# Patient Record
Sex: MALE | Race: WHITE | NOT HISPANIC OR LATINO | Employment: OTHER | URBAN - METROPOLITAN AREA
[De-identification: names, ages, dates, MRNs, and addresses within clinical notes are randomized per-mention and may not be internally consistent; named-entity substitution may affect disease eponyms.]

---

## 2023-01-31 PROBLEM — G47.00 INSOMNIA: Status: ACTIVE | Noted: 2023-01-31

## 2023-01-31 PROBLEM — G47.30 SLEEP APNEA: Status: ACTIVE | Noted: 2023-01-31

## 2023-01-31 PROBLEM — E78.00 HYPERCHOLESTEROLEMIA: Status: ACTIVE | Noted: 2023-01-31

## 2023-01-31 PROBLEM — R74.8 ELEVATED LIVER ENZYMES: Status: ACTIVE | Noted: 2023-01-31

## 2023-01-31 PROBLEM — I10 BENIGN ESSENTIAL HYPERTENSION: Status: ACTIVE | Noted: 2023-01-31

## 2023-01-31 PROBLEM — E11.9 TYPE 2 DIABETES MELLITUS (MULTI): Status: ACTIVE | Noted: 2023-01-31

## 2023-01-31 PROBLEM — I83.90 VARICOSE VEIN OF LEG: Status: ACTIVE | Noted: 2023-01-31

## 2023-01-31 PROBLEM — M75.01 ADHESIVE CAPSULITIS OF RIGHT SHOULDER: Status: ACTIVE | Noted: 2023-01-31

## 2023-01-31 PROBLEM — M67.813 BICEPS TENDONOSIS OF RIGHT SHOULDER: Status: ACTIVE | Noted: 2023-01-31

## 2023-01-31 PROBLEM — M06.9 RHEUMATOID ARTHRITIS (MULTI): Status: ACTIVE | Noted: 2023-01-31

## 2023-01-31 PROBLEM — R01.1 MURMUR: Status: ACTIVE | Noted: 2023-01-31

## 2023-01-31 PROBLEM — E66.3 OVERWEIGHT WITH BODY MASS INDEX (BMI) OF 27 TO 27.9 IN ADULT: Status: ACTIVE | Noted: 2023-01-31

## 2023-01-31 PROBLEM — K21.9 GERD (GASTROESOPHAGEAL REFLUX DISEASE): Status: ACTIVE | Noted: 2023-01-31

## 2023-01-31 RX ORDER — ATORVASTATIN CALCIUM 40 MG/1
40 TABLET, FILM COATED ORAL DAILY
COMMUNITY
Start: 2011-12-07 | End: 2023-03-14 | Stop reason: SDUPTHER

## 2023-01-31 RX ORDER — LISINOPRIL 40 MG/1
40 TABLET ORAL DAILY
COMMUNITY
Start: 2019-02-28 | End: 2023-08-02

## 2023-01-31 RX ORDER — AZELASTINE 1 MG/ML
SPRAY, METERED NASAL
COMMUNITY
Start: 2022-08-10

## 2023-01-31 RX ORDER — METFORMIN HYDROCHLORIDE 500 MG/1
1000 TABLET, EXTENDED RELEASE ORAL
COMMUNITY
Start: 2012-06-22 | End: 2023-12-04 | Stop reason: SDUPTHER

## 2023-01-31 RX ORDER — TIZANIDINE 2 MG/1
2 TABLET ORAL NIGHTLY
COMMUNITY
Start: 2022-09-13 | End: 2024-02-07 | Stop reason: ALTCHOICE

## 2023-01-31 RX ORDER — PANTOPRAZOLE SODIUM 40 MG/1
1 TABLET, DELAYED RELEASE ORAL DAILY
COMMUNITY
Start: 2020-04-27 | End: 2023-05-23

## 2023-01-31 RX ORDER — FOLIC ACID 1 MG/1
1 TABLET ORAL DAILY
COMMUNITY
Start: 2012-05-22

## 2023-01-31 RX ORDER — DULAGLUTIDE 3 MG/.5ML
INJECTION, SOLUTION SUBCUTANEOUS
COMMUNITY
Start: 2022-02-09 | End: 2023-12-04

## 2023-01-31 RX ORDER — DAPAGLIFLOZIN 10 MG/1
10 TABLET, FILM COATED ORAL DAILY
COMMUNITY
Start: 2022-02-09 | End: 2023-12-04 | Stop reason: SDUPTHER

## 2023-01-31 RX ORDER — SILDENAFIL 100 MG/1
TABLET, FILM COATED ORAL
COMMUNITY
Start: 2018-12-19

## 2023-01-31 RX ORDER — PIOGLITAZONEHYDROCHLORIDE 15 MG/1
TABLET ORAL
COMMUNITY
Start: 2022-06-08 | End: 2023-12-04 | Stop reason: SDUPTHER

## 2023-01-31 RX ORDER — LEVOCETIRIZINE DIHYDROCHLORIDE 5 MG/1
5 TABLET, FILM COATED ORAL
COMMUNITY
Start: 2021-09-27

## 2023-01-31 RX ORDER — PREDNISONE 20 MG/1
TABLET ORAL
COMMUNITY
Start: 2022-04-05 | End: 2023-12-04 | Stop reason: ALTCHOICE

## 2023-01-31 RX ORDER — LEFLUNOMIDE 20 MG/1
20 TABLET ORAL
COMMUNITY
Start: 2016-12-01

## 2023-01-31 RX ORDER — INSULIN GLARGINE 100 [IU]/ML
INJECTION, SOLUTION SUBCUTANEOUS
COMMUNITY
Start: 2022-06-08 | End: 2023-12-04 | Stop reason: SDUPTHER

## 2023-01-31 RX ORDER — METHOTREXATE 25 MG/ML
50 INJECTION, SOLUTION INTRA-ARTERIAL; INTRAMUSCULAR; INTRAVENOUS
COMMUNITY
Start: 2017-03-20

## 2023-01-31 RX ORDER — MONTELUKAST SODIUM 10 MG/1
TABLET ORAL
COMMUNITY
Start: 2021-09-27

## 2023-01-31 RX ORDER — SYRINGE AND NEEDLE,INSULIN,1ML 25GX1"
SYRINGE, EMPTY DISPOSABLE MISCELLANEOUS
COMMUNITY

## 2023-01-31 RX ORDER — CERTOLIZUMAB PEGOL 400 MG
KIT SUBCUTANEOUS
COMMUNITY
Start: 2013-03-22 | End: 2023-12-06 | Stop reason: ALTCHOICE

## 2023-03-14 ENCOUNTER — OFFICE VISIT (OUTPATIENT)
Dept: PRIMARY CARE | Facility: CLINIC | Age: 68
End: 2023-03-14
Payer: MEDICARE

## 2023-03-14 VITALS
BODY MASS INDEX: 27.12 KG/M2 | OXYGEN SATURATION: 94 % | HEIGHT: 75 IN | DIASTOLIC BLOOD PRESSURE: 77 MMHG | SYSTOLIC BLOOD PRESSURE: 110 MMHG | HEART RATE: 82 BPM

## 2023-03-14 DIAGNOSIS — Z23 ENCOUNTER FOR IMMUNIZATION: Primary | ICD-10-CM

## 2023-03-14 DIAGNOSIS — Z00.00 ROUTINE GENERAL MEDICAL EXAMINATION AT HEALTH CARE FACILITY: ICD-10-CM

## 2023-03-14 DIAGNOSIS — R01.1 MURMUR: ICD-10-CM

## 2023-03-14 DIAGNOSIS — G47.37 CENTRAL SLEEP APNEA DUE TO MEDICAL CONDITION: ICD-10-CM

## 2023-03-14 DIAGNOSIS — Z12.11 SCREENING FOR COLORECTAL CANCER: ICD-10-CM

## 2023-03-14 DIAGNOSIS — E66.3 OVERWEIGHT WITH BODY MASS INDEX (BMI) OF 27 TO 27.9 IN ADULT: ICD-10-CM

## 2023-03-14 DIAGNOSIS — M06.9 RHEUMATOID ARTHRITIS INVOLVING MULTIPLE SITES, UNSPECIFIED WHETHER RHEUMATOID FACTOR PRESENT (MULTI): ICD-10-CM

## 2023-03-14 DIAGNOSIS — F51.01 PRIMARY INSOMNIA: ICD-10-CM

## 2023-03-14 DIAGNOSIS — Z12.12 SCREENING FOR COLORECTAL CANCER: ICD-10-CM

## 2023-03-14 DIAGNOSIS — K21.9 GASTROESOPHAGEAL REFLUX DISEASE WITHOUT ESOPHAGITIS: ICD-10-CM

## 2023-03-14 DIAGNOSIS — I10 BENIGN ESSENTIAL HYPERTENSION: ICD-10-CM

## 2023-03-14 DIAGNOSIS — E11.9 TYPE 2 DIABETES MELLITUS WITHOUT COMPLICATION, WITHOUT LONG-TERM CURRENT USE OF INSULIN (MULTI): ICD-10-CM

## 2023-03-14 PROCEDURE — 3074F SYST BP LT 130 MM HG: CPT | Performed by: NURSE PRACTITIONER

## 2023-03-14 PROCEDURE — 99213 OFFICE O/P EST LOW 20 MIN: CPT | Performed by: NURSE PRACTITIONER

## 2023-03-14 PROCEDURE — 1159F MED LIST DOCD IN RCRD: CPT | Performed by: NURSE PRACTITIONER

## 2023-03-14 PROCEDURE — 1036F TOBACCO NON-USER: CPT | Performed by: NURSE PRACTITIONER

## 2023-03-14 PROCEDURE — 4010F ACE/ARB THERAPY RXD/TAKEN: CPT | Performed by: NURSE PRACTITIONER

## 2023-03-14 PROCEDURE — 1170F FXNL STATUS ASSESSED: CPT | Performed by: NURSE PRACTITIONER

## 2023-03-14 PROCEDURE — 3008F BODY MASS INDEX DOCD: CPT | Performed by: NURSE PRACTITIONER

## 2023-03-14 PROCEDURE — 90677 PCV20 VACCINE IM: CPT | Performed by: NURSE PRACTITIONER

## 2023-03-14 PROCEDURE — G0009 ADMIN PNEUMOCOCCAL VACCINE: HCPCS | Performed by: NURSE PRACTITIONER

## 2023-03-14 PROCEDURE — G0439 PPPS, SUBSEQ VISIT: HCPCS | Performed by: NURSE PRACTITIONER

## 2023-03-14 PROCEDURE — 1160F RVW MEDS BY RX/DR IN RCRD: CPT | Performed by: NURSE PRACTITIONER

## 2023-03-14 PROCEDURE — 3078F DIAST BP <80 MM HG: CPT | Performed by: NURSE PRACTITIONER

## 2023-03-14 RX ORDER — TRAZODONE HYDROCHLORIDE 50 MG/1
1 TABLET ORAL DAILY
COMMUNITY
Start: 2023-02-27 | End: 2023-12-06 | Stop reason: SDUPTHER

## 2023-03-14 RX ORDER — ATORVASTATIN CALCIUM 40 MG/1
40 TABLET, FILM COATED ORAL DAILY
Qty: 90 TABLET | Refills: 3 | Status: SHIPPED | OUTPATIENT
Start: 2023-03-14 | End: 2024-04-09 | Stop reason: SDUPTHER

## 2023-03-14 ASSESSMENT — ENCOUNTER SYMPTOMS
OCCASIONAL FEELINGS OF UNSTEADINESS: 0
LOSS OF SENSATION IN FEET: 0
DEPRESSION: 0

## 2023-03-14 ASSESSMENT — ACTIVITIES OF DAILY LIVING (ADL)
BATHING: INDEPENDENT
MANAGING_FINANCES: INDEPENDENT
DRESSING: INDEPENDENT
DOING_HOUSEWORK: INDEPENDENT
TAKING_MEDICATION: INDEPENDENT
GROCERY_SHOPPING: INDEPENDENT

## 2023-03-14 ASSESSMENT — PATIENT HEALTH QUESTIONNAIRE - PHQ9
1. LITTLE INTEREST OR PLEASURE IN DOING THINGS: NOT AT ALL
SUM OF ALL RESPONSES TO PHQ9 QUESTIONS 1 AND 2: 0
2. FEELING DOWN, DEPRESSED OR HOPELESS: NOT AT ALL

## 2023-03-14 NOTE — PATIENT INSTRUCTIONS
Thank you for seeing me today.  It was a pleasure to see you again!    Today we did your Annual Medicare Wellness Exam and discussed the following:     Continue all medications     Prevnar 20 today     RTC 6 MONTHS AND RTC AS NEEDED

## 2023-03-14 NOTE — PROGRESS NOTES
"Subjective   Reason for Visit: Rodrigue Palacios is an 67 y.o. male here for a Medicare Wellness visit.     Reviewed all medications by prescribing practitioner or clinical pharmacist (such as prescriptions, OTCs, herbal therapies and supplements) and documented in the medical record.    HPI  68 yo male presents for 6 mo f/u and AWV     PMH: RA, HLD, HTN, T2DM, GERD, ED     Pt did PSA in Jan 2022 per urologist, Dr. Mensah     #SCOOTER  using CPAP nightly  feels more awake during the day   Tx per Vipin     #T2DM  seeing Dr. Christiansen; has upcoming appt.   Taking Metformin 1000 mg BID  Last A1C= 7% Feb 2022  statin: Atorvastatin   eye exam: 2021  neuropathy: denies    #HTN  Taking Lisinopril 40 mg daily  BP today= 110/77    #HLD  Taking Atorvastatin 40 mg daily  FLP UTD     #RA  seeing Dr. Barrera; LOV   Taking Methotrexate and Cimzia   Has new pill for sleep    #GERD  Taking Protonix 40 mg daily  Sx: controlled     #ED  Rx Viagra as needed    #HM  COLON: due w/Dr. Rios   PSA: UTD  FBW: UTD   VACCINES: needs prevnar 20 vaccine     Patient Care Team:  SHERRY Blanco-CNP as PCP - General     Review of Systems    Objective   Vitals:  /77   Pulse 82   Ht 1.905 m (6' 3\")   SpO2 94%   BMI 27.12 kg/m²       Physical Exam  Constitutional:       Appearance: Normal appearance. He is normal weight.   HENT:      Right Ear: Tympanic membrane normal. There is no impacted cerumen.      Left Ear: Tympanic membrane normal. There is no impacted cerumen.      Mouth/Throat:      Mouth: Mucous membranes are moist.   Eyes:      Conjunctiva/sclera: Conjunctivae normal.   Cardiovascular:      Rate and Rhythm: Normal rate.      Pulses: Normal pulses.      Heart sounds: Normal heart sounds.   Pulmonary:      Effort: Pulmonary effort is normal.   Abdominal:      General: Abdomen is flat. Bowel sounds are normal.      Palpations: Abdomen is soft.   Musculoskeletal:         General: Normal range of motion.   Skin:     General: Skin is " warm and dry.      Capillary Refill: Capillary refill takes less than 2 seconds.   Neurological:      General: No focal deficit present.      Mental Status: He is alert.   Psychiatric:         Mood and Affect: Mood normal.         Assessment/Plan   Problem List Items Addressed This Visit          Nervous    Insomnia    Sleep apnea       Circulatory    Benign essential hypertension    Murmur       Digestive    GERD (gastroesophageal reflux disease)       Endocrine/Metabolic    Type 2 diabetes mellitus (CMS/[facility])    Overweight with body mass index (BMI) of 27 to 27.9 in adult       Other    Rheumatoid arthritis (CMS/[facility])    Encounter for immunization - Primary    Relevant Orders    Pneumococcal conjugate vaccine, 20-valent, adult (PREVNAR 20)

## 2023-05-22 DIAGNOSIS — K21.9 GASTROESOPHAGEAL REFLUX DISEASE WITHOUT ESOPHAGITIS: Primary | ICD-10-CM

## 2023-05-23 RX ORDER — PANTOPRAZOLE SODIUM 40 MG/1
TABLET, DELAYED RELEASE ORAL
Qty: 90 TABLET | Refills: 3 | Status: SHIPPED | OUTPATIENT
Start: 2023-05-23 | End: 2024-06-04

## 2023-06-05 ENCOUNTER — DOCUMENTATION (OUTPATIENT)
Dept: PRIMARY CARE | Facility: CLINIC | Age: 68
End: 2023-06-05

## 2023-08-01 DIAGNOSIS — I10 BENIGN ESSENTIAL HYPERTENSION: Primary | ICD-10-CM

## 2023-08-02 RX ORDER — LISINOPRIL 40 MG/1
40 TABLET ORAL DAILY
Qty: 90 TABLET | Refills: 0 | Status: SHIPPED | OUTPATIENT
Start: 2023-08-02 | End: 2023-11-06

## 2023-11-05 DIAGNOSIS — I10 BENIGN ESSENTIAL HYPERTENSION: ICD-10-CM

## 2023-11-06 RX ORDER — LISINOPRIL 40 MG/1
40 TABLET ORAL DAILY
Qty: 90 TABLET | Refills: 0 | Status: SHIPPED | OUTPATIENT
Start: 2023-11-06 | End: 2024-02-13

## 2023-11-17 LAB — HEMOGLOBIN A1C/HEMOGLOBIN TOTAL IN BLOOD EXTERNAL: 6.7 %

## 2023-12-03 NOTE — PROGRESS NOTES
HPI   69 yo with Diabetes 2 (dx in mid 50's), HTN, Dyslipidemia, RA presents for followup. Last A1c-6.7%, today 6.4%.           Pt is testing sugars 4 times per day with libre2. Pt is having low sugars 0 times/week. Pt is following a carb controlled diet and knows reasonable carb allowances. Pt is able to afford their medications. Pt is exercising, walking a bit more, back has improved.           Pt taking metformin, farxiga 10mg (taking 1/2 to help with cost), milan 15mg , lantus pen 20 qhs. Can't afford trulicity, doesn't qualify for pt assistance.    90 day brandyn 2 data: 94% in range, 1% low, pattern: low 100's overnight into waking, mid 100's through most of day, missing scans frequently.           Home bp 120/70's, taking lisinopril 40mg and amlopidine 5 qd.           Taking atorvastatin 20mg for lipids and tolerating.           -90 day libre2 data: 85% in range, 0% lows, pattern: low 100's overnight, mid 100's on waking, lunch, dinner, after dinner upper 100's, bedtime low 100's.    -pt never repeated labs      Current Outpatient Medications:     atorvastatin (Lipitor) 40 mg tablet, Take 1 tablet (40 mg) by mouth once daily., Disp: 90 tablet, Rfl: 3    azelastine (Astelin) 137 mcg (0.1 %) nasal spray, INSTILL 1 SPRAY IN EACH NOSTRIL TWICE A DAY AS NEEDED FOR ALLERGY SYMPTOMS, Disp: , Rfl:     lisinopril 40 mg tablet, TAKE ONE TABLET BY MOUTH DAILY, Disp: 90 tablet, Rfl: 0    blood sugar diagnostic (ONETOUCH ULTRA BLUE TEST STRIP MISC), TEST ONCE DAILY., Disp: , Rfl:     certolizumab pegol (Cimzia Powder for Reconst) injection, INJECT 400MG ([id number]) SUBCUTANEOUSLY EVERY 4 WEEKS FOR CROHN'S, RHEUMATOID ARTHRITIS, OR PSORIATIC ARTHRITIS, Disp: , Rfl:     dapagliflozin propanediol (Farxiga) 10 mg, Take 1 tablet (10 mg) by mouth once daily., Disp: 90 tablet, Rfl: 3    folic acid (Folvite) 1 mg tablet, 1 tablet (1 mg) once daily., Disp: , Rfl:     insulin glargine (Lantus) 100 unit/mL (3 mL) pen, Up to 30 units daily  "as directed, Disp: 30 mL, Rfl: 3    insulin syringe-needle U-100 (BD Insulin Syringe) 1 mL 26 x 1/2\" syringe, , Disp: , Rfl:     leflunomide (Arava) 20 mg tablet, 1 tablet (20 mg)., Disp: , Rfl:     levocetirizine (Xyzal) 5 mg tablet, Take 1 tablet (5 mg) by mouth., Disp: , Rfl:     metFORMIN  mg 24 hr tablet, Take 2 tablets (1,000 mg) by mouth 2 times a day with meals. TWICE DAILY, Disp: 360 tablet, Rfl: 3    methotrexate 25 mg/mL injection, 50 mg/m2., Disp: , Rfl:     montelukast (Singulair) 10 mg tablet, TAKE ONE TABLET BY MOUTH EVERY DAY IN THE EVENING, Disp: , Rfl:     pantoprazole (ProtoNix) 40 mg EC tablet, TAKE ONE TABLET BY MOUTH EVERY DAY, Disp: 90 tablet, Rfl: 3    pen needle, diabetic 32 gauge x 5/32\" needle, Use daily, Disp: 100 each, Rfl: 3    pioglitazone (Actos) 15 mg tablet, TAKE 1 TABLET BY MOUTH ONE TIME DAILY, Disp: 90 tablet, Rfl: 3    sildenafil (Viagra) 100 mg tablet, Sildenafil Citrate 100 MG Oral Tablet  Quantity: 6  Refills: 0      Start : 19-Dec-2018  Active, Disp: , Rfl:     tiZANidine (Zanaflex) 2 mg tablet, Take 1 tablet (2 mg) by mouth once daily at bedtime., Disp: , Rfl:     traZODone (Desyrel) 50 mg tablet, Take 1 tablet (50 mg) by mouth once daily., Disp: , Rfl:       Allergies as of 12/04/2023 - Reviewed 12/04/2023   Allergen Reaction Noted    Orencia [abatacept] Unknown 03/14/2023         Review of Systems   Cardiology: Lightheadedness-denies.  Chest pain-denies.  Leg edema-denies.  Palpitations-denies.  Respiratory: Cough-denies. Shortness of breath-denies.  Wheezing-denies.  Gastroenterology: Constipation-denies.  Diarrhea-denies.  Heartburn-denies.  Endocrinology: Cold intolerance-denies.  Heat intolerance-denies.  Sweats-denies.  Neurology: Headache-denies.  Tremor-denies.  Neuropathy in extremities-denies.  Psychology: Low energy-denies.  Irritability-denies.  Sleep disturbances-denies.      /64   Pulse 60   Wt 104 kg (229 lb 9.6 oz)   BMI 28.70 kg/m² "       Labs:  Lab Results   Component Value Date    WBC 6.5 10/27/2022    NRBC 0 03/08/2022    RBC 5.02 10/27/2022    HGB 14.6 10/27/2022    HCT 43.8 10/27/2022     10/27/2022     Lab Results   Component Value Date    CALCIUM 9.5 10/27/2022    AST 12 03/08/2022    ALKPHOS 62 03/08/2022    BILITOT 0.5 03/08/2022    PROT 6.2 03/08/2022    ALBUMIN 4.2 03/08/2022    GLOB 2.0 03/08/2022    AGR 2.1 03/08/2022     10/27/2022    K 4.0 10/27/2022     10/27/2022    CO2 27 10/27/2022    ANIONGAP 13 10/27/2022    BUN 28 (H) 10/27/2022    CREATININE 1.18 10/27/2022    UREACREAUR 19.2 03/08/2022    GLUCOSE 171 (H) 10/27/2022    ALT 6 03/08/2022    EGFR 61 03/08/2022     Lab Results   Component Value Date    CHOL 174 03/08/2022    TRIG 279 (H) 03/08/2022    HDL 38 (L) 03/08/2022    LDLCALC 80 03/08/2022     Lab Results   Component Value Date    MICROALBCREA 15.5 03/08/2022     Lab Results   Component Value Date    TSH 0.49 07/24/2020     Lab Results   Component Value Date    YQSMXQFJ29 786 03/08/2022     Lab Results   Component Value Date    HGBA1C 6.4 12/04/2023         Physical Exam   General Appearance: pleasant, cooperative, no acute distress  HEENT: no chemosis, no proptosis, no lid lag, no lid retraction  Neck: no lymphadenopathy, no thyromegaly, no dominant thyroid nodules  Heart: no murmurs, regular rate and rhythm, S1 and S2  Lungs: no wheezes, no rhonci, no rales  Extremities: no lower extremity swelling      Assessment/Plan   1. Type 2 diabetes mellitus with other specified complication, with long-term current use of insulin (CMS/[facility])  -ordered and reviewed hgba1c today  -reviewed 90 day of brandyn 2 data, needs to not miss as many scans  -pt is more active, lower insulin in 2 unit intervals for am sugars <100  -discussed compression lows as he can get some while sleeping overnight    2. Hypercholesterolemia  -on statin and tolerating    3. Benign essential hypertension  -at target on therapy, reviewed  home readings         Follow Up:  Kuldip 6 months    -labs/tests/notes reviewed  -reviewed and counseled patient on medication monitoring and side effects

## 2023-12-04 ENCOUNTER — OFFICE VISIT (OUTPATIENT)
Dept: ENDOCRINOLOGY | Facility: CLINIC | Age: 68
End: 2023-12-04
Payer: MEDICARE

## 2023-12-04 VITALS
WEIGHT: 229.6 LBS | SYSTOLIC BLOOD PRESSURE: 138 MMHG | BODY MASS INDEX: 28.7 KG/M2 | DIASTOLIC BLOOD PRESSURE: 64 MMHG | HEART RATE: 60 BPM

## 2023-12-04 DIAGNOSIS — E78.00 HYPERCHOLESTEROLEMIA: ICD-10-CM

## 2023-12-04 DIAGNOSIS — I10 BENIGN ESSENTIAL HYPERTENSION: ICD-10-CM

## 2023-12-04 DIAGNOSIS — E11.69 TYPE 2 DIABETES MELLITUS WITH OTHER SPECIFIED COMPLICATION, WITH LONG-TERM CURRENT USE OF INSULIN (MULTI): Primary | ICD-10-CM

## 2023-12-04 DIAGNOSIS — Z79.4 TYPE 2 DIABETES MELLITUS WITH OTHER SPECIFIED COMPLICATION, WITH LONG-TERM CURRENT USE OF INSULIN (MULTI): Primary | ICD-10-CM

## 2023-12-04 LAB — POC HEMOGLOBIN A1C: 6.4 % (ref 4.2–6.5)

## 2023-12-04 PROCEDURE — 4010F ACE/ARB THERAPY RXD/TAKEN: CPT | Performed by: INTERNAL MEDICINE

## 2023-12-04 PROCEDURE — 3075F SYST BP GE 130 - 139MM HG: CPT | Performed by: INTERNAL MEDICINE

## 2023-12-04 PROCEDURE — 3078F DIAST BP <80 MM HG: CPT | Performed by: INTERNAL MEDICINE

## 2023-12-04 PROCEDURE — 99214 OFFICE O/P EST MOD 30 MIN: CPT | Performed by: INTERNAL MEDICINE

## 2023-12-04 PROCEDURE — 3044F HG A1C LEVEL LT 7.0%: CPT | Performed by: INTERNAL MEDICINE

## 2023-12-04 PROCEDURE — 1159F MED LIST DOCD IN RCRD: CPT | Performed by: INTERNAL MEDICINE

## 2023-12-04 PROCEDURE — 83036 HEMOGLOBIN GLYCOSYLATED A1C: CPT | Performed by: INTERNAL MEDICINE

## 2023-12-04 PROCEDURE — 1160F RVW MEDS BY RX/DR IN RCRD: CPT | Performed by: INTERNAL MEDICINE

## 2023-12-04 PROCEDURE — 1036F TOBACCO NON-USER: CPT | Performed by: INTERNAL MEDICINE

## 2023-12-04 PROCEDURE — 3008F BODY MASS INDEX DOCD: CPT | Performed by: INTERNAL MEDICINE

## 2023-12-04 PROCEDURE — 1125F AMNT PAIN NOTED PAIN PRSNT: CPT | Performed by: INTERNAL MEDICINE

## 2023-12-04 PROCEDURE — 95251 CONT GLUC MNTR ANALYSIS I&R: CPT | Performed by: INTERNAL MEDICINE

## 2023-12-04 RX ORDER — DAPAGLIFLOZIN 10 MG/1
10 TABLET, FILM COATED ORAL DAILY
Qty: 90 TABLET | Refills: 3 | Status: SHIPPED | OUTPATIENT
Start: 2023-12-04 | End: 2024-12-03

## 2023-12-04 RX ORDER — PIOGLITAZONEHYDROCHLORIDE 15 MG/1
15 TABLET ORAL DAILY
Qty: 90 TABLET | Refills: 3 | Status: SHIPPED | OUTPATIENT
Start: 2023-12-04 | End: 2024-12-03

## 2023-12-04 RX ORDER — PEN NEEDLE, DIABETIC 30 GX3/16"
NEEDLE, DISPOSABLE MISCELLANEOUS
Qty: 100 EACH | Refills: 3 | Status: SHIPPED | OUTPATIENT
Start: 2023-12-04

## 2023-12-04 RX ORDER — INSULIN GLARGINE 100 [IU]/ML
INJECTION, SOLUTION SUBCUTANEOUS
Qty: 30 ML | Refills: 3 | Status: SHIPPED | OUTPATIENT
Start: 2023-12-04

## 2023-12-04 RX ORDER — METFORMIN HYDROCHLORIDE 500 MG/1
1000 TABLET, EXTENDED RELEASE ORAL
Qty: 360 TABLET | Refills: 3 | Status: SHIPPED | OUTPATIENT
Start: 2023-12-04 | End: 2024-12-03

## 2023-12-04 ASSESSMENT — PATIENT HEALTH QUESTIONNAIRE - PHQ9
2. FEELING DOWN, DEPRESSED OR HOPELESS: NOT AT ALL
1. LITTLE INTEREST OR PLEASURE IN DOING THINGS: NOT AT ALL
SUM OF ALL RESPONSES TO PHQ9 QUESTIONS 1 & 2: 0

## 2023-12-04 ASSESSMENT — ENCOUNTER SYMPTOMS
LOSS OF SENSATION IN FEET: 0
OCCASIONAL FEELINGS OF UNSTEADINESS: 0
DEPRESSION: 0

## 2023-12-04 ASSESSMENT — PAIN SCALES - GENERAL: PAINLEVEL: 2

## 2023-12-06 ENCOUNTER — OFFICE VISIT (OUTPATIENT)
Dept: SLEEP MEDICINE | Facility: CLINIC | Age: 68
End: 2023-12-06
Payer: MEDICARE

## 2023-12-06 VITALS
SYSTOLIC BLOOD PRESSURE: 163 MMHG | BODY MASS INDEX: 28.7 KG/M2 | OXYGEN SATURATION: 95 % | DIASTOLIC BLOOD PRESSURE: 83 MMHG | HEART RATE: 68 BPM | WEIGHT: 229.6 LBS

## 2023-12-06 DIAGNOSIS — G47.09 OTHER INSOMNIA: ICD-10-CM

## 2023-12-06 DIAGNOSIS — E66.3 OVERWEIGHT: ICD-10-CM

## 2023-12-06 DIAGNOSIS — G47.33 OSA (OBSTRUCTIVE SLEEP APNEA): Primary | ICD-10-CM

## 2023-12-06 DIAGNOSIS — I10 BENIGN ESSENTIAL HYPERTENSION: ICD-10-CM

## 2023-12-06 PROCEDURE — 4010F ACE/ARB THERAPY RXD/TAKEN: CPT

## 2023-12-06 PROCEDURE — 3079F DIAST BP 80-89 MM HG: CPT

## 2023-12-06 PROCEDURE — 3077F SYST BP >= 140 MM HG: CPT

## 2023-12-06 PROCEDURE — 1160F RVW MEDS BY RX/DR IN RCRD: CPT

## 2023-12-06 PROCEDURE — 1159F MED LIST DOCD IN RCRD: CPT

## 2023-12-06 PROCEDURE — 3044F HG A1C LEVEL LT 7.0%: CPT

## 2023-12-06 PROCEDURE — 1036F TOBACCO NON-USER: CPT

## 2023-12-06 PROCEDURE — 3008F BODY MASS INDEX DOCD: CPT

## 2023-12-06 PROCEDURE — 99214 OFFICE O/P EST MOD 30 MIN: CPT

## 2023-12-06 PROCEDURE — 1125F AMNT PAIN NOTED PAIN PRSNT: CPT

## 2023-12-06 RX ORDER — TRAZODONE HYDROCHLORIDE 50 MG/1
50-100 TABLET ORAL DAILY
Qty: 180 TABLET | Refills: 3 | Status: SHIPPED | OUTPATIENT
Start: 2023-12-06 | End: 2024-12-05

## 2023-12-06 ASSESSMENT — PATIENT HEALTH QUESTIONNAIRE - PHQ9
SUM OF ALL RESPONSES TO PHQ9 QUESTIONS 1 AND 2: 0
1. LITTLE INTEREST OR PLEASURE IN DOING THINGS: NOT AT ALL
2. FEELING DOWN, DEPRESSED OR HOPELESS: NOT AT ALL

## 2023-12-06 ASSESSMENT — SLEEP AND FATIGUE QUESTIONNAIRES
SLEEP_PROBLEM_NOTICEABLE_TO_OTHERS: A LITTLE
SITING INACTIVE IN A PUBLIC PLACE LIKE A CLASS ROOM OR A MOVIE THEATER: WOULD NEVER DOZE
SATISFACTION_WITH_CURRENT_SLEEP_PATTERN: DISSATISFIED
HOW LIKELY ARE YOU TO NOD OFF OR FALL ASLEEP IN A CAR, WHILE STOPPED FOR A FEW MINUTES IN TRAFFIC: WOULD NEVER DOZE
WORRIED_DISTRESSED_DUE_TO_SLEEP: A LITTLE
HOW LIKELY ARE YOU TO NOD OFF OR FALL ASLEEP WHILE LYING DOWN TO REST IN THE AFTERNOON WHEN CIRCUMSTANCES PERMIT: SLIGHT CHANCE OF DOZING
SLEEP_PROBLEM_INTERFERES_DAILY_ACTIVITIES: SOMEWHAT
HOW LIKELY ARE YOU TO NOD OFF OR FALL ASLEEP WHILE SITTING AND TALKING TO SOMEONE: WOULD NEVER DOZE
ESS-CHAD TOTAL SCORE: 3
WAKING_TOO_EARLY: MODERATE
DIFFICULTY_STAYING_ASLEEP: MODERATE
HOW LIKELY ARE YOU TO NOD OFF OR FALL ASLEEP WHEN YOU ARE A PASSENGER IN A CAR FOR AN HOUR WITHOUT A BREAK: WOULD NEVER DOZE
HOW LIKELY ARE YOU TO NOD OFF OR FALL ASLEEP WHILE WATCHING TV: SLIGHT CHANCE OF DOZING
HOW LIKELY ARE YOU TO NOD OFF OR FALL ASLEEP WHILE SITTING AND READING: SLIGHT CHANCE OF DOZING
DIFFICULTY_FALLING_ASLEEP: MILD
HOW LIKELY ARE YOU TO NOD OFF OR FALL ASLEEP WHILE SITTING QUIETLY AFTER LUNCH WITHOUT ALCOHOL: WOULD NEVER DOZE

## 2023-12-06 ASSESSMENT — ANXIETY QUESTIONNAIRES
5. BEING SO RESTLESS THAT IT IS HARD TO SIT STILL: NOT AT ALL
3. WORRYING TOO MUCH ABOUT DIFFERENT THINGS: SEVERAL DAYS
1. FEELING NERVOUS, ANXIOUS, OR ON EDGE: SEVERAL DAYS
GAD7 TOTAL SCORE: 3
4. TROUBLE RELAXING: SEVERAL DAYS
7. FEELING AFRAID AS IF SOMETHING AWFUL MIGHT HAPPEN: NOT AT ALL
2. NOT BEING ABLE TO STOP OR CONTROL WORRYING: NOT AT ALL
6. BECOMING EASILY ANNOYED OR IRRITABLE: NOT AT ALL

## 2023-12-06 NOTE — PATIENT INSTRUCTIONS
It was a pleasure meeting you today Rodrigue Palacios     As we discussed today in clinic:    1. Continue with your current CPAP setting.   2. Encouraged to lose weight.   3. Remember, don't drive when sleepy.   4. Stay off your back when sleeping.  5. You can take 1 - 2 pills of trazodone at night to help you sleep       As a general guideline, please replace your: PAP cushions every 2-4 weeks, mask every 3-6 months, hose every 3-6 months, Filter (disposable) every 2-4 weeks; machine may need replacement in 5-10 years (once they stop working).     Education handouts given: PAP Handouts / Cleaning Schedule    Follow-up: 2 months      ALWAYS BRING YOUR CPAP / BIPAP WITH YOU TO EVERY APPOINTMENT!  THANKS    FOR QUESTIONS AND CONCERNS:   1. In case of problems with machine or mask interface, please contact your DME company first. DME is the company that provides you the machine and/or CPAP supplies. If [employer] if your DME, you can reach them at [phone] or [employer] ([employer]) [phone].  2. For SLEEP STUDY appointments, please call [phone] ([facility]) or [phone] / [phone] ([facility]) or any other  Sleep Lab location please call [phone]  3. For MEDICAL QUESTIONS, MEDICATION REFILLS, or CLINIC APPOINTMENT SCHEDULING, please call [phone] and my practice lead Kadie would gladly assist you with any concerns.   4. In the event that you are running more than 10 minutes late to your appointment or 5 minutes to virtual, I will kindly ask you to reschedule.    Here at [facility], we wish you a restful sleep!

## 2023-12-06 NOTE — LETTER
Frequency of replacement of CPAP / BIPAP supplies as per Medicare guidelines  (This frequency of replacement can be different with private insurances or Medicaid)    Nasal mask, full face mask, tubing, heated tubing - 1 per 3 months  Headgear, water chamber, chin strap, reusable filter - 1 per 6 months  Disposable filter, replacement cushion, nasal pillows - 2 per month  Replacement cushion / liner for interface - 1 per month    CPAP / BIPAP Cleaning Recommendations    There are several specialized CPAP cleaning machines on the market. None of them are as good as soap and water. The only thing that you need to clean daily is the part of the mask that contacts your skin also known as the mask insert. This mask insert needs to be cleaned with soap and water daily. Another option is to use baby wipes daily.     The rest of the mask, the water chamber, and the tubing should be cleaned at least once a week.    The water tank needs to be filled with distilled water to prevent buildup of white deposits in the future. If you cannot find distilled water, you can use tap water but expect to have white deposits buildup seen after prolonged use with tap water. If you start seeing white deposits on the water tank, you can clean it by filling it with equal parts of distilled white vinegar and water. Let the vinegar-water mixture sit for 2 hours, and then rinse it with running tap water. Clean with soap and water then let it dry.

## 2023-12-06 NOTE — PROGRESS NOTES
Patient: Danna Palacios  : 1955 AGE: 68 y.o. SEX:male   MRN: [id number]   Provider: YUNG Platt     Location [facility]   Service Date: 2023     PCP: YUNG Blanco   Referred by:               [facility]  FOLLOW-UP VISIT NOTE      HISTORY OF PRESENT ILLNESS     Patient ID: Danna Palacios is a 68 y.o. male who presents to a [facility] for follow-up Insomnia, SCOOTER on PAP, Hypersomnia, and Medication Management    Patient is here alone today.  The patient has pertinent hx of SCOOTER, Insomnia, sleep disturbance, EDS, fatigue, overweight, HTN, GERD, T2DM, RA and chronic pain    Previous Visit's:  2023  Mr. DANNA PALACIOS is a 67 year male who presents today for f/u SCOOTER on PAP therapy + Insomnia. Patient has a pertinent hx of SCOOTER, Insomnia, overweight, HTN, GERD, T2DM, rheumatoid arthritis, and chronic pain.   Patient is using machine every night, no issues with machine. He is tolerating pressures and mask. He has minimal positional mask leak. Patient believes they are getting adequate sleep. He was able to get a new mask after previous visit. He is comfortable with the new mask.   He is taking the Trazodone most nights, helps for the most part. He reports having an awaken at around 4 am and unable to go back to sleep ~2-3 days per week. He has definitely noticed a difference with more consistent usage. We discussed he is currently on the 150 mg. We can increase to the max dosage for sleep to 200 mg or change to a different medication. I originally talked with the patient about Ramelteon, however, insurance does not cover this medication, recommended for patient to try Tasimelteon. He is agreeable to try this medication.        2023   Referred by Pulmonary. Here to get established. Used to see Pulmonary JESSICA Caroline Baltazar CNP. Diagnosed with SCOOTER by HSAT in , currently on fixed-CPAP 8  cmH20 EPR 3.      Patient reports he is doing well overall. Patient is using machine every night, no issues with machine. He is tolerating pressures and mask. He has minimal positional mask leak. Patient believes they are getting adequate sleep. He does not think he snores on the machine. He wakes up refreshed in the morning and denies any excessive daytime sleepiness. Denies nasal congestion, dry mouth, skin irritation, aerophagia, and air hunger.     He does report however, multiple MNA, usually goes back to sleep within 30 mins. Patient is currently on Trazodone as needed, takes 1/2 dose. We discussed dosing and using more frequently. Patient educated that Trazodone can be PRN or taken every night. He is going to try and take more frequently to help with his sleep. I also instructed patient he can take a full pill as well.       Interval History  Patient was last seen in 8/2/2023.   12/06/23   Since last visit, patient has been using machine every night with clinical benefit and no issues on machine. Tolerating pressure, mask, and humidity. Per records, patient is getting supplies thru [employer]  The following are patient's perceived benefits of PAP: decreased or no snoring, decreased daytime sleepiness and/or fatigue, decreased nocturnal awakening, better quality of sleep, and improved memory, concentration, and/or mood.  He still struggles some nights with multiple MNA. I tried to prescribed a different medication last time, the multiple options were sent to pharmacy but none were covered by insurance, out-of-pocket cost was over $200.   He continues taking 50 mg of Trazodone. I discussed increasing the dosage of Trazodone today to 1.5 to 2 pills at night. I updated his prescription today.  His BP is elevated today. He denies any dizziness, lightheadedness, syncopal episodes, chest pain, SOB, changes in vision or HA. Encouraged him to follow-up with PCP.         SLEEP HISTORY     SLEEP STUDY  "HISTORY  HSAT - 2/25/2021  BMI - 26.25  AHI4% - 18.4/hr  Supine AHI - 50/hr  Non-supine AHI - 14.6/hr  SpO2 jesus - 69.9%      SLEEP ENVIRONMENT  Sleep location: bed  Sleep status: sleeps alone  Preferred sleep position: side  TV in bedroom:   Room is dark:  Yes  Room is quiet: Yes  Room is cool: Yes  Bed comfort: good    SLEEP HABITS   Activities before bedtime: TV  Activities in bed:   Clockwatching: No   Smoking: never  ETOH: 1 per week  Marijuana: denied  Caffeine: daily  Sleep aids: Trazodone     WEIGHT: stable    Claustrophobia: No     REVIEW OF SYSTEMS     REVIEW OF SYSTEMS  SLEEP ROS   Review of Systems  SLEEP ROS: tossing and turning, falling asleep while watching television, awakening in the middle of the night because of unknown, feels sleepy during the day    All other systems have been reviewed and are negative.      ALLERGIES     Allergies   Allergen Reactions    Orencia [Abatacept] Unknown       MEDICATIONS     Current Outpatient Medications   Medication Sig Dispense Refill    atorvastatin (Lipitor) 40 mg tablet Take 1 tablet (40 mg) by mouth once daily. 90 tablet 3    azelastine (Astelin) 137 mcg (0.1 %) nasal spray INSTILL 1 SPRAY IN EACH NOSTRIL TWICE A DAY AS NEEDED FOR ALLERGY SYMPTOMS      blood sugar diagnostic (ONETOUCH ULTRA BLUE TEST STRIP MISC) TEST ONCE DAILY.      dapagliflozin propanediol (Farxiga) 10 mg Take 1 tablet (10 mg) by mouth once daily. 90 tablet 3    folic acid (Folvite) 1 mg tablet 1 tablet (1 mg) once daily.      insulin glargine (Lantus) 100 unit/mL (3 mL) pen Up to 30 units daily as directed 30 mL 3    insulin syringe-needle U-100 (BD Insulin Syringe) 1 mL 26 x 1/2\" syringe       leflunomide (Arava) 20 mg tablet 1 tablet (20 mg).      levocetirizine (Xyzal) 5 mg tablet Take 1 tablet (5 mg) by mouth.      lisinopril 40 mg tablet TAKE ONE TABLET BY MOUTH DAILY 90 tablet 0    metFORMIN  mg 24 hr tablet Take 2 tablets (1,000 mg) by mouth 2 times a day with meals. TWICE " "DAILY 360 tablet 3    methotrexate 25 mg/mL injection 50 mg/m2.      montelukast (Singulair) 10 mg tablet TAKE ONE TABLET BY MOUTH EVERY DAY IN THE EVENING      pantoprazole (ProtoNix) 40 mg EC tablet TAKE ONE TABLET BY MOUTH EVERY DAY 90 tablet 3    pen needle, diabetic 32 gauge x 5/32\" needle Use daily 100 each 3    pioglitazone (Actos) 15 mg tablet TAKE 1 TABLET BY MOUTH ONE TIME DAILY 90 tablet 3    sildenafil (Viagra) 100 mg tablet Sildenafil Citrate 100 MG Oral Tablet   Quantity: 6  Refills: 0        Start : 19-Dec-2018   Active      tiZANidine (Zanaflex) 2 mg tablet Take 1 tablet (2 mg) by mouth once daily at bedtime.      traZODone (Desyrel) 50 mg tablet Take 1-2 tablets ( mg) by mouth once daily. 180 tablet 3     No current facility-administered medications for this visit.       PAST HISTORY     PERTINENT PAST MEDICAL HISTORY: See HPI    PERTINENT PAST SURGICAL HISTORY for Sleep Medicine:  non-contributory    PERTINENT FAMILY HISTORY for Sleep Medicine:  Patient denies family history of any sleep disorder.  Patient denies family history of sleep apnea.    PERTINENT SOCIAL HISTORY:  He  reports that he has never smoked. He has never used smokeless tobacco. He reports current alcohol use. He reports that he does not use drugs. He currently lives alone and retired    Active Problems, Allergy List, Medication List, and PMH/PSH/FH/Social Hx have been reviewed and reconciled in chart. No significant changes unless documented in the pertinent chart section. Updates made when necessary.     PHYSICAL EXAM     VITAL SIGNS: /83   Pulse 68   Wt 104 kg (229 lb 9.6 oz)   SpO2 95%   BMI 28.70 kg/m²     NECK CIRCUMFERENCE:     CURRENT WEIGHT:   Vitals:    12/06/23 1306   Weight: 104 kg (229 lb 9.6 oz)      BMI: Body mass index is 28.7 kg/m².     PREVIOUS WEIGHTS:  Wt Readings from Last 3 Encounters:   12/06/23 104 kg (229 lb 9.6 oz)   12/04/23 104 kg (229 lb 9.6 oz)   06/22/23 99.9 kg (220 lb 3.2 oz) " "      STOP-BAN    Today ESS: 3  Last visit ESS: 2    Today SYDNEY: 12  Last visit SYDNEY: 10    Today PHQ-2: NEGATIVE SCREEN  Last visit PHQ-2: NEGATIVE SCREEN     Today BERNARD-7: 3  Last visit BERNARD-7: 3    Physical Exam  Constitutional: Awake, not in distress  Lungs: Clear to auscultation bilateral, no cough noted  Heart: Regular rate and rhythm  Skin: Warm, no visible rashes  Neuro: No tremors, moves all extremities  Psych: Alert and oriented to time, place, and person    ENT: Modified Mallampati score - III          RESULTS/DATA     No results found for: \"IRON\", \"TRANSFERRIN\", \"IRONSAT\", \"TIBC\", \"FERRITIN\"    Bicarbonate   Date Value Ref Range Status   10/27/2022 27 21 - 32 mmol/L Final       PAP Adherence  A PAP adherence download was obtained and data was reviewed personally today in clinic.        ASSESSMENT/PLAN     Assessment/Plan   Rodrigue Palacios is a 68 y.o. male presents today in [facility] with the following problems:    CURRENT DME: [facility]    SLEEP DISORDERED BREATHING/SUSPECTED SLEEP APNEA  OBSTRUCTIVE SLEEP APNEA, moderate (HSAT AHI: 18.4/hr)  currently on fixed-CPAP 8 cmH20 EPR 3  Excellent compliance to PAP therapy, residual AHI at goal, and good control of SCOOTER symptoms  - Patient's risk factors for SCOOTER: age, neck circumference, gender, HTN, use of ETOH, and narrow crowded upper airway anatomy  - Current symptoms are:   - SCOOTER diagnosed by HSAT in . Reviewed sleep studies previously in clinic. See HPI.  - Retrieved and personally reviewed recent PAP adherence download data today. See HPI.  - patient used 30/30 day with average usage of 5 hr 53 mins & residual AHI of 2.7  - Continue current PAP settings.         SLEEP MAINTENANCE INSOMNIA + SLEEP DISTURBANCES  - likely due to combination of poor sleep hygiene, depression, anxiety, and chronic pain. Meds may be a contributing factor as well.  - discussed with patient good sleep hygiene  - we discussed his medication regimen, " discussed increasing dose to full pill as well as increasing frequency to nightly or URMILA to help with MNA  08/02/2023  - patient had some improvement with increased frequency of use of Trazodone however, he is still having x2-3 days of waking up at 4 am  - we discussed he is almost at the max dose of 200 mg of Trazodone, we initially discussed trying Ramelteon however, not covered by insurance, medication changed to Tasimelteon.   12/6/2023  - patient was unable to get any of the suggested medication from previous visit d/t not covered and out-of-pocket costs  - he is still on Trazodone, I discussed increasing to 1.5 to 2 pill at night. He is agreeable to try increased dosage. I submitted an updated prescription today.     OVERWEIGHT  - BMI today Body mass index is 28.7 kg/m².   - no significant weight changes noted or reported  - Encouraged patient to lose weight with diet and exercise.   - Weight loss can help in the long term treatment of SCOOTER.      HYPERTENSION  - BP today 163/83  - denies any headache, blurry vision, chest pain, palpitation, dizziness, lightheadedness, or syncopal episodes  - encouraged daily exercise with healthy diet for BP and SCOOTER management  - Defer management to PCP    SMOKING STATUS screen  - never a smoker    All of patient's questions were answered. He verbalizes understanding and agreement with my assessment and plan.

## 2023-12-31 DIAGNOSIS — U07.1 COVID-19: Primary | ICD-10-CM

## 2023-12-31 NOTE — PROGRESS NOTES
Pt contacted oncall service as he tested positive for covid this am and would like paxlovid, symptoms started 24hours ago.  Pt educated on need to hold statin while on this medication.  All red flags discussed, cough up to 3 weeks, viral infection take 7-10days to get better even with medication.

## 2024-02-07 ENCOUNTER — OFFICE VISIT (OUTPATIENT)
Dept: SLEEP MEDICINE | Facility: CLINIC | Age: 69
End: 2024-02-07
Payer: MEDICARE

## 2024-02-07 VITALS
OXYGEN SATURATION: 95 % | HEART RATE: 86 BPM | WEIGHT: 228.8 LBS | DIASTOLIC BLOOD PRESSURE: 77 MMHG | BODY MASS INDEX: 28.6 KG/M2 | SYSTOLIC BLOOD PRESSURE: 136 MMHG

## 2024-02-07 DIAGNOSIS — G47.33 OSA (OBSTRUCTIVE SLEEP APNEA): Primary | ICD-10-CM

## 2024-02-07 DIAGNOSIS — G47.09 OTHER INSOMNIA: ICD-10-CM

## 2024-02-07 DIAGNOSIS — I10 BENIGN ESSENTIAL HYPERTENSION: ICD-10-CM

## 2024-02-07 DIAGNOSIS — E66.3 OVERWEIGHT: ICD-10-CM

## 2024-02-07 DIAGNOSIS — Z78.9 NEVER SMOKED TOBACCO: ICD-10-CM

## 2024-02-07 DIAGNOSIS — Z13.31 NEGATIVE DEPRESSION SCREENING: ICD-10-CM

## 2024-02-07 PROCEDURE — 1160F RVW MEDS BY RX/DR IN RCRD: CPT

## 2024-02-07 PROCEDURE — 1159F MED LIST DOCD IN RCRD: CPT

## 2024-02-07 PROCEDURE — 4010F ACE/ARB THERAPY RXD/TAKEN: CPT

## 2024-02-07 PROCEDURE — 3078F DIAST BP <80 MM HG: CPT

## 2024-02-07 PROCEDURE — 3008F BODY MASS INDEX DOCD: CPT

## 2024-02-07 PROCEDURE — 1036F TOBACCO NON-USER: CPT

## 2024-02-07 PROCEDURE — 99213 OFFICE O/P EST LOW 20 MIN: CPT

## 2024-02-07 PROCEDURE — 1125F AMNT PAIN NOTED PAIN PRSNT: CPT

## 2024-02-07 PROCEDURE — 3075F SYST BP GE 130 - 139MM HG: CPT

## 2024-02-07 ASSESSMENT — SLEEP AND FATIGUE QUESTIONNAIRES
HOW LIKELY ARE YOU TO NOD OFF OR FALL ASLEEP WHEN YOU ARE A PASSENGER IN A CAR FOR AN HOUR WITHOUT A BREAK: WOULD NEVER DOZE
HOW LIKELY ARE YOU TO NOD OFF OR FALL ASLEEP WHILE SITTING QUIETLY AFTER LUNCH WITHOUT ALCOHOL: WOULD NEVER DOZE
SITING INACTIVE IN A PUBLIC PLACE LIKE A CLASS ROOM OR A MOVIE THEATER: WOULD NEVER DOZE
SLEEP_PROBLEM_NOTICEABLE_TO_OTHERS: SOMEWHAT
HOW LIKELY ARE YOU TO NOD OFF OR FALL ASLEEP IN A CAR, WHILE STOPPED FOR A FEW MINUTES IN TRAFFIC: WOULD NEVER DOZE
WAKING_TOO_EARLY: MODERATE
DIFFICULTY_FALLING_ASLEEP: MILD
HOW LIKELY ARE YOU TO NOD OFF OR FALL ASLEEP WHILE SITTING AND TALKING TO SOMEONE: WOULD NEVER DOZE
SATISFACTION_WITH_CURRENT_SLEEP_PATTERN: SATISFIED
SLEEP_PROBLEM_INTERFERES_DAILY_ACTIVITIES: A LITTLE
WORRIED_DISTRESSED_DUE_TO_SLEEP: A LITTLE
HOW LIKELY ARE YOU TO NOD OFF OR FALL ASLEEP WHILE SITTING AND READING: WOULD NEVER DOZE
HOW LIKELY ARE YOU TO NOD OFF OR FALL ASLEEP WHILE WATCHING TV: WOULD NEVER DOZE
DIFFICULTY_STAYING_ASLEEP: MODERATE
ESS-CHAD TOTAL SCORE: 1
HOW LIKELY ARE YOU TO NOD OFF OR FALL ASLEEP WHILE LYING DOWN TO REST IN THE AFTERNOON WHEN CIRCUMSTANCES PERMIT: SLIGHT CHANCE OF DOZING

## 2024-02-07 ASSESSMENT — ANXIETY QUESTIONNAIRES
5. BEING SO RESTLESS THAT IT IS HARD TO SIT STILL: NOT AT ALL
GAD7 TOTAL SCORE: 4
3. WORRYING TOO MUCH ABOUT DIFFERENT THINGS: SEVERAL DAYS
6. BECOMING EASILY ANNOYED OR IRRITABLE: SEVERAL DAYS
1. FEELING NERVOUS, ANXIOUS, OR ON EDGE: SEVERAL DAYS
4. TROUBLE RELAXING: NOT AT ALL
7. FEELING AFRAID AS IF SOMETHING AWFUL MIGHT HAPPEN: NOT AT ALL
2. NOT BEING ABLE TO STOP OR CONTROL WORRYING: SEVERAL DAYS

## 2024-02-07 ASSESSMENT — ENCOUNTER SYMPTOMS
CHEST TIGHTNESS: 0
SHORTNESS OF BREATH: 0
MYALGIAS: 0
BACK PAIN: 0
SLEEP DISTURBANCE: 1
DECREASED CONCENTRATION: 0
NERVOUS/ANXIOUS: 1
ABDOMINAL PAIN: 0
FATIGUE: 1
ARTHRALGIAS: 0
COUGH: 0

## 2024-02-07 ASSESSMENT — PATIENT HEALTH QUESTIONNAIRE - PHQ9
1. LITTLE INTEREST OR PLEASURE IN DOING THINGS: SEVERAL DAYS
SUM OF ALL RESPONSES TO PHQ9 QUESTIONS 1 AND 2: 1
2. FEELING DOWN, DEPRESSED OR HOPELESS: NOT AT ALL

## 2024-02-07 NOTE — PATIENT INSTRUCTIONS
It was a pleasure meeting you today Rodrigue Palacios     CURRENT DME: [facility]    As we discussed today in clinic:    1. Continue with your current CPAP setting.   2. Encouraged to lose weight.   3. Remember, don't drive when sleepy.   4. Continue with the Trazodone, you have 3 refills. Let me know if you need anything else  5. Remember to maintain a consistent sleep schedule with PAP usage at night       As a general guideline, please replace your: PAP cushions every 2-4 weeks, mask every 3-6 months, hose every 3-6 months, Filter (disposable) every 2-4 weeks; machine may need replacement in 5-10 years (once they stop working).     Education handouts given: Sleep Hygiene     Please follow-up in 6 months    ALWAYS BRING YOUR CPAP / BIPAP WITH YOU TO EVERY APPOINTMENT!  THANKS    FOR QUESTIONS AND CONCERNS:   1. In case of problems with machine or mask interface, please contact your DME company first. DME is the company that provides you the machine and/or CPAP supplies. If [employer] if your DME, you can reach them at [phone] or [employer] ([employer]) [phone].  2. For SLEEP STUDY appointments, please call [phone] ([facility]) or [phone] / [phone] ([facility]) or any other  Sleep Lab location please call [phone]  3. For MEDICAL QUESTIONS, MEDICATION REFILLS, or CLINIC APPOINTMENT SCHEDULING, please call [phone] and my practice lead Kadie would gladly assist you with any concerns.   4. In the event that you are running more than 10 minutes late to your appointment or 5 minutes to virtual, I will kindly ask you to reschedule.    Here at [facility], we wish you a restful sleep!

## 2024-02-07 NOTE — LETTER
RULES FOR BETTER SLEEP HYGIENE    Clock Watching -Looking at the clock in the middle of the night only leads to more worry about sleep and leads to longer periods of wakefulness.    Lighting -Keep the bedroom dark as possible especially in the morning as the sun comes up. Room darkening shades or curtains can help, but a simple solution is to wear a sleep mask. Low lighting such as night lights, TV, and book lights are OK.    Noise - A quiet bedroom is preferable. Irregular noises in the bedroom, even quiet ones, can be disruptive to sleep. White noise such as the sound of a fan or humidifier, can drown out other more disruptive noises leading to less broken sleep. Ear plugs can also be helpful.    Pets -Keep pets off the bed. Their movement on the bed can lead to increased awakenings. It is OK to have pets in the bedroom, but keep them on the floor or their own bed. Consider removing collars or tags that jingle.    Meals - Avoid heavy meals close to bedtime Hunger can disturb your sleep. Therefore, a light snack can be helpful before bedtime. Carbohydrates (i.e., crackers, bread, cereal, fruit) are best for a good night's sleep.    Liquids - A full bladder is likely to lead to sleep disruption in the middle of the night. Try to cut down on the amount of fluid consumed before bedtime. Try to drink smaller amounts in the evening. Try to drink no more than 4-6 oz. in the last 4 hours before bedtime. Everyone is different, so experiment with the timing of liquid reduction for best results.    Exercise -Do not exercise to try to get to sleep. Sometimes exercise too close to bedtime can be overly stimulating but this can vary from person to person. Sometimes exercise in the evening can help keep you alert until bedtime and lead to deeper sleeping. [place] with the timing of exercise in the evening for the best results.    Alcohol - Although alcohol use before bedtime can help some people to fall asleep more easily,  it has been shown to result in more fragmented sleep and more awakening during the night.    Nicotine -Nicotine is also a stimulant, and it has been demonstrated that chronic cigarette smokers have experienced significantly improved sleep when they quit.    Buffer Zone -It helps to set aside the last hour of the evening before bed for quiet relaxing activities. Try not to work or engage in agitating activities close to bedtime.

## 2024-02-07 NOTE — PROGRESS NOTES
Patient: Danna Palacios  : 1955 AGE: 68 y.o. SEX:male   MRN: [id number]   Provider: YUNG Platt     Location [facility]   Service Date: 2024     PCP: YUNG Blanco   Referred by:               [facility]  FOLLOW-UP VISIT NOTE        HISTORY OF PRESENT ILLNESS     Patient ID: Danna Palacios is a 68 y.o. male who presents to a [facility] for follow-up Insomnia and SCOOTER on PAP    Patient is here alone today.  The patient has pertinent hx of SCOOTER, Insomnia, sleep disturbance, EDS, fatigue, overweight, HTN, GERD, T2DM, RA and chronic pain    Previous Visit's:  23   Since last visit, patient has been using machine every night with clinical benefit and no issues on machine. Tolerating pressure, mask, and humidity. Per records, patient is getting supplies thru [employer]  The following are patient's perceived benefits of PAP: decreased or no snoring, decreased daytime sleepiness and/or fatigue, decreased nocturnal awakening, better quality of sleep, and improved memory, concentration, and/or mood.  He still struggles some nights with multiple MNA. I tried to prescribed a different medication last time, the multiple options were sent to pharmacy but none were covered by insurance, out-of-pocket cost was over $200.   He continues taking 50 mg of Trazodone. I discussed increasing the dosage of Trazodone today to 1.5 to 2 pills at night. I updated his prescription today.  His BP is elevated today. He denies any dizziness, lightheadedness, syncopal episodes, chest pain, SOB, changes in vision or HA. Encouraged him to follow-up with PCP.     2023  Mr. DANNA PALACIOS is a 67 year male who presents today for f/u SCOOTER on PAP therapy + Insomnia. Patient has a pertinent hx of SCOOTER, Insomnia, overweight, HTN, GERD, T2DM, rheumatoid arthritis, and chronic pain.   Patient is using machine  every night, no issues with machine. He is tolerating pressures and mask. He has minimal positional mask leak. Patient believes they are getting adequate sleep. He was able to get a new mask after previous visit. He is comfortable with the new mask.   He is taking the Trazodone most nights, helps for the most part. He reports having an awaken at around 4 am and unable to go back to sleep ~2-3 days per week. He has definitely noticed a difference with more consistent usage. We discussed he is currently on the 150 mg. We can increase to the max dosage for sleep to 200 mg or change to a different medication. I originally talked with the patient about Ramelteon, however, insurance does not cover this medication, recommended for patient to try Tasimelteon. He is agreeable to try this medication.      06/22/2023   Referred by Pulmonary. Here to get established. Used to see Pulmonary JESSICA Caroline Baltazar CNP. Diagnosed with SCOOTER by HSAT in 2021, currently on fixed-CPAP 8 cmH20 EPR 3.    Patient reports he is doing well overall. Patient is using machine every night, no issues with machine. He is tolerating pressures and mask. He has minimal positional mask leak. Patient believes they are getting adequate sleep. He does not think he snores on the machine. He wakes up refreshed in the morning and denies any excessive daytime sleepiness. Denies nasal congestion, dry mouth, skin irritation, aerophagia, and air hunger.     He does report however, multiple MNA, usually goes back to sleep within 30 mins. Patient is currently on Trazodone as needed, takes 1/2 dose. We discussed dosing and using more frequently. Patient educated that Trazodone can be PRN or taken every night. He is going to try and take more frequently to help with his sleep. I also instructed patient he can take a full pill as well.     Interval History  Patient was last seen in 12/2023.     02/07/24   Patient reports that sleep has improved with the Trazodone. He reports  that he is doing better with variable dose of 1 - 2 pills. Patient reports that some nights are better than others. He is now only have maybe one bad night of sleep with the medication. His mood has improved with better sleep.   He brought his machine into clinic today. He is using nightly, denied any issues with mask, leak or pressures. He is getting plenty of supplies through [facility].   His BP today is WNL. Weight remains stable, encouraged healthy weight management with diet and exercise.   +screens, improving mood, follows with PCP    SLEEP HISTORY     SLEEP STUDY HISTORY  HSAT - 2/25/2021  BMI - 26.25  AHI4% - 18.4/hr  Supine AHI - 50/hr  Non-supine AHI - 14.6/hr  SpO2 jesus - 69.9%      SLEEP ENVIRONMENT  Sleep location: bed  Sleep status: sleeps alone  Preferred sleep position: side  TV in bedroom:   Room is dark:  Yes  Room is quiet: Yes  Room is cool: Yes  Bed comfort: good    SLEEP HABITS   Activities before bedtime: TV  Activities in bed:   Clockwatching: No   Smoking: never  ETOH: 1 per week  Marijuana: denied  Caffeine: daily  Sleep aids: Trazodone     WEIGHT: stable    Claustrophobia: No     REVIEW OF SYSTEMS     REVIEW OF SYSTEMS  SLEEP ROS   Review of Systems   Constitutional:  Positive for fatigue.   HENT:  Negative for congestion.    Respiratory:  Negative for cough, chest tightness and shortness of breath.    Cardiovascular:  Negative for chest pain.   Gastrointestinal:  Negative for abdominal pain.   Musculoskeletal:  Negative for arthralgias, back pain and myalgias.   Psychiatric/Behavioral:  Positive for sleep disturbance. Negative for decreased concentration and suicidal ideas. The patient is nervous/anxious.      All other systems have been reviewed and are negative.      ALLERGIES     Allergies   Allergen Reactions    Orencia [Abatacept] Unknown       MEDICATIONS     Current Outpatient Medications   Medication Sig Dispense Refill    atorvastatin (Lipitor) 40 mg tablet Take 1 tablet (40 mg) by  "mouth once daily. 90 tablet 3    azelastine (Astelin) 137 mcg (0.1 %) nasal spray INSTILL 1 SPRAY IN EACH NOSTRIL TWICE A DAY AS NEEDED FOR ALLERGY SYMPTOMS      blood sugar diagnostic (ONETOUCH ULTRA BLUE TEST STRIP MISC) TEST ONCE DAILY.      dapagliflozin propanediol (Farxiga) 10 mg Take 1 tablet (10 mg) by mouth once daily. 90 tablet 3    folic acid (Folvite) 1 mg tablet 1 tablet (1 mg) once daily.      insulin glargine (Lantus) 100 unit/mL (3 mL) pen Up to 30 units daily as directed 30 mL 3    insulin syringe-needle U-100 (BD Insulin Syringe) 1 mL 26 x 1/2\" syringe       leflunomide (Arava) 20 mg tablet 1 tablet (20 mg).      levocetirizine (Xyzal) 5 mg tablet Take 1 tablet (5 mg) by mouth.      lisinopril 40 mg tablet TAKE ONE TABLET BY MOUTH DAILY 90 tablet 0    metFORMIN  mg 24 hr tablet Take 2 tablets (1,000 mg) by mouth 2 times a day with meals. TWICE DAILY 360 tablet 3    methotrexate 25 mg/mL injection 50 mg/m2.      montelukast (Singulair) 10 mg tablet TAKE ONE TABLET BY MOUTH EVERY DAY IN THE EVENING      pantoprazole (ProtoNix) 40 mg EC tablet TAKE ONE TABLET BY MOUTH EVERY DAY 90 tablet 3    pen needle, diabetic 32 gauge x 5/32\" needle Use daily 100 each 3    pioglitazone (Actos) 15 mg tablet TAKE 1 TABLET BY MOUTH ONE TIME DAILY 90 tablet 3    sildenafil (Viagra) 100 mg tablet Sildenafil Citrate 100 MG Oral Tablet   Quantity: 6  Refills: 0        Start : 19-Dec-2018   Active      traZODone (Desyrel) 50 mg tablet Take 1-2 tablets ( mg) by mouth once daily. 180 tablet 3     No current facility-administered medications for this visit.       PAST HISTORY     PERTINENT PAST MEDICAL HISTORY: See HPI    PERTINENT PAST SURGICAL HISTORY for Sleep Medicine:  non-contributory    PERTINENT FAMILY HISTORY for Sleep Medicine:  Patient denies family history of any sleep disorder.  Patient denies family history of sleep apnea.    PERTINENT SOCIAL HISTORY:  He  reports that he has never smoked. He has " "never used smokeless tobacco. He reports current alcohol use. He reports that he does not use drugs. He currently lives alone and retired  Active Problems, Allergy List, Medication List, and PMH/PSH/FH/Social Hx have been reviewed and reconciled in chart. No significant changes unless documented in the pertinent chart section. Updates made when necessary.     PHYSICAL EXAM     VITAL SIGNS: /77   Pulse 86   Wt 104 kg (228 lb 12.8 oz)   SpO2 95%   BMI 28.60 kg/m²     CURRENT WEIGHT:   Vitals:    24 1302   Weight: 104 kg (228 lb 12.8 oz)      BMI: Body mass index is 28.6 kg/m².     PREVIOUS WEIGHTS:  Wt Readings from Last 3 Encounters:   24 104 kg (228 lb 12.8 oz)   23 104 kg (229 lb 9.6 oz)   23 104 kg (229 lb 9.6 oz)       STOP-BAN    Today ESS: 1  Last visit ESS: 3    Today SYDNEY: 11  Last visit SYDNEY: 12    Today PHQ-2: POSITIVE  little interest or pleasure = 1  down, depressed or hopeless = 0  Last visit PHQ-2: NEGATIVE SCREEN    Today BERNARD-7: 4  Last visit BERNARD-7: 3    Physical Exam  Constitutional: Awake, not in distress  Lungs: no cough noted  Skin: Warm, no visible rashes  Neuro: No tremors, moves all extremities  Psych: Alert and oriented to time, place, and person    ENT: Modified Mallampati score - III          RESULTS/DATA     No results found for: \"IRON\", \"TRANSFERRIN\", \"IRONSAT\", \"TIBC\", \"FERRITIN\"    Bicarbonate   Date Value Ref Range Status   10/27/2022 27 21 - 32 mmol/L Final       PAP Adherence  A PAP adherence download was obtained and data was reviewed personally today in clinic.      ASSESSMENT/PLAN     Assessment/Plan   Rodrigue Palacios is a 68 y.o. male presents today in [facility] with the following problems:    CURRENT DME: [facility]    OBSTRUCTIVE SLEEP APNEA, moderate (HSAT AHI: 18.4/hr)  currently on fixed-CPAP 8 cmH20 EPR 3  Excellent compliance to PAP therapy, residual AHI at goal, and good control of SCOOTER symptoms  - Patient's risk " factors for SCOOTER: age, gender, HTN, use of ETOH, and narrow crowded upper airway anatomy  - SCOOTER diagnosed by HSAT in 2021. Reviewed sleep studies previously in clinic. See HPI.  - Retrieved and personally reviewed recent PAP adherence download data today. See HPI.   - - used 30/30 days with 29/30 days >4 hrs, for an average of 7 hrs 4 mins with residual AHI of 3/hr  - Continue current PAP settings.     SLEEP MAINTENANCE INSOMNIA + SLEEP DISTURBANCES  - likely due to combination of poor sleep hygiene, depression, anxiety, and chronic pain. Meds may be a contributing factor as well.  - discussed with patient good sleep hygiene  - we discussed his medication regimen, discussed increasing dose to full pill as well as increasing frequency to nightly or URMILA to help with MNA  08/02/2023  - patient had some improvement with increased frequency of use of Trazodone however, he is still having x2-3 days of waking up at 4 am  - we discussed he is almost at the max dose of 200 mg of Trazodone, we initially discussed trying Ramelteon however, not covered by insurance, medication changed to Tasimelteon.   12/6/2023  - patient was unable to get any of the suggested medication from previous visit d/t not covered and out-of-pocket costs  - he is still on Trazodone, I discussed increasing to 1.5 to 2 pill at night. He is agreeable to try increased dosage. I submitted an updated prescription today.   02/07/24  - patient reports he is doing better with Trazodone and sleep, he sleeps most night through the night, still having maybe one episode of insomnia per week  - prescription is 90 days with 3 refills   - flexible dosage of 1 - 2 pills at night    OVERWEIGHT  - BMI today Body mass index is 28.6 kg/m².   - no significant weight changes noted or reported  - Encouraged patient to lose weight with diet and exercise.   - Weight loss can help in the long term treatment of SCOOTER.  - Defer management to PCP    HYPERTENSION  - BP today 136/77  -  well controlled with medication, denies any issues with management   - encouraged daily exercise with healthy diet for BP and SCOOTER management  - Defer management to PCP    DEPRESSION / ANXIETY screen  - NEGATIVE SCREEN today 02/07/24     SMOKING STATUS screen  - never a smoker       All of patient's questions were answered. He verbalizes understanding and agreement with my assessment and plan.

## 2024-02-11 DIAGNOSIS — I10 BENIGN ESSENTIAL HYPERTENSION: ICD-10-CM

## 2024-02-13 RX ORDER — LISINOPRIL 40 MG/1
40 TABLET ORAL DAILY
Qty: 90 TABLET | Refills: 1 | Status: SHIPPED | OUTPATIENT
Start: 2024-02-13

## 2024-04-09 DIAGNOSIS — E11.9 TYPE 2 DIABETES MELLITUS WITHOUT COMPLICATION, WITHOUT LONG-TERM CURRENT USE OF INSULIN (MULTI): ICD-10-CM

## 2024-04-09 RX ORDER — ATORVASTATIN CALCIUM 40 MG/1
40 TABLET, FILM COATED ORAL DAILY
Qty: 90 TABLET | Refills: 3 | Status: SHIPPED | OUTPATIENT
Start: 2024-04-09

## 2024-05-31 NOTE — PROGRESS NOTES
"HPI   69 yo with Diabetes 2 (dx in mid 50's), HTN, Dyslipidemia, angie (cpap), RA presents for followup. Last A1c-6.4%, today 5.9%.           Pt is testing sugars 4 times per day with libre2. Pt is having low sugars 0 times/week. Pt is following a carb controlled diet and knows reasonable carb allowances. Pt is able to afford their medications. Pt is exercising, walking a bit more, back has improved.           Pt taking metformin, farxiga 10mg (taking 1/2 to help with cost), mialn 15mg , lantus pen 20 qhs. Can't afford trulicity, doesn't qualify for pt assistance in 2023.     90 day brandyn 2 data: 94% in range, 1% low, pattern: low 100's overnight into waking, mid 100's through most of day, missing scans frequently.           Home bp 120/70's, taking lisinopril 40mg and amlopidine 5 qd.           Taking atorvastatin 20mg for lipids and tolerating.          50 day libre2 data: 97% in range, 0% lows, pattern:  low 100's overnight into waking, mid 100's through most of the day into bedtime, occ excursion after dinner.     -pt never repeated labs ordered 12/2023        Current Outpatient Medications:     atorvastatin (Lipitor) 40 mg tablet, Take 1 tablet (40 mg) by mouth once daily., Disp: 90 tablet, Rfl: 3    azelastine (Astelin) 137 mcg (0.1 %) nasal spray, INSTILL 1 SPRAY IN EACH NOSTRIL TWICE A DAY AS NEEDED FOR ALLERGY SYMPTOMS, Disp: , Rfl:     dapagliflozin propanediol (Farxiga) 10 mg, Take 1 tablet (10 mg) by mouth once daily., Disp: 90 tablet, Rfl: 3    folic acid (Folvite) 1 mg tablet, 1 tablet (1 mg) once daily., Disp: , Rfl:     insulin glargine (Lantus) 100 unit/mL (3 mL) pen, Up to 30 units daily as directed, Disp: 30 mL, Rfl: 3    insulin syringe-needle U-100 (BD Insulin Syringe) 1 mL 26 x 1/2\" syringe, , Disp: , Rfl:     leflunomide (Arava) 20 mg tablet, 1 tablet (20 mg)., Disp: , Rfl:     levocetirizine (Xyzal) 5 mg tablet, Take 1 tablet (5 mg) by mouth., Disp: , Rfl:     lisinopril 40 mg tablet, TAKE ONE " "TABLET BY MOUTH DAILY, Disp: 90 tablet, Rfl: 1    metFORMIN  mg 24 hr tablet, Take 2 tablets (1,000 mg) by mouth 2 times a day with meals. TWICE DAILY, Disp: 360 tablet, Rfl: 3    methotrexate 25 mg/mL injection, 50 mg/m2., Disp: , Rfl:     montelukast (Singulair) 10 mg tablet, TAKE ONE TABLET BY MOUTH EVERY DAY IN THE EVENING, Disp: , Rfl:     pantoprazole (ProtoNix) 40 mg EC tablet, TAKE ONE TABLET BY MOUTH EVERY DAY, Disp: 90 tablet, Rfl: 3    pen needle, diabetic 32 gauge x 5/32\" needle, Use daily, Disp: 100 each, Rfl: 3    pioglitazone (Actos) 15 mg tablet, TAKE 1 TABLET BY MOUTH ONE TIME DAILY, Disp: 90 tablet, Rfl: 3    sildenafil (Viagra) 100 mg tablet, Sildenafil Citrate 100 MG Oral Tablet  Quantity: 6  Refills: 0      Start : 19-Dec-2018  Active, Disp: , Rfl:     traZODone (Desyrel) 50 mg tablet, Take 1-2 tablets ( mg) by mouth once daily., Disp: 180 tablet, Rfl: 3      Allergies as of 06/03/2024 - Reviewed 06/03/2024   Allergen Reaction Noted    Orencia [abatacept] Unknown 03/14/2023         Review of Systems   Cardiology: Lightheadedness-denies.  Chest pain-denies.  Leg edema-denies.  Palpitations-denies.  Respiratory: Cough-denies. Shortness of breath-denies.  Wheezing-denies.  Gastroenterology: Constipation-denies.  Diarrhea-denies.  Heartburn-denies.  Endocrinology: Cold intolerance-denies.  Heat intolerance-denies.  Sweats-denies.  Neurology: Headache-denies.  Tremor-denies.  Neuropathy in extremities-occ feet  Psychology: Low energy-denies.  Irritability-denies.  Sleep disturbances +      /70 (BP Location: Left arm)   Pulse 77   Ht 1.905 m (6' 3\")   Wt 103 kg (228 lb)   BMI 28.50 kg/m²       Labs:  Lab Results   Component Value Date    WBC 6.5 10/27/2022    NRBC 0 03/08/2022    RBC 5.02 10/27/2022    HGB 14.6 10/27/2022    HCT 43.8 10/27/2022     10/27/2022     Lab Results   Component Value Date    CALCIUM 9.5 10/27/2022    AST 12 03/08/2022    ALKPHOS 62 03/08/2022    " BILITOT 0.5 03/08/2022    PROT 6.2 03/08/2022    ALBUMIN 4.2 03/08/2022    GLOB 2.0 03/08/2022    AGR 2.1 03/08/2022     10/27/2022    K 4.0 10/27/2022     10/27/2022    CO2 27 10/27/2022    ANIONGAP 13 10/27/2022    BUN 28 (H) 10/27/2022    CREATININE 1.18 10/27/2022    UREACREAUR 19.2 03/08/2022    GLUCOSE 171 (H) 10/27/2022    ALT 6 03/08/2022    EGFR 61 03/08/2022     Lab Results   Component Value Date    CHOL 174 03/08/2022    TRIG 279 (H) 03/08/2022    HDL 38 (L) 03/08/2022    LDLCALC 80 03/08/2022     Lab Results   Component Value Date    MICROALBCREA 15.5 03/08/2022     Lab Results   Component Value Date    TSH 0.49 07/24/2020     Lab Results   Component Value Date    [id number] 786 03/08/2022     Lab Results   Component Value Date    HGBA1C 5.9 06/03/2024         Physical Exam   General Appearance: pleasant, cooperative, no acute distress  HEENT: no chemosis, no proptosis, no lid lag, no lid retraction  Neck: no lymphadenopathy, no thyromegaly, no dominant thyroid nodules  Heart: no murmurs, regular rate and rhythm, S1 and S2  Lungs: no wheezes, no rhonci, no rales  Extremities: no lower extremity swelling      Assessment/Plan   1. Type 2 diabetes mellitus with other specified complication, with long-term current use of insulin (Multi)  -A1c ordered and reviewed  -labs ordered  -brandyn data reviewed, scanned in epic    -lower insulin in 2 unit intervals for am sugars <100, be extra careful in the warmer weather/when more active    2. Hypercholesterolemia  -on statin, ldl target <70, please repeat labs, ordered in system    3. Benign essential hypertension  -at target on therapy, no change, continue current meds         Follow Up:  Kuldip 6 months    Medical Decision Making  Complexity of problem: Chronic illness of diabetes mellitus uncontrolled, progressing  Data analyzed and reviewed: Reviewed prior notes, blood glucose data, labs including HgbA1c, lipids, serum chemistries.  Ordered tests.    Risk of complications and morbidities: Is definite because of use of insulin and risk of hypoglycemia.  Prescription medications reviewed and modifications made.  Compliance assessed.  Addressed social determinants of health including food insecurity.

## 2024-06-03 ENCOUNTER — OFFICE VISIT (OUTPATIENT)
Dept: ENDOCRINOLOGY | Facility: CLINIC | Age: 69
End: 2024-06-03
Payer: MEDICARE

## 2024-06-03 VITALS
BODY MASS INDEX: 28.35 KG/M2 | SYSTOLIC BLOOD PRESSURE: 129 MMHG | HEIGHT: 75 IN | WEIGHT: 228 LBS | DIASTOLIC BLOOD PRESSURE: 70 MMHG | HEART RATE: 77 BPM

## 2024-06-03 DIAGNOSIS — Z79.4 TYPE 2 DIABETES MELLITUS WITH OTHER SPECIFIED COMPLICATION, WITH LONG-TERM CURRENT USE OF INSULIN (MULTI): Primary | ICD-10-CM

## 2024-06-03 DIAGNOSIS — E78.00 HYPERCHOLESTEROLEMIA: ICD-10-CM

## 2024-06-03 DIAGNOSIS — E11.69 TYPE 2 DIABETES MELLITUS WITH OTHER SPECIFIED COMPLICATION, WITH LONG-TERM CURRENT USE OF INSULIN (MULTI): Primary | ICD-10-CM

## 2024-06-03 DIAGNOSIS — I10 BENIGN ESSENTIAL HYPERTENSION: ICD-10-CM

## 2024-06-03 LAB — POC HEMOGLOBIN A1C: 5.9 % (ref 4.2–6.5)

## 2024-06-03 PROCEDURE — 3078F DIAST BP <80 MM HG: CPT | Performed by: INTERNAL MEDICINE

## 2024-06-03 PROCEDURE — 1159F MED LIST DOCD IN RCRD: CPT | Performed by: INTERNAL MEDICINE

## 2024-06-03 PROCEDURE — 1125F AMNT PAIN NOTED PAIN PRSNT: CPT | Performed by: INTERNAL MEDICINE

## 2024-06-03 PROCEDURE — 3008F BODY MASS INDEX DOCD: CPT | Performed by: INTERNAL MEDICINE

## 2024-06-03 PROCEDURE — 99214 OFFICE O/P EST MOD 30 MIN: CPT | Performed by: INTERNAL MEDICINE

## 2024-06-03 PROCEDURE — 3074F SYST BP LT 130 MM HG: CPT | Performed by: INTERNAL MEDICINE

## 2024-06-03 PROCEDURE — 95251 CONT GLUC MNTR ANALYSIS I&R: CPT | Performed by: INTERNAL MEDICINE

## 2024-06-03 PROCEDURE — 1036F TOBACCO NON-USER: CPT | Performed by: INTERNAL MEDICINE

## 2024-06-03 PROCEDURE — 83036 HEMOGLOBIN GLYCOSYLATED A1C: CPT | Performed by: INTERNAL MEDICINE

## 2024-06-03 PROCEDURE — 4010F ACE/ARB THERAPY RXD/TAKEN: CPT | Performed by: INTERNAL MEDICINE

## 2024-06-03 ASSESSMENT — ENCOUNTER SYMPTOMS
LOSS OF SENSATION IN FEET: 0
OCCASIONAL FEELINGS OF UNSTEADINESS: 0
DEPRESSION: 0

## 2024-06-03 ASSESSMENT — PATIENT HEALTH QUESTIONNAIRE - PHQ9
1. LITTLE INTEREST OR PLEASURE IN DOING THINGS: NOT AT ALL
SUM OF ALL RESPONSES TO PHQ9 QUESTIONS 1 & 2: 0
2. FEELING DOWN, DEPRESSED OR HOPELESS: NOT AT ALL

## 2024-06-03 ASSESSMENT — PAIN SCALES - GENERAL: PAINLEVEL: 4

## 2024-07-01 ENCOUNTER — OFFICE VISIT (OUTPATIENT)
Dept: PRIMARY CARE | Facility: CLINIC | Age: 69
End: 2024-07-01
Payer: MEDICARE

## 2024-07-01 VITALS
WEIGHT: 228 LBS | BODY MASS INDEX: 28.35 KG/M2 | HEART RATE: 76 BPM | SYSTOLIC BLOOD PRESSURE: 145 MMHG | DIASTOLIC BLOOD PRESSURE: 76 MMHG | OXYGEN SATURATION: 94 % | HEIGHT: 75 IN

## 2024-07-01 DIAGNOSIS — M79.604 PAIN OF RIGHT LOWER EXTREMITY: Primary | ICD-10-CM

## 2024-07-01 PROCEDURE — 99214 OFFICE O/P EST MOD 30 MIN: CPT | Performed by: STUDENT IN AN ORGANIZED HEALTH CARE EDUCATION/TRAINING PROGRAM

## 2024-07-01 PROCEDURE — 3078F DIAST BP <80 MM HG: CPT | Performed by: STUDENT IN AN ORGANIZED HEALTH CARE EDUCATION/TRAINING PROGRAM

## 2024-07-01 PROCEDURE — 1159F MED LIST DOCD IN RCRD: CPT | Performed by: STUDENT IN AN ORGANIZED HEALTH CARE EDUCATION/TRAINING PROGRAM

## 2024-07-01 PROCEDURE — 3008F BODY MASS INDEX DOCD: CPT | Performed by: STUDENT IN AN ORGANIZED HEALTH CARE EDUCATION/TRAINING PROGRAM

## 2024-07-01 PROCEDURE — 3077F SYST BP >= 140 MM HG: CPT | Performed by: STUDENT IN AN ORGANIZED HEALTH CARE EDUCATION/TRAINING PROGRAM

## 2024-07-01 PROCEDURE — 4010F ACE/ARB THERAPY RXD/TAKEN: CPT | Performed by: STUDENT IN AN ORGANIZED HEALTH CARE EDUCATION/TRAINING PROGRAM

## 2024-07-01 RX ORDER — TIZANIDINE 4 MG/1
4 TABLET ORAL EVERY 6 HOURS PRN
Qty: 30 TABLET | Refills: 0 | Status: SHIPPED | OUTPATIENT
Start: 2024-07-01 | End: 2024-07-16

## 2024-07-01 ASSESSMENT — PATIENT HEALTH QUESTIONNAIRE - PHQ9
1. LITTLE INTEREST OR PLEASURE IN DOING THINGS: NOT AT ALL
2. FEELING DOWN, DEPRESSED OR HOPELESS: NOT AT ALL
SUM OF ALL RESPONSES TO PHQ9 QUESTIONS 1 AND 2: 0

## 2024-07-01 ASSESSMENT — ENCOUNTER SYMPTOMS: MUSCULOSKELETAL PAIN: 1

## 2024-07-01 NOTE — PATIENT INSTRUCTIONS
The patient should apply ice to the injured area four times per day for 20 minutes at a time until swelling subsides. If pain becomes severe or tingling in the leg/foot, Emergency room evaluation will be needed.     I sent over a muscle relaxant to help but you may also like some compression while waking.

## 2024-07-01 NOTE — PROGRESS NOTES
Subjective   Patient ID: Rodrigue Palacios is a 68 y.o. male who presents for Muscle Pain (Rodrigue believes he pulled right calf muscle going up stairs, swollen happened a few hours ago).    Walked up the steps   Icing has been making it better   Walking with pain       Muscle Pain  This is a new problem. The current episode started today. The problem has been gradually improving since onset. Associated with: walking up steps felt a pop. The pain is present in the right lower leg. The pain is medium (7-8/10). Past treatments include cold pack. The treatment provided mild relief. Swelling location: swelling sig. He has been Less active. His past medical history is significant for rheumatic disease.         Objective   Physical Exam  Vitals reviewed.   Constitutional:       Appearance: Normal appearance.   Cardiovascular:      Rate and Rhythm: Normal rate and regular rhythm.      Heart sounds: No murmur heard.  Pulmonary:      Effort: Pulmonary effort is normal. No respiratory distress.      Breath sounds: Normal breath sounds. No wheezing.   Musculoskeletal:         General: No swelling, tenderness, deformity or signs of injury.      Cervical back: Neck supple.      Left lower leg: No edema.   Neurological:      Mental Status: He is alert.         Assessment/Plan   Diagnoses and all orders for this visit:  Pain of right lower extremity  -     US MSK lower extremity joints tendons muscles; Future           Janneth Johnson DO 07/01/24 4:51 PM

## 2024-07-03 ENCOUNTER — HOSPITAL ENCOUNTER (EMERGENCY)
Facility: HOSPITAL | Age: 69
Discharge: HOME | End: 2024-07-03
Attending: STUDENT IN AN ORGANIZED HEALTH CARE EDUCATION/TRAINING PROGRAM
Payer: MEDICARE

## 2024-07-03 ENCOUNTER — APPOINTMENT (OUTPATIENT)
Dept: RADIOLOGY | Facility: HOSPITAL | Age: 69
End: 2024-07-03
Payer: MEDICARE

## 2024-07-03 VITALS
OXYGEN SATURATION: 95 % | DIASTOLIC BLOOD PRESSURE: 79 MMHG | HEIGHT: 75 IN | BODY MASS INDEX: 27.98 KG/M2 | HEART RATE: 78 BPM | RESPIRATION RATE: 16 BRPM | WEIGHT: 225 LBS | SYSTOLIC BLOOD PRESSURE: 149 MMHG | TEMPERATURE: 98.4 F

## 2024-07-03 DIAGNOSIS — S89.90XA INJURY OF CALF: Primary | ICD-10-CM

## 2024-07-03 PROCEDURE — 93971 EXTREMITY STUDY: CPT

## 2024-07-03 PROCEDURE — 93971 EXTREMITY STUDY: CPT | Performed by: RADIOLOGY

## 2024-07-03 PROCEDURE — 99284 EMERGENCY DEPT VISIT MOD MDM: CPT | Mod: 25

## 2024-07-03 RX ORDER — TADALAFIL 20 MG/1
1 TABLET ORAL
COMMUNITY
Start: 2024-05-08

## 2024-07-03 ASSESSMENT — PAIN - FUNCTIONAL ASSESSMENT: PAIN_FUNCTIONAL_ASSESSMENT: 0-10

## 2024-07-03 ASSESSMENT — COLUMBIA-SUICIDE SEVERITY RATING SCALE - C-SSRS
2. HAVE YOU ACTUALLY HAD ANY THOUGHTS OF KILLING YOURSELF?: NO
6. HAVE YOU EVER DONE ANYTHING, STARTED TO DO ANYTHING, OR PREPARED TO DO ANYTHING TO END YOUR LIFE?: NO
1. IN THE PAST MONTH, HAVE YOU WISHED YOU WERE DEAD OR WISHED YOU COULD GO TO SLEEP AND NOT WAKE UP?: NO

## 2024-07-03 ASSESSMENT — PAIN DESCRIPTION - ORIENTATION: ORIENTATION: RIGHT

## 2024-07-03 ASSESSMENT — PAIN SCALES - GENERAL: PAINLEVEL_OUTOF10: 7

## 2024-07-03 ASSESSMENT — PAIN DESCRIPTION - LOCATION: LOCATION: LEG

## 2024-07-03 ASSESSMENT — PAIN DESCRIPTION - PAIN TYPE: TYPE: ACUTE PAIN

## 2024-07-03 NOTE — ED PROVIDER NOTES
HPI   Chief Complaint   Patient presents with    Leg Pain     Pt reports right calf pain and swelling. Pt denies recent travels. MSPs intact. Pt was sent to ED by poncho GARCIA for DVT ruleout       HPI  See MDM                  [place] Coma Scale Score: 15                     Patient History   Past Medical History:   Diagnosis Date    Dorsalgia, unspecified 07/28/2020    Back pain without radiation    Local infection of the skin and subcutaneous tissue, unspecified 10/11/2013    Nonvenomous insect bite with infection    Other muscle spasm 02/14/2018    Muscle spasms of neck    Other specified diseases of anus and rectum 03/22/2013    Anal pain    Personal history of other diseases of the digestive system 09/18/2013    History of gastroenteritis    Personal history of other diseases of the respiratory system 04/27/2020    History of acute bronchitis    Personal history of other diseases of the respiratory system 06/24/2019    History of upper respiratory infection    Personal history of other infectious and parasitic diseases     History of candidiasis of mouth    Personal history of other specified conditions 03/22/2013    History of abnormal weight loss    Personal history of other specified conditions 02/24/2014    History of numbness    Personal history of other specified conditions 07/14/2017    History of abdominal pain    Pneumonia, unspecified organism 01/25/2016    Pneumonia involving right lung    Rash and other nonspecific skin eruption 04/29/2013    Rash     Past Surgical History:   Procedure Laterality Date    CHOLECYSTECTOMY  07/07/2016    Cholecystectomy    HERNIA REPAIR  07/07/2016    Inguinal Hernia Repair    SHOULDER SURGERY Right      Family History   Problem Relation Name Age of Onset    Ovarian cancer Mother      Other (asbestosis) Father       Social History     Tobacco Use    Smoking status: Never    Smokeless tobacco: Never   Vaping Use    Vaping status: Never Used   Substance Use Topics     Alcohol use: Yes     Comment: rare    Drug use: Never       Physical Exam   ED Triage Vitals [07/03/24 1058]   Temperature Heart Rate Respirations BP   36.9 °C (98.4 °F) 78 16 149/79      Pulse Ox Temp Source Heart Rate Source Patient Position   95 % Tympanic Monitor Sitting      BP Location FiO2 (%)     Right arm --       Physical Exam  See [facility]  ED Course & [facility]   Diagnoses as of 07/03/24 1239   Injury of calf       Medical Decision Making  68-year-old male with past medical history of hyperlipidemia, diabetes presenting to the emergency room with pain to his right calf.  Patient was walking upstairs some days ago and felt a pop in his right calf and noticed immediate pain.  Since then he has started to notice some swelling went to urgent care today and they directed him to come to the emergency room to rule out DVT.  Patient has no clotting issues in the past and otherwise denies any recent long distance travel.  Patient does note some pain on ambulation but still does have intact flexion and extension at the calf.  He otherwise denies significant pain, fevers, chills, nausea, vomiting, new injury, chest pain or shortness of breath.    ED Triage Vitals [07/03/24 1058]   Temperature Heart Rate Respirations BP   36.9 °C (98.4 °F) 78 16 149/79      Pulse Ox Temp Source Heart Rate Source Patient Position   95 % Tympanic Monitor Sitting      BP Location FiO2 (%)     Right arm --       Vital signs reviewed: Afebrile, nontachycardic, normotensive, 95% on room air    Exam     Constitutional: No acute distress. Resting comfortably.   Head: Normocephalic, atraumatic.   Eyes: Pupils equal bilaterally, EOM grossly intact, conjunctiva normal.  Mouth/Throat: Oropharynx is clear, moist mucus membranes.   Neck: Supple. No lymphadenopathy.  Cardiovascular: Regular rate and regular rhythm. Extremities are well-perfused.   Pulmonary/Chest: No respiratory distress, breathing comfortably on room air.    Abdominal: Soft, non-tender,  non-distended. No rebound or guarding.   Musculoskeletal: Mild right lower extremity edema.  Tenderness to palpation over posterior calf, no popliteal tenderness, no erythema, Garcia's test reveals intact plantarflexion.      Skin: Warm, dry, and intact.   Neurological: Patient is oriented to person, place, time, and situation. Face symmetric, hearing intact to voice, speech normal. Moves all extremities.     Differential includes but is not limited to:  Achilles tendon injury versus ligamentous injury versus DVT    Labs: Considered but not indicated.  Labs Reviewed - No data to display    Radiology: ordered and independent interpretation performed.  Ultrasound(s) ultrasound as interpreted by me shows no noncompressible veins    ECG/medicine tests: ordered and independent interpretation performed.  Diagnoses as of 07/03/24 1239   Injury of calf     Ultrasound as interpreted by radiologist shows no evidence of DVT.  There does appear to be a hematoma likely relating to a soft tissue injury.  Patient otherwise with intact distal pulses and no evidence or concern for compartment syndrome.  Will discharge with referral to see orthopedics and advised to use a splint for maximum dorsiflexion tolerable.    PLAN AND FOLLOW-UP: Patient counseled on all findings, diagnosis and treatment plan. Patient's questions and concerns addressed. Patient stable, discharged with instructions to follow up with PMD, and to return to ED at any time for worsening symptoms or any other concerns. Patient demonstrates understanding of the findings and the importance of appropriate follow up care.    ED Medications managed:    Medications - No data to display      PATIENT REFERRED TO:  Ildefonso Ross MD  [address]  [facility] [address]  [facility] [address]  [phone]            DISCHARGE MEDICATIONS:  New Prescriptions    No medications on file       Ezra Thao MD  12:39 PM    Attending Emergency Physician  [facility] EMERGENCY  MEDICINE            Procedure  Procedures     Ezra Thao MD  07/03/24 [id number]

## 2024-07-12 ENCOUNTER — APPOINTMENT (OUTPATIENT)
Dept: RADIOLOGY | Facility: HOSPITAL | Age: 69
End: 2024-07-12
Payer: MEDICARE

## 2024-07-14 DIAGNOSIS — M79.604 PAIN OF RIGHT LOWER EXTREMITY: ICD-10-CM

## 2024-07-16 RX ORDER — TIZANIDINE 4 MG/1
4 TABLET ORAL EVERY 6 HOURS PRN
Qty: 30 TABLET | Refills: 0 | Status: SHIPPED | OUTPATIENT
Start: 2024-07-16 | End: 2024-07-26

## 2024-08-07 ENCOUNTER — APPOINTMENT (OUTPATIENT)
Dept: SLEEP MEDICINE | Facility: CLINIC | Age: 69
End: 2024-08-07
Payer: MEDICARE

## 2024-08-07 VITALS
SYSTOLIC BLOOD PRESSURE: 143 MMHG | DIASTOLIC BLOOD PRESSURE: 79 MMHG | HEART RATE: 72 BPM | BODY MASS INDEX: 28.57 KG/M2 | WEIGHT: 228.6 LBS | OXYGEN SATURATION: 94 %

## 2024-08-07 DIAGNOSIS — Z13.31 NEGATIVE DEPRESSION SCREENING: ICD-10-CM

## 2024-08-07 DIAGNOSIS — I10 BENIGN ESSENTIAL HYPERTENSION: ICD-10-CM

## 2024-08-07 DIAGNOSIS — E66.3 OVERWEIGHT: ICD-10-CM

## 2024-08-07 DIAGNOSIS — G47.09 OTHER INSOMNIA: ICD-10-CM

## 2024-08-07 DIAGNOSIS — G47.33 OSA (OBSTRUCTIVE SLEEP APNEA): Primary | ICD-10-CM

## 2024-08-07 DIAGNOSIS — Z99.89 CONTINUOUS POSITIVE AIRWAY PRESSURE DEPENDENCE: ICD-10-CM

## 2024-08-07 DIAGNOSIS — G47.9 SLEEP DISTURBANCE: ICD-10-CM

## 2024-08-07 DIAGNOSIS — Z78.9 NEVER SMOKED TOBACCO: ICD-10-CM

## 2024-08-07 PROCEDURE — 1160F RVW MEDS BY RX/DR IN RCRD: CPT

## 2024-08-07 PROCEDURE — 3077F SYST BP >= 140 MM HG: CPT

## 2024-08-07 PROCEDURE — 99214 OFFICE O/P EST MOD 30 MIN: CPT

## 2024-08-07 PROCEDURE — 1159F MED LIST DOCD IN RCRD: CPT

## 2024-08-07 PROCEDURE — 4010F ACE/ARB THERAPY RXD/TAKEN: CPT

## 2024-08-07 PROCEDURE — G2211 COMPLEX E/M VISIT ADD ON: HCPCS

## 2024-08-07 PROCEDURE — 1036F TOBACCO NON-USER: CPT

## 2024-08-07 PROCEDURE — 3078F DIAST BP <80 MM HG: CPT

## 2024-08-07 RX ORDER — TRAZODONE HYDROCHLORIDE 50 MG/1
50-100 TABLET ORAL DAILY
Qty: 180 TABLET | Refills: 3 | Status: SHIPPED | OUTPATIENT
Start: 2024-08-07 | End: 2025-08-07

## 2024-08-07 ASSESSMENT — SLEEP AND FATIGUE QUESTIONNAIRES
WORRIED_DISTRESSED_DUE_TO_SLEEP: A LITTLE
WAKING_TOO_EARLY: MILD
HOW LIKELY ARE YOU TO NOD OFF OR FALL ASLEEP WHILE SITTING AND TALKING TO SOMEONE: WOULD NEVER DOZE
HOW LIKELY ARE YOU TO NOD OFF OR FALL ASLEEP WHILE SITTING AND READING: SLIGHT CHANCE OF DOZING
HOW LIKELY ARE YOU TO NOD OFF OR FALL ASLEEP WHILE LYING DOWN TO REST IN THE AFTERNOON WHEN CIRCUMSTANCES PERMIT: WOULD NEVER DOZE
SITING INACTIVE IN A PUBLIC PLACE LIKE A CLASS ROOM OR A MOVIE THEATER: WOULD NEVER DOZE
HOW LIKELY ARE YOU TO NOD OFF OR FALL ASLEEP IN A CAR, WHILE STOPPED FOR A FEW MINUTES IN TRAFFIC: WOULD NEVER DOZE
SLEEP_PROBLEM_INTERFERES_DAILY_ACTIVITIES: A LITTLE
ESS-CHAD TOTAL SCORE: 2
HOW LIKELY ARE YOU TO NOD OFF OR FALL ASLEEP WHILE WATCHING TV: SLIGHT CHANCE OF DOZING
SATISFACTION_WITH_CURRENT_SLEEP_PATTERN: SATISFIED
HOW LIKELY ARE YOU TO NOD OFF OR FALL ASLEEP WHILE SITTING QUIETLY AFTER LUNCH WITHOUT ALCOHOL: WOULD NEVER DOZE
DIFFICULTY_FALLING_ASLEEP: MILD
SLEEP_PROBLEM_NOTICEABLE_TO_OTHERS: A LITTLE
DIFFICULTY_STAYING_ASLEEP: MODERATE
HOW LIKELY ARE YOU TO NOD OFF OR FALL ASLEEP WHEN YOU ARE A PASSENGER IN A CAR FOR AN HOUR WITHOUT A BREAK: WOULD NEVER DOZE

## 2024-08-07 ASSESSMENT — PATIENT HEALTH QUESTIONNAIRE - PHQ9
2. FEELING DOWN, DEPRESSED OR HOPELESS: NOT AT ALL
1. LITTLE INTEREST OR PLEASURE IN DOING THINGS: NOT AT ALL
SUM OF ALL RESPONSES TO PHQ9 QUESTIONS 1 AND 2: 0

## 2024-08-07 ASSESSMENT — ANXIETY QUESTIONNAIRES
4. TROUBLE RELAXING: SEVERAL DAYS
5. BEING SO RESTLESS THAT IT IS HARD TO SIT STILL: NOT AT ALL
GAD7 TOTAL SCORE: 3
7. FEELING AFRAID AS IF SOMETHING AWFUL MIGHT HAPPEN: NOT AT ALL
2. NOT BEING ABLE TO STOP OR CONTROL WORRYING: NOT AT ALL
6. BECOMING EASILY ANNOYED OR IRRITABLE: NOT AT ALL
3. WORRYING TOO MUCH ABOUT DIFFERENT THINGS: SEVERAL DAYS
1. FEELING NERVOUS, ANXIOUS, OR ON EDGE: SEVERAL DAYS

## 2024-08-07 ASSESSMENT — ENCOUNTER SYMPTOMS
FATIGUES EASILY: 0
DEPRESSED MOOD: 0
HYPERSOMNIA: 0
SNORING: 0
DIFFICULTY WITH CONCENTRATION: 0
FATIGUE: 0
INSOMNIA: 1
EXCESSIVE DAYTIME SLEEPINESS: 0

## 2024-08-07 NOTE — PATIENT INSTRUCTIONS
It was a pleasure meeting you today Rodrigue Palacios     CURRENT DME: [facility]    As we discussed today in clinic:    1. Continue with your current CPAP setting.   2. Encouraged to lose weight.   3. Remember, don't drive when sleepy.   4. Follow-up with Dr. Miles Peters, my last day with [facility] will be August 16th, 2024. I am here to assist you for any issues in the interim.      As a general guideline, please replace your: PAP cushions every 2-4 weeks, mask every 3-6 months, hose every 3-6 months, Filter (disposable) every 2-4 weeks; machine may need replacement in 5-10 years (once they stop working).     Please follow-up in 1 year    ALWAYS BRING YOUR CPAP / BIPAP WITH YOU TO EVERY APPOINTMENT!  THANKS    FOR QUESTIONS AND CONCERNS:   1. In case of problems with machine or mask interface, please contact your DME company first. DME is the company that provides you the machine and/or CPAP supplies. If [employer] if your DME, you can reach them at [phone] or [employer] ([employer]) [phone].  2. For SLEEP STUDY appointments, please call [phone] ([facility]) or [phone] / [phone] ([employer]) or any other  Sleep Lab location please call [phone]  3. For MEDICAL QUESTIONS, MEDICATION REFILLS, or CLINIC APPOINTMENT SCHEDULING, please call [phone] and my practice lead Kadie would gladly assist you with any concerns.     Here at [facility], we wish you a restful sleep!

## 2024-08-07 NOTE — PROGRESS NOTES
Patient: Rodrigue Palacios  : 1955 AGE: 68 y.o. SEX:male   MRN: [id number]   Provider: YUNG Platt     Location [facility]   Service Date: 2024     PCP: YUNG Blanco   Referred by:               [facility]  FOLLOW-UP VISIT NOTE        HISTORY OF PRESENT ILLNESS     Patient ID: Rodrigue Palacios is a 68 y.o. male who presents to a [facility] for follow-up Insomnia and SCOOTER on PAP.    Patient is here alone today.  The patient has pertinent hx of SCOOTER, CPAP dependence, Insomnia, sleep disturbance, EDS, fatigue, overweight, HTN, GERD, T2DM, RA and chronic pain    Previous Visit's:  24   Patient reports that sleep has improved with the Trazodone. He reports that he is doing better with variable dose of 1 - 2 pills. Patient reports that some nights are better than others. He is now only have maybe one bad night of sleep with the medication. His mood has improved with better sleep.   He brought his machine into clinic today. He is using nightly, denied any issues with mask, leak or pressures. He is getting plenty of supplies through [facility].   His BP today is WNL. Weight remains stable, encouraged healthy weight management with diet and exercise.   +screens, improving mood, follows with PCP    23   Since last visit, patient has been using machine every night with clinical benefit and no issues on machine. Tolerating pressure, mask, and humidity. Per records, patient is getting supplies thru [employer]  The following are patient's perceived benefits of PAP: decreased or no snoring, decreased daytime sleepiness and/or fatigue, decreased nocturnal awakening, better quality of sleep, and improved memory, concentration, and/or mood.  He still struggles some nights with multiple MNA. I tried to prescribed a different medication last time, the multiple options were sent to pharmacy but none  were covered by insurance, out-of-pocket cost was over $200.   He continues taking 50 mg of Trazodone. I discussed increasing the dosage of Trazodone today to 1.5 to 2 pills at night. I updated his prescription today.  His BP is elevated today. He denies any dizziness, lightheadedness, syncopal episodes, chest pain, SOB, changes in vision or HA. Encouraged him to follow-up with PCP.     08/02/2023  Mr. DANNA BARLOW is a 67 year male who presents today for f/u SCOOTER on PAP therapy + Insomnia. Patient has a pertinent hx of SCOOTER, Insomnia, overweight, HTN, GERD, T2DM, rheumatoid arthritis, and chronic pain.   Patient is using machine every night, no issues with machine. He is tolerating pressures and mask. He has minimal positional mask leak. Patient believes they are getting adequate sleep. He was able to get a new mask after previous visit. He is comfortable with the new mask.   He is taking the Trazodone most nights, helps for the most part. He reports having an awaken at around 4 am and unable to go back to sleep ~2-3 days per week. He has definitely noticed a difference with more consistent usage. We discussed he is currently on the 150 mg. We can increase to the max dosage for sleep to 200 mg or change to a different medication. I originally talked with the patient about Ramelteon, however, insurance does not cover this medication, recommended for patient to try Tasimelteon. He is agreeable to try this medication.      06/22/2023   Referred by Pulmonary. Here to get established. Used to see Pulmonary JESSICA Caroline Baltazar CNP. Diagnosed with SCOOTER by HSAT in 2021, currently on fixed-CPAP 8 cmH20 EPR 3.    Patient reports he is doing well overall. Patient is using machine every night, no issues with machine. He is tolerating pressures and mask. He has minimal positional mask leak. Patient believes they are getting adequate sleep. He does not think he snores on the machine. He wakes up refreshed in the morning and denies any  excessive daytime sleepiness. Denies nasal congestion, dry mouth, skin irritation, aerophagia, and air hunger.     He does report however, multiple MNA, usually goes back to sleep within 30 mins. Patient is currently on Trazodone as needed, takes 1/2 dose. We discussed dosing and using more frequently. Patient educated that Trazodone can be PRN or taken every night. He is going to try and take more frequently to help with his sleep. I also instructed patient he can take a full pill as well.       Interval History  Patient was last seen in 2/2024.     08/07/24   Patient here today for follow-up SCOOTER on CPAP therapy and Insomnia management. He reports that he is doing well with sleep. He does however report a recent issue with injury to his right calf, he is currently treating with physical therapy and walking. Otherwise, no other complaints today.  He brought his machine into clinic today. I reviewed data from machine as well as from download. He has used his machine 30/30 days for average usage of 8 hr 20 mins with residual AHI of 2.5/hr. He is getting plenty of supplies, denied any issues with machine, mask, leak or pressure.   Weight is stable. Not active with weight management. Encouraged healthy diet with lifestyle management.   BP is slightly elevated, denied any symptoms. Currently on medications.  Negative MH screens    We discussed today that I will be leaving [facility] as of August 16th, 2024. I discussed follow-up plan with patient today. He will be transitioned to Dr. Miles Peters in [place]. His clinic and contact information was given to the patient in clinic. He verbalized understanding.  SLEEP HISTORY     SLEEP STUDY HISTORY  HSAT - 2/25/2021  BMI - 26.25  AHI4% - 18.4/hr  Supine AHI - 50/hr  Non-supine AHI - 14.6/hr  SpO2 jesus - 69.9%    SLEEP-WAKE SCHEDULE  Bedtime: 10 pm  Wake time: 7 am    SLEEP HABITS   Smoking: never  ETOH:  YES - 1 per week  Marijuana: DENIED  Caffeine: DENIED  Sleep aids:  "Trazodone      WEIGHT: stable    REVIEW OF SYSTEMS     REVIEW OF SYSTEMS  SLEEP ROS   Review of Systems   Constitutional:  Negative for fatigue.   Respiratory:  Negative for snoring.    Neurological:  Negative for difficulty with concentration and excessive daytime sleepiness.   Psychiatric/Behavioral:  The patient has insomnia.        Psych Review of Symptoms:    Anxiety:   Generalized Anxiety Symptoms: Difficulty controlling worry and sleep disturbances due to anxiety. No excessive worry, no difficulty with concentration, does not fatigues easily and no physiological symptoms of anxiety.      Depressive Symptoms:   Insomnia. No depressed mood, no decreased interest, no fatigue, no hypersomnia, not irritable and no suicidal ideation.      Sleep Concerns:   Difficulty staying asleep, restless sleep and awakening from sleep. No excessive daytime sleepiness, no difficulty falling asleep and no snoring.        All other systems have been reviewed and are negative.      ALLERGIES     Allergies   Allergen Reactions    Orencia [Abatacept] Unknown       MEDICATIONS     Current Outpatient Medications   Medication Sig Dispense Refill    atorvastatin (Lipitor) 40 mg tablet Take 1 tablet (40 mg) by mouth once daily. 90 tablet 3    azelastine (Astelin) 137 mcg (0.1 %) nasal spray INSTILL 1 SPRAY IN EACH NOSTRIL TWICE A DAY AS NEEDED FOR ALLERGY SYMPTOMS      dapagliflozin propanediol (Farxiga) 10 mg Take 1 tablet (10 mg) by mouth once daily. 90 tablet 3    folic acid (Folvite) 1 mg tablet 1 tablet (1 mg) once daily.      insulin glargine (Lantus) 100 unit/mL (3 mL) pen Up to 30 units daily as directed 30 mL 3    insulin syringe-needle U-100 (BD Insulin Syringe) 1 mL 26 x 1/2\" syringe       leflunomide (Arava) 20 mg tablet 1 tablet (20 mg).      levocetirizine (Xyzal) 5 mg tablet Take 1 tablet (5 mg) by mouth.      lisinopril 40 mg tablet TAKE ONE TABLET BY MOUTH DAILY 90 tablet 1    metFORMIN  mg 24 hr tablet Take 2 " "tablets (1,000 mg) by mouth 2 times a day with meals. TWICE DAILY 360 tablet 3    montelukast (Singulair) 10 mg tablet TAKE ONE TABLET BY MOUTH EVERY DAY IN THE EVENING      pantoprazole (ProtoNix) 40 mg EC tablet TAKE ONE TABLET BY MOUTH EVERY DAY 90 tablet 0    pen needle, diabetic 32 gauge x 5/32\" needle Use daily 100 each 3    pioglitazone (Actos) 15 mg tablet TAKE 1 TABLET BY MOUTH ONE TIME DAILY 90 tablet 3    sildenafil (Viagra) 100 mg tablet Sildenafil Citrate 100 MG Oral Tablet   Quantity: 6  Refills: 0        Start : 19-Dec-2018   Active      tadalafil 20 mg tablet Take 1 tablet (20 mg) by mouth early in the morning..      methotrexate 25 mg/mL injection 50 mg/m2.      tiZANidine (Zanaflex) 4 mg tablet TAKE ONE TABLET BY MOUTH EVERY 6 HOURS AS NEEDED FOR MUSCLE SPASMS FOR UP TO 10 DAYS. 30 tablet 0    traZODone (Desyrel) 50 mg tablet Take 1-2 tablets ( mg) by mouth once daily. 180 tablet 3     No current facility-administered medications for this visit.       PAST HISTORY     PERTINENT PAST MEDICAL HISTORY: See HPI    PERTINENT PAST SURGICAL HISTORY for Sleep Medicine:  non-contributory    PERTINENT FAMILY HISTORY for Sleep Medicine:  Patient denies family history of any sleep disorder.  Patient denies family history of sleep apnea.    PERTINENT SOCIAL HISTORY:  He  reports that he has never smoked. He has never used smokeless tobacco. He reports current alcohol use. He reports that he does not use drugs. He currently lives alone and retired    Active Problems, Allergy List, Medication List, and PMH/PSH/FH/Social Hx have been reviewed and reconciled in chart. No significant changes unless documented in the pertinent chart section. Updates made when necessary.     PHYSICAL EXAM     VITAL SIGNS: /79   Pulse 72   Wt 104 kg (228 lb 9.6 oz)   SpO2 94%   BMI 28.57 kg/m²     CURRENT WEIGHT:   Vitals:    08/07/24 1300   Weight: 104 kg (228 lb 9.6 oz)      BMI: Body mass index is 28.57 kg/m². " "    PREVIOUS WEIGHTS:  Wt Readings from Last 3 Encounters:   08/07/24 104 kg (228 lb 9.6 oz)   07/03/24 102 kg (225 lb)   07/01/24 103 kg (228 lb)       Today ESS: 2  Today SYDNEY: 9  Today BERNARD-7: 3   Today PHQ-2: NEGATIVE SCREEN    PHYSICAL EXAMINATION  General appearance: awake alert in NAD  Affect: normal  Skin: no rash noted to face  HEENT: Nasal congestion absent  Teeth: normal dentition  Lungs: no cough.  Extr: moves all four extremities  Neuro: normal speech        RESULTS/DATA     No results found for: \"IRON\", \"TRANSFERRIN\", \"IRONSAT\", \"TIBC\", \"FERRITIN\"    Bicarbonate   Date Value Ref Range Status   10/27/2022 27 21 - 32 mmol/L Final       PAP Adherence  A PAP adherence download was obtained and data was reviewed personally today in clinic.        ASSESSMENT/PLAN     Assessment/Plan   Rodrigue Palacios is a 68 y.o. male presents today in [facility] with the following problems:    CURRENT DME: [facility]    OBSTRUCTIVE SLEEP APNEA, moderate (HSAT AHI: 18.4/hr)  currently on fixed-CPAP 8 cmH20 EPR 3  Excellent compliance to PAP therapy, residual AHI at goal, and good control of SCOOTER symptoms  - Patient's risk factors for SCOOTER: age, gender, HTN, use of ETOH, and narrow crowded upper airway anatomy  - SCOOTER diagnosed by HSAT in 2021. Reviewed sleep studies previously in clinic. See HPI.  - Retrieved and personally reviewed recent PAP adherence download data today. See HPI.   - - used 30/30 days for an average of 8 hrs 20 mins with residual AHI of 2.5/hr  - Continue current PAP settings.       SLEEP MAINTENANCE INSOMNIA + SLEEP DISTURBANCES  - likely due to combination of poor sleep hygiene, depression, anxiety, and chronic pain. Meds may be a contributing factor as well.  - discussed with patient good sleep hygiene  - we discussed his medication regimen, discussed increasing dose to full pill as well as increasing frequency to nightly or URMILA to help with MNA  08/02/2023  - patient had some " improvement with increased frequency of use of Trazodone however, he is still having x2-3 days of waking up at 4 am  - we discussed he is almost at the max dose of 200 mg of Trazodone, we initially discussed trying Ramelteon however, not covered by insurance, medication changed to Tasimelteon.   12/6/2023  - patient was unable to get any of the suggested medication from previous visit d/t not covered and out-of-pocket costs  - he is still on Trazodone, I discussed increasing to 1.5 to 2 pill at night. He is agreeable to try increased dosage. I submitted an updated prescription today.   02/07/24  - patient reports he is doing better with Trazodone and sleep, he sleeps most night through the night, still having maybe one episode of insomnia per week  - prescription is 90 days with 3 refills   - flexible dosage of 1 - 2 pills at night  08/07/24  - patient reports utilizing Trazodone nightly, he is doing well with medication. Having occasional nights that he struggles but most nights he does fine with medications  - updated prescription today for 1 year    OVERWEIGHT  - BMI today Body mass index is 28.57 kg/m².   - no significant weight changes noted or reported  - Encouraged patient to lose weight with diet and exercise.   - Weight loss can help in the long term treatment of SCOOTER.  - defer management to PCP       HYPERTENSION  - BP today 143/79  - denies any headache, blurry vision, chest pain, palpitation, dizziness, lightheadedness, or syncopal episodes  - encouraged daily exercise with healthy diet for BP and SCOOTER management  - Defer management to PCP    DEPRESSION / ANXIETY screen  - NEGATIVE SCREEN today 08/07/24      SMOKING STATUS screen  - never a smoker     All of patient's questions were answered. He verbalizes understanding and agreement with my assessment and plan.

## 2024-08-11 DIAGNOSIS — I10 BENIGN ESSENTIAL HYPERTENSION: ICD-10-CM

## 2024-08-12 RX ORDER — LISINOPRIL 40 MG/1
40 TABLET ORAL DAILY
Qty: 90 TABLET | Refills: 1 | Status: SHIPPED | OUTPATIENT
Start: 2024-08-12

## 2024-08-30 DIAGNOSIS — K21.9 GASTROESOPHAGEAL REFLUX DISEASE WITHOUT ESOPHAGITIS: ICD-10-CM

## 2024-09-04 RX ORDER — PANTOPRAZOLE SODIUM 40 MG/1
TABLET, DELAYED RELEASE ORAL
Qty: 90 TABLET | Refills: 3 | Status: SHIPPED | OUTPATIENT
Start: 2024-09-04

## 2024-10-09 DIAGNOSIS — N52.9 ERECTILE DYSFUNCTION, UNSPECIFIED ERECTILE DYSFUNCTION TYPE: Primary | ICD-10-CM

## 2024-10-09 RX ORDER — TADALAFIL 20 MG/1
20 TABLET ORAL DAILY
Qty: 30 TABLET | Refills: 0 | Status: SHIPPED | OUTPATIENT
Start: 2024-10-09

## 2024-10-15 NOTE — PROGRESS NOTES
Subjective   Reason for Visit: Rodrigue Palacios is an 69 y.o. male here for a Medicare Wellness visit.     Past Medical, Surgical, and Family History reviewed and updated in chart.    Reviewed all medications by prescribing practitioner or clinical pharmacist (such as prescriptions, OTCs, herbal therapies and supplements) and documented in the medical record.    HPI  Rodrigue is an est 70 yo male presenting today for AWV and f/u on mult conditions   LOV: DEC 2023    Dx: RA, HLD, HTN, T2DM, GERD, ED, SCOOTER, HEART MURMUR      Pt states he tore his RIGHT calf in July  Was walking up stairs and it popped   Just starting to feel relief     Pt would like a referral to see urology to discuss urinary frequency  States he was seeing a provider that retired and they were talking about a bladder procedure to help with his symptoms     #SCOOTER  using CPAP nightly  feels more awake during the day   Tx per Vipin      #T2DM  Dx'd @ 2012 (in his mid 50's)  Followed by Dr. Christiansen  Taking Metformin 1000 mg BID, Actos, Farxiga and Insulin at bedtime (Trulicity too expensive)   Last A1C= 5.9% June 2024  CGM: Mateus 2   statin: Atorvastatin   ACE/ARB: yes   eye exam: scheduled for Dec 2024  neuropathy: denies     #HTN  Taking Lisinopril 40 mg daily  BP today= 126/74     #HLD  Taking Atorvastatin 40 mg daily  FLP due      #RA  seeing Dr. Barrera; est with new provider in Dec/Jan 2025  Stopped methotrexate and Cimzia @ 6 months ago   Using trazodone for sleep      #GERD  Taking Protonix 40 mg daily  Sx: controlled      #ED  Rx Viagra as needed     #HM  COLON: due w/Dr. Rios, needs son to be home to drive him   PSA: DUE   FBW: DUE   VACCINES: Flu vaccine today         Allergies   Allergen Reactions    Orencia [Abatacept] Unknown       Patient Care Team:  YUNG Blanco as PCP - General  YUNG Blanco       Review of Systems  ROS was completed and all systems are negative with the exception of what was noted in the the HPI.  "      Objective   Vitals:  /74   Pulse 78   Ht 1.905 m (6' 3\")   Wt 105 kg (231 lb 9.6 oz)   SpO2 95%   BMI 28.95 kg/m²       Current Outpatient Medications   Medication Instructions    atorvastatin (LIPITOR) 40 mg, oral, Daily    azelastine (Astelin) 137 mcg (0.1 %) nasal spray INSTILL 1 SPRAY IN EACH NOSTRIL TWICE A DAY AS NEEDED FOR ALLERGY SYMPTOMS    dapagliflozin propanediol (FARXIGA) 10 mg, oral, Daily    folic acid (FOLVITE) 1 mg, Daily    insulin glargine (Lantus) 100 unit/mL (3 mL) pen Up to 30 units daily as directed    insulin syringe-needle U-100 (BD Insulin Syringe) 1 mL 26 x 1/2\" syringe No dose, route, or frequency recorded.    levocetirizine (XYZAL) 5 mg    lisinopril 40 mg, oral, Daily    metFORMIN XR (GLUCOPHAGE-XR) 1,000 mg, oral, 2 times daily (morning and late afternoon), TWICE DAILY    montelukast (Singulair) 10 mg tablet TAKE ONE TABLET BY MOUTH EVERY DAY IN THE EVENING    pantoprazole (ProtoNix) 40 mg EC tablet TAKE ONE TABLET BY MOUTH EVERY DAY    pen needle, diabetic 32 gauge x 5/32\" needle Use daily    pioglitazone (Actos) 15 mg tablet TAKE 1 TABLET BY MOUTH ONE TIME DAILY    tadalafil (CIALIS) 20 mg, oral, Daily    tiZANidine (ZANAFLEX) 4 mg, oral, Every 6 hours PRN    traZODone (DESYREL)  mg, oral, Daily         Physical Exam  Vitals reviewed.   Cardiovascular:      Rate and Rhythm: Normal rate.      Heart sounds: Murmur heard.   Pulmonary:      Effort: Pulmonary effort is normal.      Breath sounds: Normal breath sounds.   Skin:     General: Skin is warm and dry.   Neurological:      Mental Status: He is alert and oriented to person, place, and time.   Psychiatric:         Thought Content: Thought content normal.           Assessment & Plan  Medicare annual wellness visit, subsequent  - Counseled on healthy diet and regular exercise  - Fall avoidance information provided  - Personalized prevention plan provided   - Colon ordered   - Vaccines: flu vaccine today   - FBW  " ordered        Benign essential hypertension  Stable today 126/74  Continue lisinopril daily    Orders:    Follow Up In Primary Care - Established; Future    Central sleep apnea due to medical condition  Continue CPAP nightly        Type 2 diabetes mellitus with other specified complication, with long-term current use of insulin  Followed by Endo  Continue medications as discussed in office today    Orders:    Albumin-Creatinine Ratio, Urine Random; Future    Rheumatoid arthritis involving multiple sites, unspecified whether rheumatoid factor present (Multi)  Condition stable based on symptoms and exam.    Continue current medications and follow-up at least yearly.  Followed by Rheumatology         Need for vaccination    Orders:    Flu vaccine, trivalent, preservative free, HIGH-DOSE, age 65y+ (Fluzone)    Routine general medical examination at health care facility    Orders:    1 Year Follow Up In Primary Care - Wellness Exam; Future    Comprehensive Metabolic Panel; Future    1 Year Follow Up In Primary Care - Wellness Exam; Future    Need for hepatitis C screening test    Orders:    Hepatitis C Antibody; Future    Screening for HIV without presence of risk factors    Orders:    HIV 1/2 Antigen/Ab Screen with Reflex to Confirm; Future    Screening for prostate cancer    Orders:    PSA, Total and Free; Future    Urinary frequency    Orders:    Referral to Urology; Future    Colon cancer screening    Orders:    Colonoscopy Screening; Average Risk Patient; Future    Murmur         Advanced directives, counseling/discussion         Screening for multiple conditions

## 2024-10-16 ENCOUNTER — APPOINTMENT (OUTPATIENT)
Dept: PRIMARY CARE | Facility: CLINIC | Age: 69
End: 2024-10-16
Payer: MEDICARE

## 2024-10-16 VITALS
SYSTOLIC BLOOD PRESSURE: 126 MMHG | DIASTOLIC BLOOD PRESSURE: 74 MMHG | WEIGHT: 231.6 LBS | OXYGEN SATURATION: 95 % | HEART RATE: 78 BPM | HEIGHT: 75 IN | BODY MASS INDEX: 28.8 KG/M2

## 2024-10-16 DIAGNOSIS — Z79.4 TYPE 2 DIABETES MELLITUS WITH OTHER SPECIFIED COMPLICATION, WITH LONG-TERM CURRENT USE OF INSULIN: ICD-10-CM

## 2024-10-16 DIAGNOSIS — Z13.89 SCREENING FOR MULTIPLE CONDITIONS: ICD-10-CM

## 2024-10-16 DIAGNOSIS — R35.0 URINARY FREQUENCY: ICD-10-CM

## 2024-10-16 DIAGNOSIS — Z00.00 ROUTINE GENERAL MEDICAL EXAMINATION AT HEALTH CARE FACILITY: ICD-10-CM

## 2024-10-16 DIAGNOSIS — Z71.89 ADVANCED DIRECTIVES, COUNSELING/DISCUSSION: ICD-10-CM

## 2024-10-16 DIAGNOSIS — M06.9 RHEUMATOID ARTHRITIS INVOLVING MULTIPLE SITES, UNSPECIFIED WHETHER RHEUMATOID FACTOR PRESENT (MULTI): ICD-10-CM

## 2024-10-16 DIAGNOSIS — Z11.4 SCREENING FOR HIV WITHOUT PRESENCE OF RISK FACTORS: ICD-10-CM

## 2024-10-16 DIAGNOSIS — Z12.11 COLON CANCER SCREENING: ICD-10-CM

## 2024-10-16 DIAGNOSIS — R01.1 MURMUR: ICD-10-CM

## 2024-10-16 DIAGNOSIS — I10 BENIGN ESSENTIAL HYPERTENSION: ICD-10-CM

## 2024-10-16 DIAGNOSIS — Z11.59 NEED FOR HEPATITIS C SCREENING TEST: ICD-10-CM

## 2024-10-16 DIAGNOSIS — E11.69 TYPE 2 DIABETES MELLITUS WITH OTHER SPECIFIED COMPLICATION, WITH LONG-TERM CURRENT USE OF INSULIN: ICD-10-CM

## 2024-10-16 DIAGNOSIS — G47.37 CENTRAL SLEEP APNEA DUE TO MEDICAL CONDITION: ICD-10-CM

## 2024-10-16 DIAGNOSIS — Z00.00 MEDICARE ANNUAL WELLNESS VISIT, SUBSEQUENT: Primary | ICD-10-CM

## 2024-10-16 DIAGNOSIS — Z23 NEED FOR VACCINATION: ICD-10-CM

## 2024-10-16 DIAGNOSIS — Z12.5 SCREENING FOR PROSTATE CANCER: ICD-10-CM

## 2024-10-16 ASSESSMENT — ACTIVITIES OF DAILY LIVING (ADL)
BATHING: INDEPENDENT
GROCERY_SHOPPING: INDEPENDENT
TAKING_MEDICATION: INDEPENDENT
MANAGING_FINANCES: INDEPENDENT
DRESSING: INDEPENDENT
DOING_HOUSEWORK: INDEPENDENT

## 2024-10-16 ASSESSMENT — ENCOUNTER SYMPTOMS
DEPRESSION: 0
OCCASIONAL FEELINGS OF UNSTEADINESS: 0
LOSS OF SENSATION IN FEET: 0

## 2024-10-16 NOTE — ASSESSMENT & PLAN NOTE
- Counseled on healthy diet and regular exercise  - Fall avoidance information provided  - Personalized prevention plan provided   - Colon ordered   - Vaccines: flu vaccine today   - FBW  ordered

## 2024-10-16 NOTE — ASSESSMENT & PLAN NOTE
Followed by Endo  Continue medications as discussed in office today    Orders:    Albumin-Creatinine Ratio, Urine Random; Future

## 2024-10-16 NOTE — PATIENT INSTRUCTIONS
Thank you for seeing me today Rodrigue Palacios, it was a pleasure to see you again!    Today we did your Annual Medicare Wellness Exam and discussed the following:     Continue medications as reviewed during OV today    Flu vaccine today    Schedule Colonoscopy     See Endo and Rheum as scheduled     Lab work ordered today.  Please have your blood drawn in the next 1-2 weeks.  You need to be FASTING for 12 hours prior to blood draw.  You may only have water.  Please contact your insurance company to ask about the best location to get blood drawn.  We will contact you with the results of your blood work and any necessary adjustments  to your plan of care, if you do not hear from us within 3-5 days of having your blood drawn, please call the office at [phone].    For assistance with scheduling referrals or consultations, please call [phone] or [phone].    For laboratory, radiology, and other tests, please call [phone] ([phone] for pediatrics).   If you do not get results within 7-10 days, or you have any questions or concerns, please send a message, call the office ([phone]), or return to the office for a follow-up appointment.     For acute/sick visits, if you are unable to get an office visit, you can do a  On Demand Virtual Visit that is accessible via your My Chart account.  For emergencies, call 9-1-1 or go to the nearest Emergency Department.     Please schedule additional appointment(s) to address concern(s) not addressed today.    Please review prescription inserts and published information for possible adverse effects of all medications.     In general, results are discussed over the phone or via  Mintedhart.     You can see your health information, review clinical summaries from office visits & test results online when you follow your health with MY  Chart, a personal health record.   To sign up go to [email or web address].   If you need assistance with signing up or  trouble getting into your account call [facility] Patient Line 24/7 at [phone]     RTC 6 MONTHS AND AS NEEDED     Have a nice day!  Katherine Farnsworth

## 2024-10-16 NOTE — ASSESSMENT & PLAN NOTE
Stable today 126/74  Continue lisinopril daily    Orders:    Follow Up In Primary Care - Established; Future

## 2024-10-16 NOTE — ASSESSMENT & PLAN NOTE
Condition stable based on symptoms and exam.    Continue current medications and follow-up at least yearly.  Followed by Rheumatology

## 2024-12-02 ENCOUNTER — TELEPHONE (OUTPATIENT)
Dept: ENDOCRINOLOGY | Facility: CLINIC | Age: 69
End: 2024-12-02
Payer: MEDICARE

## 2024-12-03 ENCOUNTER — APPOINTMENT (OUTPATIENT)
Dept: ENDOCRINOLOGY | Facility: CLINIC | Age: 69
End: 2024-12-03
Payer: MEDICARE

## 2024-12-03 DIAGNOSIS — E11.69 TYPE 2 DIABETES MELLITUS WITH OTHER SPECIFIED COMPLICATION, WITH LONG-TERM CURRENT USE OF INSULIN: ICD-10-CM

## 2024-12-03 DIAGNOSIS — Z79.4 TYPE 2 DIABETES MELLITUS WITH OTHER SPECIFIED COMPLICATION, WITH LONG-TERM CURRENT USE OF INSULIN: ICD-10-CM

## 2024-12-04 RX ORDER — PIOGLITAZONEHYDROCHLORIDE 15 MG/1
15 TABLET ORAL DAILY
Qty: 90 TABLET | Refills: 0 | Status: SHIPPED | OUTPATIENT
Start: 2024-12-04

## 2024-12-11 DIAGNOSIS — E11.69 TYPE 2 DIABETES MELLITUS WITH OTHER SPECIFIED COMPLICATION, WITH LONG-TERM CURRENT USE OF INSULIN: ICD-10-CM

## 2024-12-11 DIAGNOSIS — Z79.4 TYPE 2 DIABETES MELLITUS WITH OTHER SPECIFIED COMPLICATION, WITH LONG-TERM CURRENT USE OF INSULIN: ICD-10-CM

## 2024-12-12 RX ORDER — METFORMIN HYDROCHLORIDE 500 MG/1
1000 TABLET, EXTENDED RELEASE ORAL
Qty: 360 TABLET | Refills: 0 | Status: SHIPPED | OUTPATIENT
Start: 2024-12-12

## 2025-01-09 ENCOUNTER — APPOINTMENT (OUTPATIENT)
Dept: UROLOGY | Facility: CLINIC | Age: 70
End: 2025-01-09
Payer: MEDICARE

## 2025-01-09 DIAGNOSIS — R35.0 BENIGN PROSTATIC HYPERPLASIA WITH URINARY FREQUENCY: Primary | ICD-10-CM

## 2025-01-09 DIAGNOSIS — N40.1 BENIGN PROSTATIC HYPERPLASIA WITH URINARY FREQUENCY: Primary | ICD-10-CM

## 2025-01-09 DIAGNOSIS — Z12.5 ENCOUNTER FOR SCREENING FOR MALIGNANT NEOPLASM OF PROSTATE: ICD-10-CM

## 2025-01-09 LAB
POC APPEARANCE, URINE: CLEAR
POC BILIRUBIN, URINE: NEGATIVE
POC BLOOD, URINE: ABNORMAL
POC COLOR, URINE: YELLOW
POC GLUCOSE, URINE: NEGATIVE MG/DL
POC KETONES, URINE: NEGATIVE MG/DL
POC LEUKOCYTES, URINE: ABNORMAL
POC NITRITE,URINE: NEGATIVE
POC PH, URINE: 5.5 PH
POC PROTEIN, URINE: ABNORMAL MG/DL
POC SPECIFIC GRAVITY, URINE: 1.02
POC UROBILINOGEN, URINE: 0.2 EU/DL

## 2025-01-09 PROCEDURE — 4010F ACE/ARB THERAPY RXD/TAKEN: CPT | Performed by: SURGERY

## 2025-01-09 PROCEDURE — 1159F MED LIST DOCD IN RCRD: CPT | Performed by: SURGERY

## 2025-01-09 PROCEDURE — 81003 URINALYSIS AUTO W/O SCOPE: CPT | Performed by: SURGERY

## 2025-01-09 PROCEDURE — 1126F AMNT PAIN NOTED NONE PRSNT: CPT | Performed by: SURGERY

## 2025-01-09 PROCEDURE — 51798 US URINE CAPACITY MEASURE: CPT | Performed by: SURGERY

## 2025-01-09 PROCEDURE — 1036F TOBACCO NON-USER: CPT | Performed by: SURGERY

## 2025-01-09 PROCEDURE — 87086 URINE CULTURE/COLONY COUNT: CPT

## 2025-01-09 PROCEDURE — 1123F ACP DISCUSS/DSCN MKR DOCD: CPT | Performed by: SURGERY

## 2025-01-09 PROCEDURE — 81001 URINALYSIS AUTO W/SCOPE: CPT

## 2025-01-09 PROCEDURE — 99203 OFFICE O/P NEW LOW 30 MIN: CPT | Performed by: SURGERY

## 2025-01-09 PROCEDURE — 1160F RVW MEDS BY RX/DR IN RCRD: CPT | Performed by: SURGERY

## 2025-01-09 RX ORDER — TAMSULOSIN HYDROCHLORIDE 0.4 MG/1
0.4 CAPSULE ORAL EVERY EVENING
Qty: 30 CAPSULE | Refills: 0 | Status: SHIPPED | OUTPATIENT
Start: 2025-01-09

## 2025-01-09 ASSESSMENT — PAIN SCALES - GENERAL: PAINLEVEL_OUTOF10: 0-NO PAIN

## 2025-01-09 NOTE — PROGRESS NOTES
Subjective   Patient ID: Rodrigue Palacios is a 69 y.o. male who presents for Establish Care and Urinary Frequency.    HPI  He is here to establish urologic care.  His previous urologist retired.  He has a history of BPH with urinary tract symptoms.  He is fairly bothered by his symptoms.  His AUA symptom score is 24.  He is bothered by his frequency, slow stream, and nocturia.  He does have a history of diabetes.  He denies any dysuria or hematuria.  He does not recall being treated with medications for his BPH.      Review of Systems  As per HPI, remaining 10 systems negative        Objective   Physical Exam  Well nourished  Abdomen soft, ND, NT, mildly obese  No hernias  Circumcised, patent meatus, no lesions  Bilateral descended testes, no masses  JACKIE - prostate smooth, 40 grams, no nodules            Assessment/Plan   Problem List Items Addressed This Visit    None  Visit Diagnoses         Codes    Benign prostatic hyperplasia with urinary frequency    -  Primary N40.1, R35.0    Relevant Medications    tamsulosin (Flomax) 0.4 mg 24 hr capsule    Other Relevant Orders    Urinalysis with Reflex Microscopic    Urine Culture    POCT UA Automated manually resulted (Completed)    Measure post void residual (Completed)    Cystoscopy    Follow Up In Urology    Encounter for screening for malignant neoplasm of prostate     Z12.5             1. BPH.  We discussed treatment options for his BPH.  His postvoid residual today is minimal.  I will start him on Flomax 0.4 mg once a day.  He is interested in the UroLift.  I will schedule him for a cystoscopy.    2. Prostate cancer screening.  He will get a PSA done with his primary care doctor.            Shante Ureña MD 01/09/25 1:26 PM

## 2025-01-10 DIAGNOSIS — E11.69 TYPE 2 DIABETES MELLITUS WITH OTHER SPECIFIED COMPLICATION, WITH LONG-TERM CURRENT USE OF INSULIN: ICD-10-CM

## 2025-01-10 DIAGNOSIS — Z79.4 TYPE 2 DIABETES MELLITUS WITH OTHER SPECIFIED COMPLICATION, WITH LONG-TERM CURRENT USE OF INSULIN: ICD-10-CM

## 2025-01-10 LAB
APPEARANCE UR: CLEAR
BILIRUB UR STRIP.AUTO-MCNC: NEGATIVE MG/DL
COLOR UR: ABNORMAL
GLUCOSE UR STRIP.AUTO-MCNC: NORMAL MG/DL
KETONES UR STRIP.AUTO-MCNC: NEGATIVE MG/DL
LEUKOCYTE ESTERASE UR QL STRIP.AUTO: ABNORMAL
MUCOUS THREADS #/AREA URNS AUTO: ABNORMAL /LPF
NITRITE UR QL STRIP.AUTO: NEGATIVE
PH UR STRIP.AUTO: 5.5 [PH]
PROT UR STRIP.AUTO-MCNC: ABNORMAL MG/DL
RBC # UR STRIP.AUTO: ABNORMAL /UL
RBC #/AREA URNS AUTO: >20 /HPF
SP GR UR STRIP.AUTO: 1.02
UROBILINOGEN UR STRIP.AUTO-MCNC: NORMAL MG/DL
WBC #/AREA URNS AUTO: ABNORMAL /HPF

## 2025-01-11 LAB — BACTERIA UR CULT: NORMAL

## 2025-01-13 RX ORDER — PEN NEEDLE, DIABETIC 30 GX3/16"
NEEDLE, DISPOSABLE MISCELLANEOUS
Qty: 100 EACH | Refills: 1 | Status: SHIPPED | OUTPATIENT
Start: 2025-01-13

## 2025-01-13 RX ORDER — INSULIN GLARGINE 100 [IU]/ML
INJECTION, SOLUTION SUBCUTANEOUS
Qty: 30 ML | Refills: 0 | Status: SHIPPED | OUTPATIENT
Start: 2025-01-13

## 2025-01-21 ENCOUNTER — APPOINTMENT (OUTPATIENT)
Dept: UROLOGY | Facility: CLINIC | Age: 70
End: 2025-01-21
Payer: MEDICARE

## 2025-01-21 VITALS
HEIGHT: 75 IN | TEMPERATURE: 97.7 F | DIASTOLIC BLOOD PRESSURE: 83 MMHG | HEART RATE: 66 BPM | WEIGHT: 231 LBS | BODY MASS INDEX: 28.72 KG/M2 | SYSTOLIC BLOOD PRESSURE: 159 MMHG

## 2025-01-21 DIAGNOSIS — C67.8 MALIGNANT NEOPLASM OF OVERLAPPING SITES OF BLADDER (MULTI): ICD-10-CM

## 2025-01-21 DIAGNOSIS — N40.1 BENIGN PROSTATIC HYPERPLASIA WITH URINARY FREQUENCY: Primary | ICD-10-CM

## 2025-01-21 DIAGNOSIS — R35.0 BENIGN PROSTATIC HYPERPLASIA WITH URINARY FREQUENCY: Primary | ICD-10-CM

## 2025-01-21 PROCEDURE — 52000 CYSTOURETHROSCOPY: CPT | Performed by: SURGERY

## 2025-01-21 ASSESSMENT — PAIN SCALES - GENERAL: PAINLEVEL_OUTOF10: 3

## 2025-01-21 NOTE — PROGRESS NOTES
Patient ID: Rodrigue Palacios is a 69 y.o. male.   Here for BPH evaluation.  He has worsening LUTS despite medical therapy.         Cystoscopy    Date/Time: 1/21/2025 1:17 PM    Performed by: Shante Ureña MD  Authorized by: Shante Ureña MD    Procedure - Bladder Cystoscopy:     Procedure details: cystoscopy    Post-procedure:     Patient tolerance: Patient tolerated the procedure well with no immediate complications      Comments:      The patient was placed in a supine position.  His genitalia were prepped and draped.  We introduced viscous lidocaine per urethra.  A cystoscope was passed per urethra, and we entered the bladder.  The orifices were identified and appeared normal with clear efflux.  The bladder mucosa was inspected.  Along the left lateral wall there are several papillary tumors which are highly suspicious for pathology.  These tumors range between 1 and 2 cm in size.  No stones seen.  Prostate is enlarged, mildly obstructive.  No strictures seen.        Plan:  Schedule TURBT.

## 2025-01-23 DIAGNOSIS — R39.9 UTI SYMPTOMS: ICD-10-CM

## 2025-01-24 ENCOUNTER — LAB (OUTPATIENT)
Dept: LAB | Facility: LAB | Age: 70
End: 2025-01-24
Payer: MEDICARE

## 2025-01-24 DIAGNOSIS — R39.9 UTI SYMPTOMS: ICD-10-CM

## 2025-01-24 PROCEDURE — 87186 SC STD MICRODIL/AGAR DIL: CPT

## 2025-01-24 PROCEDURE — 87086 URINE CULTURE/COLONY COUNT: CPT

## 2025-01-25 DIAGNOSIS — N30.00 ACUTE CYSTITIS WITHOUT HEMATURIA: Primary | ICD-10-CM

## 2025-01-25 RX ORDER — CEPHALEXIN 500 MG/1
500 CAPSULE ORAL 4 TIMES DAILY
Qty: 28 CAPSULE | Refills: 0 | Status: SHIPPED | OUTPATIENT
Start: 2025-01-25 | End: 2025-02-01

## 2025-01-26 LAB — BACTERIA UR CULT: ABNORMAL

## 2025-01-30 DIAGNOSIS — R31.9 URINARY TRACT INFECTION WITH HEMATURIA, SITE UNSPECIFIED: ICD-10-CM

## 2025-01-30 DIAGNOSIS — N39.0 URINARY TRACT INFECTION WITH HEMATURIA, SITE UNSPECIFIED: ICD-10-CM

## 2025-01-30 RX ORDER — CIPROFLOXACIN 500 MG/1
500 TABLET ORAL 2 TIMES DAILY
Qty: 14 TABLET | Refills: 0 | Status: CANCELLED | OUTPATIENT
Start: 2025-01-30 | End: 2025-02-06

## 2025-01-31 DIAGNOSIS — N30.00 ACUTE CYSTITIS WITHOUT HEMATURIA: Primary | ICD-10-CM

## 2025-01-31 RX ORDER — SULFAMETHOXAZOLE AND TRIMETHOPRIM 800; 160 MG/1; MG/1
1 TABLET ORAL 2 TIMES DAILY
Qty: 14 TABLET | Refills: 0 | Status: SHIPPED | OUTPATIENT
Start: 2025-01-31 | End: 2025-02-07

## 2025-02-03 DIAGNOSIS — I10 BENIGN ESSENTIAL HYPERTENSION: ICD-10-CM

## 2025-02-04 RX ORDER — LISINOPRIL 40 MG/1
40 TABLET ORAL DAILY
Qty: 90 TABLET | Refills: 1 | Status: SHIPPED | OUTPATIENT
Start: 2025-02-04

## 2025-02-06 PROCEDURE — 88307 TISSUE EXAM BY PATHOLOGIST: CPT

## 2025-02-06 PROCEDURE — 88307 TISSUE EXAM BY PATHOLOGIST: CPT | Performed by: STUDENT IN AN ORGANIZED HEALTH CARE EDUCATION/TRAINING PROGRAM

## 2025-02-07 ENCOUNTER — OFFICE VISIT (OUTPATIENT)
Dept: UROLOGY | Facility: CLINIC | Age: 70
End: 2025-02-07
Payer: MEDICARE

## 2025-02-07 ENCOUNTER — LAB REQUISITION (OUTPATIENT)
Dept: LAB | Facility: HOSPITAL | Age: 70
End: 2025-02-07
Payer: MEDICARE

## 2025-02-07 DIAGNOSIS — C67.2 MALIGNANT NEOPLASM OF LATERAL WALL OF URINARY BLADDER (MULTI): Primary | ICD-10-CM

## 2025-02-07 DIAGNOSIS — C67.8 MALIGNANT NEOPLASM OF OVERLAPPING SITES OF BLADDER (MULTI): ICD-10-CM

## 2025-02-07 PROCEDURE — 99215 OFFICE O/P EST HI 40 MIN: CPT | Performed by: SURGERY

## 2025-02-07 PROCEDURE — 1159F MED LIST DOCD IN RCRD: CPT | Performed by: SURGERY

## 2025-02-07 PROCEDURE — 1160F RVW MEDS BY RX/DR IN RCRD: CPT | Performed by: SURGERY

## 2025-02-07 PROCEDURE — 1126F AMNT PAIN NOTED NONE PRSNT: CPT | Performed by: SURGERY

## 2025-02-07 PROCEDURE — 1123F ACP DISCUSS/DSCN MKR DOCD: CPT | Performed by: SURGERY

## 2025-02-07 PROCEDURE — 4010F ACE/ARB THERAPY RXD/TAKEN: CPT | Performed by: SURGERY

## 2025-02-07 PROCEDURE — 1036F TOBACCO NON-USER: CPT | Performed by: SURGERY

## 2025-02-07 ASSESSMENT — PAIN SCALES - GENERAL: PAINLEVEL_OUTOF10: 0-NO PAIN

## 2025-02-07 NOTE — PROGRESS NOTES
Subjective   Patient ID: Rodrigue Palacios is a 69 y.o. male who presents for surgery follow up.    HPI  He is here for follow-up.  He had a transurethral resection of a large bladder tumor, this was done yesterday.  He is doing well.  He has some catheter associated discomfort and leakage.  He has not had any hematuria.              Objective   Physical Exam  Well nourished  Abdomen soft, ND, NT  Aquino - clear urine              Assessment/Plan   Problem List Items Addressed This Visit    None  Visit Diagnoses         Codes    Malignant neoplasm of lateral wall of urinary bladder (Multi)    -  Primary C67.2    Relevant Orders    CT urography w 3D volume rendered imaging    Basic Metabolic Panel             We discussed the natural history of bladder cancer.  We discussed superficial disease, and also muscle invasive disease.  We discussed surveillance and recurrence rates.  I will call him with the biopsy results.  I will also order a CT urogram today to evaluate his upper tracts.  We will remove his catheter today for a voiding trial.    We also discussed treatment options for superficial disease.  Given the size of his tumor he may benefit from a 6-week induction course of BCG.  This will be dependent on his biopsy results.    Greater than 50% of my office time today was spent in counseling and coordinating care.        Shante Ureña MD 02/07/25 10:11 AM

## 2025-02-12 LAB
ANION GAP SERPL CALCULATED.4IONS-SCNC: 7 MMOL/L (CALC) (ref 7–17)
BUN SERPL-MCNC: 28 MG/DL (ref 7–25)
BUN/CREAT SERPL: 18 (CALC) (ref 6–22)
CALCIUM SERPL-MCNC: 9.4 MG/DL (ref 8.6–10.3)
CHLORIDE SERPL-SCNC: 106 MMOL/L (ref 98–110)
CO2 SERPL-SCNC: 26 MMOL/L (ref 20–32)
CREAT SERPL-MCNC: 1.52 MG/DL (ref 0.7–1.35)
EGFRCR SERPLBLD CKD-EPI 2021: 49 ML/MIN/1.73M2
GLUCOSE SERPL-MCNC: 192 MG/DL (ref 65–99)
POTASSIUM SERPL-SCNC: 5.3 MMOL/L (ref 3.5–5.3)
SODIUM SERPL-SCNC: 139 MMOL/L (ref 135–146)

## 2025-02-13 ENCOUNTER — HOSPITAL ENCOUNTER (OUTPATIENT)
Dept: RADIOLOGY | Facility: HOSPITAL | Age: 70
Discharge: HOME | End: 2025-02-13
Payer: MEDICARE

## 2025-02-13 DIAGNOSIS — C67.2 MALIGNANT NEOPLASM OF LATERAL WALL OF URINARY BLADDER (MULTI): ICD-10-CM

## 2025-02-13 LAB
LABORATORY COMMENT REPORT: NORMAL
PATH REPORT.FINAL DX SPEC: NORMAL
PATH REPORT.GROSS SPEC: NORMAL
PATH REPORT.RELEVANT HX SPEC: NORMAL
PATH REPORT.TOTAL CANCER: NORMAL
RESIDENT REVIEW: NORMAL

## 2025-02-13 PROCEDURE — 2550000001 HC RX 255 CONTRASTS: Performed by: SURGERY

## 2025-02-13 PROCEDURE — 76377 3D RENDER W/INTRP POSTPROCES: CPT

## 2025-02-13 RX ADMIN — IOHEXOL 75 ML: 350 INJECTION, SOLUTION INTRAVENOUS at 12:29

## 2025-02-17 DIAGNOSIS — N20.0 KIDNEY CALCULI: Primary | ICD-10-CM

## 2025-02-17 DIAGNOSIS — N20.0 CALCULUS, RENAL: Primary | ICD-10-CM

## 2025-02-19 ENCOUNTER — TELEMEDICINE (OUTPATIENT)
Dept: PRIMARY CARE | Facility: CLINIC | Age: 70
End: 2025-02-19
Payer: MEDICARE

## 2025-02-19 ENCOUNTER — TELEPHONE (OUTPATIENT)
Dept: PRIMARY CARE | Facility: CLINIC | Age: 70
End: 2025-02-19
Payer: MEDICARE

## 2025-02-19 DIAGNOSIS — U07.1 COVID-19: Primary | ICD-10-CM

## 2025-02-19 DIAGNOSIS — C67.2 MALIGNANT NEOPLASM OF LATERAL WALL OF URINARY BLADDER (MULTI): ICD-10-CM

## 2025-02-19 PROCEDURE — 1036F TOBACCO NON-USER: CPT | Performed by: NURSE PRACTITIONER

## 2025-02-19 PROCEDURE — 1123F ACP DISCUSS/DSCN MKR DOCD: CPT | Performed by: NURSE PRACTITIONER

## 2025-02-19 PROCEDURE — G2211 COMPLEX E/M VISIT ADD ON: HCPCS | Performed by: NURSE PRACTITIONER

## 2025-02-19 PROCEDURE — 1160F RVW MEDS BY RX/DR IN RCRD: CPT | Performed by: NURSE PRACTITIONER

## 2025-02-19 PROCEDURE — 4010F ACE/ARB THERAPY RXD/TAKEN: CPT | Performed by: NURSE PRACTITIONER

## 2025-02-19 PROCEDURE — 1159F MED LIST DOCD IN RCRD: CPT | Performed by: NURSE PRACTITIONER

## 2025-02-19 PROCEDURE — 99213 OFFICE O/P EST LOW 20 MIN: CPT | Performed by: NURSE PRACTITIONER

## 2025-02-19 RX ORDER — NIRMATRELVIR AND RITONAVIR 150-100 MG
2 KIT ORAL 2 TIMES DAILY
Qty: 20 TABLET | Refills: 0 | Status: SHIPPED | OUTPATIENT
Start: 2025-02-19 | End: 2025-02-24

## 2025-02-19 NOTE — PATIENT INSTRUCTIONS
Thank you for seeing me today Rodrigue Palacios, it was a pleasure to meet you!    #COVID  Rx Paxlovid (renal dose)  x 5 days  Hold Atorvastatin during treatment, may resume the following day     If you have COVID-19, you can spread the virus to others. There are precautions you can take to prevent spreading it to others: isolation, masking, and avoiding contact with people who are at high risk of getting very sick. Isolation is used to separate people with confirmed or suspected COVID-19 from those without COVID-19.    **If you tested POSITIVE or HAVE SYMPTOMS of Covid-19, you should stay home for 6 days from the day symptoms began or 6 days from the day you tested positive and wear a high-quality mask around other people for 10 days from symptom onset or date of positive test.   If your symptoms are improving, you have been fever-free for 24 hours, without the use of fever-reducing medication, and you have access to antigen tests, you should consider using them after your isolation. With 2 sequential negative tests 48 hours apart, you may remove your mask sooner than 10 days.  If your antigen test results are positive, you may still be infectious and should not remove your mask around others. Continue taking antigen tests at least 48 hours apart until you have 2 sequential negative results. This may mean you need to continue wearing a mask and testing beyond day 10.    For assistance with scheduling referrals or consultations, please call [phone] or [phone].    For laboratory, radiology, and other tests, please call [phone] ([phone] for pediatrics).   For laboratory locations, please visit [email or web address]   If you do not get results within 7-10 days, or you have any questions or concerns, please send a message, call the office ([phone]), or return to the office for a follow-up appointment.     For acute/sick visits, if you are unable to get an  office visit, you can do a  On Demand Virtual Visit that is accessible via your My Chart account.  For emergencies, call 9-1-1 or go to the nearest Emergency Department.     Please schedule additional appointment(s) to address concern(s) not addressed today.    Please review prescription inserts and published information for possible adverse effects of all medications.     In general, results are discussed over the phone or via  Bundle Itt.     You can see your health information, review clinical summaries from office visits & test results online when you follow your health with MY  Chart, a personal health record.   To sign up go to [email or web address].   If you need assistance with signing up or trouble getting into your account call [facility] [employer] Patient Line 24/7 at [phone]     RTC     ~NILESH Becerra

## 2025-02-20 ENCOUNTER — APPOINTMENT (OUTPATIENT)
Dept: PREADMISSION TESTING | Facility: HOSPITAL | Age: 70
End: 2025-02-20
Payer: MEDICARE

## 2025-03-12 DIAGNOSIS — Z79.4 TYPE 2 DIABETES MELLITUS WITH OTHER SPECIFIED COMPLICATION, WITH LONG-TERM CURRENT USE OF INSULIN: ICD-10-CM

## 2025-03-12 DIAGNOSIS — E11.69 TYPE 2 DIABETES MELLITUS WITH OTHER SPECIFIED COMPLICATION, WITH LONG-TERM CURRENT USE OF INSULIN: ICD-10-CM

## 2025-03-13 RX ORDER — PIOGLITAZONEHYDROCHLORIDE 15 MG/1
15 TABLET ORAL DAILY
Qty: 90 TABLET | Refills: 0 | Status: SHIPPED | OUTPATIENT
Start: 2025-03-13

## 2025-03-17 ENCOUNTER — PRE-ADMISSION TESTING (OUTPATIENT)
Dept: PREADMISSION TESTING | Facility: HOSPITAL | Age: 70
End: 2025-03-17
Payer: MEDICARE

## 2025-03-17 VITALS
DIASTOLIC BLOOD PRESSURE: 76 MMHG | WEIGHT: 224 LBS | HEART RATE: 88 BPM | OXYGEN SATURATION: 99 % | BODY MASS INDEX: 27.85 KG/M2 | TEMPERATURE: 98.1 F | HEIGHT: 75 IN | SYSTOLIC BLOOD PRESSURE: 124 MMHG | RESPIRATION RATE: 16 BRPM

## 2025-03-17 DIAGNOSIS — Z79.4 TYPE 2 DIABETES MELLITUS WITHOUT COMPLICATION, WITH LONG-TERM CURRENT USE OF INSULIN (MULTI): ICD-10-CM

## 2025-03-17 DIAGNOSIS — E11.9 TYPE 2 DIABETES MELLITUS WITHOUT COMPLICATION, WITH LONG-TERM CURRENT USE OF INSULIN (MULTI): ICD-10-CM

## 2025-03-17 DIAGNOSIS — G47.33 OSA (OBSTRUCTIVE SLEEP APNEA): ICD-10-CM

## 2025-03-17 DIAGNOSIS — N20.0 KIDNEY CALCULI: ICD-10-CM

## 2025-03-17 DIAGNOSIS — Z01.818 PRE-OP TESTING: Primary | ICD-10-CM

## 2025-03-17 LAB
ANION GAP SERPL CALCULATED.3IONS-SCNC: 11 MMOL/L (ref 10–20)
BASOPHILS # BLD AUTO: 0.03 X10*3/UL (ref 0–0.1)
BASOPHILS NFR BLD AUTO: 0.3 %
BUN SERPL-MCNC: 26 MG/DL (ref 6–23)
CALCIUM SERPL-MCNC: 9.3 MG/DL (ref 8.6–10.3)
CHLORIDE SERPL-SCNC: 108 MMOL/L (ref 98–107)
CO2 SERPL-SCNC: 27 MMOL/L (ref 21–32)
CREAT SERPL-MCNC: 1.49 MG/DL (ref 0.5–1.3)
EGFRCR SERPLBLD CKD-EPI 2021: 50 ML/MIN/1.73M*2
EOSINOPHIL # BLD AUTO: 0.49 X10*3/UL (ref 0–0.7)
EOSINOPHIL NFR BLD AUTO: 5.1 %
ERYTHROCYTE [DISTWIDTH] IN BLOOD BY AUTOMATED COUNT: 14 % (ref 11.5–14.5)
EST. AVERAGE GLUCOSE BLD GHB EST-MCNC: 160 MG/DL
GLUCOSE SERPL-MCNC: 209 MG/DL (ref 74–99)
HBA1C MFR BLD: 7.2 %
HCT VFR BLD AUTO: 34.6 % (ref 41–52)
HGB BLD-MCNC: 11.1 G/DL (ref 13.5–17.5)
IMM GRANULOCYTES # BLD AUTO: 0.04 X10*3/UL (ref 0–0.7)
IMM GRANULOCYTES NFR BLD AUTO: 0.4 % (ref 0–0.9)
LYMPHOCYTES # BLD AUTO: 1.14 X10*3/UL (ref 1.2–4.8)
LYMPHOCYTES NFR BLD AUTO: 11.9 %
MCH RBC QN AUTO: 28.6 PG (ref 26–34)
MCHC RBC AUTO-ENTMCNC: 32.1 G/DL (ref 32–36)
MCV RBC AUTO: 89 FL (ref 80–100)
MONOCYTES # BLD AUTO: 0.73 X10*3/UL (ref 0.1–1)
MONOCYTES NFR BLD AUTO: 7.6 %
NEUTROPHILS # BLD AUTO: 7.14 X10*3/UL (ref 1.2–7.7)
NEUTROPHILS NFR BLD AUTO: 74.7 %
NRBC BLD-RTO: 0 /100 WBCS (ref 0–0)
PLATELET # BLD AUTO: 252 X10*3/UL (ref 150–450)
POTASSIUM SERPL-SCNC: 4.6 MMOL/L (ref 3.5–5.3)
RBC # BLD AUTO: 3.88 X10*6/UL (ref 4.5–5.9)
SODIUM SERPL-SCNC: 141 MMOL/L (ref 136–145)
WBC # BLD AUTO: 9.6 X10*3/UL (ref 4.4–11.3)

## 2025-03-17 PROCEDURE — 99204 OFFICE O/P NEW MOD 45 MIN: CPT | Performed by: PHYSICIAN ASSISTANT

## 2025-03-17 PROCEDURE — 85025 COMPLETE CBC W/AUTO DIFF WBC: CPT

## 2025-03-17 PROCEDURE — 93005 ELECTROCARDIOGRAM TRACING: CPT

## 2025-03-17 PROCEDURE — 36415 COLL VENOUS BLD VENIPUNCTURE: CPT

## 2025-03-17 PROCEDURE — 93010 ELECTROCARDIOGRAM REPORT: CPT | Performed by: INTERNAL MEDICINE

## 2025-03-17 PROCEDURE — 83036 HEMOGLOBIN GLYCOSYLATED A1C: CPT | Mod: TRILAB

## 2025-03-17 PROCEDURE — 80048 BASIC METABOLIC PNL TOTAL CA: CPT

## 2025-03-17 ASSESSMENT — DUKE ACTIVITY SCORE INDEX (DASI)
CAN YOU WALK A BLOCK OR TWO ON LEVEL GROUND: YES
CAN YOU CLIMB A FLIGHT OF STAIRS OR WALK UP A HILL: YES
CAN YOU DO MODERATE WORK AROUND THE HOUSE LIKE VACUUMING, SWEEPING FLOORS OR CARRYING GROCERIES: YES
CAN YOU TAKE CARE OF YOURSELF (EAT, DRESS, BATHE, OR USE TOILET): YES
CAN YOU DO YARD WORK LIKE RAKING LEAVES, WEEDING OR PUSHING A MOWER: YES
DASI METS SCORE: 9.9
CAN YOU DO HEAVY WORK AROUND THE HOUSE LIKE SCRUBBING FLOORS OR LIFTING AND MOVING HEAVY FURNITURE: YES
CAN YOU PARTICIPATE IN STRENOUS SPORTS LIKE SWIMMING, SINGLES TENNIS, FOOTBALL, BASKETBALL, OR SKIING: YES
TOTAL_SCORE: 58.2
CAN YOU HAVE SEXUAL RELATIONS: YES
CAN YOU RUN A SHORT DISTANCE: YES
CAN YOU DO LIGHT WORK AROUND THE HOUSE LIKE DUSTING OR WASHING DISHES: YES
CAN YOU PARTICIPATE IN MODERATE RECREATIONAL ACTIVITIES LIKE GOLF, BOWLING, DANCING, DOUBLES TENNIS OR THROWING A BASEBALL OR FOOTBALL: YES
CAN YOU WALK INDOORS, SUCH AS AROUND YOUR HOUSE: YES

## 2025-03-17 ASSESSMENT — ENCOUNTER SYMPTOMS: ARTHRALGIAS: 1

## 2025-03-17 NOTE — CPM/PAT H&P
"CPM/PAT Evaluation       Name: Rodrigue Palacios (Rodrigue Palacios)  /Age: 1955/69 y.o.     In-Person       Chief Complaint: \"kidney stone\"    HPI  The patient is a 69 year old male.  He has a history of BPH and a cystoscopy in  showed several papillary tumors and pathology showed invasive papillary urothelial carcinoma, high grade.  A CT scan showed a right UPJ calculus with severe hydronephrosis.  He did have excision bladder tumors approximately 1 month ago.  Post operatively he had a few days of hematuria, but this has resolved.  He does have nocturia, getting up 3+ times per night, occasional urinary hesitancy and occasional urinary urgency.  He denies dysuria, flank pain or fever/chills.  Kidney stone surgery is recommended at this time.    Past Medical History:   Diagnosis Date    Bladder cancer (Multi)     Dorsalgia, unspecified 2020    Back pain without radiation    GERD (gastroesophageal reflux disease)     Hyperlipidemia     Hypertension     Local infection of the skin and subcutaneous tissue, unspecified 10/11/2013    Nonvenomous insect bite with infection    Nephrolithiasis     Other muscle spasm 2018    Muscle spasms of neck    Other specified diseases of anus and rectum 2013    Anal pain    Personal history of other diseases of the digestive system 2013    History of gastroenteritis    Personal history of other diseases of the respiratory system 2020    History of acute bronchitis    Personal history of other diseases of the respiratory system 2019    History of upper respiratory infection    Personal history of other infectious and parasitic diseases     History of candidiasis of mouth    Personal history of other specified conditions 2013    History of abnormal weight loss    Personal history of other specified conditions 2014    History of numbness    Personal history of other specified conditions 2017    History of abdominal " "pain    Pneumonia, unspecified organism 01/25/2016    Pneumonia involving right lung    Rash and other nonspecific skin eruption 04/29/2013    Rash    Sleep apnea     Type 2 diabetes mellitus        Past Surgical History:   Procedure Laterality Date    BLADDER SURGERY  2025    Removal malignant tumor    CHOLECYSTECTOMY  07/07/2016    Cholecystectomy    HERNIA REPAIR  07/07/2016    Inguinal Hernia Repair    SHOULDER SURGERY Right      Family History   Problem Relation Name Age of Onset    Ovarian cancer Mother      Other (asbestosis) Father       Social History     Tobacco Use    Smoking status: Never     Passive exposure: Never    Smokeless tobacco: Never   Substance Use Topics    Alcohol use: Yes     Comment: rare     Social History     Substance and Sexual Activity   Drug Use Never     Allergies   Allergen Reactions    Orencia [Abatacept] Unknown     Current Outpatient Medications   Medication Sig Dispense Refill    atorvastatin (Lipitor) 40 mg tablet Take 1 tablet (40 mg) by mouth once daily. 90 tablet 3    azelastine (Astelin) 137 mcg (0.1 %) nasal spray INSTILL 1 SPRAY IN EACH NOSTRIL TWICE A DAY AS NEEDED FOR ALLERGY SYMPTOMS      dapagliflozin propanediol (Farxiga) 10 mg Take 1 tablet (10 mg) by mouth once daily. (Patient not taking: Reported on 3/17/2025) 90 tablet 3    folic acid (Folvite) 1 mg tablet 1 tablet (1 mg) once daily.      insulin glargine (Lantus Solostar U-100 Insulin) 100 unit/mL (3 mL) pen INJECT UP TO 30 UNITS SUBCUTANEOUSLY DAILY AS DIRECTED (Patient taking differently: 20 Units once daily at bedtime. INJECT UP TO 30 UNITS SUBCUTANEOUSLY DAILY AS DIRECTED) 30 mL 0    insulin syringe-needle U-100 (BD Insulin Syringe) 1 mL 26 x 1/2\" syringe       levocetirizine (Xyzal) 5 mg tablet Take 1 tablet (5 mg) by mouth.      lisinopril 40 mg tablet TAKE ONE TABLET BY MOUTH DAILY (Patient taking differently: Take 1 tablet (40 mg) by mouth once daily in the evening.) 90 tablet 1    metFORMIN  mg " "24 hr tablet TAKE TWO TABLETS BY MOUTH TWO TIMES A DAY WITH MEALS 360 tablet 0    montelukast (Singulair) 10 mg tablet TAKE ONE TABLET BY MOUTH EVERY DAY IN THE EVENING      pantoprazole (ProtoNix) 40 mg EC tablet TAKE ONE TABLET BY MOUTH EVERY DAY 90 tablet 3    pen needle, diabetic (BD Luann 2nd Gen Pen Needle) 32 gauge x 5/32\" needle use DAILY 100 each 1    pioglitazone (Actos) 15 mg tablet TAKE ONE TABLET BY MOUTH EVERY DAY 90 tablet 0    tadalafil 20 mg tablet TAKE ONE TABLET BY MOUTH ONCE DAILY 30 tablet 0    tiZANidine (Zanaflex) 4 mg tablet TAKE ONE TABLET BY MOUTH EVERY 6 HOURS AS NEEDED FOR MUSCLE SPASMS FOR UP TO 10 DAYS. 30 tablet 0    traZODone (Desyrel) 50 mg tablet Take 1-2 tablets ( mg) by mouth once daily. 180 tablet 3     No current facility-administered medications for this visit.     Review of Systems   Genitourinary:         See HPI   Musculoskeletal:  Positive for arthralgias.   All other systems reviewed and are negative.    /76   Pulse 88   Temp 36.7 °C (98.1 °F) (Temporal)   Resp 16   Ht 1.905 m (6' 3\")   Wt 102 kg (224 lb)   SpO2 99%   BMI 28.00 kg/m²     Physical Exam  Vitals reviewed.   Constitutional:       Appearance: Normal appearance.   HENT:      Head: Normocephalic and atraumatic.      Mouth/Throat:      Mouth: Mucous membranes are moist.      Pharynx: Oropharynx is clear.   Eyes:      Extraocular Movements: Extraocular movements intact.      Pupils: Pupils are equal, round, and reactive to light.   Cardiovascular:      Rate and Rhythm: Normal rate and regular rhythm.      Comments: Soft, systolic murmur Grade I-II/VI noted at the left sternal border.  Pulmonary:      Effort: Pulmonary effort is normal.      Breath sounds: Normal breath sounds.   Abdominal:      General: Bowel sounds are normal.      Palpations: Abdomen is soft.   Musculoskeletal:         General: No swelling.   Skin:     General: Skin is warm and dry.   Neurological:      General: No focal deficit " present.      Mental Status: He is alert and oriented to person, place, and time.   Psychiatric:         Mood and Affect: Mood normal.         Behavior: Behavior normal.        PAT AIRWAY:   Airway:     Mallampati::  III    TM distance::  >3 FB    Neck ROM::  Full   Teeth intact    ASA:  III  DASI SCORE:  58.2  METS SCORE:  9.9  CHAD2 SCORE:  4.0%  REVISED CARDIAC RISK INDEX:  0.9%  STOP BANG SCORE:  4  CAPRINI DVT SCORE:  7  YUAN SCORE:  0.11%  ARISCAT SCORE:  1.6%    EKG (preliminary in PAT) - normal sinus rhythm, left axis deviation, voltage criteria for LVH  CBC, BMP, HEMOGLOBIN A1C ordered during PAT visit    Assessment and Plan:     Renal calculus:  Cystoscopy with ESWL, insertion right ureteral stent  SCOOTER - compliant with CPAP  Hypertension - taking lisinopril  Diabetes Mellitus - taking metformin, insuline and pioglitazone  Bladder cancer - s/p bladder tumor removal  2025  GERD - taking pantoprazole    Melissa Bolton PA-C

## 2025-03-17 NOTE — PREPROCEDURE INSTRUCTIONS
PAT DISCHARGE INSTRUCTIONS    Please call the Same Day Surgery (SDS) Department of the hospital where your procedure will be performed after 2:00 PM the day before your surgery. If you are scheduled on a Monday, or a Tuesday following a Monday holiday, you will need to call on the last business day prior to your surgery.    [facility]  [address] [address], [address]  [phone]    [facility]  [address] [place] [address] [phone]    [facility]  [address]  [place] [id number]  [phone]    Please let your surgeon know if:      You develop any open sores, shingles, burning or painful urination as these may increase your risk of an infection.   You no longer wish to have the surgery.   Any other personal circumstances change that may lead to the need to cancel or defer this surgery-such as being sick or getting admitted to any hospital within one week of your planned procedure.    Your contact details change, such as a change of address or phone number.    Starting now:     Please DO NOT drink alcohol or smoke for 24 hours before surgery. It is well known that quitting smoking can make a huge difference to your health and recovery from surgery. The longer you abstain from smoking, the better your chances of a healthy recovery. If you need help with quitting, call 1-800-QUIT-NOW to be connected to a trained counselor who will discuss the best methods to help you quit.     Before your surgery:    Please stop all supplements 7 days prior to surgery. Or as directed by your surgeon.   Please stop taking NSAID pain medicine such as Advil and Motrin 7 days before surgery.    If you develop any fever, cough, cold, rashes, cuts, scratches, scrapes, urinary symptoms or infection anywhere on your body (including teeth and gums) prior to surgery, please call your surgeon’s office as soon as  possible. This may require treatment to reduce the chance of cancellation on the day of surgery.    The day before your surgery:   Get a good night’s rest.  Use the special soap for bathing if you have been instructed to use one.    Scheduled surgery times may change and you will be notified if this occurs - please check your personal voicemail for any updates.     On the morning of surgery:   Wear comfortable, loose fitting clothes which open in the front. Please do not wear moisturizers, creams, lotions, makeup or perfume.    Please bring with you to surgery:   Photo ID and insurance card   Current list of medicines and allergies   Pacemaker/ Defibrillator/Heart stent cards   CPAP machine and mask    Slings/ splints/ crutches   A copy of your complete advanced directive/DHPOA.    Please do NOT bring with you to surgery:   All jewelry and valuables should be left at home.   Prosthetic devices such as contact lenses, hearing aids, dentures, eyelash extensions, hairpins and body piercings must be removed prior to going in to the surgical suite.    After outpatient surgery:   A responsible adult MUST accompany you at the time of discharge and stay with you for 24 hours after your surgery. You may NOT drive yourself home after surgery.    Do not drive, operate machinery, make critical decisions or do activities that require co-ordination or balance until after a night’s sleep.   Do not drink alcoholic beverages for 24 hours.   Instructions for resuming your medications will be provided by your surgeon.    CALL YOUR DOCTOR AFTER SURGERY IF YOU HAVE:     Chills and/or a fever of 101° F or higher.    Redness, swelling, pus or drainage from your surgical wound or a bad smell from the wound.    Lightheadedness, fainting or confusion.    Persistent vomiting (throwing up) and are not able to eat or drink for 12 hours.    Three or more loose, watery bowel movements in 24 hours (diarrhea).   Difficulty or pain while urinating(  "after non-urological surgery)    Pain and swelling in your legs, especially if it is only on one side.    Difficulty breathing or are breathing faster than normal.    Any new concerning symptoms.          Preoperative Fasting Guidelines    Why must I stop eating and drinking near surgery time?  With sedation, food or liquid in your stomach can enter your lungs causing serious complications  Increases nausea and vomiting    When do I need to stop eating and drinking before my surgery?  Do not eat any food after midnight the night before your surgery/procedure.  You may have up to 13 ounces of clear liquid until TWO hours before your instructed arrival time to the hospital.  This includes water, black tea/coffee, (no milk or cream) apple juice, and electrolyte drinks (Gatorade)  You may chew gum until TWO hours before your surgery/procedure     Medication List            Accurate as of March 17, 2025  9:19 AM. Always use your most recent med list.                atorvastatin 40 mg tablet  Commonly known as: Lipitor  Take 1 tablet (40 mg) by mouth once daily.  Medication Adjustments for Surgery: Take/Use as prescribed  Notes to patient: CONTINUE PER USUAL/TAKE NIGHT BEFORE SURGERY     azelastine 137 mcg (0.1 %) nasal spray  Commonly known as: Astelin  Medication Adjustments for Surgery: Take/Use as prescribed     BD Insulin Syringe 1 mL 26 x 1/2\" syringe  Generic drug: insulin syringe-needle U-100     dapagliflozin propanediol 10 mg  Commonly known as: Farxiga  Take 1 tablet (10 mg) by mouth once daily.  Notes to patient: PT NOT TAKING     folic acid 1 mg tablet  Commonly known as: Folvite  Additional Medication Adjustments for Surgery: Take last dose 7 days before surgery     Lantus Solostar U-100 Insulin 100 unit/mL (3 mL) pen  Generic drug: insulin glargine  INJECT UP TO 30 UNITS SUBCUTANEOUSLY DAILY AS DIRECTED  Medication Adjustments for Surgery: Take/Use as prescribed  Notes to patient: CONTINUE PER USUAL/TAKE " "NIGHT BEFORE SURGERY     levocetirizine 5 mg tablet  Commonly known as: Xyzal  Medication Adjustments for Surgery: Take/Use as prescribed     lisinopril 40 mg tablet  TAKE ONE TABLET BY MOUTH DAILY     metFORMIN  mg 24 hr tablet  Commonly known as: Glucophage-XR  TAKE TWO TABLETS BY MOUTH TWO TIMES A DAY WITH MEALS  Medication Adjustments for Surgery: Take on the morning of surgery     montelukast 10 mg tablet  Commonly known as: Singulair  Medication Adjustments for Surgery: Take/Use as prescribed  Notes to patient: CONTINUE PER USUAL/TAKE NIGHT BEFORE SURGERY     pantoprazole 40 mg EC tablet  Commonly known as: ProtoNix  TAKE ONE TABLET BY MOUTH EVERY DAY  Medication Adjustments for Surgery: Take on the morning of surgery     pen needle, diabetic 32 gauge x 5/32\" needle  Commonly known as: BD Luann 2nd Gen Pen Needle  use DAILY     pioglitazone 15 mg tablet  Commonly known as: Actos  TAKE ONE TABLET BY MOUTH EVERY DAY  Medication Adjustments for Surgery: Take on the morning of surgery     tadalafil 20 mg tablet  Commonly known as: Cialis  TAKE ONE TABLET BY MOUTH ONCE DAILY  Medication Adjustments for Surgery: Take last dose 3 days before surgery     tiZANidine 4 mg tablet  Commonly known as: Zanaflex  TAKE ONE TABLET BY MOUTH EVERY 6 HOURS AS NEEDED FOR MUSCLE SPASMS FOR UP TO 10 DAYS.  Medication Adjustments for Surgery: Take/Use as prescribed     traZODone 50 mg tablet  Commonly known as: Desyrel  Take 1-2 tablets ( mg) by mouth once daily.  Medication Adjustments for Surgery: Take/Use as prescribed  Notes to patient: CONTINUE PER USUAL/TAKE NIGHT BEFORE SURGERY                                                                          "

## 2025-03-19 DIAGNOSIS — Z79.4 TYPE 2 DIABETES MELLITUS WITH OTHER SPECIFIED COMPLICATION, WITH LONG-TERM CURRENT USE OF INSULIN: ICD-10-CM

## 2025-03-19 DIAGNOSIS — E11.69 TYPE 2 DIABETES MELLITUS WITH OTHER SPECIFIED COMPLICATION, WITH LONG-TERM CURRENT USE OF INSULIN: ICD-10-CM

## 2025-03-19 RX ORDER — INSULIN GLARGINE 100 [IU]/ML
INJECTION, SOLUTION SUBCUTANEOUS
Qty: 30 ML | Refills: 2 | Status: SHIPPED | OUTPATIENT
Start: 2025-03-19

## 2025-03-21 DIAGNOSIS — Z79.4 TYPE 2 DIABETES MELLITUS WITH OTHER SPECIFIED COMPLICATION, WITH LONG-TERM CURRENT USE OF INSULIN: ICD-10-CM

## 2025-03-21 DIAGNOSIS — E11.69 TYPE 2 DIABETES MELLITUS WITH OTHER SPECIFIED COMPLICATION, WITH LONG-TERM CURRENT USE OF INSULIN: ICD-10-CM

## 2025-03-21 LAB
ATRIAL RATE: 89 BPM
P AXIS: 48 DEGREES
P OFFSET: 187 MS
P ONSET: 122 MS
PR INTERVAL: 172 MS
Q ONSET: 208 MS
QRS COUNT: 14 BEATS
QRS DURATION: 110 MS
QT INTERVAL: 384 MS
QTC CALCULATION(BAZETT): 467 MS
QTC FREDERICIA: 437 MS
R AXIS: -36 DEGREES
T AXIS: 70 DEGREES
T OFFSET: 400 MS
VENTRICULAR RATE: 89 BPM

## 2025-03-21 RX ORDER — METFORMIN HYDROCHLORIDE 500 MG/1
1000 TABLET, EXTENDED RELEASE ORAL
Qty: 360 TABLET | Refills: 0 | Status: SHIPPED | OUTPATIENT
Start: 2025-03-21

## 2025-03-26 NOTE — PROGRESS NOTES
Judi Jordan was seen and treated in our emergency department on 3/26/2025.                Diagnosis:     Slickir  .    He may return on this date:          If you have any questions or concerns, please don't hesitate to call.      Cathy Dowell MD    ______________________________           _______________          _______________  Hospital Representative                              Date                                Time "Rodrigue Palacios is a 69 y.o. male who presents virtually today for an acute sick visit    I performed this visit using real-time telehealth tools, including an audio/video connection between Rodrigue Palacios  and myself, Daxa Farnsworth CNP,  within the state of [place].  Consent has been obtained for this visit.  I have verbally confirmed with Rodrigue Palacios (or parent if under 18) that they are physically located in the [facility] during this virtual visit.  Telemedicine appropriate evaluation completed.  Unable to perform complete physical exam due to virtual visit.      Chief Complaint   Patient presents with    Covid       Symptoms: Sudden onset of cough, fever, chills, fatigue     Symptom onset has been acute for a time period of 1 day(s).     Severity is described as moderate.     Course of her symptoms over time is acute.    Has taken: Tested + for Covid this am      Allergies   Allergen Reactions    Orencia [Abatacept] Unknown       Review of Systems  ROS was completed and all systems are negative with the exception of what was noted in the the HPI.       Objective   Vitals:  There were no vitals taken for this visit.      Current Outpatient Medications   Medication Instructions    atorvastatin (LIPITOR) 40 mg, oral, Daily    azelastine (Astelin) 137 mcg (0.1 %) nasal spray INSTILL 1 SPRAY IN EACH NOSTRIL TWICE A DAY AS NEEDED FOR ALLERGY SYMPTOMS    dapagliflozin propanediol (FARXIGA) 10 mg, oral, Daily    folic acid (FOLVITE) 1 mg, Daily    insulin glargine (Lantus Solostar U-100 Insulin) 100 unit/mL (3 mL) pen INJECT UP TO 30 UNITS SUBCUTANEOUSLY DAILY AS DIRECTED    insulin syringe-needle U-100 (BD Insulin Syringe) 1 mL 26 x 1/2\" syringe No dose, route, or frequency recorded.    levocetirizine (XYZAL) 5 mg    lisinopril 40 mg, oral, Daily    metFORMIN XR (GLUCOPHAGE-XR) 1,000 mg, oral, 2 times daily (morning and late afternoon)    montelukast (Singulair) 10 mg tablet TAKE ONE TABLET BY MOUTH EVERY DAY IN " "THE EVENING    nirmatrelvir-ritonavir (Paxlovid) 150-100 mg tablet therapy pack 2 tablets, oral, 2 times daily    pantoprazole (ProtoNix) 40 mg EC tablet TAKE ONE TABLET BY MOUTH EVERY DAY    pen needle, diabetic (BD Luann 2nd Gen Pen Needle) 32 gauge x 5/32\" needle use DAILY    pioglitazone (ACTOS) 15 mg, oral, Daily    tadalafil (CIALIS) 20 mg, oral, Daily    tamsulosin (FLOMAX) 0.4 mg, oral, Every evening    tiZANidine (ZANAFLEX) 4 mg, oral, Every 6 hours PRN    traZODone (DESYREL)  mg, oral, Daily         Physical Exam  Pulmonary:      Effort: Pulmonary effort is normal.   Neurological:      Mental Status: He is alert and oriented to person, place, and time.   Psychiatric:         Mood and Affect: Mood normal.         Behavior: Behavior normal.         Thought Content: Thought content normal.       Assessment & Plan  COVID-19  Rx Paxlovid (renal dose) x 5 days   Hold statin during treatment   Tylenol for fevers  Encouraged fluids and rest   Orders:    nirmatrelvir-ritonavir (Paxlovid) 150-100 mg tablet therapy pack; Take 2 tablets by mouth 2 times a day for 5 days.               "

## 2025-03-31 ENCOUNTER — ANESTHESIA (OUTPATIENT)
Dept: OPERATING ROOM | Facility: HOSPITAL | Age: 70
End: 2025-03-31
Payer: MEDICARE

## 2025-03-31 ENCOUNTER — APPOINTMENT (OUTPATIENT)
Dept: RADIOLOGY | Facility: HOSPITAL | Age: 70
End: 2025-03-31
Payer: MEDICARE

## 2025-03-31 ENCOUNTER — ANESTHESIA EVENT (OUTPATIENT)
Dept: OPERATING ROOM | Facility: HOSPITAL | Age: 70
End: 2025-03-31
Payer: MEDICARE

## 2025-03-31 ENCOUNTER — HOSPITAL ENCOUNTER (OUTPATIENT)
Facility: HOSPITAL | Age: 70
Setting detail: OUTPATIENT SURGERY
Discharge: HOME | End: 2025-03-31
Attending: SURGERY | Admitting: SURGERY
Payer: MEDICARE

## 2025-03-31 VITALS
HEIGHT: 75 IN | OXYGEN SATURATION: 98 % | TEMPERATURE: 97 F | BODY MASS INDEX: 27.75 KG/M2 | HEART RATE: 59 BPM | RESPIRATION RATE: 14 BRPM | WEIGHT: 223.2 LBS | DIASTOLIC BLOOD PRESSURE: 85 MMHG | SYSTOLIC BLOOD PRESSURE: 167 MMHG

## 2025-03-31 DIAGNOSIS — N20.0 CALCULUS, RENAL: Primary | ICD-10-CM

## 2025-03-31 DIAGNOSIS — N13.2 HYDRONEPHROSIS WITH RENAL AND URETERAL CALCULUS OBSTRUCTION: Primary | ICD-10-CM

## 2025-03-31 PROCEDURE — 2550000001 HC RX 255 CONTRASTS: Performed by: SURGERY

## 2025-03-31 PROCEDURE — 7100000010 HC PHASE TWO TIME - EACH INCREMENTAL 1 MINUTE: Performed by: SURGERY

## 2025-03-31 PROCEDURE — 74420 UROGRAPHY RTRGR +-KUB: CPT | Performed by: SURGERY

## 2025-03-31 PROCEDURE — 3700000001 HC GENERAL ANESTHESIA TIME - INITIAL BASE CHARGE: Performed by: SURGERY

## 2025-03-31 PROCEDURE — 74018 RADEX ABDOMEN 1 VIEW: CPT

## 2025-03-31 PROCEDURE — C1769 GUIDE WIRE: HCPCS | Performed by: SURGERY

## 2025-03-31 PROCEDURE — 7100000002 HC RECOVERY ROOM TIME - EACH INCREMENTAL 1 MINUTE: Performed by: SURGERY

## 2025-03-31 PROCEDURE — 3700000002 HC GENERAL ANESTHESIA TIME - EACH INCREMENTAL 1 MINUTE: Performed by: SURGERY

## 2025-03-31 PROCEDURE — 7100000001 HC RECOVERY ROOM TIME - INITIAL BASE CHARGE: Performed by: SURGERY

## 2025-03-31 PROCEDURE — 52005 CYSTO W/URTRL CATHJ: CPT | Performed by: SURGERY

## 2025-03-31 PROCEDURE — 2720000007 HC OR 272 NO HCPCS: Performed by: SURGERY

## 2025-03-31 PROCEDURE — 3600000008 HC OR TIME - EACH INCREMENTAL 1 MINUTE - PROCEDURE LEVEL THREE: Performed by: SURGERY

## 2025-03-31 PROCEDURE — 7100000009 HC PHASE TWO TIME - INITIAL BASE CHARGE: Performed by: SURGERY

## 2025-03-31 PROCEDURE — 3600000003 HC OR TIME - INITIAL BASE CHARGE - PROCEDURE LEVEL THREE: Performed by: SURGERY

## 2025-03-31 PROCEDURE — 2500000004 HC RX 250 GENERAL PHARMACY W/ HCPCS (ALT 636 FOR OP/ED): Performed by: ANESTHESIOLOGIST ASSISTANT

## 2025-03-31 PROCEDURE — 50590 FRAGMENTING OF KIDNEY STONE: CPT | Performed by: SURGERY

## 2025-03-31 PROCEDURE — A50590 PR FRAGMENT KIDNEY STONE/ ESWL: Performed by: ANESTHESIOLOGIST ASSISTANT

## 2025-03-31 PROCEDURE — 74018 RADEX ABDOMEN 1 VIEW: CPT | Performed by: RADIOLOGY

## 2025-03-31 PROCEDURE — A50590 PR FRAGMENT KIDNEY STONE/ ESWL: Performed by: ANESTHESIOLOGY

## 2025-03-31 PROCEDURE — 2500000004 HC RX 250 GENERAL PHARMACY W/ HCPCS (ALT 636 FOR OP/ED): Performed by: SURGERY

## 2025-03-31 RX ORDER — MEPERIDINE HYDROCHLORIDE 25 MG/ML
12.5 INJECTION INTRAMUSCULAR; INTRAVENOUS; SUBCUTANEOUS EVERY 10 MIN PRN
Status: DISCONTINUED | OUTPATIENT
Start: 2025-03-31 | End: 2025-03-31 | Stop reason: HOSPADM

## 2025-03-31 RX ORDER — HYDROCODONE BITARTRATE AND ACETAMINOPHEN 5; 325 MG/1; MG/1
1 TABLET ORAL EVERY 6 HOURS PRN
Qty: 20 TABLET | Refills: 0 | Status: SHIPPED | OUTPATIENT
Start: 2025-03-31

## 2025-03-31 RX ORDER — ONDANSETRON HYDROCHLORIDE 2 MG/ML
4 INJECTION, SOLUTION INTRAVENOUS ONCE AS NEEDED
Status: DISCONTINUED | OUTPATIENT
Start: 2025-03-31 | End: 2025-03-31 | Stop reason: HOSPADM

## 2025-03-31 RX ORDER — PROPOFOL 10 MG/ML
INJECTION, EMULSION INTRAVENOUS AS NEEDED
Status: DISCONTINUED | OUTPATIENT
Start: 2025-03-31 | End: 2025-03-31

## 2025-03-31 RX ORDER — LABETALOL HYDROCHLORIDE 5 MG/ML
5 INJECTION, SOLUTION INTRAVENOUS ONCE AS NEEDED
Status: DISCONTINUED | OUTPATIENT
Start: 2025-03-31 | End: 2025-03-31 | Stop reason: HOSPADM

## 2025-03-31 RX ORDER — MIDAZOLAM HYDROCHLORIDE 1 MG/ML
INJECTION, SOLUTION INTRAMUSCULAR; INTRAVENOUS AS NEEDED
Status: DISCONTINUED | OUTPATIENT
Start: 2025-03-31 | End: 2025-03-31

## 2025-03-31 RX ORDER — SODIUM CHLORIDE, SODIUM LACTATE, POTASSIUM CHLORIDE, CALCIUM CHLORIDE 600; 310; 30; 20 MG/100ML; MG/100ML; MG/100ML; MG/100ML
50 INJECTION, SOLUTION INTRAVENOUS CONTINUOUS
Status: DISCONTINUED | OUTPATIENT
Start: 2025-03-31 | End: 2025-03-31 | Stop reason: HOSPADM

## 2025-03-31 RX ORDER — FENTANYL CITRATE 50 UG/ML
INJECTION, SOLUTION INTRAMUSCULAR; INTRAVENOUS AS NEEDED
Status: DISCONTINUED | OUTPATIENT
Start: 2025-03-31 | End: 2025-03-31

## 2025-03-31 RX ORDER — LIDOCAINE HYDROCHLORIDE 10 MG/ML
0.1 INJECTION, SOLUTION EPIDURAL; INFILTRATION; INTRACAUDAL; PERINEURAL ONCE
Status: DISCONTINUED | OUTPATIENT
Start: 2025-03-31 | End: 2025-03-31 | Stop reason: HOSPADM

## 2025-03-31 RX ORDER — CEFAZOLIN SODIUM 2 G/100ML
2 INJECTION, SOLUTION INTRAVENOUS ONCE
Status: COMPLETED | OUTPATIENT
Start: 2025-03-31 | End: 2025-03-31

## 2025-03-31 RX ORDER — FENTANYL CITRATE 50 UG/ML
25 INJECTION, SOLUTION INTRAMUSCULAR; INTRAVENOUS EVERY 5 MIN PRN
Status: DISCONTINUED | OUTPATIENT
Start: 2025-03-31 | End: 2025-03-31 | Stop reason: HOSPADM

## 2025-03-31 RX ORDER — ONDANSETRON HYDROCHLORIDE 2 MG/ML
INJECTION, SOLUTION INTRAVENOUS AS NEEDED
Status: DISCONTINUED | OUTPATIENT
Start: 2025-03-31 | End: 2025-03-31

## 2025-03-31 RX ORDER — OXYCODONE HYDROCHLORIDE 5 MG/1
5 TABLET ORAL EVERY 4 HOURS PRN
Status: DISCONTINUED | OUTPATIENT
Start: 2025-03-31 | End: 2025-03-31 | Stop reason: HOSPADM

## 2025-03-31 RX ORDER — FENTANYL CITRATE 50 UG/ML
50 INJECTION, SOLUTION INTRAMUSCULAR; INTRAVENOUS EVERY 5 MIN PRN
Status: DISCONTINUED | OUTPATIENT
Start: 2025-03-31 | End: 2025-03-31 | Stop reason: HOSPADM

## 2025-03-31 RX ORDER — ACETAMINOPHEN 500 MG
500 TABLET ORAL EVERY 6 HOURS PRN
COMMUNITY

## 2025-03-31 RX ORDER — LIDOCAINE HYDROCHLORIDE 10 MG/ML
INJECTION, SOLUTION EPIDURAL; INFILTRATION; INTRACAUDAL; PERINEURAL AS NEEDED
Status: DISCONTINUED | OUTPATIENT
Start: 2025-03-31 | End: 2025-03-31

## 2025-03-31 RX ADMIN — FENTANYL CITRATE 25 MCG: 50 INJECTION INTRAMUSCULAR; INTRAVENOUS at 10:50

## 2025-03-31 RX ADMIN — CEFAZOLIN SODIUM 2 G: 2 INJECTION, SOLUTION INTRAVENOUS at 10:35

## 2025-03-31 RX ADMIN — LIDOCAINE HYDROCHLORIDE 100 MG: 10 INJECTION, SOLUTION EPIDURAL; INFILTRATION; INTRACAUDAL; PERINEURAL at 10:31

## 2025-03-31 RX ADMIN — SODIUM CHLORIDE, POTASSIUM CHLORIDE, SODIUM LACTATE AND CALCIUM CHLORIDE: 600; 310; 30; 20 INJECTION, SOLUTION INTRAVENOUS at 10:28

## 2025-03-31 RX ADMIN — FENTANYL CITRATE 25 MCG: 50 INJECTION INTRAMUSCULAR; INTRAVENOUS at 10:43

## 2025-03-31 RX ADMIN — FENTANYL CITRATE 50 MCG: 50 INJECTION INTRAMUSCULAR; INTRAVENOUS at 10:40

## 2025-03-31 RX ADMIN — ONDANSETRON 4 MG: 2 INJECTION, SOLUTION INTRAMUSCULAR; INTRAVENOUS at 11:08

## 2025-03-31 RX ADMIN — PROPOFOL 200 MG: 10 INJECTION, EMULSION INTRAVENOUS at 10:31

## 2025-03-31 RX ADMIN — DEXAMETHASONE SODIUM PHOSPHATE 4 MG: 4 INJECTION INTRA-ARTICULAR; INTRALESIONAL; INTRAMUSCULAR; INTRAVENOUS; SOFT TISSUE at 10:46

## 2025-03-31 RX ADMIN — MIDAZOLAM 2 MG: 1 INJECTION INTRAMUSCULAR; INTRAVENOUS at 10:31

## 2025-03-31 ASSESSMENT — PAIN - FUNCTIONAL ASSESSMENT
PAIN_FUNCTIONAL_ASSESSMENT: 0-10
PAIN_FUNCTIONAL_ASSESSMENT: WONG-BAKER FACES
PAIN_FUNCTIONAL_ASSESSMENT: 0-10

## 2025-03-31 ASSESSMENT — PAIN DESCRIPTION - DESCRIPTORS
DESCRIPTORS: ACHING
DESCRIPTORS: ACHING

## 2025-03-31 ASSESSMENT — PAIN SCALES - GENERAL
PAINLEVEL_OUTOF10: 2
PAINLEVEL_OUTOF10: 2
PAINLEVEL_OUTOF10: 0 - NO PAIN

## 2025-03-31 ASSESSMENT — COLUMBIA-SUICIDE SEVERITY RATING SCALE - C-SSRS
2. HAVE YOU ACTUALLY HAD ANY THOUGHTS OF KILLING YOURSELF?: NO
1. IN THE PAST MONTH, HAVE YOU WISHED YOU WERE DEAD OR WISHED YOU COULD GO TO SLEEP AND NOT WAKE UP?: NO
6. HAVE YOU EVER DONE ANYTHING, STARTED TO DO ANYTHING, OR PREPARED TO DO ANYTHING TO END YOUR LIFE?: NO

## 2025-03-31 NOTE — ANESTHESIA PROCEDURE NOTES
Airway  Date/Time: 3/31/2025 10:33 AM  Urgency: elective    Airway not difficult    Staffing  Performed: IVORY   Authorized by: Chang Delgado MD    Performed by: IVORY Roger  Patient location during procedure: OR    Indications and Patient Condition  Indications for airway management: anesthesia  Sedation level: deep  Preoxygenated: yes  Patient position: sniffing  Mask difficulty assessment: 0 - not attempted    Final Airway Details  Final airway type: supraglottic airway      Successful airway: classic  Size 5     Number of attempts at approach: 1

## 2025-03-31 NOTE — OP NOTE
CYSTOSCOPY, WITH URETERAL STENT INSERTION  ( ESWL Solomonjimmie Ireland C - Arm ) (R) Operative Note     Date: 3/31/2025  OR Location: [facility]    Name: Rodrigue Palacios, : 1955, Age: 69 y.o., MRN: [id number], Sex: male    Diagnosis  Pre-op Diagnosis      * Calculus, renal [N20.0] Post-op Diagnosis     * Calculus, renal [N20.0]     Procedures  CYSTOSCOPY, WITH URETERAL STENT INSERTION  ( ESWL Solomon Beka C - Arm )  20799 - RI LITHOTRIPSY XTRCORP SHOCK WAVE    CYSTOSCOPY, WITH URETERAL STENT INSERTION  ( ESWL Solomon Beka C - Arm )  56983 - RI CYSTOURETHROSCOPY W/URETERAL CATHETERIZATION      Surgeons      * Shante Ureña - Primary    Resident/Fellow/Other Assistant:  Surgeons and Role:  * No surgeons found with a matching role *    Staff:   Circulator: Isabel Laurent Person: Ann    Anesthesia Staff: Anesthesiologist: Chang Delgado MD  C-AA: IVORY Roger    Procedure Summary  Anesthesia: General  ASA: III  Estimated Blood Loss: 0 mL  Intra-op Medications:   Administrations occurring from 0955 to 1040 on 25:   Medication Name Total Dose   fentaNYL (Sublimaze) injection 50 mcg/mL 50 mcg   lactated Ringer's infusion Cannot be calculated   lidocaine PF (Xylocaine-MPF) local injection 1 % 100 mg   midazolam (Versed) injection 1 mg/mL 2 mg   propofol (Diprivan) injection 10 mg/mL 200 mg   ceFAZolin (Ancef) 2 g in dextrose (iso)  mL 2 g              Anesthesia Record               Intraprocedure I/O Totals          Intake    lactated Ringer's infusion 500.00 mL    ceFAZolin (Ancef) 2 g in dextrose (iso)  mL 100.00 mL    Total Intake 600 mL          Specimen: No specimens collected              Drains and/or Catheters: * None in log *    Tourniquet Times:         Implants:     Findings: 1. Large, impacted UPJ stone.      Indications: Rodrigue Palacios is an 69 y.o. male who is having surgery for Calculus, renal [N20.0].     The patient was seen in the preoperative area. The risks, benefits,  complications, treatment options, non-operative alternatives, expected recovery and outcomes were discussed with the patient. The possibilities of reaction to medication, pulmonary aspiration, injury to surrounding structures, bleeding, recurrent infection, the need for additional procedures, failure to diagnose a condition, and creating a complication requiring transfusion or operation were discussed with the patient. The patient concurred with the proposed plan, giving informed consent.  The site of surgery was properly noted/marked if necessary per policy. The patient has been actively warmed in preoperative area. Preoperative antibiotics have been ordered and given within 1 hours of incision. Venous thrombosis prophylaxis have been ordered including bilateral sequential compression devices    Procedure Details: The patient was brought to the operating room table.  General anesthesia was induced.  The patient was placed in the lithotomy position.  We introduced intraoperative fluoroscopy in order to visualize the stone.  We could see the stone projected over the region of the right kidney.  We next brought in our lithotripter and we targeted the stone.  We began our shockwave lithotripsy.  We started at a low power setting and applied 500 shocks at a rate of 90 shocks per minute.  We then had a brief 3-minute pause.  We then resumed our lithotripsy.  We increased our power and applied an additional 2000 shocks at a rate of 120 shocks per minute.  Using fluoroscopy we visualized only partial fragmentation of the stone.  We applied a total of 2500 shocks.  At this point we stopped our lithotripsy.  During the lithotripsy we prepped and draped the patient's genitalia.  We introduced a cystoscope per urethra and we entered the bladder.  We identified the right orifice.  We introduced a ureteral catheter.  Contrast was injected and we performed a right retrograde pyelogram.  The stone was at the level of the UPJ and  there was only very little contrast beyond the stone.  We next made several attempts to pass a guidewire beyond the stone.  The stone appeared to be impacted.  Despite several attempts we were not able to advance the wire.  At this point we removed the ureteral catheter.  The scope was removed.  The patient was awakened and brought to the recovery room in stable condition.  Complications:  None; patient tolerated the procedure well.    Disposition: PACU - hemodynamically stable.  Condition: stable                 Additional Details: Patient may require a PCN tube and/or PCNL.        Attending Attestation: I was present and scrubbed for the entire procedure.    Shante Ureña  Phone Number: [phone]

## 2025-03-31 NOTE — ANESTHESIA POSTPROCEDURE EVALUATION
Patient: Rodrigue Palacios    Procedure Summary       Date: 03/31/25 Room / Location: [facility] 01 / Virtual [facility]    Anesthesia Start: 1028 Anesthesia Stop: 1125    Procedure: CYSTOSCOPY, WITH URETERAL STENT INSERTION  ( ESWL Solomon Ireland C - Arm ) (Right) Diagnosis:       Calculus, renal      (Calculus, renal [N20.0])    Surgeons: Shante Ureña MD Responsible Provider: Chang Delgado MD    Anesthesia Type: general ASA Status: 3            Anesthesia Type: general    Vitals Value Taken Time   /92 03/31/25 1125   Temp 36 °C (96.8 °F) 03/31/25 1115   Pulse 70 03/31/25 1125   Resp 16 03/31/25 1125   SpO2 98 % 03/31/25 1125       Anesthesia Post Evaluation    Patient location during evaluation: PACU  Patient participation: complete - patient participated  Level of consciousness: awake  Pain management: adequate  Airway patency: patent  Cardiovascular status: acceptable  Respiratory status: acceptable  Hydration status: acceptable  Postoperative Nausea and Vomiting: none        No notable events documented.

## 2025-03-31 NOTE — ANESTHESIA PREPROCEDURE EVALUATION
Patient: Rodrigue Palacios    Procedure Information       Date/Time: 03/31/25 0955    Procedure: CYSTOSCOPY, WITH URETERAL STENT INSERTION  ( ESWL Solomon Ireland C - Arm ) (Right)    Location: [facility] 01 / [facility]    Surgeons: Shante Ureña MD            Relevant Problems   Cardiac   (+) Benign essential hypertension   (+) Hypercholesterolemia   (+) Murmur      Pulmonary   (+) SCOOTER (obstructive sleep apnea)      GI   (+) GERD (gastroesophageal reflux disease)      /Renal   (+) Calculus, renal      Endocrine   (+) Type 2 diabetes mellitus      Musculoskeletal   (+) Rheumatoid arthritis       Clinical information reviewed:   Tobacco  Allergies  Meds   Med Hx  Surg Hx   Fam Hx  Soc Hx        NPO Detail:  NPO/Void Status  Carbohydrate Drink Given Prior to Surgery? : N  Date of Last Liquid: 03/30/25  Time of Last Liquid: 1900  Date of Last Solid: 03/30/25  Time of Last Solid: 1900  Last Intake Type: Clear fluids; Food  Time of Last Void: 0855         Physical Exam    Airway  Mallampati: II  TM distance: >3 FB  Neck ROM: full     Cardiovascular    Dental    Pulmonary    Abdominal            Anesthesia Plan    History of general anesthesia?: yes  History of complications of general anesthesia?: no    ASA 3     general     intravenous induction   Anesthetic plan and risks discussed with patient.    Plan discussed with CAA.

## 2025-04-01 ASSESSMENT — PAIN SCALES - GENERAL: PAINLEVEL_OUTOF10: 8

## 2025-04-02 DIAGNOSIS — C67.2 MALIGNANT NEOPLASM OF LATERAL WALL OF URINARY BLADDER (MULTI): Primary | ICD-10-CM

## 2025-04-04 ENCOUNTER — HOSPITAL ENCOUNTER (OUTPATIENT)
Facility: HOSPITAL | Age: 70
Setting detail: OUTPATIENT SURGERY
End: 2025-04-04
Attending: SURGERY | Admitting: SURGERY
Payer: MEDICARE

## 2025-04-04 ENCOUNTER — HOSPITAL ENCOUNTER (OUTPATIENT)
Dept: CARDIOLOGY | Facility: HOSPITAL | Age: 70
Setting detail: OUTPATIENT SURGERY
Discharge: HOME | End: 2025-04-04
Payer: MEDICARE

## 2025-04-04 ENCOUNTER — HOSPITAL ENCOUNTER (OUTPATIENT)
Dept: RADIOLOGY | Facility: HOSPITAL | Age: 70
Discharge: HOME | End: 2025-04-04
Payer: MEDICARE

## 2025-04-04 VITALS
BODY MASS INDEX: 27.35 KG/M2 | RESPIRATION RATE: 12 BRPM | OXYGEN SATURATION: 96 % | DIASTOLIC BLOOD PRESSURE: 77 MMHG | HEIGHT: 75 IN | WEIGHT: 220 LBS | SYSTOLIC BLOOD PRESSURE: 135 MMHG | HEART RATE: 80 BPM | TEMPERATURE: 97.3 F

## 2025-04-04 DIAGNOSIS — N13.2 HYDRONEPHROSIS WITH RENAL AND URETERAL CALCULUS OBSTRUCTION: ICD-10-CM

## 2025-04-04 DIAGNOSIS — N20.0 KIDNEY CALCULI: Primary | ICD-10-CM

## 2025-04-04 LAB
GLUCOSE BLD MANUAL STRIP-MCNC: 169 MG/DL (ref 74–99)
POCT INTERNATIONAL NORMALIZATION RATIO: 1.1
POCT PROTHROMBIN TIME: 13.1 SECONDS

## 2025-04-04 PROCEDURE — C1894 INTRO/SHEATH, NON-LASER: HCPCS

## 2025-04-04 PROCEDURE — 7100000009 HC PHASE TWO TIME - INITIAL BASE CHARGE

## 2025-04-04 PROCEDURE — 2720000007 HC OR 272 NO HCPCS

## 2025-04-04 PROCEDURE — 7100000010 HC PHASE TWO TIME - EACH INCREMENTAL 1 MINUTE

## 2025-04-04 PROCEDURE — 82947 ASSAY GLUCOSE BLOOD QUANT: CPT

## 2025-04-04 PROCEDURE — 2500000004 HC RX 250 GENERAL PHARMACY W/ HCPCS (ALT 636 FOR OP/ED): Performed by: NURSE PRACTITIONER

## 2025-04-04 PROCEDURE — 2500000004 HC RX 250 GENERAL PHARMACY W/ HCPCS (ALT 636 FOR OP/ED): Performed by: RADIOLOGY

## 2025-04-04 PROCEDURE — 76942 ECHO GUIDE FOR BIOPSY: CPT

## 2025-04-04 PROCEDURE — 99152 MOD SED SAME PHYS/QHP 5/>YRS: CPT

## 2025-04-04 PROCEDURE — 99153 MOD SED SAME PHYS/QHP EA: CPT

## 2025-04-04 PROCEDURE — 77001 FLUOROGUIDE FOR VEIN DEVICE: CPT | Performed by: RADIOLOGY

## 2025-04-04 RX ORDER — FENTANYL CITRATE 50 UG/ML
INJECTION, SOLUTION INTRAMUSCULAR; INTRAVENOUS AS NEEDED
Status: DISCONTINUED | OUTPATIENT
Start: 2025-04-04 | End: 2025-04-04 | Stop reason: HOSPADM

## 2025-04-04 RX ORDER — LIDOCAINE HYDROCHLORIDE 10 MG/ML
INJECTION, SOLUTION EPIDURAL; INFILTRATION; INTRACAUDAL; PERINEURAL AS NEEDED
Status: DISCONTINUED | OUTPATIENT
Start: 2025-04-04 | End: 2025-04-04 | Stop reason: HOSPADM

## 2025-04-04 RX ORDER — MIDAZOLAM HYDROCHLORIDE 1 MG/ML
INJECTION, SOLUTION INTRAMUSCULAR; INTRAVENOUS AS NEEDED
Status: DISCONTINUED | OUTPATIENT
Start: 2025-04-04 | End: 2025-04-04 | Stop reason: HOSPADM

## 2025-04-04 RX ORDER — CIPROFLOXACIN 2 MG/ML
400 INJECTION, SOLUTION INTRAVENOUS ONCE
Status: COMPLETED | OUTPATIENT
Start: 2025-04-04 | End: 2025-04-04

## 2025-04-04 RX ADMIN — CIPROFLOXACIN 400 MG: 400 INJECTION, SOLUTION INTRAVENOUS at 13:00

## 2025-04-04 RX ADMIN — LIDOCAINE HYDROCHLORIDE 5 ML: 10 INJECTION, SOLUTION EPIDURAL; INFILTRATION; INTRACAUDAL; PERINEURAL at 13:46

## 2025-04-04 RX ADMIN — MIDAZOLAM 1 MG: 1 INJECTION INTRAMUSCULAR; INTRAVENOUS at 13:42

## 2025-04-04 RX ADMIN — FENTANYL CITRATE 50 MCG: 50 INJECTION, SOLUTION INTRAMUSCULAR; INTRAVENOUS at 13:43

## 2025-04-04 ASSESSMENT — PAIN - FUNCTIONAL ASSESSMENT
PAIN_FUNCTIONAL_ASSESSMENT: 0-10
PAIN_FUNCTIONAL_ASSESSMENT: 0-10

## 2025-04-04 ASSESSMENT — COLUMBIA-SUICIDE SEVERITY RATING SCALE - C-SSRS
1. IN THE PAST MONTH, HAVE YOU WISHED YOU WERE DEAD OR WISHED YOU COULD GO TO SLEEP AND NOT WAKE UP?: NO
1. IN THE PAST MONTH, HAVE YOU WISHED YOU WERE DEAD OR WISHED YOU COULD GO TO SLEEP AND NOT WAKE UP?: NO
6. HAVE YOU EVER DONE ANYTHING, STARTED TO DO ANYTHING, OR PREPARED TO DO ANYTHING TO END YOUR LIFE?: NO
2. HAVE YOU ACTUALLY HAD ANY THOUGHTS OF KILLING YOURSELF?: NO
6. HAVE YOU EVER DONE ANYTHING, STARTED TO DO ANYTHING, OR PREPARED TO DO ANYTHING TO END YOUR LIFE?: NO
2. HAVE YOU ACTUALLY HAD ANY THOUGHTS OF KILLING YOURSELF?: NO

## 2025-04-04 ASSESSMENT — PAIN SCALES - GENERAL
PAINLEVEL_OUTOF10: 0 - NO PAIN
PAINLEVEL_OUTOF10: 0 - NO PAIN

## 2025-04-04 NOTE — NURSING NOTE
END OF PROCEDURE MONITORING CRITERIA  01. BP is within +/- 20% of preprocedure  02. Oxygen sat is at 92% or above on RA, or existing order for O2 treatment, or at pre-sedation levels, otherwise new O2 order needed.  03. Unless the patient has a pre-procedure history of diminished level of consciousness, s/he is easily arousable and when aroused is able to responds appropriately for his/her age.  04. Significant complications related to the specific procedure are absent, have been controlled, or have been evaluated, including:      A. Pain.      B. Wound drainage.      C. All drains and tubes are patent.      D. Nausea and Vomiting. Vomiting is not persisten and has not occurred within 15 minutes prior to discharge.      E. Bladder distention. Voided bladder and/or no symptoms or urinary retention (e.g., bladder distention, frequent voiding in small amounts).      F. Neurovascular status.      G. Level of Consciousness consistent with pre procedural status.        daughter(s)  affirmed that they were driving the patient home.

## 2025-04-04 NOTE — DISCHARGE INSTRUCTIONS
FOR NEXT 24 HOURS  - Upon discharge, you should return home and rest for the remainder of the day and evening. You do not have to stay on bed rest but should not be very active.  It is recommended a responsible adult be with you for the first 24 hours after the procedure.    -Please resume your blood thinning medication the evening of your procedure.      - No driving for 24 hours after procedure.     - Do not drive, operate machinery, or use power tools for 24 hours after your procedure.     - Do not make any legal decisions for 24 hours after your procedure.     - Do not drink alcoholic beverages for 24 hours after your procedure.     How to Reach your Doctor:  Call Dr. Ureña at [phone] with problems or questions.     This info is a general resource. It is not meant to replace your health care provider’s advice.  Ask your doctor or health care team any questions. Always follow their instructions.

## 2025-04-04 NOTE — INTERVAL H&P NOTE
H&P reviewed. The patient was examined and there are no changes to the H&P.  On 3/31/2025 Dr. Ureña attempt cysto with ureteral stone removal. Was unable to remove calculi, here for nephrostomy tube placement. Prophylaxis antibiotic Cipro 400mg IV X1 pre procedure.

## 2025-04-04 NOTE — Clinical Note
Report was give by sandi aparicio to  [facility] rn in [facility].

## 2025-04-07 ENCOUNTER — PATIENT OUTREACH (OUTPATIENT)
Dept: HEMATOLOGY/ONCOLOGY | Facility: CLINIC | Age: 70
End: 2025-04-07
Payer: MEDICARE

## 2025-04-07 ENCOUNTER — OFFICE VISIT (OUTPATIENT)
Dept: HEMATOLOGY/ONCOLOGY | Facility: CLINIC | Age: 70
End: 2025-04-07
Payer: MEDICARE

## 2025-04-07 VITALS
SYSTOLIC BLOOD PRESSURE: 120 MMHG | HEART RATE: 89 BPM | WEIGHT: 215.5 LBS | DIASTOLIC BLOOD PRESSURE: 78 MMHG | TEMPERATURE: 98.1 F | OXYGEN SATURATION: 97 % | HEIGHT: 74 IN | RESPIRATION RATE: 18 BRPM | BODY MASS INDEX: 27.66 KG/M2

## 2025-04-07 DIAGNOSIS — C67.2 MALIGNANT NEOPLASM OF LATERAL WALL OF URINARY BLADDER (MULTI): ICD-10-CM

## 2025-04-07 LAB
ALBUMIN SERPL BCP-MCNC: 3.9 G/DL (ref 3.4–5)
ALP SERPL-CCNC: 65 U/L (ref 33–136)
ALT SERPL W P-5'-P-CCNC: 7 U/L (ref 10–52)
ANION GAP SERPL CALC-SCNC: 12 MMOL/L (ref 10–20)
AST SERPL W P-5'-P-CCNC: 10 U/L (ref 9–39)
BASOPHILS # BLD AUTO: 0.04 X10*3/UL (ref 0–0.1)
BASOPHILS NFR BLD AUTO: 0.5 %
BILIRUB SERPL-MCNC: 0.4 MG/DL (ref 0–1.2)
BUN SERPL-MCNC: 27 MG/DL (ref 6–23)
CALCIUM SERPL-MCNC: 9.2 MG/DL (ref 8.6–10.3)
CHLORIDE SERPL-SCNC: 105 MMOL/L (ref 98–107)
CO2 SERPL-SCNC: 28 MMOL/L (ref 21–32)
CREAT SERPL-MCNC: 1.45 MG/DL (ref 0.5–1.3)
EGFRCR SERPLBLD CKD-EPI 2021: 52 ML/MIN/1.73M*2
EOSINOPHIL # BLD AUTO: 0.49 X10*3/UL (ref 0–0.7)
EOSINOPHIL NFR BLD AUTO: 6.1 %
ERYTHROCYTE [DISTWIDTH] IN BLOOD BY AUTOMATED COUNT: 13.7 % (ref 11.5–14.5)
GLUCOSE SERPL-MCNC: 157 MG/DL (ref 74–99)
HCT VFR BLD AUTO: 34.5 % (ref 41–52)
HGB BLD-MCNC: 11.3 G/DL (ref 13.5–17.5)
IMM GRANULOCYTES # BLD AUTO: 0.04 X10*3/UL (ref 0–0.7)
IMM GRANULOCYTES NFR BLD AUTO: 0.5 % (ref 0–0.9)
LYMPHOCYTES # BLD AUTO: 1.49 X10*3/UL (ref 1.2–4.8)
LYMPHOCYTES NFR BLD AUTO: 18.6 %
MCH RBC QN AUTO: 29.1 PG (ref 26–34)
MCHC RBC AUTO-ENTMCNC: 32.8 G/DL (ref 32–36)
MCV RBC AUTO: 89 FL (ref 80–100)
MONOCYTES # BLD AUTO: 0.75 X10*3/UL (ref 0.1–1)
MONOCYTES NFR BLD AUTO: 9.3 %
NEUTROPHILS # BLD AUTO: 5.22 X10*3/UL (ref 1.2–7.7)
NEUTROPHILS NFR BLD AUTO: 65 %
NRBC BLD-RTO: 0 /100 WBCS (ref 0–0)
PLATELET # BLD AUTO: 259 X10*3/UL (ref 150–450)
POTASSIUM SERPL-SCNC: 4.5 MMOL/L (ref 3.5–5.3)
PROT SERPL-MCNC: 6.9 G/DL (ref 6.4–8.2)
RBC # BLD AUTO: 3.88 X10*6/UL (ref 4.5–5.9)
SODIUM SERPL-SCNC: 140 MMOL/L (ref 136–145)
WBC # BLD AUTO: 8 X10*3/UL (ref 4.4–11.3)

## 2025-04-07 PROCEDURE — 85025 COMPLETE CBC W/AUTO DIFF WBC: CPT | Performed by: INTERNAL MEDICINE

## 2025-04-07 PROCEDURE — 3051F HG A1C>EQUAL 7.0%<8.0%: CPT | Performed by: INTERNAL MEDICINE

## 2025-04-07 PROCEDURE — 3078F DIAST BP <80 MM HG: CPT | Performed by: INTERNAL MEDICINE

## 2025-04-07 PROCEDURE — 1125F AMNT PAIN NOTED PAIN PRSNT: CPT | Performed by: INTERNAL MEDICINE

## 2025-04-07 PROCEDURE — 4010F ACE/ARB THERAPY RXD/TAKEN: CPT | Performed by: INTERNAL MEDICINE

## 2025-04-07 PROCEDURE — 1036F TOBACCO NON-USER: CPT | Performed by: INTERNAL MEDICINE

## 2025-04-07 PROCEDURE — 3074F SYST BP LT 130 MM HG: CPT | Performed by: INTERNAL MEDICINE

## 2025-04-07 PROCEDURE — 99205 OFFICE O/P NEW HI 60 MIN: CPT | Performed by: INTERNAL MEDICINE

## 2025-04-07 PROCEDURE — 1159F MED LIST DOCD IN RCRD: CPT | Performed by: INTERNAL MEDICINE

## 2025-04-07 PROCEDURE — 1123F ACP DISCUSS/DSCN MKR DOCD: CPT | Performed by: INTERNAL MEDICINE

## 2025-04-07 PROCEDURE — 3008F BODY MASS INDEX DOCD: CPT | Performed by: INTERNAL MEDICINE

## 2025-04-07 PROCEDURE — 84075 ASSAY ALKALINE PHOSPHATASE: CPT | Performed by: INTERNAL MEDICINE

## 2025-04-07 PROCEDURE — 99215 OFFICE O/P EST HI 40 MIN: CPT | Performed by: INTERNAL MEDICINE

## 2025-04-07 SDOH — ECONOMIC STABILITY: FOOD INSECURITY: WITHIN THE PAST 12 MONTHS, THE FOOD YOU BOUGHT JUST DIDN'T LAST AND YOU DIDN'T HAVE MONEY TO GET MORE.: NEVER TRUE

## 2025-04-07 SDOH — ECONOMIC STABILITY: FOOD INSECURITY: WITHIN THE PAST 12 MONTHS, YOU WORRIED THAT YOUR FOOD WOULD RUN OUT BEFORE YOU GOT MONEY TO BUY MORE.: NEVER TRUE

## 2025-04-07 SDOH — ECONOMIC STABILITY: INCOME INSECURITY: IN THE LAST 12 MONTHS, WAS THERE A TIME WHEN YOU WERE NOT ABLE TO PAY THE MORTGAGE OR RENT ON TIME?: NO

## 2025-04-07 SDOH — ECONOMIC STABILITY: TRANSPORTATION INSECURITY
IN THE PAST 12 MONTHS, HAS THE LACK OF TRANSPORTATION KEPT YOU FROM MEDICAL APPOINTMENTS OR FROM GETTING MEDICATIONS?: NO

## 2025-04-07 SDOH — ECONOMIC STABILITY: GENERAL
WHICH OF THE FOLLOWING DO YOU KNOW HOW TO USE AND HAVE ACCESS TO EVERY DAY? (CHOOSE ALL THAT APPLY): DESKTOP COMPUTER, LAPTOP COMPUTER, OR TABLET WITH BROADBAND INTERNET CONNECTION;SMARTPHONE WITH CELLULAR DATA PLAN

## 2025-04-07 SDOH — HEALTH STABILITY: PHYSICAL HEALTH: ON AVERAGE, HOW MANY MINUTES DO YOU ENGAGE IN EXERCISE AT THIS LEVEL?: 30 MIN

## 2025-04-07 SDOH — HEALTH STABILITY: PHYSICAL HEALTH: ON AVERAGE, HOW MANY DAYS PER WEEK DO YOU ENGAGE IN MODERATE TO STRENUOUS EXERCISE (LIKE A BRISK WALK)?: 7 DAYS

## 2025-04-07 SDOH — ECONOMIC STABILITY: TRANSPORTATION INSECURITY
IN THE PAST 12 MONTHS, HAS LACK OF TRANSPORTATION KEPT YOU FROM MEETINGS, WORK, OR FROM GETTING THINGS NEEDED FOR DAILY LIVING?: NO

## 2025-04-07 ASSESSMENT — SOCIAL DETERMINANTS OF HEALTH (SDOH)
DO YOU BELONG TO ANY CLUBS OR ORGANIZATIONS SUCH AS CHURCH GROUPS UNIONS, FRATERNAL OR ATHLETIC GROUPS, OR SCHOOL GROUPS?: NO
HOW OFTEN DO YOU ATTEND CHURCH OR RELIGIOUS SERVICES?: NEVER
WITHIN THE LAST YEAR, HAVE YOU BEEN KICKED, HIT, SLAPPED, OR OTHERWISE PHYSICALLY HURT BY YOUR PARTNER OR EX-PARTNER?: NO
IN A TYPICAL WEEK, HOW MANY TIMES DO YOU TALK ON THE PHONE WITH FAMILY, FRIENDS, OR NEIGHBORS?: MORE THAN THREE TIMES A WEEK
WITHIN THE LAST YEAR, HAVE YOU BEEN AFRAID OF YOUR PARTNER OR EX-PARTNER?: NO
HOW OFTEN DO YOU GET TOGETHER WITH FRIENDS OR RELATIVES?: THREE TIMES A WEEK
WITHIN THE LAST YEAR, HAVE TO BEEN RAPED OR FORCED TO HAVE ANY KIND OF SEXUAL ACTIVITY BY YOUR PARTNER OR EX-PARTNER?: NO
HOW HARD IS IT FOR YOU TO PAY FOR THE VERY BASICS LIKE FOOD, HOUSING, MEDICAL CARE, AND HEATING?: NOT HARD AT ALL
HOW OFTEN DO YOU ATTENT MEETINGS OF THE CLUB OR ORGANIZATION YOU BELONG TO?: NEVER
WITHIN THE LAST YEAR, HAVE YOU BEEN HUMILIATED OR EMOTIONALLY ABUSED IN OTHER WAYS BY YOUR PARTNER OR EX-PARTNER?: NO
IN THE PAST 12 MONTHS, HAS THE ELECTRIC, GAS, OIL, OR WATER COMPANY THREATENED TO SHUT OFF SERVICE IN YOUR HOME?: NO

## 2025-04-07 ASSESSMENT — LIFESTYLE VARIABLES
AUDIT-C TOTAL SCORE: 1
HOW OFTEN DO YOU HAVE A DRINK CONTAINING ALCOHOL: MONTHLY OR LESS
HOW OFTEN DO YOU HAVE SIX OR MORE DRINKS ON ONE OCCASION: NEVER
HOW MANY STANDARD DRINKS CONTAINING ALCOHOL DO YOU HAVE ON A TYPICAL DAY: 1 OR 2
SKIP TO QUESTIONS 9-10: 1

## 2025-04-07 ASSESSMENT — PAIN SCALES - GENERAL: PAINLEVEL_OUTOF10: 2

## 2025-04-07 NOTE — PROGRESS NOTES
Patient here with friend for NPV with Dr. Leigh; seen for bladder cancer.    Reviewed Patient Guide Bladder Folder reviewed with patient and discussed importance of lab monitoring as part of plan of care.      Reconciled and reviewed medication and allergy list with patient.     Patient has 2:10 pain in right flank area where he has had several procedures done recently to try and remove kidney stones.    Patient is eating well and without difficulty at this time, although he has lost approximately 10 pounds in the last month due to his recent diagnosis and procedures.     Per MD, patient education material for Cisplatin/Gemzar/Durvlaumab reviewed with patient. Patient to have labs drawn today, STAT CT CAP, which is scheduled from tomorrow at 1340.   Follow up to be determined based on lab and CT results.     Reminded patient to check My Chart for any updates to scheduling and to review medication list against what  has at home.  Also reviewed patient can obtain lab results on My Chart which will be reviewed at follow up visits.    No barriers to education noted, pt. Agreed to plan and verbalized understanding using teach back method

## 2025-04-07 NOTE — PROGRESS NOTES
Education Documentation  Nutrition/Diet, taught by Deborah Aguirre RN at 4/7/2025  5:15 PM.  Learner: Patient  Readiness: Acceptance  Method: Explanation, Handout  Response: Verbalizes Understanding    Shortness of Breath, taught by Deborah Aguirre RN at 4/7/2025  5:15 PM.  Learner: Patient  Readiness: Acceptance  Method: Explanation, Handout  Response: Verbalizes Understanding    Changes in Appetite, taught by Deborah Aguirre RN at 4/7/2025  5:15 PM.  Learner: Patient  Readiness: Acceptance  Method: Explanation, Handout  Response: Verbalizes Understanding    Skin and Nail Changes, taught by Deborah Aguirre RN at 4/7/2025  5:15 PM.  Learner: Patient  Readiness: Acceptance  Method: Explanation, Handout  Response: Verbalizes Understanding    Bleeding Precautions, taught by Deborah Aguirre RN at 4/7/2025  5:15 PM.  Learner: Patient  Readiness: Acceptance  Method: Explanation, Handout  Response: Verbalizes Understanding    Care of Neuropathy, taught by Deborah Aguirre RN at 4/7/2025  5:15 PM.  Learner: Patient  Readiness: Acceptance  Method: Explanation, Handout  Response: Verbalizes Understanding    Infection Control, taught by Deborah Aguirre RN at 4/7/2025  5:15 PM.  Learner: Patient  Readiness: Acceptance  Method: Explanation, Handout  Response: Verbalizes Understanding    Alopecia, taught by Deborah Aguirre RN at 4/7/2025  5:15 PM.  Learner: Patient  Readiness: Acceptance  Method: Explanation, Handout  Response: Verbalizes Understanding    Diarrhea, taught by Deborah Aguirre RN at 4/7/2025  5:15 PM.  Learner: Patient  Readiness: Acceptance  Method: Explanation, Handout  Response: Verbalizes Understanding    Nausea Management, taught by Deborah Aguirre RN at 4/7/2025  5:15 PM.  Learner: Patient  Readiness: Acceptance  Method: Explanation, Handout  Response: Verbalizes Understanding    Fatigue, taught by Deborah Aguirre RN at 4/7/2025  5:15 PM.  Learner: Patient  Readiness: Acceptance  Method: Explanation, Handout  Response: Verbalizes Understanding    Diagnostic  Studies, taught by Deborah Aguirre RN at 4/7/2025  5:15 PM.  Learner: Patient  Readiness: Acceptance  Method: Explanation, Handout  Response: Verbalizes Understanding    Comprehensive Metabolic Panel (CMP), taught by Deborah Aguirre RN at 4/7/2025  5:15 PM.  Learner: Patient  Readiness: Acceptance  Method: Explanation, Handout  Response: Verbalizes Understanding    Complete Blood Count with Differential (CBC w/ Diff), taught by Deborah Aguirre RN at 4/7/2025  5:15 PM.  Learner: Patient  Readiness: Acceptance  Method: Explanation, Handout  Response: Verbalizes Understanding    When and How to Contact Clinic, taught by Deborah Aguirre RN at 4/7/2025  5:15 PM.  Learner: Patient  Readiness: Acceptance  Method: Explanation, Handout  Response: Verbalizes Understanding    Material on New Diagnosis Provided, taught by Deborah Aguirre RN at 4/7/2025  5:15 PM.  Learner: Patient  Readiness: Acceptance  Method: Explanation, Handout  Response: Verbalizes Understanding    Oriented to Facility, taught by Deborah Aguirre RN at 4/7/2025  5:15 PM.  Learner: Patient  Readiness: Acceptance  Method: Explanation, Handout  Response: Verbalizes Understanding    Education Comments  No comments found.

## 2025-04-07 NOTE — PROGRESS NOTES
Oncology New Patient Visit  Patient ID: Rodrigue Palacios is a 69 y.o. male who presents today to establish care.  Referring Physician: Shante Ureña MD  [id number]  [facility], [place] [id number]  Primary Care Provider: SHERRY Blanco-CNP  [facility]  [facility]   Shante Willis     Cancer History  Bladder Cancer   Treatment  -CT urogram 2/13/25 -right UPJ calculus with severe hydronephrosis nonobstructive bilateral renal calculi left renal pelvic calculi prostatomegaly colonic diverticulosis atherosclerosis with 3.1 cm abdominal aortic aneurysm  -Cystoscopy March 31, 2025 unable to place a stent in the impacted stone status post attempt for right-sided percutaneous nephrostomy tube placement April 4, 2025 yet was not placed  -Bladder invasive urothelial carcinoma high-grade with focal squamous differentiation less than 10% with invasion into detrusor muscle    Other contributing history  RA prior therapy methotrexate and Cimzia stopped 6 months ago 6-7 years / right calf injury / HTN / DM2 GERD ED SCOOTER Heart Murmur, BPH    HPI  Referred by provider/s above.  Oncology history is summarized above.  The patient is accompanied by a friend he is overall doing fairly well denies any other major new complaints.  Denies any ongoing issues with nausea, vomiting, fevers, chills, easy bruising or bleeding denies any issues with any chest pain or chest tightness.  Still ongoing issues with urinary frequency and urgency denies any other new complaints.    ROS:  10-pt ROS reviewed and negative except as mentioned above.    Medications: below was reviewed and is accurate  Current Outpatient Medications   Medication Instructions    acetaminophen (TYLENOL) 500 mg, Every 6 hours PRN    atorvastatin (LIPITOR) 40 mg, oral, Daily    Basaglar KwikPen U-100 Insulin 100 unit/mL (3 mL) pen Use up to 30 units daily    folic acid (FOLVITE) 1 mg, Daily    HYDROcodone-acetaminophen (Norco) 5-325 mg  "tablet 1 tablet, oral, Every 6 hours PRN    insulin glargine (Lantus Solostar U-100 Insulin) 100 unit/mL (3 mL) pen INJECT UP TO 30 UNITS SUBCUTANEOUSLY DAILY AS DIRECTED    insulin syringe-needle U-100 (BD Insulin Syringe) 1 mL 26 x 1/2\" syringe No dose, route, or frequency recorded.    lisinopril 40 mg, oral, Daily    metFORMIN XR (GLUCOPHAGE-XR) 1,000 mg, oral, 2 times daily (morning and late afternoon)    montelukast (Singulair) 10 mg tablet TAKE ONE TABLET BY MOUTH EVERY DAY IN THE EVENING    pantoprazole (ProtoNix) 40 mg EC tablet TAKE ONE TABLET BY MOUTH EVERY DAY    pen needle, diabetic (BD Luann 2nd Gen Pen Needle) 32 gauge x 5/32\" needle use DAILY    pioglitazone (ACTOS) 15 mg, oral, Daily    tadalafil 20 mg, oral, Daily    traZODone (DESYREL)  mg, oral, Daily        Past Medical History  Past Medical History:   Diagnosis Date    Bladder cancer (Multi)     Dorsalgia, unspecified 07/28/2020    Back pain without radiation    GERD (gastroesophageal reflux disease)     Hyperlipidemia     Hypertension     Local infection of the skin and subcutaneous tissue, unspecified 10/11/2013    Nonvenomous insect bite with infection    Nephrolithiasis     Other muscle spasm 02/14/2018    Muscle spasms of neck    Other specified diseases of anus and rectum 03/22/2013    Anal pain    Personal history of other diseases of the digestive system 09/18/2013    History of gastroenteritis    Personal history of other diseases of the respiratory system 04/27/2020    History of acute bronchitis    Personal history of other diseases of the respiratory system 06/24/2019    History of upper respiratory infection    Personal history of other infectious and parasitic diseases     History of candidiasis of mouth    Personal history of other specified conditions 03/22/2013    History of abnormal weight loss    Personal history of other specified conditions 02/24/2014    History of numbness    Personal history of other specified " "conditions 07/14/2017    History of abdominal pain    Pneumonia, unspecified organism 01/25/2016    Pneumonia involving right lung    Rash and other nonspecific skin eruption 04/29/2013    Rash    Sleep apnea     Type 2 diabetes mellitus      FamilyHistory  Family History   Problem Relation Name Age of Onset    Ovarian cancer Mother      Other (asbestosis) Father        Past Surgical History  Past Surgical History:   Procedure Laterality Date    BLADDER SURGERY  2025    Removal malignant tumor    CHOLECYSTECTOMY  07/07/2016    Cholecystectomy    HERNIA REPAIR  07/07/2016    Inguinal Hernia Repair    SHOULDER SURGERY Right      Social History  Retired   / [employer]  One children  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  He  reports no history of drug use.    Physical exam:  Vitals:    04/07/25 1547   BP: 120/78   BP Location: Left arm   Patient Position: Sitting   BP Cuff Size: Adult long   Pulse: 89   Resp: 18   Temp: 36.7 °C (98.1 °F)   TempSrc: Temporal   SpO2: 97%   Weight: 97.7 kg (215 lb 8 oz)   Height: (S) 1.881 m (6' 2.06\")         GEN: NAD, well-appearing  SKIN: no concerning new lesions examined in upper / lower extremity   LUNGS: CTA  CV: RRR no clear R/G  ABD: Soft, NTND. No rebound or guarding.  EXT:  trace edema bilaterally   NEURO: Grossly intact. No focal deficits.  PSYCH: Normal mood and affect      Radiology review  Reviewed in detail personally and for detail see results in EPIC        Genomics Data    Laboratory review  Reviewed in detail personally and for detail see results in [facility]  Lab Results   Component Value Date    WBC 9.6 03/17/2025    HGB 11.1 (L) 03/17/2025    HCT 34.6 (L) 03/17/2025    MCV 89 03/17/2025     03/17/2025     Lab Results   Component Value Date    GLUCOSE 209 (H) 03/17/2025    CALCIUM 9.3 03/17/2025     03/17/2025    K 4.6 03/17/2025    CO2 27 03/17/2025     (H) 03/17/2025    BUN 26 (H) 03/17/2025    CREATININE 1.49 " "(H) 03/17/2025     No results found for: \"PSA\"  Lab Results   Component Value Date    ALT 6 03/08/2022    AST 12 03/08/2022    ALKPHOS 62 03/08/2022    BILITOT 0.5 03/08/2022         ASSESSMENT AND PLAN   We had a long discussion regarding the natural history, prognosis, and potential treatment options for his disease.  We discussed timing of therapy and next line standard options as well as clinical trial options for his current disease state. Discussed also NCCN guidlines as per the patients diagnosis and treatment options.  The Total Visit includes the following :  face to face time during the visit today if in person, phone / virtual time with the patient,  review of records, including office notes, pathology, radiology, labs, and prior treatments and care coordination. This review directly influenced my assessment and recommendations for this visit.    MIBC -discussed in detail rationale for treatment for muscle invasive bladder cancer discussed in detail need for CT of chest abdomen and pelvis metastatic disease discussed in detail the role and rationale for neoadjuvant chemotherapy with cisplatin him and gemcitabine based therapies potential toxicities that can occur with cisplatin also discussed the new role of cisplatin and gemcitabine with immunotherapy and then completing adjuvant immunotherapy.  Also discussed the role of surgery alone and the role of adjuvant chemotherapy and the role of adjuvant immunotherapy also discussed the potential role of therapy and radiation for bladder preservation.  All options discussed in detail the role of genomics and [employer] cT DNA testing discussed.  He is still unclear if he wants to proceed with definitive surgery did reach out to the urologist I will try to see if they can see him later this week.  Will then go ahead and continue with plan for CT scan to rule out metastatic disease.  Also will discuss with the urologist potential atrophic kidney and unable to " place a nephrostomy tube if no improvement in creatinine may not be a candidate for cisplatin we will get blood work today also discussed role of combined chemotherapy and radiation if that is how he wants to proceed moving forward we will follow-up with him later this week.  Would have some concerns about treating him with cisplatin gemcitabine and durvalumab in the setting of rheumatoid arthritis and may just do with cisplatin and gemcitabine alone.    Anemia-borderline could be related to anemia of chronic disease continue to monitor closely.    Hyrdo/ Kidney stones-would need definitive intervention unable to place nephrostomy tube.        Ernesto Leigh MD  Senior Attending Physician/  in Medicine [facility] [employer]  [facility]  Patient line [phone]  Fax [phone]

## 2025-04-08 ENCOUNTER — TELEPHONE (OUTPATIENT)
Dept: HEMATOLOGY/ONCOLOGY | Facility: CLINIC | Age: 70
End: 2025-04-08
Payer: MEDICARE

## 2025-04-08 ENCOUNTER — HOSPITAL ENCOUNTER (OUTPATIENT)
Dept: RADIOLOGY | Facility: HOSPITAL | Age: 70
Discharge: HOME | End: 2025-04-08
Payer: MEDICARE

## 2025-04-08 DIAGNOSIS — C67.2 MALIGNANT NEOPLASM OF LATERAL WALL OF URINARY BLADDER (MULTI): ICD-10-CM

## 2025-04-08 PROCEDURE — 71250 CT THORAX DX C-: CPT

## 2025-04-08 NOTE — PROGRESS NOTES
Subjective   Patient ID: Rodrigue Palacios is a 69 y.o. male. The patient recently was followed by Dr. Ureña.       HPI  69 y.o. male presents to establish care as a new patient regarding MIBC. He has a history of BPH with LUTS including frequency, slow stream and nocturia. He has a history of diabetes and rheumatoid arthritis.     He was present for a normal prostate exam. He wanted to undergo a Urolift. The patient underwent a cystoscopy as part of pre-operative testing. He underwent a cystoscopy on 01/21/2025 due to worsening LUTS despite medical therapy. The patient's results indicated tumors that range from 1 to 2 cm in size. He then underwent a cystoscopy with ureteral stent insertion and ESWL on 3/31/2025 with Dr. Ureña. The stent could not be inserted , then right sided percutaneous nephrostomy tube placement failed as well.     TURBT revealed MIBC and 10% squamous differentiation.    He was seen by Dr. Ernesto Leigh on 04/07/2025. He was unclear if he wanted to proceed with definitive surgery for the MIBC or start with NAC. His GFR is ranging between 45-50 making him not ideal candidate for neoadjuvant chemotherapy.     He was previously scheduled for a percutaneous nephrolithotomy on 05/07/2025 with Dr. Ureña. This has now been cancelled.     He is currently prescribed tamsulosin 0.4 mg daily for his BPH.     He had an umbilical hernia repair with mesh, unknown date. He also had cholecystomy, unknown date.     CT CHEST ABDOMEN PELVIS WO CONTRAST; 4/8/2025   IMPRESSION:  Urothelial carcinoma, in comparison to prior CT from February 2025:  1. High-density subcapsular collection, corresponding to the contrast injection from recent attempted percutaneous nephrostomy tube placement. Superimposed hematoma is difficult to exclude.  2. Unchanged 10 mm obstructive calculus in the proximal right ureter with resulting moderate to severe hydroureteronephrosis.  3. Mild bladder wall thickening may correspond to  patient's known malignancy.  4. Subcentimeter para-aortic lymph nodes and a 2 mm right upper lobe nodule are noted, which bears watching on future follow-up evaluations. Otherwise, no definite evidence of metastasis.  5. Mild aneurysmal dilatation of the ascending aorta, measuring 4.1 cm in diameter.    Surgical Pathology Exam collected on 02/06/2025:   FINAL DIAGNOSIS   A. BLADDER, TRANSURETHRAL RESECTION:   -- Invasive papillary urothelial carcinoma, high grade, with focal squamous differentiation (less than 10%) invading into the detrusor muscle (muscularis propria).       Review of Systems  A complete review of systems was performed. All systems are noted to be negative unless indicated in the history of present illness, impression, active problem list, or past histories.     Objective   Physical Exam  General: Well developed, well nourished, alert and cooperative, appears in no acute distress   Eyes: Non-injected conjunctiva, sclera clear, no proptosis   Cardiac: Extremities are warm and well perfused. No edema, cyanosis or pallor.   Lungs: Breathing is easy, non-labored. Speaking in clear and complete sentences. Normal diaphragmatic movement.   Abdomen: soft, non-tender   MSK: Ambulatory with steady gait, unassisted   Neuro: alert and oriented to person, place and time   Psych: Demonstrates good judgement and reason, without hallucinations, abnormal affect or abnormal behaviors.   Skin: no obvious lesions, no rashes.       Assessment/Plan   Diagnoses and all orders for this visit:  Bladder cancer (Multi)  Atrophy of right kidney  -     NM kidney flow function w diuretic; Future  -     Case Request Operating Room: LAPAROSCOPY, WITH CYSTECTOMY AND ILEAL CONDUIT CREATION, FOR URINARY DIVERSION, ROBOTIC-ASSISTED NEPHRECTOMY, LAPAROSCOPIC; Standing  Malignant neoplasm of lateral wall of urinary bladder (Multi)  -     Case Request Operating Room: LAPAROSCOPY, WITH CYSTECTOMY AND ILEAL CONDUIT CREATION, FOR URINARY  DIVERSION, ROBOTIC-ASSISTED NEPHRECTOMY, LAPAROSCOPIC; Standing  -     Request for Pre-Admission Testing Visit; Future  -     Urine Culture; Future  -     Urinalysis with Reflex Culture and Microscopic; Future  -     CBC; Future  -     Comprehensive Metabolic Panel; Future  Lower urinary tract symptoms (LUTS)  -     Urine Culture; Future  -     Urinalysis with Reflex Culture and Microscopic; Future  Other orders  -     NPO Diet Except: Sips with meds; Effective now; Standing  -     Height and weight; Standing  -     Insert and maintain peripheral IV; Standing  -     Saline lock IV; Standing  -     POCT Glucose; Standing  -     Type And Screen; Standing  -     Inpatient consult to Respiratory Care; Standing  -     Admit to inpatient; Standing  -     Full code; Standing  -     heparin (porcine) injection 5,000 Units  -     fluconazole (Diflucan) 400 mg in sodium chloride (iso)  mL  -     ceFAZolin (Ancef) 2 g in dextrose (iso)  mL  -     acetaminophen (Tylenol) tablet 975 mg  -     gabapentin (Neurontin) capsule 300 mg  -     alvimopan (Entereg) capsule 12 mg  -     ertapenem (INVanz) 1 g in sodium chloride 0.9% 50 mL IV      69 y.o. male MIBC and 10% squamous differentiation, with borderline GFR for NAC, with atrophic right kidney and thinned out parenchyma and obstructive right UPJ stone. He has a history of BPH with LUTS including frequency, slow stream and nocturia. He has a history of diabetes.     I personally reviewed the CT chest, abdomen and pelvis that was completed on 04/08/2025. The kidney is atrophic. The right kidney appears to be non-functional.     I would like to obtain a MAG3 scan of the patient. I do not expect that the patient's right kidney function will be greater than 20%. If this is the case, we will have the kidney removed. There are concerns with leaving the kidney without function and connecting it to an ileal conduit which would increase the risk of significant right kidney and  ureter infection.      I personally reviewed the patient's final diagnosis of the surgical pathology. The patient's pathology involves   Invasive papillary urothelial carcinoma high grade with focal squamous differentiation that was less than 10%. However, this does not change the management of the cancer, as it is invasive to the detrusor muscle. The recommended treatment for this type of cancer, would be cystectomy with chemotherapy. This treatment is not curative and has a high risk of recurrence. I reviewed with the patient that there could be microscopic cancer cells in the bladder and are ready to metastasize. The recommendation would be to undergo treatment within 90 days.     I reviewed with the patient that Dr. Centeno has reluctance to have the patient undergo radiation due to his kidney function. If the patient undergoes surgical intervention, there is a good probability that he will not require radiation.     I had a very long discussion with the patient, going over the biology of his bladder cancer, nvasive papillary urothelial carcinoma high grade with focal squamous differentiation that was less than 10%.    I reviewed the standard of care treatment for aggressive bladder cancer, constituting of radical cystectomy for high grade NMIBC in case of BCG failure, and radical cystectomy with or without neoadjuvant chemotherapy for muscle-invasive disease, depending on creatinine clearance and cisplatin tolerance.    I went over the surgery details, both open and robotic approaches, explaining the incisions, the site of the urostomy above the belt line, the insertion of ureteral stents and the wound care. We also went over the postoperative course, ranging from 5 to 7 days in the hospital if the immediate recovery course was smooth, and this is to allow gradual recovery of physical strength, proper breathing, regular diet tolerance and bowel function. I explained the potential complications pertinent to  surgery, which in addition to cardiopulmonary and cerebrovascular related to the anesthesia and the length of the procedure, pertinent complications could include urinary leak, bowel leak, bleeding, wound infection, wound dehiscence, atelectasis and pneumonia, UTIs mainly pyelonephritis, prolonged ileus, and thromboembolic complications. The rate of such complications ranges in the literature from 40 to 60%, and are typically more common in the first 90 days from surgery. I also went over the postoperative clinic visits and what is expected in each visit.    I reviewed the advantages and disadvantages of both open and robotic surgery, including the advantages of faster surgery and the supine position and the no need for pneumoperitoneum which could be safer for heart and lungs in the open approach versus smaller incisions, less blood loss, and faster recovery for the robotic approach.    I do not want to defer the procedure due to a renal calculi that is not symptomatic. If the calculi starts to become symptomatic, he can undergo a small PCNL procedure.     For this patient, I recommended a right robotic nephrectomy and cystectomy with ileal conduit creation. The patient has concerns related to the surgery. I have answered all his questions in detail. He would like to proceed with scheduling the procedure for 04/23/2025.    Plan:  Continue tamsulosin 0.4 mg 1 pill daily at bedtime.  MAG 3 scan STAT for atrophic right kidney   Robotic right nephroureterctomy and cystoprostatectomy with ileal conduit creation on 04/23/2025     I spent 60 min on direct patient care including time to review the medical records, face to face time with the patient and his friend, coordinating care and documentation.      Scribe Attestation   By signing my name below, I, Jayshree Stout, Jonelibabhay attestation that this documentation has been prepared under the direction and in the presence of James Borrero MD.

## 2025-04-08 NOTE — TELEPHONE ENCOUNTER
Updated  Creatinine  Still remains elevated borderline GFR roughly 50% await CT scan await surgical intervention and then will discuss pros and cons of proceeding with neoadjuvant chemotherapy versus just proceeding straight with surgery and then adjuvant therapy

## 2025-04-09 ENCOUNTER — OFFICE VISIT (OUTPATIENT)
Dept: UROLOGY | Facility: CLINIC | Age: 70
End: 2025-04-09
Payer: MEDICARE

## 2025-04-09 ENCOUNTER — HOSPITAL ENCOUNTER (OUTPATIENT)
Facility: HOSPITAL | Age: 70
Setting detail: SURGERY ADMIT
End: 2025-04-09
Attending: STUDENT IN AN ORGANIZED HEALTH CARE EDUCATION/TRAINING PROGRAM | Admitting: STUDENT IN AN ORGANIZED HEALTH CARE EDUCATION/TRAINING PROGRAM
Payer: MEDICARE

## 2025-04-09 DIAGNOSIS — C67.9 BLADDER CANCER (MULTI): Primary | ICD-10-CM

## 2025-04-09 DIAGNOSIS — C67.2 MALIGNANT NEOPLASM OF LATERAL WALL OF URINARY BLADDER (MULTI): ICD-10-CM

## 2025-04-09 DIAGNOSIS — N26.1 ATROPHY OF RIGHT KIDNEY: ICD-10-CM

## 2025-04-09 DIAGNOSIS — R39.9 LOWER URINARY TRACT SYMPTOMS (LUTS): ICD-10-CM

## 2025-04-09 PROCEDURE — 3051F HG A1C>EQUAL 7.0%<8.0%: CPT | Performed by: STUDENT IN AN ORGANIZED HEALTH CARE EDUCATION/TRAINING PROGRAM

## 2025-04-09 PROCEDURE — G2211 COMPLEX E/M VISIT ADD ON: HCPCS | Performed by: STUDENT IN AN ORGANIZED HEALTH CARE EDUCATION/TRAINING PROGRAM

## 2025-04-09 PROCEDURE — 1123F ACP DISCUSS/DSCN MKR DOCD: CPT | Performed by: STUDENT IN AN ORGANIZED HEALTH CARE EDUCATION/TRAINING PROGRAM

## 2025-04-09 PROCEDURE — 4010F ACE/ARB THERAPY RXD/TAKEN: CPT | Performed by: STUDENT IN AN ORGANIZED HEALTH CARE EDUCATION/TRAINING PROGRAM

## 2025-04-09 PROCEDURE — 99215 OFFICE O/P EST HI 40 MIN: CPT | Performed by: STUDENT IN AN ORGANIZED HEALTH CARE EDUCATION/TRAINING PROGRAM

## 2025-04-09 RX ORDER — FLUCONAZOLE 2 MG/ML
400 INJECTION, SOLUTION INTRAVENOUS ONCE
OUTPATIENT
Start: 2025-04-09 | End: 2025-04-09

## 2025-04-09 RX ORDER — CEFAZOLIN SODIUM 2 G/100ML
2 INJECTION, SOLUTION INTRAVENOUS ONCE
OUTPATIENT
Start: 2025-04-09 | End: 2025-04-09

## 2025-04-09 RX ORDER — ACETAMINOPHEN 325 MG/1
975 TABLET ORAL ONCE
OUTPATIENT
Start: 2025-04-09 | End: 2025-04-09

## 2025-04-09 RX ORDER — GABAPENTIN 300 MG/1
300 CAPSULE ORAL ONCE
OUTPATIENT
Start: 2025-04-09 | End: 2025-04-09

## 2025-04-09 RX ORDER — ALVIMOPAN 12 MG/1
12 CAPSULE ORAL ONCE
OUTPATIENT
Start: 2025-04-09 | End: 2025-04-09

## 2025-04-09 RX ORDER — HEPARIN SODIUM 5000 [USP'U]/ML
5000 INJECTION, SOLUTION INTRAVENOUS; SUBCUTANEOUS ONCE
OUTPATIENT
Start: 2025-04-09 | End: 2025-04-09

## 2025-04-10 ENCOUNTER — TELEPHONE (OUTPATIENT)
Dept: HEMATOLOGY/ONCOLOGY | Facility: CLINIC | Age: 70
End: 2025-04-10
Payer: MEDICARE

## 2025-04-10 NOTE — TELEPHONE ENCOUNTER
Left message to discuss  Went to voicemail instructed to call back for any questions or concerns  Discuss ct  And next steps

## 2025-04-11 NOTE — TELEPHONE ENCOUNTER
Updated in detail CT scan nonspecific lymph node all options discussed in detail and based on borderline kidney function and further discussions with the urologist he wants to proceed with definitive surgery and then arrange follow-up to discuss adjuvant treatment we will arrange follow-up afterwards.

## 2025-04-14 ENCOUNTER — APPOINTMENT (OUTPATIENT)
Dept: RADIOLOGY | Facility: HOSPITAL | Age: 70
End: 2025-04-14
Payer: MEDICARE

## 2025-04-15 DIAGNOSIS — E11.9 TYPE 2 DIABETES MELLITUS WITHOUT COMPLICATION, WITHOUT LONG-TERM CURRENT USE OF INSULIN: ICD-10-CM

## 2025-04-16 ENCOUNTER — APPOINTMENT (OUTPATIENT)
Dept: PRIMARY CARE | Facility: CLINIC | Age: 70
End: 2025-04-16
Payer: MEDICARE

## 2025-04-16 ENCOUNTER — HOSPITAL ENCOUNTER (OUTPATIENT)
Dept: RADIOLOGY | Facility: HOSPITAL | Age: 70
Discharge: HOME | End: 2025-04-16
Payer: MEDICARE

## 2025-04-16 VITALS
SYSTOLIC BLOOD PRESSURE: 108 MMHG | OXYGEN SATURATION: 94 % | HEART RATE: 90 BPM | WEIGHT: 214 LBS | DIASTOLIC BLOOD PRESSURE: 70 MMHG | HEIGHT: 75 IN | BODY MASS INDEX: 26.61 KG/M2

## 2025-04-16 DIAGNOSIS — Z12.11 COLON CANCER SCREENING: ICD-10-CM

## 2025-04-16 DIAGNOSIS — I10 BENIGN ESSENTIAL HYPERTENSION: Primary | ICD-10-CM

## 2025-04-16 DIAGNOSIS — N18.31 CHRONIC KIDNEY DISEASE, STAGE 3A (MULTI): ICD-10-CM

## 2025-04-16 DIAGNOSIS — Z11.59 ENCOUNTER FOR HEPATITIS C SCREENING TEST FOR LOW RISK PATIENT: ICD-10-CM

## 2025-04-16 DIAGNOSIS — E11.69 TYPE 2 DIABETES MELLITUS WITH OTHER SPECIFIED COMPLICATION, WITH LONG-TERM CURRENT USE OF INSULIN: ICD-10-CM

## 2025-04-16 DIAGNOSIS — R01.1 MURMUR: ICD-10-CM

## 2025-04-16 DIAGNOSIS — M05.79 RHEUMATOID ARTHRITIS WITH RHEUMATOID FACTOR OF MULTIPLE SITES WITHOUT ORGAN OR SYSTEMS INVOLVEMENT (MULTI): ICD-10-CM

## 2025-04-16 DIAGNOSIS — N26.1 ATROPHY OF RIGHT KIDNEY: ICD-10-CM

## 2025-04-16 DIAGNOSIS — Z79.4 TYPE 2 DIABETES MELLITUS WITH OTHER SPECIFIED COMPLICATION, WITH LONG-TERM CURRENT USE OF INSULIN: ICD-10-CM

## 2025-04-16 PROBLEM — M06.9 RHEUMATOID ARTHRITIS: Status: RESOLVED | Noted: 2023-01-31 | Resolved: 2025-04-16

## 2025-04-16 PROCEDURE — 99214 OFFICE O/P EST MOD 30 MIN: CPT | Performed by: NURSE PRACTITIONER

## 2025-04-16 PROCEDURE — 4010F ACE/ARB THERAPY RXD/TAKEN: CPT | Performed by: NURSE PRACTITIONER

## 2025-04-16 PROCEDURE — 1158F ADVNC CARE PLAN TLK DOCD: CPT | Performed by: NURSE PRACTITIONER

## 2025-04-16 PROCEDURE — 3008F BODY MASS INDEX DOCD: CPT | Performed by: NURSE PRACTITIONER

## 2025-04-16 PROCEDURE — 2500000004 HC RX 250 GENERAL PHARMACY W/ HCPCS (ALT 636 FOR OP/ED): Mod: JZ | Performed by: STUDENT IN AN ORGANIZED HEALTH CARE EDUCATION/TRAINING PROGRAM

## 2025-04-16 PROCEDURE — 3051F HG A1C>EQUAL 7.0%<8.0%: CPT | Performed by: NURSE PRACTITIONER

## 2025-04-16 PROCEDURE — 78708 K FLOW/FUNCT IMAGE W/DRUG: CPT

## 2025-04-16 PROCEDURE — 3078F DIAST BP <80 MM HG: CPT | Performed by: NURSE PRACTITIONER

## 2025-04-16 PROCEDURE — 3430000001 HC RX 343 DIAGNOSTIC RADIOPHARMACEUTICALS: Performed by: STUDENT IN AN ORGANIZED HEALTH CARE EDUCATION/TRAINING PROGRAM

## 2025-04-16 PROCEDURE — 1160F RVW MEDS BY RX/DR IN RCRD: CPT | Performed by: NURSE PRACTITIONER

## 2025-04-16 PROCEDURE — G2211 COMPLEX E/M VISIT ADD ON: HCPCS | Performed by: NURSE PRACTITIONER

## 2025-04-16 PROCEDURE — A9562 TC99M MERTIATIDE: HCPCS | Performed by: STUDENT IN AN ORGANIZED HEALTH CARE EDUCATION/TRAINING PROGRAM

## 2025-04-16 PROCEDURE — 1123F ACP DISCUSS/DSCN MKR DOCD: CPT | Performed by: NURSE PRACTITIONER

## 2025-04-16 PROCEDURE — 1159F MED LIST DOCD IN RCRD: CPT | Performed by: NURSE PRACTITIONER

## 2025-04-16 PROCEDURE — 3074F SYST BP LT 130 MM HG: CPT | Performed by: NURSE PRACTITIONER

## 2025-04-16 PROCEDURE — 1036F TOBACCO NON-USER: CPT | Performed by: NURSE PRACTITIONER

## 2025-04-16 RX ORDER — ATORVASTATIN CALCIUM 40 MG/1
40 TABLET, FILM COATED ORAL DAILY
Qty: 90 TABLET | Refills: 3 | Status: SHIPPED | OUTPATIENT
Start: 2025-04-16

## 2025-04-16 RX ORDER — BETIATIDE 1 MG/1
10 INJECTION, POWDER, LYOPHILIZED, FOR SOLUTION INTRAVENOUS
Status: COMPLETED | OUTPATIENT
Start: 2025-04-16 | End: 2025-04-16

## 2025-04-16 RX ORDER — FUROSEMIDE 10 MG/ML
40 INJECTION INTRAMUSCULAR; INTRAVENOUS ONCE
Status: COMPLETED | OUTPATIENT
Start: 2025-04-16 | End: 2025-04-16

## 2025-04-16 RX ADMIN — TECHNESCAN TC 99M MERTIATIDE 10 MILLICURIE: 1 INJECTION, POWDER, LYOPHILIZED, FOR SOLUTION INTRAVENOUS at 09:28

## 2025-04-16 RX ADMIN — FUROSEMIDE 40 MG: 10 INJECTION, SOLUTION INTRAVENOUS at 10:23

## 2025-04-16 NOTE — PROGRESS NOTES
Subjective   Chief Complaint   Patient presents with    Follow-up     Rodrigue Palacios is a 69 y.o. male is here today for a follow up. No questions or concerns at this time.           Patient ID: Rodrigue Palacios is a 69 y.o. male who presents for Follow-up (Rodrigue Palacios is a 69 y.o. male is here today for a follow up. No questions or concerns at this time.//).    HPI  Rodrigue is a 68 yo est male presenting today for 6 month f/u   LOV: OCT 2024     Dx: RA, HLD, HTN, T2DM, GERD, ED, SCOOTER, HEART MURMUR      Current Providers  Specialists: I have reviewed specialist-related care of the patient in the medical record.   UROL: KARY  ENDO: YULY    Pt reports that since our last visit (Oct 2024) he was dx'd with bladder cancer (Jan 2025)  He went in for uro-lift and they found mass  He then had PET scan and they found a kidney stone in RIGHT kidney and he is being told the RIGHT kidney may not be healthy and may need removed   He is planning to have his bladder removed in May     He is not in any pain and he denies fevers/chills, N/V    + murmur, had ECHO years ago at Dr. Rider office     #SCOOTER  Dx'd yrs ago   using CPAP nightly  No concerns   Tx per Vipin      #T2DM  Dx'd @ 2012 (in his mid 50's)  Followed by Dr. Christiansen  Taking Metformin 1000 mg BID, Actos, and Insulin at bedtime (Trulicity too expensive)   Last A1C= 5.9% June 2024---> 7.2% March 2025   CGM: Mateus 2   statin: Atorvastatin   ACE/ARB: yes   eye exam: 2024  neuropathy: denies  Stopped Farxiga d/t cost      #HTN  Taking Lisinopril 40 mg daily  BP today= 108/70     #HLD  Taking Atorvastatin 40 mg daily  FLP due      #RA  Dx'd @ 10 yrs ago  Needs to est with new provider in June 2025  Stopped methotrexate and Cimzia @ 12 months ago   Using trazodone for sleep   Affects different joints at different times      #GERD  Taking Protonix 40 mg daily  Sx: controlled      #ED  Rx Viagra as needed     #HM  COLON: due w/Dr. Rios  PSA: DUE   FBW: DUE   VACCINES:  "UTD      RX Allergies[1]    Review of Systems  ROS was completed and all systems are negative with the exception of what was noted in the the HPI.       Objective     Last Recorded Vitals   /70   Pulse 90   Ht 1.905 m (6' 3\")   Wt 97.1 kg (214 lb)   SpO2 94%   BMI 26.75 kg/m²      Medications  Current Outpatient Medications   Medication Instructions    acetaminophen (TYLENOL) 500 mg, Every 6 hours PRN    atorvastatin (LIPITOR) 40 mg, oral, Daily    Basaglar KwikPen U-100 Insulin 100 unit/mL (3 mL) pen Use up to 30 units daily    folic acid (FOLVITE) 1 mg, Daily    HYDROcodone-acetaminophen (Norco) 5-325 mg tablet 1 tablet, oral, Every 6 hours PRN    insulin glargine (Lantus Solostar U-100 Insulin) 100 unit/mL (3 mL) pen INJECT UP TO 30 UNITS SUBCUTANEOUSLY DAILY AS DIRECTED    insulin syringe-needle U-100 (BD Insulin Syringe) 1 mL 26 x 1/2\" syringe No dose, route, or frequency recorded.    lisinopril 40 mg, oral, Daily    metFORMIN XR (GLUCOPHAGE-XR) 1,000 mg, oral, 2 times daily (morning and late afternoon)    montelukast (Singulair) 10 mg tablet TAKE ONE TABLET BY MOUTH EVERY DAY IN THE EVENING    pantoprazole (ProtoNix) 40 mg EC tablet TAKE ONE TABLET BY MOUTH EVERY DAY    pen needle, diabetic (BD Luann 2nd Gen Pen Needle) 32 gauge x 5/32\" needle use DAILY    pioglitazone (ACTOS) 15 mg, oral, Daily    tadalafil 20 mg, oral, Daily    traZODone (DESYREL)  mg, oral, Daily       Surgical History[2]      Physical Exam  Vitals reviewed.   Cardiovascular:      Heart sounds: Murmur heard.   Pulmonary:      Effort: Pulmonary effort is normal.      Breath sounds: Normal breath sounds.   Skin:     General: Skin is warm and dry.   Neurological:      Mental Status: He is alert.       Assessment & Plan  Benign essential hypertension  Stable today 108/70  Continue lisinopril daily  FBW ordered     Orders:    Follow Up In Primary Care - Established     Type 2 diabetes mellitus with other specified complication, " with long-term current use of insulin  Followed by Dr. Kuldip Stevens   Continue medications as discussed in office today    Orders:    Lipid Panel; Future    Albumin-Creatinine Ratio, Urine Random; Future     Murmur  Schedule ECHO     Orders:    Transthoracic Echo (TTE) Complete; Future     Chronic kidney disease, stage 3a (Multi)  Condition stable based on symptoms and exam.    Continue current medications and follow-up at least yearly.       Rheumatoid arthritis with rheumatoid factor of multiple sites without organ or systems involvement (Multi)  Condition stable based on symptoms and exam.    Continue current medications and follow-up at least yearly.  Est with Rheum as scheduled in June        Encounter for hepatitis C screening test for low risk patient    Orders:    Hepatitis C Antibody; Future    Colon cancer screening         I spent a total of 25 minutes on the date of the service which included preparing to see the patient, face-to-face patient care, completing clinical documentation, obtaining and/or reviewing separately obtained history, performing a medically appropriate examination, counseling and educating the patient/family/caregiver, ordering medications and/or tests, communicating with other HCPs (not separately reported), communicating results to the patient/family/caregiver and care coordination (not separately reported).     Assessment, impression and plan is reflected in the note above as well as the orders.     Plan was discussed with the patient and patient signalled understanding of the plan.            [1]   Allergies  Allergen Reactions    Orencia [Abatacept] Hives   [2]   Past Surgical History:  Procedure Laterality Date    BLADDER SURGERY  2025    Removal malignant tumor    CHOLECYSTECTOMY  07/07/2016    Cholecystectomy    HERNIA REPAIR  07/07/2016    Inguinal Hernia Repair    SHOULDER SURGERY Right

## 2025-04-16 NOTE — ASSESSMENT & PLAN NOTE
Followed by Dr. Kuldip Stevens   Continue medications as discussed in office today    Orders:    Lipid Panel; Future    Albumin-Creatinine Ratio, Urine Random; Future

## 2025-04-16 NOTE — ASSESSMENT & PLAN NOTE
Condition stable based on symptoms and exam.    Continue current medications and follow-up at least yearly.  Est with Rheum as scheduled in June

## 2025-04-16 NOTE — ASSESSMENT & PLAN NOTE
Stable today 108/70  Continue lisinopril daily  FBW ordered     Orders:    Follow Up In Primary Care - Established

## 2025-04-16 NOTE — PATIENT INSTRUCTIONS
Thank you for seeing me today Rodrigue Palacios, it was a pleasure to see you again!    Continue medications as prescribed    Schedule ECHO    Follow up with specialists as recommended     Lab work ordered today.  Please have your blood drawn in the next 1-2 weeks.  You need to be FASTING for 12 hours prior to blood draw.  You may only have water.  Please contact your insurance company to ask about the best location to get blood drawn.  We will contact you with the results of your blood work and any necessary adjustments  to your plan of care, if you do not hear from us within 3-5 days of having your blood drawn, please call the office at [phone].      For assistance with scheduling referrals or consultations, please call [phone] or [phone].    For laboratory, radiology, and other tests, please call [phone] ([phone] for pediatrics).   For laboratory locations, please visit [email or web address]   If you do not get results within 7-10 days, or you have any questions or concerns, please send a message, call the office ([phone]), or return to the office for a follow-up appointment.     For acute/sick visits, if you are unable to get an office visit, you can do a  On Demand Virtual Visit that is accessible via your My Chart account.  For emergencies, call 9-1-1 or go to the nearest Emergency Department.     Please schedule additional appointment(s) to address concern(s) not addressed today.    Please review prescription inserts and published information for possible adverse effects of all medications.     In general, results are discussed over the phone or via  [facility].     You can see your health information, review clinical summaries from office visits & test results online when you follow your health with MY  Chart, a personal health record.   To sign up go to [email or web address].   If you need assistance with signing up or trouble  getting into your account call [facility] Patient Line 24/7 at [phone]     RTC 6 MONTHS AND AS NEEDED     ~Katherine Farnsworth NP

## 2025-04-19 NOTE — PROGRESS NOTES
"HPI   67 yo with Diabetes 2 (dx in mid 50's), HTN, Dyslipidemia, angie (cpap), RA, bladder cancer (dx jan 2025) presents for followup. Last A1c-5.9%, today 6.9% (last seen June 2024).           Pt is testing sugars 4 times per day with libre2. Pt is having low sugars 0 times/week. Pt is following a carb controlled diet and knows reasonable carb allowances. Pt is able to afford their medications. Pt is exercising, walking a bit more, back has improved.           Pt taking metformin, farxiga 10mg(off since early 2025 due to cost), milan 15mg , lantus pen 25 (was 20) qhs.   Can't afford trulicity, doesn't qualify for pt assistance.     69 day brandyn 2 data: 58% in range, 1% low, pattern: low 100's overnight into waking, once eating into the upper 100/low 200's for most of the day, still missing scans frequently.           Home bp 120/70's, taking lisinopril 40mg and amlopidine 5 qd.           Taking atorvastatin 20mg for lipids and tolerating.    -since last visit dx with bladder cancer, also with large kidney stone-having nephrectomy soon    Current Outpatient Medications:     acetaminophen (Tylenol) 500 mg tablet, Take 1 tablet (500 mg) by mouth every 6 hours if needed for mild pain (1 - 3)., Disp: , Rfl:     atorvastatin (Lipitor) 40 mg tablet, TAKE ONE TABLET BY MOUTH DAILY, Disp: 90 tablet, Rfl: 3    Basaglar KwikPen U-100 Insulin 100 unit/mL (3 mL) pen, Use up to 30 units daily, Disp: 30 mL, Rfl: 2    folic acid (Folvite) 1 mg tablet, 1 tablet (1 mg) once daily., Disp: , Rfl:     insulin glargine (Lantus Solostar U-100 Insulin) 100 unit/mL (3 mL) pen, INJECT UP TO 30 UNITS SUBCUTANEOUSLY DAILY AS DIRECTED, Disp: 30 mL, Rfl: 0    insulin syringe-needle U-100 (BD Insulin Syringe) 1 mL 26 x 1/2\" syringe, , Disp: , Rfl:     lisinopril 40 mg tablet, TAKE ONE TABLET BY MOUTH DAILY, Disp: 90 tablet, Rfl: 1    metFORMIN  mg 24 hr tablet, TAKE TWO TABLETS BY MOUTH TWO TIMES A DAY with meals, Disp: 360 tablet, Rfl: 0    " "montelukast (Singulair) 10 mg tablet, TAKE ONE TABLET BY MOUTH EVERY DAY IN THE EVENING, Disp: , Rfl:     pantoprazole (ProtoNix) 40 mg EC tablet, TAKE ONE TABLET BY MOUTH EVERY DAY, Disp: 90 tablet, Rfl: 3    pen needle, diabetic (BD Luann 2nd Gen Pen Needle) 32 gauge x 5/32\" needle, use DAILY, Disp: 100 each, Rfl: 1    pioglitazone (Actos) 15 mg tablet, TAKE ONE TABLET BY MOUTH EVERY DAY, Disp: 90 tablet, Rfl: 0    tadalafil 20 mg tablet, TAKE ONE TABLET BY MOUTH ONCE DAILY, Disp: 30 tablet, Rfl: 0    traZODone (Desyrel) 50 mg tablet, Take 1-2 tablets ( mg) by mouth once daily., Disp: 180 tablet, Rfl: 3    HYDROcodone-acetaminophen (Norco) 5-325 mg tablet, Take 1 tablet by mouth every 6 hours if needed for severe pain (7 - 10). (Patient not taking: Reported on 4/24/2025), Disp: 20 tablet, Rfl: 0      Allergies as of 04/24/2025 - Reviewed 04/24/2025   Allergen Reaction Noted    Orencia [abatacept] Hives 03/14/2023         Review of Systems   Cardiology: Lightheadedness-denies.  Chest pain-denies.  Leg edema-denies.  Palpitations-denies.  Respiratory: Cough-denies. Shortness of breath-denies.  Wheezing-denies.  Gastroenterology: Constipation-denies.  Diarrhea-denies.  Heartburn-denies.  Endocrinology: Cold intolerance-denies.  Heat intolerance-denies.  Sweats-denies.  Neurology: Headache-denies.  Tremor-denies.  Neuropathy in extremities-denies.  Psychology: Low energy-denies.  Irritability-denies.  Sleep disturbances-denies.      /65 (BP Location: Left arm, Patient Position: Sitting)   Pulse 80   Ht 1.905 m (6' 3\")   Wt 99.4 kg (219 lb 3.2 oz)   BMI 27.40 kg/m²       Labs:  Lab Results   Component Value Date    WBC 8.0 04/07/2025    NRBC 0.0 04/07/2025    RBC 3.88 (L) 04/07/2025    HGB 11.3 (L) 04/07/2025    HCT 34.5 (L) 04/07/2025     04/07/2025     Lab Results   Component Value Date    CALCIUM 9.2 04/07/2025    AST 10 04/07/2025    ALKPHOS 65 04/07/2025    BILITOT 0.4 04/07/2025    PROT 6.9 " 04/07/2025    ALBUMIN 3.9 04/07/2025    GLOB 2.0 03/08/2022    AGR 2.1 03/08/2022     04/07/2025    K 4.5 04/07/2025     04/07/2025    CO2 28 04/07/2025    ANIONGAP 12 04/07/2025    BUN 27 (H) 04/07/2025    CREATININE 1.45 (H) 04/07/2025    UREACREAUR 19.2 03/08/2022    GLUCOSE 157 (H) 04/07/2025    ALT 7 (L) 04/07/2025    EGFR 52 (L) 04/07/2025     Lab Results   Component Value Date    CHOL 174 03/08/2022    TRIG 279 (H) 03/08/2022    HDL 38 (L) 03/08/2022    LDLCALC 80 03/08/2022     Lab Results   Component Value Date    MICROALBCREA 15.5 03/08/2022     Lab Results   Component Value Date    TSH 0.49 07/24/2020     Lab Results   Component Value Date    [id number] 786 03/08/2022     Lab Results   Component Value Date    HGBA1C 6.9 (A) 04/24/2025         Physical Exam   General Appearance: pleasant, cooperative, no acute distress  HEENT: no chemosis, no proptosis, no lid lag, no lid retraction  Neck: no lymphadenopathy, no thyromegaly, no dominant thyroid nodules  Heart: no murmurs, regular rate and rhythm, S1 and S2  Lungs: no wheezes, no rhonci, no rales  Extremities: no lower extremity swelling      Assessment/Plan   1. Type 2 diabetes mellitus with other specified complication, with long-term current use of insulin (Primary)  -A1c ordered and reviewed  -glycemic log reviewed  -labs reviewed    -given bladder cancer stay off pioglitazone  -hold metformin as egfr may go <30 as having nephrectomy  -can't afford farxiga or [id number]    -add glimepiride 4mg every day, increase insulin 2 units q 3 days for am sugars 130 or less  -next move is prandial insulin    2. Hypercholesterolemia  -on statin, pt still has not repeated labs ordered  -reordered    3. Benign essential hypertension  -at target on therapy, will follow      Follow Up:  pharmD 3 months    Medical Decision Making  Complexity of problem: Chronic illness of diabetes mellitus uncontrolled, progressing  Data analyzed and reviewed: Reviewed prior  notes, blood glucose data, labs including HgbA1c, lipids, serum chemistries.  Ordered tests.   Risk of complications and morbidities: Is definite because of use of insulin and risk of hypoglycemia.  Prescription medications reviewed and modifications made.  Compliance assessed.  Addressed social determinants of health including food insecurity.

## 2025-04-24 ENCOUNTER — APPOINTMENT (OUTPATIENT)
Dept: ENDOCRINOLOGY | Facility: CLINIC | Age: 70
End: 2025-04-24
Payer: MEDICARE

## 2025-04-24 VITALS
WEIGHT: 219.2 LBS | DIASTOLIC BLOOD PRESSURE: 65 MMHG | HEART RATE: 80 BPM | SYSTOLIC BLOOD PRESSURE: 137 MMHG | HEIGHT: 75 IN | BODY MASS INDEX: 27.26 KG/M2

## 2025-04-24 DIAGNOSIS — E78.00 HYPERCHOLESTEROLEMIA: ICD-10-CM

## 2025-04-24 DIAGNOSIS — E11.69 TYPE 2 DIABETES MELLITUS WITH OTHER SPECIFIED COMPLICATION, WITH LONG-TERM CURRENT USE OF INSULIN: Primary | ICD-10-CM

## 2025-04-24 DIAGNOSIS — I10 BENIGN ESSENTIAL HYPERTENSION: ICD-10-CM

## 2025-04-24 DIAGNOSIS — Z79.4 TYPE 2 DIABETES MELLITUS WITH OTHER SPECIFIED COMPLICATION, WITH LONG-TERM CURRENT USE OF INSULIN: Primary | ICD-10-CM

## 2025-04-24 LAB — POC HEMOGLOBIN A1C: 6.9 % (ref 4.2–6.5)

## 2025-04-24 PROCEDURE — 1123F ACP DISCUSS/DSCN MKR DOCD: CPT | Performed by: INTERNAL MEDICINE

## 2025-04-24 PROCEDURE — 3078F DIAST BP <80 MM HG: CPT | Performed by: INTERNAL MEDICINE

## 2025-04-24 PROCEDURE — 3075F SYST BP GE 130 - 139MM HG: CPT | Performed by: INTERNAL MEDICINE

## 2025-04-24 PROCEDURE — 3008F BODY MASS INDEX DOCD: CPT | Performed by: INTERNAL MEDICINE

## 2025-04-24 PROCEDURE — 3044F HG A1C LEVEL LT 7.0%: CPT | Performed by: INTERNAL MEDICINE

## 2025-04-24 PROCEDURE — 83036 HEMOGLOBIN GLYCOSYLATED A1C: CPT | Performed by: INTERNAL MEDICINE

## 2025-04-24 PROCEDURE — 4010F ACE/ARB THERAPY RXD/TAKEN: CPT | Performed by: INTERNAL MEDICINE

## 2025-04-24 PROCEDURE — 99214 OFFICE O/P EST MOD 30 MIN: CPT | Performed by: INTERNAL MEDICINE

## 2025-04-24 PROCEDURE — 1036F TOBACCO NON-USER: CPT | Performed by: INTERNAL MEDICINE

## 2025-04-24 RX ORDER — GLIMEPIRIDE 4 MG/1
4 TABLET ORAL
Qty: 90 TABLET | Refills: 1 | Status: SHIPPED | OUTPATIENT
Start: 2025-04-24 | End: 2026-04-24

## 2025-05-08 ENCOUNTER — APPOINTMENT (OUTPATIENT)
Dept: UROLOGY | Facility: CLINIC | Age: 70
End: 2025-05-08
Payer: MEDICARE

## 2025-05-09 ENCOUNTER — PRE-ADMISSION TESTING (OUTPATIENT)
Dept: PREADMISSION TESTING | Facility: HOSPITAL | Age: 70
End: 2025-05-09
Payer: MEDICARE

## 2025-05-09 VITALS
OXYGEN SATURATION: 96 % | SYSTOLIC BLOOD PRESSURE: 165 MMHG | TEMPERATURE: 98.1 F | HEIGHT: 75 IN | DIASTOLIC BLOOD PRESSURE: 77 MMHG | HEART RATE: 68 BPM | WEIGHT: 225.3 LBS | BODY MASS INDEX: 28.01 KG/M2

## 2025-05-09 DIAGNOSIS — Z01.818 PREOPERATIVE EXAMINATION: Primary | ICD-10-CM

## 2025-05-09 DIAGNOSIS — N26.1 ATROPHY OF RIGHT KIDNEY: ICD-10-CM

## 2025-05-09 DIAGNOSIS — C67.2 MALIGNANT NEOPLASM OF LATERAL WALL OF URINARY BLADDER (MULTI): ICD-10-CM

## 2025-05-09 LAB
ABO GROUP (TYPE) IN BLOOD: NORMAL
ALBUMIN SERPL BCP-MCNC: 4.2 G/DL (ref 3.4–5)
ALP SERPL-CCNC: 63 U/L (ref 33–136)
ALT SERPL W P-5'-P-CCNC: 3 U/L (ref 10–52)
ANION GAP SERPL CALC-SCNC: 9 MMOL/L (ref 10–20)
ANTIBODY SCREEN: NORMAL
APPEARANCE UR: ABNORMAL
AST SERPL W P-5'-P-CCNC: 13 U/L (ref 9–39)
BILIRUB SERPL-MCNC: 0.4 MG/DL (ref 0–1.2)
BILIRUB UR STRIP.AUTO-MCNC: NEGATIVE MG/DL
BUN SERPL-MCNC: 21 MG/DL (ref 6–23)
CALCIUM SERPL-MCNC: 8.9 MG/DL (ref 8.6–10.6)
CHLORIDE SERPL-SCNC: 108 MMOL/L (ref 98–107)
CO2 SERPL-SCNC: 27 MMOL/L (ref 21–32)
COLOR UR: ABNORMAL
CREAT SERPL-MCNC: 1.45 MG/DL (ref 0.5–1.3)
EGFRCR SERPLBLD CKD-EPI 2021: 52 ML/MIN/1.73M*2
ERYTHROCYTE [DISTWIDTH] IN BLOOD BY AUTOMATED COUNT: 14.4 % (ref 11.5–14.5)
GLUCOSE SERPL-MCNC: 103 MG/DL (ref 74–99)
GLUCOSE UR STRIP.AUTO-MCNC: NORMAL MG/DL
HCT VFR BLD AUTO: 36.5 % (ref 41–52)
HGB BLD-MCNC: 11.1 G/DL (ref 13.5–17.5)
KETONES UR STRIP.AUTO-MCNC: NEGATIVE MG/DL
LEUKOCYTE ESTERASE UR QL STRIP.AUTO: ABNORMAL
MCH RBC QN AUTO: 27.5 PG (ref 26–34)
MCHC RBC AUTO-ENTMCNC: 30.4 G/DL (ref 32–36)
MCV RBC AUTO: 91 FL (ref 80–100)
MUCOUS THREADS #/AREA URNS AUTO: ABNORMAL /LPF
NITRITE UR QL STRIP.AUTO: ABNORMAL
NRBC BLD-RTO: 0 /100 WBCS (ref 0–0)
PH UR STRIP.AUTO: 6 [PH]
PLATELET # BLD AUTO: 239 X10*3/UL (ref 150–450)
POTASSIUM SERPL-SCNC: 4.4 MMOL/L (ref 3.5–5.3)
PROT SERPL-MCNC: 6.7 G/DL (ref 6.4–8.2)
PROT UR STRIP.AUTO-MCNC: ABNORMAL MG/DL
RBC # BLD AUTO: 4.03 X10*6/UL (ref 4.5–5.9)
RBC # UR STRIP.AUTO: ABNORMAL MG/DL
RBC #/AREA URNS AUTO: >20 /HPF
RH FACTOR (ANTIGEN D): NORMAL
SODIUM SERPL-SCNC: 140 MMOL/L (ref 136–145)
SP GR UR STRIP.AUTO: 1.02
UROBILINOGEN UR STRIP.AUTO-MCNC: NORMAL MG/DL
WBC # BLD AUTO: 5.9 X10*3/UL (ref 4.4–11.3)
WBC #/AREA URNS AUTO: >50 /HPF

## 2025-05-09 PROCEDURE — 86901 BLOOD TYPING SEROLOGIC RH(D): CPT

## 2025-05-09 PROCEDURE — 99204 OFFICE O/P NEW MOD 45 MIN: CPT

## 2025-05-09 PROCEDURE — 80053 COMPREHEN METABOLIC PANEL: CPT | Performed by: STUDENT IN AN ORGANIZED HEALTH CARE EDUCATION/TRAINING PROGRAM

## 2025-05-09 PROCEDURE — 87086 URINE CULTURE/COLONY COUNT: CPT | Performed by: STUDENT IN AN ORGANIZED HEALTH CARE EDUCATION/TRAINING PROGRAM

## 2025-05-09 PROCEDURE — 87081 CULTURE SCREEN ONLY: CPT | Mod: 59

## 2025-05-09 PROCEDURE — 81001 URINALYSIS AUTO W/SCOPE: CPT | Performed by: STUDENT IN AN ORGANIZED HEALTH CARE EDUCATION/TRAINING PROGRAM

## 2025-05-09 PROCEDURE — 86923 COMPATIBILITY TEST ELECTRIC: CPT

## 2025-05-09 PROCEDURE — 85027 COMPLETE CBC AUTOMATED: CPT | Performed by: STUDENT IN AN ORGANIZED HEALTH CARE EDUCATION/TRAINING PROGRAM

## 2025-05-09 RX ORDER — CHLORHEXIDINE GLUCONATE 40 MG/ML
SOLUTION TOPICAL DAILY PRN
Qty: 473 ML | Refills: 0 | Status: SHIPPED | OUTPATIENT
Start: 2025-05-09

## 2025-05-09 RX ORDER — CHLORHEXIDINE GLUCONATE ORAL RINSE 1.2 MG/ML
15 SOLUTION DENTAL AS NEEDED
Qty: 120 ML | Refills: 0 | Status: SHIPPED | OUTPATIENT
Start: 2025-05-09 | End: 2025-05-11

## 2025-05-09 ASSESSMENT — ENCOUNTER SYMPTOMS
CONFUSION: 0
PALPITATIONS: 0
NECK SWELLING: 0
SINUS CONGESTION: 0
TREMORS: 0
PND: 0
TROUBLE SWALLOWING: 0
ABDOMINAL DISTENTION: 0
VOICE CHANGE: 0
VISUAL CHANGE: 0
WOUND: 0
NECK STIFFNESS: 0
LIMITED RANGE OF MOTION: 0
FEVER: 0
WEAKNESS: 0
DYSPNEA WITH EXERTION: 0
SKIN CHANGES: 0
CHILLS: 0
ABDOMINAL PAIN: 0
NECK PAIN: 0
MYALGIAS: 0
VOMITING: 0
DYSURIA: 0
DIFFICULTY URINATING: 0
BRUISES/BLEEDS EASILY: 0
DOUBLE VISION: 0
ARTHRALGIAS: 0
NUMBNESS: 0
SHORTNESS OF BREATH: 0
DYSPNEA AT REST: 0
HEMOPTYSIS: 0
EYE DISCHARGE: 0
NECK MASS: 0
COUGH: 0
DIARRHEA: 0
POLYDIPSIA: 0
UNEXPECTED WEIGHT CHANGE: 0
BLOOD IN STOOL: 0
LIGHT-HEADEDNESS: 0
EXCESSIVE BLEEDING: 0
JOINT SWELLING: 0
RHINORRHEA: 0
CONSTIPATION: 0
WHEEZING: 0
NERVOUS/ANXIOUS: 0
VERTIGO: 0
NAUSEA: 0

## 2025-05-09 ASSESSMENT — DUKE ACTIVITY SCORE INDEX (DASI)
TOTAL_SCORE: 50.7
CAN YOU DO HEAVY WORK AROUND THE HOUSE LIKE SCRUBBING FLOORS OR LIFTING AND MOVING HEAVY FURNITURE: YES
CAN YOU RUN A SHORT DISTANCE: YES
CAN YOU WALK INDOORS, SUCH AS AROUND YOUR HOUSE: YES
CAN YOU PARTICIPATE IN STRENOUS SPORTS LIKE SWIMMING, SINGLES TENNIS, FOOTBALL, BASKETBALL, OR SKIING: NO
CAN YOU PARTICIPATE IN MODERATE RECREATIONAL ACTIVITIES LIKE GOLF, BOWLING, DANCING, DOUBLES TENNIS OR THROWING A BASEBALL OR FOOTBALL: YES
CAN YOU CLIMB A FLIGHT OF STAIRS OR WALK UP A HILL: YES
CAN YOU HAVE SEXUAL RELATIONS: YES
CAN YOU DO MODERATE WORK AROUND THE HOUSE LIKE VACUUMING, SWEEPING FLOORS OR CARRYING GROCERIES: YES
CAN YOU TAKE CARE OF YOURSELF (EAT, DRESS, BATHE, OR USE TOILET): YES
CAN YOU DO YARD WORK LIKE RAKING LEAVES, WEEDING OR PUSHING A MOWER: YES
DASI METS SCORE: 9
CAN YOU WALK A BLOCK OR TWO ON LEVEL GROUND: YES
CAN YOU DO LIGHT WORK AROUND THE HOUSE LIKE DUSTING OR WASHING DISHES: YES

## 2025-05-09 ASSESSMENT — PAIN SCALES - GENERAL: PAINLEVEL_OUTOF10: 3

## 2025-05-09 ASSESSMENT — LIFESTYLE VARIABLES: SMOKING_STATUS: NONSMOKER

## 2025-05-09 ASSESSMENT — PAIN - FUNCTIONAL ASSESSMENT: PAIN_FUNCTIONAL_ASSESSMENT: 0-10

## 2025-05-09 NOTE — PREPROCEDURE INSTRUCTIONS
Thank you for visiting [facility] ([facility]) today for your pre-procedure evaluation, you were seen by     Dalila Cox PA-C   Department of Anesthesiology and Perioperative Medicine  Main phone [phone]  Fax [phone]    This summary includes instructions and information to aid you during your perioperative period.  Please read carefully. If you have any questions about your visit today, please call the number listed above.  If you become ill or have any changes to your health before your surgery, please contact your primary care provider and alert your surgeon.    General Medications Instructions (see back for further medication instructions)  Hold Vit D supplementation day of surgery  Hold all other vitamins and supplements 7 days prior to surgery  Tylenol okay to continue, please hold Aleve/naproxen/ibuprofen (NSAIDs) for 7 days prior to surgery  No lotion/moisturizers or Deoderant after last shower prior to surgery    You will be called business day prior to surgery to confirm arrival time.     Preparing for Surgery       Preoperative Fasting Guidelines  Why must I stop eating and drinking near surgery time?  With sedation, food or liquid in your stomach can enter your lungs causing serious complications  Food can increase nausea and vomiting  When do I need to stop eating and drinking before my surgery?  Do not eat any food after midnight the night before your surgery/procedure.  You may have up to 13.5 ounces of clear liquid until TWO hours before your instructed arrival time to the hospital.  This includes water, black tea/coffee, (no milk or cream) apple juice, and electrolyte drinks (Gatorade)  You may chew gum until TWO hours before your surgery/procedure    Tobacco and Alcohol;  Do not drink alcohol or smoke within 24 hours of surgery.  It is best to quit smoking for as long as possible before any surgery or procedure.       Other Instructions  Why did I have my nose,  "under my arms, and groin swabbed? The purpose of the swab is to identify Staphylococcus aureus inside your nose or on your skin.  The swab was sent to the laboratory for culture.  A positive swab/culture for Staphylococcus aureus is called colonization or carriage.   What is Staphylococcus aureus? Staphylococcus aureus, also known as \"staph\", is a germ found on the skin or in the nose of healthy people.  Sometimes Staphylococcus aureus can get into the body and cause an infection.  This can be minor (such as pimples, boils, or other skin problems).  It might also be serious (such as a blood infection, pneumonia, or a surgical site infection). What is Staphylococcus aureus colonization or carriage? Colonization or carriage means that a person has the germ but is not sick from it.  These bacteria can be spread on the hands or when breathing or sneezing. How is Staphylococcus aureus spread? It is most often spread by close contact with a person or item that carries it. What happens if my culture is positive for Staphylococcus aureus? Your doctor/medical team will use this information to guide any antibiotic treatment which may be necessary.  Regardless of the culture results, we will clean the inside of your nose with a betadine swab just before you have your surgery. Will I get an infection if I have Staphylococcus aureus in my nose or on my skin? Anyone can get an infection with Staphylococcus aureus.  However, the best way to reduce your risk of infection is to follow the instructions provided to you for the use of your CHG soap and dental rinse.  , Body Wash; What is a home preoperative antibacterial shower? This shower is a way of cleaning the skin with a germ-killing solution before surgery.  The solution contains chlorhexidine, commonly known as CHG.  CHG is a skin cleanser with germ-killing ability.  Let your doctor know if you are allergic to chlorhexidine. Why do I need to take a preoperative antibacterial " shower? Skin is not sterile.  It is best to try to make your skin as free of germs as possible before surgery.  Proper cleansing with a germ-killing soap before surgery can lower the number of germs on your skin.  This helps to reduce the risk of infection at the surgical site.  Following the instructions listed below will help you prepare your skin for surgery.   How do I use the solution? Steps:  Begin using your CHG soap 5 days before your scheduled surgery on ___________.   First, wash and rinse your hair using the CHG soap. Keep CHG soap away from ear canals and eyes.  Rinse completely, do not condition.  Hair extensions should be removed. , Oral/Dental Rinse: What is oral/dental rinse?  It is a mouthwash. It is a way of cleaning the mouth with a germ-killing solution before your surgery.  The solution contains chlorhexidine, commonly known as CHG. It is used inside the mouth to kill a bacteria known as Staphylococcus aureus.  Let your doctor know if you are allergic to Chlorhexidine. Why do I need to use CHG oral/dental rinse? The CHG oral/dental rinse helps to kill a bacteria in your mouth known as Staphylococcus aureus.  This reduces the risk of infection at the surgical site.  Using your CHG oral/dental rinse STEPS: Use your CHG oral/dental rinse after you brush your teeth the night before (at bedtime) and the morning of your surgery.  Follow all directions on your prescription label.  Use the cap on the container to measure 15 ml.  Swish (gargle if you can) the mouthwash in your mouth for at least 30 seconds, (do not swallow) and spit out.  After you use your CHG rinse, do not rinse your mouth with water, drink or eat.  Please refer to the prescription label for the appropriate time to resume oral intake What side effects might I have using the CHG oral/dental rinse? CHG rinse will stick to plaque on the teeth.  Brush and floss just before use.  Teeth brushing will help avoid staining of plaque during use.        The Week before Surgery        Seven days before Surgery  Check your [facility] medication instructions  Do the exercises provided to you by [facility]   Arrange for a responsible, adult licensed  to take you home after surgery and stay with you for 24 hours.  You will not be permitted to drive yourself home if you have received any anesthetic/sedation  Five days before surgery  Check your [facility] medication instructions  Do the exercises provided to you by [facility]   Start using Chlorhexidene (CHG) body wash if prescribed (Continue till day of surgery)      The Day before Surgery       Check your [facility] medication and all other [facility] instructions including when to stop eating and drinking  You will be called with your arrival time for surgery in the late afternoon.  If you do not receive a call please reach out to your surgeon's office.  Do not smoke or drink 24 hours before surgery  Prepare items to bring with you to the hospital  Shower with your chlorhexidine wash if prescribed  Brush your teeth and use your chlorhexidine dental rinse if prescribed    The Day of Surgery       Check your [facility] medication instructions  Shower, if prescribed use CHG.  Do not apply any lotions, creams, moisturizers, perfume or deodorant  Brush your teeth and use your CHG dental rinse if prescribed  Wear loose comfortable clothing  Avoid make-up  Remove  jewelry and piercings, consider professional piercing removal with a plastic spacer if needed  Bring photo ID and Insurance card  Bring an accurate medication list that includes medication dose, frequency and allergies  Bring a copy of your advanced directives (will, health care power of )  Bring any devices and controllers as well as medical devices you have been provided with for surgery (CPAP, slings, braces, etc.)  Dentures, eyeglasses, and contacts will be removed before surgery, please bring cases for contacts or glasses    Preoperative Deep Breathing Exercises    Why it is important to  do deep breathing exercises before my surgery?  Deep breathing exercises strengthen your breathing muscles.  This helps you to recover after your surgery and decreases the chance of breathing complications.    How are the deep breathing exercises done?  Sit straight with your back supported.  Breathe in deeply and slowly through your nose. Your lower rib cage should expand and your abdomen may move forward.  Hold that breath for 3 to 5 seconds.  Breathe out through pursed lips, slowly and completely.  Rest and repeat 10 times every hour while awake.  Rest longer if you become dizzy or lightheaded.      Preoperative Brain Exercises    What are brain exercises?  A brain exercise is any activity that engages your thinking (cognitive) skills.    What types of activities are considered brain exercises?  Jigsaw puzzles, crossword puzzles, word jumble, memory games, word search, and many more.  Many can be found free online or on your phone via a mobile walt.    Why should I do brain exercises before my surgery?  More recent research has shown brain exercise before surgery can lower the risk of postoperative delirium (confusion) which can be especially important for older adults.  Patients who did brain exercises for 5 to 10 hours the days before surgery, cut their risk of postoperative delirium in half up to 1 week after surgery.    Sit-to-Stand Exercise    What is the sit-to-stand exercise?  The sit-to-stand exercise strengthens the muscles of your lower body and muscles in the center of your body (core muscles for stability) helping to maintain and improve your strength and mobility.  How do I do the sit-to-stand exercise?  The goal is to do this exercise without using your arms or hands.  If this is too difficult, use your arms and hands or a chair with armrests to help slowly push yourself to the standing position and lower yourself back to the sitting position. As the movement becomes easier use your arms and hands  less.    Steps to the sit-to-stand exercise  Sit up tall in a sturdy chair, knees bent, feet flat on the floor shoulder-width apart.  Shift your hips/pelvis forward in the chair to correctly position yourself for the next movement.  Lean forward at your hips.  Stand up straight putting equal weight on both feet.  Check to be sure you are properly aligned with the chair, in a slow controlled movement sit back down.  Repeat this exercise 10-15 times.  If needed you can do it fewer times until your strength improves.  Rest for 1 minute.  Do another 10-15 sit-to-stand exercises.  Try to do this in the morning and evening.       Simple things you can do to help prevent blood clots     Blood clots are blockages that can form in the body's veins. When a blood clot forms in your deep veins, it may be called a deep vein thrombosis, or DVT for short. Blood clots can happen in any part of the body where blood flows, but they are most common in the arms and legs. If a piece of a blood clot breaks free and travels to the lungs, it is called a pulmonary embolus (PE). A PE can be a very serious problem.      Being in the hospital or having surgery can raise your chances of getting a blood clot because you may not be well enough to move around as much as you normally do.         Ways you can help prevent blood clots in the hospital       Wearing SCDs  SCDs stands for Sequential Compression Devices.   SCDs are special sleeves that wrap around your legs. They attach to a pump that fills them with air to gently squeeze your legs every few minutes.  This helps return the blood in your legs to your heart.   SCDs should only be taken off when walking or bathing. SCDs may not be comfortable, but they can help save your life.              Pump SCD leg sleeves  Wearing compression stockings - if your doctor orders them. These special snug-fitting stockings gently squeeze your legs to help blood flow.       Walking. Walking helps move the  blood in your legs.   If your doctor says it is ok, try walking the halls at least   5 times a day. Ask us to help you get up, so you don't fall.      Taking any blood-thinning medicines your doctor orders.              Ways you can help prevent blood clots at home         Wearing compression stockings - if your doctor orders them.   Walking - to help move the blood in your legs.    Taking any blood-thinning medicines your doctor orders.      Signs of a blood clot or PE    Tell your doctor or nurse right away if you have any of the problems listed below.         If you are at home, seek medical care right away. Call 911 for chest pain or problems breathing.            Signs of a blood clot (DVT) - such as pain, swelling, redness, or warmth in your arm or legs.  Signs of a pulmonary embolism (PE) - such as chest pain or feeling short of breath

## 2025-05-09 NOTE — CPM/PAT H&P
CPM/PAT Evaluation       Name: Rodrigue Palacios (Rodrigue Palacios)  /Age: 1955/69 y.o.     Visit Type:   In-Person       Chief Complaint: perioperative evaluation    HPI HPI: 68 y/o male scheduled for LAPAROSCOPY, WITH CYSTECTOMY AND ILEAL CONDUIT CREATION, FOR URINARY DIVERSION, ROBOTIC-ASSISTED NEPHRECTOMY, LAPAROSCOPIC - Right , NEPHRECTOMY, ROBOT-ASSISTED - Right  on 2025  secondary to Atrophy of right kidney,  Malignant neoplasm of lateral wall of urinary bladder with Dr. James Borrero who referred to [facility].  Presents to [facility] today for perioperative risk stratification and optimization. PMHX includes HTN, HLD, varicose veins, murmur, SCOOTER (CPAP), Atrophy of right kidney,  Malignant neoplasm of lateral wall of urinary bladder, CKD, nephrolithiasis, DM2, GERD, Rheumatoid Arthritis, .    PCP: YUNG Blanco  Specialists:    Endocrinology - Gonzalo Christiansen MD   Urology- James Borrero, MD   Oncology- Ernesto Leigh MD   Sleep med/pulm - SHERRY PlattTIFFANY             Medical History[1]    Surgical History[2]    Patient Sexual activity questions deferred to the physician.    Family History[3]    Allergies[4]    Prior to Admission medications    Medication Sig Start Date End Date Taking? Authorizing Provider   acetaminophen (Tylenol) 500 mg tablet Take 1 tablet (500 mg) by mouth every 6 hours if needed for mild pain (1 - 3).    Historical Provider, MD   atorvastatin (Lipitor) 40 mg tablet TAKE ONE TABLET BY MOUTH DAILY 25   Daxa B Iliano, APRN-CNP   Basaglar KwikPen U-100 Insulin 100 unit/mL (3 mL) pen Use up to 30 units daily 3/19/25   Gonzalo Christiansen MD   folic acid (Folvite) 1 mg tablet 1 tablet (1 mg) once daily. 12   Historical Provider, MD   glimepiride (AmaryL) 4 mg tablet Take 1 tablet (4 mg) by mouth once daily in the morning. Take before meals. 25  Gonzalo Christiansen MD   HYDROcodone-acetaminophen (Norco) 5-325 mg tablet Take 1 tablet by mouth  "every 6 hours if needed for severe pain (7 - 10).  Patient not taking: Reported on 4/24/2025 3/31/25   Shante Ureña MD   insulin glargine (Lantus Solostar U-100 Insulin) 100 unit/mL (3 mL) pen INJECT UP TO 30 UNITS SUBCUTANEOUSLY DAILY AS DIRECTED 1/13/25   Gonzalo Christiansen MD   insulin syringe-needle U-100 (BD Insulin Syringe) 1 mL 26 x 1/2\" syringe     Historical Provider, MD   lisinopril 40 mg tablet TAKE ONE TABLET BY MOUTH DAILY 2/4/25   YUNG Blanco   montelukast (Singulair) 10 mg tablet TAKE ONE TABLET BY MOUTH EVERY DAY IN THE EVENING 9/27/21   Historical Provider, MD   pantoprazole (ProtoNix) 40 mg EC tablet TAKE ONE TABLET BY MOUTH EVERY DAY 9/4/24   YUNG Blanco   pen needle, diabetic (BD Luann 2nd Gen Pen Needle) 32 gauge x 5/32\" needle use DAILY 1/13/25   Gonzalo Christiansen MD   tadalafil 20 mg tablet TAKE ONE TABLET BY MOUTH ONCE DAILY 10/9/24   YUNG Blanco   traZODone (Desyrel) 50 mg tablet Take 1-2 tablets ( mg) by mouth once daily. 8/7/24 8/7/25  YUNG Platt        PAT ROS:   Constitutional:    no fever   no chills   no unexpected weight change  Neuro/Psych:    no numbness   no weakness   no light-headedness   no tremors   no confusion   not nervous/anxious   no self-injury   no suicidal ideation  Eyes:    no discharge   no vision loss   no diplopia   no visual disturbance   use of corrective lenses  Ears:    no ear pain   no hearing loss   no vertigo   no tinnitus   hearing aides  Nose:    no nasal discharge   no sinus congestion   no epistaxis  Mouth:    no dental issues   no mouth pain   no oral bleeding   no mouth lesions  Throat:    no throat pain   no dysphagia   no voice change  Neck:    no neck pain   neck swelling   no neck stiffness   no neck masses  Cardio:    no chest pain   no palpitations   no peripheral edema   no dyspnea   no ESPOSITO   no paroxysmal nocturnal dyspnea  Respiratory:    no cough   no wheezing   no hemoptysis   no " shortness of breath  Endocrine:    no cold intolerance   no heat intolerance   no polydipsia  GI:    no abdominal distention   no abdominal pain   no constipation   no diarrhea   no nausea   no vomiting   no blood in stool  :    no difficulty urinating   no dysuria   no oliguria   no polyuria  Musculoskeletal:    no arthralgias   no myalgias   no decreased ROM   no swelling  Hematologic:    does not bruise/bleed easily   no excessive bleeding   no history of blood transfusion   no blood clots  Skin:   no skin changes   no sores/wound   no rash      Physical Exam  Vitals reviewed.   Constitutional:       General: He is not in acute distress.     Appearance: Normal appearance. He is normal weight. He is not ill-appearing, toxic-appearing or diaphoretic.   HENT:      Head: Normocephalic.      Nose: Nose normal. No congestion or rhinorrhea.      Mouth/Throat:      Mouth: Mucous membranes are moist.      Pharynx: Oropharynx is clear. No oropharyngeal exudate or posterior oropharyngeal erythema.   Eyes:      General: No scleral icterus.        Right eye: No discharge.         Left eye: No discharge.      Conjunctiva/sclera: Conjunctivae normal.   Neck:      Vascular: No carotid bruit.   Cardiovascular:      Rate and Rhythm: Normal rate and regular rhythm.      Pulses: Normal pulses.      Heart sounds: Normal heart sounds. No murmur heard.     No friction rub. No gallop.   Pulmonary:      Effort: Pulmonary effort is normal. No respiratory distress.      Breath sounds: Normal breath sounds. No stridor. No wheezing, rhonchi or rales.   Chest:      Chest wall: No tenderness.   Musculoskeletal:         General: No swelling or tenderness.      Cervical back: Normal range of motion. No rigidity or tenderness.   Neurological:      General: No focal deficit present.      Mental Status: He is alert.   Psychiatric:         Mood and Affect: Mood normal.         Behavior: Behavior normal.         Thought Content: Thought content  "normal.         Judgment: Judgment normal.          PAT AIRWAY:   Airway:     Mallampati::  IV    TM distance::  >3 FB    Neck ROM::  Full        Visit Vitals  /77   Pulse 68   Temp 36.7 °C (98.1 °F) (Temporal)   Ht 1.905 m (6' 3\")   Wt 102 kg (225 lb 4.8 oz)   SpO2 96%   BMI 28.16 kg/m²   Smoking Status Never   BSA 2.32 m²       DASI Risk Score      Flowsheet Row Pre-Admission Testing from 5/9/2025 in [facility] Pre-Admission Testing from 3/17/2025 in [facility][note 3]   Can you take care of yourself (eat, dress, bathe, or use toilet)?  2.75 filed at 05/09/2025 1341 2.75 filed at 03/17/2025 0913   Can you walk indoors, such as around your house? 1.75 filed at 05/09/2025 1341 1.75 filed at 03/17/2025 0913   Can you walk a block or two on level ground?  2.75 filed at 05/09/2025 1341 2.75 filed at 03/17/2025 0913   Can you climb a flight of stairs or walk up a hill? 5.5 filed at 05/09/2025 1341 5.5 filed at 03/17/2025 0913   Can you run a short distance? 8 filed at 05/09/2025 1341 8 filed at 03/17/2025 0913   Can you do light work around the house like dusting or washing dishes? 2.7 filed at 05/09/2025 1341 2.7 filed at 03/17/2025 0913   Can you do moderate work around the house like vacuuming, sweeping floors or carrying groceries? 3.5 filed at 05/09/2025 1341 3.5 filed at 03/17/2025 0913   Can you do heavy work around the house like scrubbing floors or lifting and moving heavy furniture?  8 filed at 05/09/2025 1341 8 filed at 03/17/2025 0913   Can you do yard work like raking leaves, weeding or pushing a mower? 4.5 filed at 05/09/2025 1341 4.5 filed at 03/17/2025 0913   Can you have sexual relations? 5.25 filed at 05/09/2025 1341 5.25 filed at 03/17/2025 0913   Can you participate in moderate recreational activities like golf, bowling, dancing, doubles tennis or throwing a baseball or football? 6 filed at 05/09/2025 1341 6 filed at 03/17/2025 0913   Can you participate in strenous " sports like swimming, singles tennis, football, basketball, or skiing? 0 filed at 05/09/2025 1341 7.5 filed at 03/17/2025 0913   DASI SCORE 50.7 filed at 05/09/2025 1341 58.2 filed at 03/17/2025 0913   METS Score (Will be calculated only when all the questions are answered) 9 filed at 05/09/2025 1341 9.9 filed at 03/17/2025 0913          Caprini DVT Assessment      Flowsheet Row Pre-Admission Testing from 5/9/2025 in [facility] Interventional Radiology  Nephrostomy Placement from 4/4/2025 in [facility] with Pk Hernandez MD   DVT Score (IF A SCORE IS NOT CALCULATING, MUST SELECT A BMI TO COMPLETE) 11 filed at 05/09/2025 1412 3 filed at 04/04/2025 1401   Medical Factors Present cancer, chemotherapy, or previous malignancy, Varicose veins filed at 05/09/2025 1412 --   Surgical Factors Major surgery planned, lasting over 3 hours filed at 05/09/2025 1412 --   BMI (BMI MUST BE CHOSEN) 30 or less filed at 05/09/2025 1412 30 or less filed at 04/04/2025 1401          Modified Frailty Index      Flowsheet Row Pre-Admission Testing from 5/9/2025 in [facility]   Non-independent functional status (problems with dressing, bathing, personal grooming, or cooking) 0 filed at 05/09/2025 1413   History of diabetes mellitus  0.0909 filed at 05/09/2025 1413   History of COPD 0 filed at 05/09/2025 1413   History of CHF No filed at 05/09/2025 1413   History of MI 0 filed at 05/09/2025 1413   History of Percutaneous Coronary Intervention, Cardiac Surgery, or Angina No filed at 05/09/2025 1413   Hypertension requiring the use of medication  0.0909 filed at 05/09/2025 1413   Peripheral vascular disease 0.0909 filed at 05/09/2025 1413   Impaired sensorium (cognitive impairement or loss, clouding, or delirium) 0 filed at 05/09/2025 1413   TIA or CVA withouy residual deficit 0 filed at 05/09/2025 1413   Cerebrovascular accident with deficit 0 filed at 05/09/2025 1413   Modified Frailty  Index Calculator .2727 filed at 05/09/2025 1413          [id number] Stroke Risk Points  Current as of just now        N/A 0 to 9 Points:      Last Change: N/A          The [id number] risk score (Shara HERNANDEZ, et al. 2009. © 2010 [employer]) quantifies the risk of stroke for a patient with atrial fibrillation. For patients without atrial fibrillation or under the age of 18 this score appears as N/A. Higher score values generally indicate higher risk of stroke.        This score is not applicable to this patient. Components are not calculated.          Revised Cardiac Risk Index      Flowsheet Row Pre-Admission Testing from 5/9/2025 in [facility] Pre-Admission Testing from 3/17/2025 in [facility]   High-Risk Surgery (Intraperitoneal, Intrathoracic,Suprainguinal vascular) 1 filed at 05/09/2025 1411 0 filed at 03/17/2025 0942   History of ischemic heart disease (History of MI, History of positive exercuse test, Current chest paint considered due to myocardial ischemia, Use of nitrate therapy, ECG with pathological Q Waves) 0 filed at 05/09/2025 1411 0 filed at 03/17/2025 0942   History of congestive heart failure (pulmonary edemia, bilateral rales or S3 gallop, Paroxysmal nocturnal dyspnea, CXR showing pulmonary vascular redistribution) 0 filed at 05/09/2025 1411 0 filed at 03/17/2025 0942   History of cerebrovascular disease (Prior TIA or stroke) 0 filed at 05/09/2025 1411 0 filed at 03/17/2025 0942   Pre-operative insulin treatment 1 filed at 05/09/2025 1411 1 filed at 03/17/2025 0942   Pre-operative creatinine>2 mg/dl 0 filed at 05/09/2025 1411 0 filed at 03/17/2025 0942   Revised Cardiac Risk Calculator 2 filed at 05/09/2025 1411 1 filed at 03/17/2025 0942          Apfel Simplified Score      Flowsheet Row Pre-Admission Testing from 5/9/2025 in [facility]   Smoking status 1 filed at 05/09/2025 1413   History of motion sickness or PONV  0 filed  at 05/09/2025 1413   Use of postoperative opioids 1 filed at 05/09/2025 1413   Gender - Female 0=No filed at 05/09/2025 1413   Apfel Simplified Score Calculator 2 filed at 05/09/2025 1413          Risk Analysis Index Results This Encounter    No data found in the last 10 encounters.       Stop Bang Score      Flowsheet Row Pre-Admission Testing from 5/9/2025 in [facility] Admission (Discharged) from 3/31/2025 in [facility] OR with Shante Ureña MD   Do you snore loudly? 1 filed at 05/09/2025 1340 1 filed at 03/31/2025 0917   Do you often feel tired or fatigued after your sleep? 1 filed at 05/09/2025 1340 1 filed at 03/31/2025 0917   Has anyone ever observed you stop breathing in your sleep? 1  [utilizes CPAP x 2 years] filed at 05/09/2025 1340 1 filed at 03/31/2025 0917   Do you have or are you being treated for high blood pressure? 1 filed at 05/09/2025 1340 1 filed at 03/31/2025 0917   Recent BMI (Calculated) 27.4 filed at 05/09/2025 1340 27.9 filed at 03/31/2025 0917   Is BMI greater than 35 kg/m2? 0=No filed at 05/09/2025 1340 0=No filed at 03/31/2025 0917   Age older than 50 years old? 1=Yes filed at 05/09/2025 1340 1=Yes filed at 03/31/2025 0917   Is your neck circumference greater than 17 inches (Male) or 16 inches (Female)? 1 filed at 05/09/2025 1340 1 filed at 03/31/2025 0917   Gender - Male 1=Yes filed at 05/09/2025 1340 1=Yes filed at 03/31/2025 0917   STOP-BANG Total Score 7 filed at 05/09/2025 1340 7 filed at 03/31/2025 0917          Prodigy: High Risk  Total Score: 24              Prodigy Age Score      Prodigy Gender Score     Prodigy Previous Opioid Use Score      Prodigy SDB Score          ARISCAT Score for Postoperative Pulmonary Complications      Flowsheet Row Pre-Admission Testing from 5/9/2025 in [facility]   Age Calculated Score 3 filed at 05/09/2025 1412   Preoperative SpO2 0 filed at 05/09/2025 1412   Respiratory infection in the last month  Either upper or lower (i.e., URI, bronchitis, pneumonia), with fever and antibiotic treatment 0 filed at 05/09/2025 1412   Preoperative anemia (Hgb less than 10 g/dl) 0 filed at 05/09/2025 1412   Surgical incision  15 filed at 05/09/2025 1412   Duration of surgery  23 filed at 05/09/2025 1412   Emergency Procedure  0 filed at 05/09/2025 1412   ARISCAT Total Score  41 filed at 05/09/2025 1412          Skyla Perioperative Risk for Myocardial Infarction or Cardiac Arrest (YUAN)      Flowsheet Row Pre-Admission Testing from 3/17/2025 in [facility]   Calculated Age Score 1.38 filed at 03/17/2025 0943   Functional Status  0 filed at 03/17/2025 0943   ASA Class  -3.29 filed at 03/17/2025 0943   Creatinine 0.61 filed at 03/17/2025 0943   Type of Procedure  -0.26 filed at 03/17/2025 0943   YUAN Total Score  -6.81 filed at 03/17/2025 0943   YUAN % 0.11 filed at 03/17/2025 0943              Assessment and Plan:   Anesthesia/Airway:  No anesthesia complications        Neuro:    No neurologic diagnoses, however, the patient is at an increased risk for post operative delirium secondary to age >/= 65 and type and duration of surgery.  Preoperative brain exercise educational handout provided to patient.    The patient is at an increased risk for perioperative stroke secondary to chronic renal failure , increased age, HTN, HLD, DM , general anesthesia, hypercoagulable state, and op time >2.5 hours.       HEENT/Airway:    No HEENT diagnosis or significant findings on chart review or clinical presentation and evaluation. No further preoperative testing/intervention indicated at this time.      Cardiovascular:    #HTN, HLD, varicose veins, murmur, - medicated with atorvastatin (continue), lisinopril (last dose evening 5/19), and follows with PCP. BP today is 165/77 and denies cardiovascular symptoms. Physical exam is benign. METS is >4 and scheduled for non-cardiac surgery.  Patient is currently pending echo to be  scheduled prior to surgery.    METS are 9    RCRI  2 which is 10.1% 30 day risk of MACE (risk for cardiac death, nonfatal myocardial infarction, and nonfactal cardiac arrest    YUAN score which indicates a   0.2 % risk of intraoperative or 30-day postoperative MACE    EKG 3/17/25   Normal sinus rhythm  Left axis deviation  Voltage criteria for left ventricular hypertrophy  Abnormal ECG  When compared with ECG of 27-OCT-2022 12:40,  Nonspecific T wave abnormality no longer evident in Inferior leads  Confirmed by Alvin Rider ([id number]) on 3/21/2025 12:46:29 PM        Pulmonary:    #SCOOTER (CPAP), -patient reports he is compliant with his CPAP and directed to bring on day of surgery.  Denies respiratory symptoms and physical exam is benign preoperative deep breathing educational handout provided to patient.    Patient is at increased risk of perioperative complications secondary to  age > 60, duration of surgery > 2 hours, types of anesthetic    ARISCAT:    41  points which is a intermediate (13.3%) risk of in-hospital post-op pulmonary complications     PRODIGY:  21  points which is a high risk of post op opioid induced respiratory depression episodes    STOP BAN   points which is a high risk for moderate to severe SCOOTER    Pumonary toilet education discussed, patient also provided deep breathing exercises and incentive spirometry educational handout      Renal:   #Atrophy of right kidney,  Malignant neoplasm of lateral wall of urinary bladder, CKD-patient is seeking surgical intervention with urology, please see last office visit with specialist for further information.    Patient is at increase risk for perioperative renal complications secondary to  Age equal to or greater than 56, HTN, diabetes, intraperitoneal surgery, use of an ace, arb, or NSAID         Endocrine:   #DM2, -medicated with glimepiride (last dose ), glargine insulin (30 units evening, continue) last A1c 6.9 on 25. No further testing or  intervention is indicated at this time.      Hematologic:      No hematologic diagnosis, however patient is at an increased risk for DVT    Preoperative DVT educational handout provided to patient.  Caprini Score:  11  points which is a highest risk of perioperative VTE      Gastrointestinal:   #GERD, -medicated with pantoprazole and well-controlled no further need for perioperative intervention please continue.      EAT-10 score of    0  : self-perceived oropharyngeal dysphagia scale (0-40)     Apfel: 2 points 39% risk for post operative N/V      Infectious disease:     No infectious diagnosis or significant findings on chart review or clinical presentation and evaluation.   Prescription provided for CHG body wash and dental rinse. CHG use instructions reviewed and provided to patient.  Staph screen collected      Musculoskeletal:    #Rheumatoid Arthritis, -no current medication regimen and follows with rheumatology.  No further preoperative intervention      Other:     Hold Vit D supplementation day of surgery  Hold all other vitamins and supplements 7 days prior to surgery  Tylenol okay to continue, please hold Aleve/naproxen/ibuprofen/Excedrin (NSAIDs) for 7 days prior to surgery  No lotion/moisturizers or Deoderant after last shower prior to surgery  Trazodone and Norco okay to continue perioperatively  Singulair okay to continue perioperatively      Labs ordered  cbc, basic, t/s, ua with reflex, and MSSA/MRSA culture      Dalila Cox PA-C     Medication instructions and NPO guidelines reviewed with the patient.  All questions or concerns discussed and addressed.   The above clinical summary has been dictated with voice recognition software. It has not been proofread for grammatical errors, typographical mistakes, or other semantic inconsistencies.   All labs/imaging included on this documentation were reviewed/considered in medical decision making for perioperative planning, including labs ordered day of CPM  appointment.           [1]   Past Medical History:  Diagnosis Date    Arthritis     rhuematoid    Bladder cancer (Multi)     CKD (chronic kidney disease)     Dorsalgia, unspecified 07/28/2020    Back pain without radiation    GERD (gastroesophageal reflux disease)     controlled    Hearing aid worn     HL (hearing loss)     Hyperlipidemia     Hypertension     Local infection of the skin and subcutaneous tissue, unspecified 10/11/2013    Nonvenomous insect bite with infection    Nephrolithiasis     Other muscle spasm 02/14/2018    Muscle spasms of neck    Other specified diseases of anus and rectum 03/22/2013    Anal pain    Personal history of other diseases of the digestive system 09/18/2013    History of gastroenteritis    Personal history of other diseases of the respiratory system 04/27/2020    History of acute bronchitis    Personal history of other diseases of the respiratory system 06/24/2019    History of upper respiratory infection    Personal history of other infectious and parasitic diseases     History of candidiasis of mouth    Personal history of other specified conditions 03/22/2013    History of abnormal weight loss    Personal history of other specified conditions 02/24/2014    History of numbness    Personal history of other specified conditions 07/14/2017    History of abdominal pain    Pneumonia, unspecified organism 01/25/2016    Pneumonia involving right lung    Rash and other nonspecific skin eruption 04/29/2013    Rash    Sleep apnea     CPAP    Type 2 diabetes mellitus    [2]   Past Surgical History:  Procedure Laterality Date    BLADDER SURGERY  2025    Removal malignant tumor    CHOLECYSTECTOMY  07/07/2016    Cholecystectomy    HERNIA REPAIR  07/07/2016    Inguinal Hernia Repair    SHOULDER SURGERY Right    [3]   Family History  Problem Relation Name Age of Onset    Ovarian cancer Mother      Other (asbestosis) Father     [4]   Allergies  Allergen Reactions    Orencia [Abatacept] Hives

## 2025-05-09 NOTE — H&P (VIEW-ONLY)
CPM/PAT Evaluation       Name: Rodrigue Palacios (Rodrigue Palacios)  /Age: 1955/69 y.o.     Visit Type:   In-Person       Chief Complaint: perioperative evaluation    HPI HPI: 68 y/o male scheduled for LAPAROSCOPY, WITH CYSTECTOMY AND ILEAL CONDUIT CREATION, FOR URINARY DIVERSION, ROBOTIC-ASSISTED NEPHRECTOMY, LAPAROSCOPIC - Right , NEPHRECTOMY, ROBOT-ASSISTED - Right  on 2025  secondary to Atrophy of right kidney,  Malignant neoplasm of lateral wall of urinary bladder with Dr. James Borrero who referred to [facility].  Presents to [facility] today for perioperative risk stratification and optimization. PMHX includes HTN, HLD, varicose veins, murmur, SCOOTER (CPAP), Atrophy of right kidney,  Malignant neoplasm of lateral wall of urinary bladder, CKD, nephrolithiasis, DM2, GERD, Rheumatoid Arthritis, .    PCP: YUNG Blanco  Specialists:    Endocrinology - Gonzalo Christiansen MD   Urology- James Borrero, MD   Oncology- Ernesto Leigh MD   Sleep med/pulm - SHERRY PlattTIFFANY             Medical History[1]    Surgical History[2]    Patient Sexual activity questions deferred to the physician.    Family History[3]    Allergies[4]    Prior to Admission medications    Medication Sig Start Date End Date Taking? Authorizing Provider   acetaminophen (Tylenol) 500 mg tablet Take 1 tablet (500 mg) by mouth every 6 hours if needed for mild pain (1 - 3).    Historical Provider, MD   atorvastatin (Lipitor) 40 mg tablet TAKE ONE TABLET BY MOUTH DAILY 25   Daxa B Iliano, APRN-CNP   Basaglar KwikPen U-100 Insulin 100 unit/mL (3 mL) pen Use up to 30 units daily 3/19/25   Gonzalo Christiansen MD   folic acid (Folvite) 1 mg tablet 1 tablet (1 mg) once daily. 12   Historical Provider, MD   glimepiride (AmaryL) 4 mg tablet Take 1 tablet (4 mg) by mouth once daily in the morning. Take before meals. 25  Gonzalo Christiansen MD   HYDROcodone-acetaminophen (Norco) 5-325 mg tablet Take 1 tablet by mouth  "every 6 hours if needed for severe pain (7 - 10).  Patient not taking: Reported on 4/24/2025 3/31/25   Shante Ureña MD   insulin glargine (Lantus Solostar U-100 Insulin) 100 unit/mL (3 mL) pen INJECT UP TO 30 UNITS SUBCUTANEOUSLY DAILY AS DIRECTED 1/13/25   Gonzalo Christiansen MD   insulin syringe-needle U-100 (BD Insulin Syringe) 1 mL 26 x 1/2\" syringe     Historical Provider, MD   lisinopril 40 mg tablet TAKE ONE TABLET BY MOUTH DAILY 2/4/25   YUNG Blanco   montelukast (Singulair) 10 mg tablet TAKE ONE TABLET BY MOUTH EVERY DAY IN THE EVENING 9/27/21   Historical Provider, MD   pantoprazole (ProtoNix) 40 mg EC tablet TAKE ONE TABLET BY MOUTH EVERY DAY 9/4/24   YUNG Blanco   pen needle, diabetic (BD Luann 2nd Gen Pen Needle) 32 gauge x 5/32\" needle use DAILY 1/13/25   Gonzalo Christiansen MD   tadalafil 20 mg tablet TAKE ONE TABLET BY MOUTH ONCE DAILY 10/9/24   YUNG Blanco   traZODone (Desyrel) 50 mg tablet Take 1-2 tablets ( mg) by mouth once daily. 8/7/24 8/7/25  YUNG Platt        PAT ROS:   Constitutional:    no fever   no chills   no unexpected weight change  Neuro/Psych:    no numbness   no weakness   no light-headedness   no tremors   no confusion   not nervous/anxious   no self-injury   no suicidal ideation  Eyes:    no discharge   no vision loss   no diplopia   no visual disturbance   use of corrective lenses  Ears:    no ear pain   no hearing loss   no vertigo   no tinnitus   hearing aides  Nose:    no nasal discharge   no sinus congestion   no epistaxis  Mouth:    no dental issues   no mouth pain   no oral bleeding   no mouth lesions  Throat:    no throat pain   no dysphagia   no voice change  Neck:    no neck pain   neck swelling   no neck stiffness   no neck masses  Cardio:    no chest pain   no palpitations   no peripheral edema   no dyspnea   no ESPOSITO   no paroxysmal nocturnal dyspnea  Respiratory:    no cough   no wheezing   no hemoptysis   no " shortness of breath  Endocrine:    no cold intolerance   no heat intolerance   no polydipsia  GI:    no abdominal distention   no abdominal pain   no constipation   no diarrhea   no nausea   no vomiting   no blood in stool  :    no difficulty urinating   no dysuria   no oliguria   no polyuria  Musculoskeletal:    no arthralgias   no myalgias   no decreased ROM   no swelling  Hematologic:    does not bruise/bleed easily   no excessive bleeding   no history of blood transfusion   no blood clots  Skin:   no skin changes   no sores/wound   no rash      Physical Exam  Vitals reviewed.   Constitutional:       General: He is not in acute distress.     Appearance: Normal appearance. He is normal weight. He is not ill-appearing, toxic-appearing or diaphoretic.   HENT:      Head: Normocephalic.      Nose: Nose normal. No congestion or rhinorrhea.      Mouth/Throat:      Mouth: Mucous membranes are moist.      Pharynx: Oropharynx is clear. No oropharyngeal exudate or posterior oropharyngeal erythema.   Eyes:      General: No scleral icterus.        Right eye: No discharge.         Left eye: No discharge.      Conjunctiva/sclera: Conjunctivae normal.   Neck:      Vascular: No carotid bruit.   Cardiovascular:      Rate and Rhythm: Normal rate and regular rhythm.      Pulses: Normal pulses.      Heart sounds: Normal heart sounds. No murmur heard.     No friction rub. No gallop.   Pulmonary:      Effort: Pulmonary effort is normal. No respiratory distress.      Breath sounds: Normal breath sounds. No stridor. No wheezing, rhonchi or rales.   Chest:      Chest wall: No tenderness.   Musculoskeletal:         General: No swelling or tenderness.      Cervical back: Normal range of motion. No rigidity or tenderness.   Neurological:      General: No focal deficit present.      Mental Status: He is alert.   Psychiatric:         Mood and Affect: Mood normal.         Behavior: Behavior normal.         Thought Content: Thought content  "normal.         Judgment: Judgment normal.          PAT AIRWAY:   Airway:     Mallampati::  IV    TM distance::  >3 FB    Neck ROM::  Full        Visit Vitals  /77   Pulse 68   Temp 36.7 °C (98.1 °F) (Temporal)   Ht 1.905 m (6' 3\")   Wt 102 kg (225 lb 4.8 oz)   SpO2 96%   BMI 28.16 kg/m²   Smoking Status Never   BSA 2.32 m²       DASI Risk Score      Flowsheet Row Pre-Admission Testing from 5/9/2025 in [facility] Pre-Admission Testing from 3/17/2025 in [facility] [place]   Can you take care of yourself (eat, dress, bathe, or use toilet)?  2.75 filed at 05/09/2025 1341 2.75 filed at 03/17/2025 0913   Can you walk indoors, such as around your house? 1.75 filed at 05/09/2025 1341 1.75 filed at 03/17/2025 0913   Can you walk a block or two on level ground?  2.75 filed at 05/09/2025 1341 2.75 filed at 03/17/2025 0913   Can you climb a flight of stairs or walk up a hill? 5.5 filed at 05/09/2025 1341 5.5 filed at 03/17/2025 0913   Can you run a short distance? 8 filed at 05/09/2025 1341 8 filed at 03/17/2025 0913   Can you do light work around the house like dusting or washing dishes? 2.7 filed at 05/09/2025 1341 2.7 filed at 03/17/2025 0913   Can you do moderate work around the house like vacuuming, sweeping floors or carrying groceries? 3.5 filed at 05/09/2025 1341 3.5 filed at 03/17/2025 0913   Can you do heavy work around the house like scrubbing floors or lifting and moving heavy furniture?  8 filed at 05/09/2025 1341 8 filed at 03/17/2025 0913   Can you do yard work like raking leaves, weeding or pushing a mower? 4.5 filed at 05/09/2025 1341 4.5 filed at 03/17/2025 0913   Can you have sexual relations? 5.25 filed at 05/09/2025 1341 5.25 filed at 03/17/2025 0913   Can you participate in moderate recreational activities like golf, bowling, dancing, doubles tennis or throwing a baseball or football? 6 filed at 05/09/2025 1341 6 filed at 03/17/2025 0913   Can you participate in strenous " sports like swimming, singles tennis, football, basketball, or skiing? 0 filed at 05/09/2025 1341 7.5 filed at 03/17/2025 0913   DASI SCORE 50.7 filed at 05/09/2025 1341 58.2 filed at 03/17/2025 0913   METS Score (Will be calculated only when all the questions are answered) 9 filed at 05/09/2025 1341 9.9 filed at 03/17/2025 0913          Caprini DVT Assessment      Flowsheet Row Pre-Admission Testing from 5/9/2025 in [facility] Interventional Radiology  Nephrostomy Placement from 4/4/2025 in [facility] with Pk Hernandez MD   DVT Score (IF A SCORE IS NOT CALCULATING, MUST SELECT A BMI TO COMPLETE) 11 filed at 05/09/2025 1412 3 filed at 04/04/2025 1401   Medical Factors Present cancer, chemotherapy, or previous malignancy, Varicose veins filed at 05/09/2025 1412 --   Surgical Factors Major surgery planned, lasting over 3 hours filed at 05/09/2025 1412 --   BMI (BMI MUST BE CHOSEN) 30 or less filed at 05/09/2025 1412 30 or less filed at 04/04/2025 1401          Modified Frailty Index      Flowsheet Row Pre-Admission Testing from 5/9/2025 in [facility]   Non-independent functional status (problems with dressing, bathing, personal grooming, or cooking) 0 filed at 05/09/2025 1413   History of diabetes mellitus  0.0909 filed at 05/09/2025 1413   History of COPD 0 filed at 05/09/2025 1413   History of CHF No filed at 05/09/2025 1413   History of MI 0 filed at 05/09/2025 1413   History of Percutaneous Coronary Intervention, Cardiac Surgery, or Angina No filed at 05/09/2025 1413   Hypertension requiring the use of medication  0.0909 filed at 05/09/2025 1413   Peripheral vascular disease 0.0909 filed at 05/09/2025 1413   Impaired sensorium (cognitive impairement or loss, clouding, or delirium) 0 filed at 05/09/2025 1413   TIA or CVA withouy residual deficit 0 filed at 05/09/2025 1413   Cerebrovascular accident with deficit 0 filed at 05/09/2025 1413   Modified Frailty  Index Calculator .2727 filed at 05/09/2025 1413          [id number] Stroke Risk Points  Current as of just now        N/A 0 to 9 Points:      Last Change: N/A          The [id number] risk score (Shara HERNANDEZ, et al. 2009. © 2010 [employer]) quantifies the risk of stroke for a patient with atrial fibrillation. For patients without atrial fibrillation or under the age of 18 this score appears as N/A. Higher score values generally indicate higher risk of stroke.        This score is not applicable to this patient. Components are not calculated.          Revised Cardiac Risk Index      Flowsheet Row Pre-Admission Testing from 5/9/2025 in [facility] Pre-Admission Testing from 3/17/2025 in [facility]   High-Risk Surgery (Intraperitoneal, Intrathoracic,Suprainguinal vascular) 1 filed at 05/09/2025 1411 0 filed at 03/17/2025 0942   History of ischemic heart disease (History of MI, History of positive exercuse test, Current chest paint considered due to myocardial ischemia, Use of nitrate therapy, ECG with pathological Q Waves) 0 filed at 05/09/2025 1411 0 filed at 03/17/2025 0942   History of congestive heart failure (pulmonary edemia, bilateral rales or S3 gallop, Paroxysmal nocturnal dyspnea, CXR showing pulmonary vascular redistribution) 0 filed at 05/09/2025 1411 0 filed at 03/17/2025 0942   History of cerebrovascular disease (Prior TIA or stroke) 0 filed at 05/09/2025 1411 0 filed at 03/17/2025 0942   Pre-operative insulin treatment 1 filed at 05/09/2025 1411 1 filed at 03/17/2025 0942   Pre-operative creatinine>2 mg/dl 0 filed at 05/09/2025 1411 0 filed at 03/17/2025 0942   Revised Cardiac Risk Calculator 2 filed at 05/09/2025 1411 1 filed at 03/17/2025 0942          Apfel Simplified Score      Flowsheet Row Pre-Admission Testing from 5/9/2025 in [facility]   Smoking status 1 filed at 05/09/2025 1413   History of motion sickness or PONV  0 filed  at 05/09/2025 1413   Use of postoperative opioids 1 filed at 05/09/2025 1413   Gender - Female 0=No filed at 05/09/2025 1413   Apfel Simplified Score Calculator 2 filed at 05/09/2025 1413          Risk Analysis Index Results This Encounter    No data found in the last 10 encounters.       Stop Bang Score      Flowsheet Row Pre-Admission Testing from 5/9/2025 in [facility] Admission (Discharged) from 3/31/2025 in [facility] OR with Shante Ureña MD   Do you snore loudly? 1 filed at 05/09/2025 1340 1 filed at 03/31/2025 0917   Do you often feel tired or fatigued after your sleep? 1 filed at 05/09/2025 1340 1 filed at 03/31/2025 0917   Has anyone ever observed you stop breathing in your sleep? 1  [utilizes CPAP x 2 years] filed at 05/09/2025 1340 1 filed at 03/31/2025 0917   Do you have or are you being treated for high blood pressure? 1 filed at 05/09/2025 1340 1 filed at 03/31/2025 0917   Recent BMI (Calculated) 27.4 filed at 05/09/2025 1340 27.9 filed at 03/31/2025 0917   Is BMI greater than 35 kg/m2? 0=No filed at 05/09/2025 1340 0=No filed at 03/31/2025 0917   Age older than 50 years old? 1=Yes filed at 05/09/2025 1340 1=Yes filed at 03/31/2025 0917   Is your neck circumference greater than 17 inches (Male) or 16 inches (Female)? 1 filed at 05/09/2025 1340 1 filed at 03/31/2025 0917   Gender - Male 1=Yes filed at 05/09/2025 1340 1=Yes filed at 03/31/2025 0917   STOP-BANG Total Score 7 filed at 05/09/2025 1340 7 filed at 03/31/2025 0917          Prodigy: High Risk  Total Score: 24              Prodigy Age Score      Prodigy Gender Score     Prodigy Previous Opioid Use Score      Prodigy SDB Score          ARISCAT Score for Postoperative Pulmonary Complications      Flowsheet Row Pre-Admission Testing from 5/9/2025 in [facility]   Age Calculated Score 3 filed at 05/09/2025 1412   Preoperative SpO2 0 filed at 05/09/2025 1412   Respiratory infection in the last month  Either upper or lower (i.e., URI, bronchitis, pneumonia), with fever and antibiotic treatment 0 filed at 05/09/2025 1412   Preoperative anemia (Hgb less than 10 g/dl) 0 filed at 05/09/2025 1412   Surgical incision  15 filed at 05/09/2025 1412   Duration of surgery  23 filed at 05/09/2025 1412   Emergency Procedure  0 filed at 05/09/2025 1412   ARISCAT Total Score  41 filed at 05/09/2025 1412          Skyla Perioperative Risk for Myocardial Infarction or Cardiac Arrest (YUAN)      Flowsheet Row Pre-Admission Testing from 3/17/2025 in [facility]   Calculated Age Score 1.38 filed at 03/17/2025 0943   Functional Status  0 filed at 03/17/2025 0943   ASA Class  -3.29 filed at 03/17/2025 0943   Creatinine 0.61 filed at 03/17/2025 0943   Type of Procedure  -0.26 filed at 03/17/2025 0943   YUAN Total Score  -6.81 filed at 03/17/2025 0943   YUAN % 0.11 filed at 03/17/2025 0943              Assessment and Plan:   Anesthesia/Airway:  No anesthesia complications        Neuro:    No neurologic diagnoses, however, the patient is at an increased risk for post operative delirium secondary to age >/= 65 and type and duration of surgery.  Preoperative brain exercise educational handout provided to patient.    The patient is at an increased risk for perioperative stroke secondary to chronic renal failure , increased age, HTN, HLD, DM , general anesthesia, hypercoagulable state, and op time >2.5 hours.       HEENT/Airway:    No HEENT diagnosis or significant findings on chart review or clinical presentation and evaluation. No further preoperative testing/intervention indicated at this time.      Cardiovascular:    #HTN, HLD, varicose veins, murmur, - medicated with atorvastatin (continue), lisinopril (last dose evening 5/19), and follows with PCP. BP today is 165/77 and denies cardiovascular symptoms. Physical exam is benign. METS is >4 and scheduled for non-cardiac surgery.  Patient is currently pending echo to be  scheduled prior to surgery.  Addendum 25 - echo read below, no further perioperative interventions.     METS are 9    RCRI  2 which is 10.1% 30 day risk of MACE (risk for cardiac death, nonfatal myocardial infarction, and nonfactal cardiac arrest    UYAN score which indicates a   0.2 % risk of intraoperative or 30-day postoperative MACE    EKG 3/17/25   Normal sinus rhythm  Left axis deviation  Voltage criteria for left ventricular hypertrophy  Abnormal ECG  When compared with ECG of 27-OCT-2022 12:40,  Nonspecific T wave abnormality no longer evident in Inferior leads  Confirmed by Alvin Rider ([id number]) on 3/21/2025 12:46:29 PM      Echo 25  CONCLUSIONS:   1. Left ventricular ejection fraction is normal, by visual estimate at 55-60%.   2. Spectral Doppler shows a Grade I (impaired relaxation pattern) of left ventricular diastolic filling with normal left atrial filling pressure.   3. There is normal right ventricular global systolic function.   4. There is moderately increased posterior left ventricular wall thickness.         Pulmonary:    #SCOOTER (CPAP), -patient reports he is compliant with his CPAP and directed to bring on day of surgery.  Denies respiratory symptoms and physical exam is benign preoperative deep breathing educational handout provided to patient.    Patient is at increased risk of perioperative complications secondary to  age > 60, duration of surgery > 2 hours, types of anesthetic    ARISCAT:    41  points which is a intermediate (13.3%) risk of in-hospital post-op pulmonary complications     PRODIGY:  21  points which is a high risk of post op opioid induced respiratory depression episodes    STOP BAN   points which is a high risk for moderate to severe SCOOTER    Pumonary toilet education discussed, patient also provided deep breathing exercises and incentive spirometry educational handout      Renal:   #Atrophy of right kidney,  Malignant neoplasm of lateral wall of urinary bladder,  CKD-patient is seeking surgical intervention with urology, please see last office visit with specialist for further information.    Patient is at increase risk for perioperative renal complications secondary to  Age equal to or greater than 56, HTN, diabetes, intraperitoneal surgery, use of an ace, arb, or NSAID         Endocrine:   #DM2, -medicated with glimepiride (last dose 5/19), glargine insulin (30 units evening, continue) last A1c 6.9 on 4/24/25. No further testing or intervention is indicated at this time.      Hematologic:      No hematologic diagnosis, however patient is at an increased risk for DVT    Preoperative DVT educational handout provided to patient.  Caprini Score:  11  points which is a highest risk of perioperative VTE      Gastrointestinal:   #GERD, -medicated with pantoprazole and well-controlled no further need for perioperative intervention please continue.      EAT-10 score of    0  : self-perceived oropharyngeal dysphagia scale (0-40)     Apfel: 2 points 39% risk for post operative N/V      Infectious disease:     No infectious diagnosis or significant findings on chart review or clinical presentation and evaluation.   Prescription provided for CHG body wash and dental rinse. CHG use instructions reviewed and provided to patient.  Staph screen collected      Musculoskeletal:    #Rheumatoid Arthritis, -no current medication regimen and follows with rheumatology.  No further preoperative intervention      Other:     Hold Vit D supplementation day of surgery  Hold all other vitamins and supplements 7 days prior to surgery  Tylenol okay to continue, please hold Aleve/naproxen/ibuprofen/Excedrin (NSAIDs) for 7 days prior to surgery  No lotion/moisturizers or Deoderant after last shower prior to surgery  Trazodone and Norco okay to continue perioperatively  Singulair okay to continue perioperatively      Labs ordered  cbc, basic, t/s, ua with reflex, and MSSA/MRSA culture      Dalila Cox  LLOYD     Medication instructions and NPO guidelines reviewed with the patient.  All questions or concerns discussed and addressed.   The above clinical summary has been dictated with voice recognition software. It has not been proofread for grammatical errors, typographical mistakes, or other semantic inconsistencies.   All labs/imaging included on this documentation were reviewed/considered in medical decision making for perioperative planning, including labs ordered day of CPM appointment.           [1]   Past Medical History:  Diagnosis Date    Arthritis     rhuematoid    Bladder cancer (Multi)     CKD (chronic kidney disease)     Dorsalgia, unspecified 07/28/2020    Back pain without radiation    GERD (gastroesophageal reflux disease)     controlled    Hearing aid worn     HL (hearing loss)     Hyperlipidemia     Hypertension     Local infection of the skin and subcutaneous tissue, unspecified 10/11/2013    Nonvenomous insect bite with infection    Nephrolithiasis     Other muscle spasm 02/14/2018    Muscle spasms of neck    Other specified diseases of anus and rectum 03/22/2013    Anal pain    Personal history of other diseases of the digestive system 09/18/2013    History of gastroenteritis    Personal history of other diseases of the respiratory system 04/27/2020    History of acute bronchitis    Personal history of other diseases of the respiratory system 06/24/2019    History of upper respiratory infection    Personal history of other infectious and parasitic diseases     History of candidiasis of mouth    Personal history of other specified conditions 03/22/2013    History of abnormal weight loss    Personal history of other specified conditions 02/24/2014    History of numbness    Personal history of other specified conditions 07/14/2017    History of abdominal pain    Pneumonia, unspecified organism 01/25/2016    Pneumonia involving right lung    Rash and other nonspecific skin eruption 04/29/2013     Rash    Sleep apnea     CPAP    Type 2 diabetes mellitus    [2]   Past Surgical History:  Procedure Laterality Date    BLADDER SURGERY  2025    Removal malignant tumor    CHOLECYSTECTOMY  07/07/2016    Cholecystectomy    HERNIA REPAIR  07/07/2016    Inguinal Hernia Repair    SHOULDER SURGERY Right    [3]   Family History  Problem Relation Name Age of Onset    Ovarian cancer Mother      Other (asbestosis) Father     [4]   Allergies  Allergen Reactions    Orencia [Abatacept] Hives

## 2025-05-09 NOTE — NURSING NOTE
Patient seen in PAT. Orders reviewed with PAT Provider.     Following labs obtained by documenting RN:     UA CULTURE/ REFLEX, MRSA SWAB, T/S, CMP, CBC,       ESCORTED PATIENT TO SEE RESEARCH PHYSICIAN SENNA.      Patient discharged with: Pre-procedure instructions/AVS, Incentive spirometer, and ERAS Kit.        Cira QUINTERO, RN  [facility] Pre-Ad[facility]   [facility]    1530- CONTACTED PT RE: ECHO SCHEDULING. APPT MADE AT [facility]  MAY 20, 25 @ 10 AM.  PT VOICED AGREEMENT WITH APPT.  PROVIDER UPDATED

## 2025-05-10 LAB — HOLD SPECIMEN: NORMAL

## 2025-05-11 LAB
BACTERIA UR CULT: ABNORMAL
STAPHYLOCOCCUS SPEC CULT: NORMAL

## 2025-05-12 ENCOUNTER — PREP FOR PROCEDURE (OUTPATIENT)
Dept: PAIN MEDICINE | Facility: CLINIC | Age: 70
End: 2025-05-12
Payer: MEDICARE

## 2025-05-12 DIAGNOSIS — Z00.6 RESEARCH SUBJECT: Primary | ICD-10-CM

## 2025-05-13 DIAGNOSIS — N39.0 URINARY TRACT INFECTION WITHOUT HEMATURIA, SITE UNSPECIFIED: ICD-10-CM

## 2025-05-13 RX ORDER — CIPROFLOXACIN 500 MG/1
500 TABLET, FILM COATED ORAL 2 TIMES DAILY
Qty: 20 TABLET | Refills: 0 | Status: ON HOLD | OUTPATIENT
Start: 2025-05-13 | End: 2025-05-23

## 2025-05-14 ENCOUNTER — APPOINTMENT (OUTPATIENT)
Dept: UROLOGY | Facility: CLINIC | Age: 70
End: 2025-05-14
Payer: MEDICARE

## 2025-05-20 ENCOUNTER — HOSPITAL ENCOUNTER (OUTPATIENT)
Dept: CARDIOLOGY | Facility: HOSPITAL | Age: 70
Discharge: HOME | End: 2025-05-20
Payer: MEDICARE

## 2025-05-20 ENCOUNTER — PREP FOR PROCEDURE (OUTPATIENT)
Dept: UROLOGY | Facility: HOSPITAL | Age: 70
End: 2025-05-20
Payer: MEDICARE

## 2025-05-20 ENCOUNTER — ANESTHESIA EVENT (OUTPATIENT)
Dept: OPERATING ROOM | Facility: HOSPITAL | Age: 70
End: 2025-05-20
Payer: MEDICARE

## 2025-05-20 DIAGNOSIS — N11.8 XGP (XANTHOGRANULOMATOUS PYELONEPHRITIS): ICD-10-CM

## 2025-05-20 DIAGNOSIS — C67.9 MALIGNANT NEOPLASM OF URINARY BLADDER, UNSPECIFIED SITE (MULTI): Primary | ICD-10-CM

## 2025-05-20 DIAGNOSIS — R01.1 MURMUR: ICD-10-CM

## 2025-05-20 LAB
AORTIC VALVE MEAN GRADIENT: 4 MMHG
AORTIC VALVE PEAK VELOCITY: 1.19 M/S
AV PEAK GRADIENT: 6 MMHG
AVA (PEAK VEL): 6.62 CM2
AVA (VTI): 5.67 CM2
EJECTION FRACTION APICAL 4 CHAMBER: 58.2
EJECTION FRACTION: 58 %
LEFT ATRIUM VOLUME AREA LENGTH INDEX BSA: 25.9 ML/M2
LEFT VENTRICLE INTERNAL DIMENSION DIASTOLE: 4.54 CM (ref 3.5–6)
LEFT VENTRICULAR OUTFLOW TRACT DIAMETER: 2.8 CM
LV EJECTION FRACTION BIPLANE: 57 %
MITRAL VALVE E/A RATIO: 0.88
RIGHT VENTRICLE FREE WALL PEAK S': 16.1 CM/S
TRICUSPID ANNULAR PLANE SYSTOLIC EXCURSION: 3.2 CM

## 2025-05-20 PROCEDURE — 93306 TTE W/DOPPLER COMPLETE: CPT | Performed by: INTERNAL MEDICINE

## 2025-05-20 PROCEDURE — 93306 TTE W/DOPPLER COMPLETE: CPT

## 2025-05-20 RX ORDER — ACETAMINOPHEN 325 MG/1
975 TABLET ORAL ONCE
Status: CANCELLED | OUTPATIENT
Start: 2025-05-20 | End: 2025-05-20

## 2025-05-20 RX ORDER — ERTAPENEM 1 G/1
1 INJECTION, POWDER, LYOPHILIZED, FOR SOLUTION INTRAMUSCULAR; INTRAVENOUS EVERY 24 HOURS
Status: CANCELLED | OUTPATIENT
Start: 2025-05-21

## 2025-05-20 RX ORDER — GABAPENTIN 100 MG/1
300 CAPSULE ORAL ONCE
Status: CANCELLED | OUTPATIENT
Start: 2025-05-20 | End: 2025-05-20

## 2025-05-20 RX ORDER — HEPARIN SODIUM 5000 [USP'U]/ML
5000 INJECTION, SOLUTION INTRAVENOUS; SUBCUTANEOUS ONCE
Status: CANCELLED | OUTPATIENT
Start: 2025-05-20 | End: 2025-05-20

## 2025-05-20 RX ORDER — FLUCONAZOLE 2 MG/ML
400 INJECTION, SOLUTION INTRAVENOUS ONCE
Status: CANCELLED | OUTPATIENT
Start: 2025-05-20 | End: 2025-05-20

## 2025-05-20 RX ORDER — ALVIMOPAN 12 MG/1
12 CAPSULE ORAL ONCE
Status: CANCELLED | OUTPATIENT
Start: 2025-05-20 | End: 2025-05-20

## 2025-05-20 NOTE — PROGRESS NOTES
Pharmacy Medication History Review    Rodrigue Palacios is a 69 y.o. male who is planned to be admitted for Atrophy of right kidney. Pharmacy called the patient prior to their scheduled procedure and reviewed the patient's [id number] medications for accuracy.    Medications ADDED:  none  Medications CHANGED:  Trazodone 50mg directions from #1-2QD to #1QD  Medications REMOVED:   Folic acid 1mg    Please review updated prior to admission medication list and comments regarding how patient may be taking medications differently by going to Admission tab --> Admission Orders --> Admit Orders / Review prior to admission medications.     Preferred pharmacy, last doses of medications, and allergies to be confirmed with patient by nursing the day of procedure.     Sources used to complete the med history include:  [facility]  Pharmacy dispense history  Patient Interview Good historian  Chart Review  Care Everywhere     Below are additional concerns with the patient's PTA list.  Patient states they are taking #1 tablet of trazodone 50mg once daily. L.F. 03/03/25 #180/90d  Patient states they are currently injecting 20 units of basaglar once daily. L.F. 03/19/25 27ml/90d    Hattie Walters [facility]  Please reach out via Secure Chat for questions

## 2025-05-21 ENCOUNTER — APPOINTMENT (OUTPATIENT)
Dept: RADIOLOGY | Facility: HOSPITAL | Age: 70
DRG: 654 | End: 2025-05-21
Payer: MEDICARE

## 2025-05-21 ENCOUNTER — HOSPITAL ENCOUNTER (INPATIENT)
Facility: HOSPITAL | Age: 70
End: 2025-05-21
Attending: STUDENT IN AN ORGANIZED HEALTH CARE EDUCATION/TRAINING PROGRAM | Admitting: STUDENT IN AN ORGANIZED HEALTH CARE EDUCATION/TRAINING PROGRAM
Payer: MEDICARE

## 2025-05-21 ENCOUNTER — ANESTHESIA (OUTPATIENT)
Dept: OPERATING ROOM | Facility: HOSPITAL | Age: 70
End: 2025-05-21
Payer: MEDICARE

## 2025-05-21 DIAGNOSIS — C67.9 BLADDER CANCER (MULTI): ICD-10-CM

## 2025-05-21 DIAGNOSIS — C67.3 MALIGNANT NEOPLASM OF ANTERIOR WALL OF URINARY BLADDER (MULTI): ICD-10-CM

## 2025-05-21 DIAGNOSIS — C67.9 MALIGNANT NEOPLASM OF URINARY BLADDER, UNSPECIFIED SITE (MULTI): Primary | ICD-10-CM

## 2025-05-21 DIAGNOSIS — N11.8 XGP (XANTHOGRANULOMATOUS PYELONEPHRITIS): ICD-10-CM

## 2025-05-21 DIAGNOSIS — Z00.6 RESEARCH SUBJECT: ICD-10-CM

## 2025-05-21 DIAGNOSIS — I10 BENIGN ESSENTIAL HYPERTENSION: ICD-10-CM

## 2025-05-21 LAB
ABO GROUP (TYPE) IN BLOOD: NORMAL
ALBUMIN SERPL BCP-MCNC: 3.9 G/DL (ref 3.4–5)
ANION GAP SERPL CALC-SCNC: 17 MMOL/L (ref 10–20)
ANTIBODY SCREEN: NORMAL
BUN SERPL-MCNC: 28 MG/DL (ref 6–23)
CALCIUM SERPL-MCNC: 8.4 MG/DL (ref 8.6–10.6)
CHLORIDE SERPL-SCNC: 107 MMOL/L (ref 98–107)
CO2 SERPL-SCNC: 20 MMOL/L (ref 21–32)
CREAT SERPL-MCNC: 1.82 MG/DL (ref 0.5–1.3)
EGFRCR SERPLBLD CKD-EPI 2021: 40 ML/MIN/1.73M*2
ERYTHROCYTE [DISTWIDTH] IN BLOOD BY AUTOMATED COUNT: 13.6 % (ref 11.5–14.5)
GLUCOSE BLD MANUAL STRIP-MCNC: 155 MG/DL (ref 74–99)
GLUCOSE BLD MANUAL STRIP-MCNC: 252 MG/DL (ref 74–99)
GLUCOSE SERPL-MCNC: 281 MG/DL (ref 74–99)
HCT VFR BLD AUTO: 36.2 % (ref 41–52)
HGB BLD-MCNC: 11.3 G/DL (ref 13.5–17.5)
HOLD SPECIMEN: NORMAL
MCH RBC QN AUTO: 28.1 PG (ref 26–34)
MCHC RBC AUTO-ENTMCNC: 31.2 G/DL (ref 32–36)
MCV RBC AUTO: 90 FL (ref 80–100)
NRBC BLD-RTO: 0 /100 WBCS (ref 0–0)
PHOSPHATE SERPL-MCNC: 4.3 MG/DL (ref 2.5–4.9)
PLATELET # BLD AUTO: 250 X10*3/UL (ref 150–450)
POTASSIUM SERPL-SCNC: 4.6 MMOL/L (ref 3.5–5.3)
RBC # BLD AUTO: 4.02 X10*6/UL (ref 4.5–5.9)
RH FACTOR (ANTIGEN D): NORMAL
SODIUM SERPL-SCNC: 139 MMOL/L (ref 136–145)
WBC # BLD AUTO: 13.9 X10*3/UL (ref 4.4–11.3)

## 2025-05-21 PROCEDURE — 2500000004 HC RX 250 GENERAL PHARMACY W/ HCPCS (ALT 636 FOR OP/ED)

## 2025-05-21 PROCEDURE — 2780000003 HC OR 278 NO HCPCS: Performed by: STUDENT IN AN ORGANIZED HEALTH CARE EDUCATION/TRAINING PROGRAM

## 2025-05-21 PROCEDURE — 3700000001 HC GENERAL ANESTHESIA TIME - INITIAL BASE CHARGE: Performed by: STUDENT IN AN ORGANIZED HEALTH CARE EDUCATION/TRAINING PROGRAM

## 2025-05-21 PROCEDURE — 50548 LAPARO REMOVE W/URETER: CPT | Performed by: STUDENT IN AN ORGANIZED HEALTH CARE EDUCATION/TRAINING PROGRAM

## 2025-05-21 PROCEDURE — 1170000001 HC PRIVATE ONCOLOGY ROOM DAILY

## 2025-05-21 PROCEDURE — 37799 UNLISTED PX VASCULAR SURGERY: CPT | Performed by: STUDENT IN AN ORGANIZED HEALTH CARE EDUCATION/TRAINING PROGRAM

## 2025-05-21 PROCEDURE — 50820 CONSTRUCT BOWEL BLADDER: CPT | Performed by: PHYSICIAN ASSISTANT

## 2025-05-21 PROCEDURE — 80069 RENAL FUNCTION PANEL: CPT | Performed by: STUDENT IN AN ORGANIZED HEALTH CARE EDUCATION/TRAINING PROGRAM

## 2025-05-21 PROCEDURE — 0VT04ZZ RESECTION OF PROSTATE, PERCUTANEOUS ENDOSCOPIC APPROACH: ICD-10-PCS | Performed by: STUDENT IN AN ORGANIZED HEALTH CARE EDUCATION/TRAINING PROGRAM

## 2025-05-21 PROCEDURE — 3600000018 HC OR TIME - INITIAL BASE CHARGE - PROCEDURE LEVEL SIX: Performed by: STUDENT IN AN ORGANIZED HEALTH CARE EDUCATION/TRAINING PROGRAM

## 2025-05-21 PROCEDURE — 50820 CONSTRUCT BOWEL BLADDER: CPT | Performed by: STUDENT IN AN ORGANIZED HEALTH CARE EDUCATION/TRAINING PROGRAM

## 2025-05-21 PROCEDURE — 88309 TISSUE EXAM BY PATHOLOGIST: CPT | Performed by: PATHOLOGY

## 2025-05-21 PROCEDURE — 88307 TISSUE EXAM BY PATHOLOGIST: CPT | Performed by: PATHOLOGY

## 2025-05-21 PROCEDURE — 7100000002 HC RECOVERY ROOM TIME - EACH INCREMENTAL 1 MINUTE: Performed by: STUDENT IN AN ORGANIZED HEALTH CARE EDUCATION/TRAINING PROGRAM

## 2025-05-21 PROCEDURE — 88307 TISSUE EXAM BY PATHOLOGIST: CPT | Mod: TC,SUR | Performed by: STUDENT IN AN ORGANIZED HEALTH CARE EDUCATION/TRAINING PROGRAM

## 2025-05-21 PROCEDURE — 86901 BLOOD TYPING SEROLOGIC RH(D): CPT | Performed by: STUDENT IN AN ORGANIZED HEALTH CARE EDUCATION/TRAINING PROGRAM

## 2025-05-21 PROCEDURE — 0VT34ZZ RESECTION OF BILATERAL SEMINAL VESICLES, PERCUTANEOUS ENDOSCOPIC APPROACH: ICD-10-PCS | Performed by: STUDENT IN AN ORGANIZED HEALTH CARE EDUCATION/TRAINING PROGRAM

## 2025-05-21 PROCEDURE — C2625 STENT, NON-COR, TEM W/DEL SY: HCPCS | Performed by: STUDENT IN AN ORGANIZED HEALTH CARE EDUCATION/TRAINING PROGRAM

## 2025-05-21 PROCEDURE — 2500000005 HC RX 250 GENERAL PHARMACY W/O HCPCS: Mod: JZ | Performed by: STUDENT IN AN ORGANIZED HEALTH CARE EDUCATION/TRAINING PROGRAM

## 2025-05-21 PROCEDURE — 99223 1ST HOSP IP/OBS HIGH 75: CPT

## 2025-05-21 PROCEDURE — 64999 UNLISTED PX NERVOUS SYSTEM: CPT

## 2025-05-21 PROCEDURE — 3700000002 HC GENERAL ANESTHESIA TIME - EACH INCREMENTAL 1 MINUTE: Performed by: STUDENT IN AN ORGANIZED HEALTH CARE EDUCATION/TRAINING PROGRAM

## 2025-05-21 PROCEDURE — 3600000017 HC OR TIME - EACH INCREMENTAL 1 MINUTE - PROCEDURE LEVEL SIX: Performed by: STUDENT IN AN ORGANIZED HEALTH CARE EDUCATION/TRAINING PROGRAM

## 2025-05-21 PROCEDURE — 51999 UNLISTED LAPS PX BLADDER: CPT | Performed by: STUDENT IN AN ORGANIZED HEALTH CARE EDUCATION/TRAINING PROGRAM

## 2025-05-21 PROCEDURE — P9045 ALBUMIN (HUMAN), 5%, 250 ML: HCPCS | Mod: JZ,TB

## 2025-05-21 PROCEDURE — 2500000004 HC RX 250 GENERAL PHARMACY W/ HCPCS (ALT 636 FOR OP/ED): Mod: JZ | Performed by: STUDENT IN AN ORGANIZED HEALTH CARE EDUCATION/TRAINING PROGRAM

## 2025-05-21 PROCEDURE — 8E0W4CZ ROBOTIC ASSISTED PROCEDURE OF TRUNK REGION, PERCUTANEOUS ENDOSCOPIC APPROACH: ICD-10-PCS | Performed by: STUDENT IN AN ORGANIZED HEALTH CARE EDUCATION/TRAINING PROGRAM

## 2025-05-21 PROCEDURE — 36415 COLL VENOUS BLD VENIPUNCTURE: CPT | Performed by: STUDENT IN AN ORGANIZED HEALTH CARE EDUCATION/TRAINING PROGRAM

## 2025-05-21 PROCEDURE — 36620 INSERTION CATHETER ARTERY: CPT

## 2025-05-21 PROCEDURE — 50548 LAPARO REMOVE W/URETER: CPT | Performed by: PHYSICIAN ASSISTANT

## 2025-05-21 PROCEDURE — 0TTB4ZZ RESECTION OF BLADDER, PERCUTANEOUS ENDOSCOPIC APPROACH: ICD-10-PCS | Performed by: STUDENT IN AN ORGANIZED HEALTH CARE EDUCATION/TRAINING PROGRAM

## 2025-05-21 PROCEDURE — 2500000004 HC RX 250 GENERAL PHARMACY W/ HCPCS (ALT 636 FOR OP/ED): Mod: TB

## 2025-05-21 PROCEDURE — 07TC4ZZ RESECTION OF PELVIS LYMPHATIC, PERCUTANEOUS ENDOSCOPIC APPROACH: ICD-10-PCS | Performed by: STUDENT IN AN ORGANIZED HEALTH CARE EDUCATION/TRAINING PROGRAM

## 2025-05-21 PROCEDURE — 0TT64ZZ RESECTION OF RIGHT URETER, PERCUTANEOUS ENDOSCOPIC APPROACH: ICD-10-PCS | Performed by: STUDENT IN AN ORGANIZED HEALTH CARE EDUCATION/TRAINING PROGRAM

## 2025-05-21 PROCEDURE — 76942 ECHO GUIDE FOR BIOPSY: CPT

## 2025-05-21 PROCEDURE — 2500000004 HC RX 250 GENERAL PHARMACY W/ HCPCS (ALT 636 FOR OP/ED): Performed by: STUDENT IN AN ORGANIZED HEALTH CARE EDUCATION/TRAINING PROGRAM

## 2025-05-21 PROCEDURE — 0TT74ZZ RESECTION OF LEFT URETER, PERCUTANEOUS ENDOSCOPIC APPROACH: ICD-10-PCS | Performed by: STUDENT IN AN ORGANIZED HEALTH CARE EDUCATION/TRAINING PROGRAM

## 2025-05-21 PROCEDURE — 82947 ASSAY GLUCOSE BLOOD QUANT: CPT

## 2025-05-21 PROCEDURE — 74018 RADEX ABDOMEN 1 VIEW: CPT

## 2025-05-21 PROCEDURE — 0T184ZC BYPASS BILATERAL URETERS TO ILEOCUTANEOUS, PERCUTANEOUS ENDOSCOPIC APPROACH: ICD-10-PCS | Performed by: STUDENT IN AN ORGANIZED HEALTH CARE EDUCATION/TRAINING PROGRAM

## 2025-05-21 PROCEDURE — 85027 COMPLETE CBC AUTOMATED: CPT | Performed by: STUDENT IN AN ORGANIZED HEALTH CARE EDUCATION/TRAINING PROGRAM

## 2025-05-21 PROCEDURE — 2500000004 HC RX 250 GENERAL PHARMACY W/ HCPCS (ALT 636 FOR OP/ED): Mod: JZ,TB

## 2025-05-21 PROCEDURE — 2500000001 HC RX 250 WO HCPCS SELF ADMINISTERED DRUGS (ALT 637 FOR MEDICARE OP): Performed by: STUDENT IN AN ORGANIZED HEALTH CARE EDUCATION/TRAINING PROGRAM

## 2025-05-21 PROCEDURE — 7100000001 HC RECOVERY ROOM TIME - INITIAL BASE CHARGE: Performed by: STUDENT IN AN ORGANIZED HEALTH CARE EDUCATION/TRAINING PROGRAM

## 2025-05-21 PROCEDURE — 0TT04ZZ RESECTION OF RIGHT KIDNEY, PERCUTANEOUS ENDOSCOPIC APPROACH: ICD-10-PCS | Performed by: STUDENT IN AN ORGANIZED HEALTH CARE EDUCATION/TRAINING PROGRAM

## 2025-05-21 PROCEDURE — 74018 RADEX ABDOMEN 1 VIEW: CPT | Performed by: RADIOLOGY

## 2025-05-21 PROCEDURE — 2720000007 HC OR 272 NO HCPCS: Performed by: STUDENT IN AN ORGANIZED HEALTH CARE EDUCATION/TRAINING PROGRAM

## 2025-05-21 DEVICE — BANDER URETERAL DIVERSION OPEN-END STENT SET
Type: IMPLANTABLE DEVICE | Site: URETER | Status: FUNCTIONAL
Brand: BANDER

## 2025-05-21 RX ORDER — ONDANSETRON HYDROCHLORIDE 2 MG/ML
4 INJECTION, SOLUTION INTRAVENOUS ONCE AS NEEDED
Status: DISCONTINUED | OUTPATIENT
Start: 2025-05-21 | End: 2025-05-21 | Stop reason: HOSPADM

## 2025-05-21 RX ORDER — WATER 1 ML/ML
INJECTION IRRIGATION AS NEEDED
Status: DISCONTINUED | OUTPATIENT
Start: 2025-05-21 | End: 2025-05-21 | Stop reason: HOSPADM

## 2025-05-21 RX ORDER — DROPERIDOL 2.5 MG/ML
INJECTION, SOLUTION INTRAMUSCULAR; INTRAVENOUS AS NEEDED
Status: DISCONTINUED | OUTPATIENT
Start: 2025-05-21 | End: 2025-05-21

## 2025-05-21 RX ORDER — TRAZODONE HYDROCHLORIDE 50 MG/1
50 TABLET ORAL NIGHTLY
Status: DISPENSED | OUTPATIENT
Start: 2025-05-21

## 2025-05-21 RX ORDER — GABAPENTIN 300 MG/1
300 CAPSULE ORAL ONCE
Status: COMPLETED | OUTPATIENT
Start: 2025-05-21 | End: 2025-05-21

## 2025-05-21 RX ORDER — MONTELUKAST SODIUM 10 MG/1
10 TABLET ORAL NIGHTLY
Status: DISPENSED | OUTPATIENT
Start: 2025-05-21

## 2025-05-21 RX ORDER — FLUCONAZOLE 2 MG/ML
400 INJECTION, SOLUTION INTRAVENOUS ONCE
Status: DISCONTINUED | OUTPATIENT
Start: 2025-05-21 | End: 2025-05-21

## 2025-05-21 RX ORDER — ALBUMIN HUMAN 50 G/1000ML
SOLUTION INTRAVENOUS AS NEEDED
Status: DISCONTINUED | OUTPATIENT
Start: 2025-05-21 | End: 2025-05-21

## 2025-05-21 RX ORDER — INSULIN LISPRO 100 [IU]/ML
0-10 INJECTION, SOLUTION INTRAVENOUS; SUBCUTANEOUS
Status: DISPENSED | OUTPATIENT
Start: 2025-05-22

## 2025-05-21 RX ORDER — ERTAPENEM 1 G/1
INJECTION, POWDER, LYOPHILIZED, FOR SOLUTION INTRAMUSCULAR; INTRAVENOUS AS NEEDED
Status: DISCONTINUED | OUTPATIENT
Start: 2025-05-21 | End: 2025-05-21

## 2025-05-21 RX ORDER — ALVIMOPAN 12 MG/1
12 CAPSULE ORAL ONCE
Status: DISCONTINUED | OUTPATIENT
Start: 2025-05-21 | End: 2025-05-21

## 2025-05-21 RX ORDER — DROPERIDOL 2.5 MG/ML
0.62 INJECTION, SOLUTION INTRAMUSCULAR; INTRAVENOUS ONCE AS NEEDED
Status: DISCONTINUED | OUTPATIENT
Start: 2025-05-21 | End: 2025-05-21 | Stop reason: HOSPADM

## 2025-05-21 RX ORDER — POLYETHYLENE GLYCOL 3350 17 G/17G
17 POWDER, FOR SOLUTION ORAL DAILY
Status: DISCONTINUED | OUTPATIENT
Start: 2025-05-21 | End: 2025-05-25

## 2025-05-21 RX ORDER — ACETAMINOPHEN 325 MG/1
975 TABLET ORAL ONCE
Status: DISCONTINUED | OUTPATIENT
Start: 2025-05-21 | End: 2025-05-21

## 2025-05-21 RX ORDER — LIDOCAINE HYDROCHLORIDE 10 MG/ML
0.1 INJECTION, SOLUTION EPIDURAL; INFILTRATION; INTRACAUDAL; PERINEURAL ONCE
Status: DISCONTINUED | OUTPATIENT
Start: 2025-05-21 | End: 2025-05-21 | Stop reason: HOSPADM

## 2025-05-21 RX ORDER — SODIUM CHLORIDE, SODIUM LACTATE, POTASSIUM CHLORIDE, CALCIUM CHLORIDE 600; 310; 30; 20 MG/100ML; MG/100ML; MG/100ML; MG/100ML
100 INJECTION, SOLUTION INTRAVENOUS CONTINUOUS
Status: DISCONTINUED | OUTPATIENT
Start: 2025-05-21 | End: 2025-05-21 | Stop reason: HOSPADM

## 2025-05-21 RX ORDER — HEPARIN SODIUM 5000 [USP'U]/ML
5000 INJECTION, SOLUTION INTRAVENOUS; SUBCUTANEOUS EVERY 8 HOURS
Status: DISCONTINUED | OUTPATIENT
Start: 2025-05-21 | End: 2025-05-25

## 2025-05-21 RX ORDER — KETAMINE HYDROCHLORIDE 10 MG/ML
INJECTION, SOLUTION INTRAMUSCULAR; INTRAVENOUS AS NEEDED
Status: DISCONTINUED | OUTPATIENT
Start: 2025-05-21 | End: 2025-05-21

## 2025-05-21 RX ORDER — FENTANYL CITRATE 50 UG/ML
INJECTION, SOLUTION INTRAMUSCULAR; INTRAVENOUS AS NEEDED
Status: DISCONTINUED | OUTPATIENT
Start: 2025-05-21 | End: 2025-05-21

## 2025-05-21 RX ORDER — PHENYLEPHRINE HCL IN 0.9% NACL 0.4MG/10ML
SYRINGE (ML) INTRAVENOUS AS NEEDED
Status: DISCONTINUED | OUTPATIENT
Start: 2025-05-21 | End: 2025-05-21

## 2025-05-21 RX ORDER — PROPOFOL 10 MG/ML
INJECTION, EMULSION INTRAVENOUS AS NEEDED
Status: DISCONTINUED | OUTPATIENT
Start: 2025-05-21 | End: 2025-05-21

## 2025-05-21 RX ORDER — ATORVASTATIN CALCIUM 40 MG/1
40 TABLET, FILM COATED ORAL DAILY
Status: DISPENSED | OUTPATIENT
Start: 2025-05-21

## 2025-05-21 RX ORDER — GABAPENTIN 300 MG/1
300 CAPSULE ORAL ONCE
Status: DISCONTINUED | OUTPATIENT
Start: 2025-05-21 | End: 2025-05-21

## 2025-05-21 RX ORDER — CEFAZOLIN 1 G/1
INJECTION, POWDER, FOR SOLUTION INTRAVENOUS AS NEEDED
Status: DISCONTINUED | OUTPATIENT
Start: 2025-05-21 | End: 2025-05-21

## 2025-05-21 RX ORDER — ACETAMINOPHEN 325 MG/1
975 TABLET ORAL ONCE
Status: COMPLETED | OUTPATIENT
Start: 2025-05-21 | End: 2025-05-21

## 2025-05-21 RX ORDER — HEPARIN SODIUM 5000 [USP'U]/ML
5000 INJECTION, SOLUTION INTRAVENOUS; SUBCUTANEOUS ONCE
Status: DISCONTINUED | OUTPATIENT
Start: 2025-05-21 | End: 2025-05-21

## 2025-05-21 RX ORDER — METRONIDAZOLE 500 MG/100ML
INJECTION, SOLUTION INTRAVENOUS AS NEEDED
Status: DISCONTINUED | OUTPATIENT
Start: 2025-05-21 | End: 2025-05-21

## 2025-05-21 RX ORDER — ACETAMINOPHEN 325 MG/1
975 TABLET ORAL EVERY 6 HOURS
Status: DISPENSED | OUTPATIENT
Start: 2025-05-21

## 2025-05-21 RX ORDER — ALVIMOPAN 12 MG/1
12 CAPSULE ORAL 2 TIMES DAILY
Status: DISCONTINUED | OUTPATIENT
Start: 2025-05-21 | End: 2025-05-24

## 2025-05-21 RX ORDER — FLUCONAZOLE 2 MG/ML
400 INJECTION, SOLUTION INTRAVENOUS ONCE
Status: DISCONTINUED | OUTPATIENT
Start: 2025-05-21 | End: 2025-05-21 | Stop reason: HOSPADM

## 2025-05-21 RX ORDER — MIDAZOLAM HYDROCHLORIDE 1 MG/ML
INJECTION INTRAMUSCULAR; INTRAVENOUS AS NEEDED
Status: DISCONTINUED | OUTPATIENT
Start: 2025-05-21 | End: 2025-05-21

## 2025-05-21 RX ORDER — HYDRALAZINE HYDROCHLORIDE 20 MG/ML
5 INJECTION INTRAMUSCULAR; INTRAVENOUS EVERY 30 MIN PRN
Status: DISCONTINUED | OUTPATIENT
Start: 2025-05-21 | End: 2025-05-21 | Stop reason: HOSPADM

## 2025-05-21 RX ORDER — ERTAPENEM 1 G/1
1 INJECTION, POWDER, LYOPHILIZED, FOR SOLUTION INTRAMUSCULAR; INTRAVENOUS EVERY 24 HOURS
Status: COMPLETED | OUTPATIENT
Start: 2025-05-21 | End: 2025-05-25

## 2025-05-21 RX ORDER — PANTOPRAZOLE SODIUM 40 MG/1
40 TABLET, DELAYED RELEASE ORAL DAILY
Status: DISPENSED | OUTPATIENT
Start: 2025-05-21

## 2025-05-21 RX ORDER — LABETALOL HYDROCHLORIDE 5 MG/ML
INJECTION, SOLUTION INTRAVENOUS AS NEEDED
Status: DISCONTINUED | OUTPATIENT
Start: 2025-05-21 | End: 2025-05-21

## 2025-05-21 RX ORDER — ROCURONIUM BROMIDE 10 MG/ML
INJECTION, SOLUTION INTRAVENOUS AS NEEDED
Status: DISCONTINUED | OUTPATIENT
Start: 2025-05-21 | End: 2025-05-21

## 2025-05-21 RX ORDER — HYDROMORPHONE HYDROCHLORIDE 1 MG/ML
INJECTION, SOLUTION INTRAMUSCULAR; INTRAVENOUS; SUBCUTANEOUS AS NEEDED
Status: DISCONTINUED | OUTPATIENT
Start: 2025-05-21 | End: 2025-05-21

## 2025-05-21 RX ORDER — ONDANSETRON HYDROCHLORIDE 2 MG/ML
INJECTION, SOLUTION INTRAVENOUS AS NEEDED
Status: DISCONTINUED | OUTPATIENT
Start: 2025-05-21 | End: 2025-05-21

## 2025-05-21 RX ORDER — SODIUM CHLORIDE 0.9 G/100ML
INJECTION, SOLUTION IRRIGATION AS NEEDED
Status: DISCONTINUED | OUTPATIENT
Start: 2025-05-21 | End: 2025-05-21 | Stop reason: HOSPADM

## 2025-05-21 RX ORDER — LIDOCAINE HYDROCHLORIDE 20 MG/ML
INJECTION, SOLUTION INFILTRATION; PERINEURAL AS NEEDED
Status: DISCONTINUED | OUTPATIENT
Start: 2025-05-21 | End: 2025-05-21

## 2025-05-21 RX ORDER — SODIUM CHLORIDE 9 MG/ML
75 INJECTION, SOLUTION INTRAVENOUS CONTINUOUS
Status: DISCONTINUED | OUTPATIENT
Start: 2025-05-21 | End: 2025-05-23

## 2025-05-21 RX ORDER — OXYCODONE HYDROCHLORIDE 5 MG/1
5 TABLET ORAL EVERY 6 HOURS PRN
Status: ACTIVE | OUTPATIENT
Start: 2025-05-21

## 2025-05-21 RX ORDER — NALOXONE HYDROCHLORIDE 0.4 MG/ML
0.2 INJECTION, SOLUTION INTRAMUSCULAR; INTRAVENOUS; SUBCUTANEOUS EVERY 5 MIN PRN
Status: ACTIVE | OUTPATIENT
Start: 2025-05-21

## 2025-05-21 RX ORDER — HEPARIN SODIUM 5000 [USP'U]/ML
5000 INJECTION, SOLUTION INTRAVENOUS; SUBCUTANEOUS ONCE
Status: COMPLETED | OUTPATIENT
Start: 2025-05-21 | End: 2025-05-21

## 2025-05-21 RX ORDER — ONDANSETRON 4 MG/1
4 TABLET, FILM COATED ORAL EVERY 8 HOURS PRN
Status: DISPENSED | OUTPATIENT
Start: 2025-05-21

## 2025-05-21 RX ORDER — OXYCODONE HYDROCHLORIDE 5 MG/1
10 TABLET ORAL EVERY 4 HOURS PRN
Status: DISCONTINUED | OUTPATIENT
Start: 2025-05-21 | End: 2025-05-21 | Stop reason: HOSPADM

## 2025-05-21 RX ORDER — HYDROMORPHONE HYDROCHLORIDE 0.2 MG/ML
0.2 INJECTION INTRAMUSCULAR; INTRAVENOUS; SUBCUTANEOUS EVERY 5 MIN PRN
Status: DISCONTINUED | OUTPATIENT
Start: 2025-05-21 | End: 2025-05-21 | Stop reason: HOSPADM

## 2025-05-21 RX ORDER — OXYCODONE HYDROCHLORIDE 5 MG/1
10 TABLET ORAL EVERY 4 HOURS PRN
Status: DISPENSED | OUTPATIENT
Start: 2025-05-21

## 2025-05-21 RX ORDER — OXYCODONE HYDROCHLORIDE 5 MG/1
5 TABLET ORAL EVERY 4 HOURS PRN
Status: DISCONTINUED | OUTPATIENT
Start: 2025-05-21 | End: 2025-05-21 | Stop reason: HOSPADM

## 2025-05-21 RX ORDER — ONDANSETRON HYDROCHLORIDE 2 MG/ML
4 INJECTION, SOLUTION INTRAVENOUS EVERY 8 HOURS PRN
Status: DISPENSED | OUTPATIENT
Start: 2025-05-21

## 2025-05-21 RX ORDER — ALVIMOPAN 12 MG/1
12 CAPSULE ORAL ONCE
Status: COMPLETED | OUTPATIENT
Start: 2025-05-21 | End: 2025-05-21

## 2025-05-21 RX ORDER — LABETALOL HYDROCHLORIDE 5 MG/ML
5 INJECTION, SOLUTION INTRAVENOUS ONCE AS NEEDED
Status: DISCONTINUED | OUTPATIENT
Start: 2025-05-21 | End: 2025-05-21 | Stop reason: HOSPADM

## 2025-05-21 RX ADMIN — SUGAMMADEX 200 MG: 100 INJECTION, SOLUTION INTRAVENOUS at 16:44

## 2025-05-21 RX ADMIN — HEPARIN SODIUM 5000 UNITS: 5000 INJECTION, SOLUTION INTRAVENOUS; SUBCUTANEOUS at 20:57

## 2025-05-21 RX ADMIN — LABETALOL HYDROCHLORIDE 10 MG: 5 INJECTION INTRAVENOUS at 17:24

## 2025-05-21 RX ADMIN — DROPERIDOL 0.62 MG: 2.5 INJECTION, SOLUTION INTRAMUSCULAR; INTRAVENOUS at 08:36

## 2025-05-21 RX ADMIN — ALBUMIN HUMAN 250 ML: 0.05 INJECTION, SOLUTION INTRAVENOUS at 12:15

## 2025-05-21 RX ADMIN — ALVIMOPAN 12 MG: 12 CAPSULE ORAL at 07:29

## 2025-05-21 RX ADMIN — HEPARIN SODIUM 5000 UNITS: 5000 INJECTION, SOLUTION INTRAVENOUS; SUBCUTANEOUS at 07:57

## 2025-05-21 RX ADMIN — ROCURONIUM BROMIDE 20 MG: 10 INJECTION, SOLUTION INTRAVENOUS at 10:33

## 2025-05-21 RX ADMIN — HYDROMORPHONE HYDROCHLORIDE 0.2 MG: 0.2 INJECTION, SOLUTION INTRAMUSCULAR; INTRAVENOUS; SUBCUTANEOUS at 19:01

## 2025-05-21 RX ADMIN — Medication 20 MG: at 12:00

## 2025-05-21 RX ADMIN — HYDROMORPHONE HYDROCHLORIDE 0.4 MG: 1 INJECTION, SOLUTION INTRAMUSCULAR; INTRAVENOUS; SUBCUTANEOUS at 16:30

## 2025-05-21 RX ADMIN — FENTANYL CITRATE 50 MCG: 50 INJECTION, SOLUTION INTRAMUSCULAR; INTRAVENOUS at 09:44

## 2025-05-21 RX ADMIN — ACETAMINOPHEN 975 MG: 325 TABLET ORAL at 20:56

## 2025-05-21 RX ADMIN — ROCURONIUM BROMIDE 80 MG: 10 INJECTION, SOLUTION INTRAVENOUS at 08:33

## 2025-05-21 RX ADMIN — HYDROMORPHONE HYDROCHLORIDE 0.2 MG: 1 INJECTION, SOLUTION INTRAMUSCULAR; INTRAVENOUS; SUBCUTANEOUS at 16:43

## 2025-05-21 RX ADMIN — ONDANSETRON 4 MG: 2 INJECTION, SOLUTION INTRAMUSCULAR; INTRAVENOUS at 16:44

## 2025-05-21 RX ADMIN — DEXAMETHASONE SODIUM PHOSPHATE 4 MG: 4 INJECTION INTRA-ARTICULAR; INTRALESIONAL; INTRAMUSCULAR; INTRAVENOUS; SOFT TISSUE at 08:32

## 2025-05-21 RX ADMIN — PROPOFOL 150 MG: 10 INJECTION, EMULSION INTRAVENOUS at 08:32

## 2025-05-21 RX ADMIN — SODIUM CHLORIDE 100 ML/HR: 0.9 INJECTION, SOLUTION INTRAVENOUS at 21:00

## 2025-05-21 RX ADMIN — ROCURONIUM BROMIDE 20 MG: 10 INJECTION, SOLUTION INTRAVENOUS at 14:12

## 2025-05-21 RX ADMIN — ERTAPENEM SODIUM 1 G: 1 INJECTION INTRAMUSCULAR; INTRAVENOUS at 22:10

## 2025-05-21 RX ADMIN — PROPOFOL 50 MG: 10 INJECTION, EMULSION INTRAVENOUS at 08:38

## 2025-05-21 RX ADMIN — ROCURONIUM BROMIDE 20 MG: 10 INJECTION, SOLUTION INTRAVENOUS at 14:58

## 2025-05-21 RX ADMIN — HYDROMORPHONE HYDROCHLORIDE 0.4 MG: 1 INJECTION, SOLUTION INTRAMUSCULAR; INTRAVENOUS; SUBCUTANEOUS at 15:39

## 2025-05-21 RX ADMIN — Medication 20 MG: at 13:01

## 2025-05-21 RX ADMIN — ROCURONIUM BROMIDE 10 MG: 10 INJECTION, SOLUTION INTRAVENOUS at 15:40

## 2025-05-21 RX ADMIN — LIDOCAINE HYDROCHLORIDE 100 MG: 20 INJECTION, SOLUTION INFILTRATION; PERINEURAL at 08:32

## 2025-05-21 RX ADMIN — CEFAZOLIN 2 G: 1 INJECTION, POWDER, FOR SOLUTION INTRAMUSCULAR; INTRAVENOUS at 09:15

## 2025-05-21 RX ADMIN — ALVIMOPAN 12 MG: 12 CAPSULE ORAL at 22:09

## 2025-05-21 RX ADMIN — ACETAMINOPHEN 975 MG: 325 TABLET ORAL at 07:29

## 2025-05-21 RX ADMIN — SODIUM CHLORIDE, SODIUM LACTATE, POTASSIUM CHLORIDE, AND CALCIUM CHLORIDE: 600; 310; 30; 20 INJECTION, SOLUTION INTRAVENOUS at 12:15

## 2025-05-21 RX ADMIN — ROCURONIUM BROMIDE 20 MG: 10 INJECTION, SOLUTION INTRAVENOUS at 09:26

## 2025-05-21 RX ADMIN — LABETALOL HYDROCHLORIDE 10 MG: 5 INJECTION INTRAVENOUS at 17:39

## 2025-05-21 RX ADMIN — MONTELUKAST 10 MG: 10 TABLET, FILM COATED ORAL at 20:56

## 2025-05-21 RX ADMIN — HYDROMORPHONE HYDROCHLORIDE 0.5 MG: 1 INJECTION, SOLUTION INTRAMUSCULAR; INTRAVENOUS; SUBCUTANEOUS at 17:31

## 2025-05-21 RX ADMIN — Medication 20 MG: at 14:00

## 2025-05-21 RX ADMIN — Medication 20 MG: at 10:00

## 2025-05-21 RX ADMIN — Medication 30 MG: at 08:33

## 2025-05-21 RX ADMIN — ERTAPENEM SODIUM 1 G: 1 INJECTION, POWDER, LYOPHILIZED, FOR SOLUTION INTRAMUSCULAR; INTRAVENOUS at 09:15

## 2025-05-21 RX ADMIN — SODIUM CHLORIDE, SODIUM LACTATE, POTASSIUM CHLORIDE, AND CALCIUM CHLORIDE: 600; 310; 30; 20 INJECTION, SOLUTION INTRAVENOUS at 08:27

## 2025-05-21 RX ADMIN — METRONIDAZOLE 500 MG: 5 INJECTION, SOLUTION INTRAVENOUS at 09:15

## 2025-05-21 RX ADMIN — ATORVASTATIN CALCIUM 40 MG: 40 TABLET, FILM COATED ORAL at 20:56

## 2025-05-21 RX ADMIN — Medication 20 MG: at 11:00

## 2025-05-21 RX ADMIN — TRAZODONE HYDROCHLORIDE 50 MG: 50 TABLET ORAL at 20:56

## 2025-05-21 RX ADMIN — Medication 80 MCG: at 09:23

## 2025-05-21 RX ADMIN — MIDAZOLAM HYDROCHLORIDE 2 MG: 2 INJECTION, SOLUTION INTRAMUSCULAR; INTRAVENOUS at 08:27

## 2025-05-21 RX ADMIN — GABAPENTIN 300 MG: 300 CAPSULE ORAL at 07:29

## 2025-05-21 RX ADMIN — LIDOCAINE HYDROCHLORIDE 100 MG: 20 INJECTION, SOLUTION INFILTRATION; PERINEURAL at 16:44

## 2025-05-21 RX ADMIN — ROCURONIUM BROMIDE 20 MG: 10 INJECTION, SOLUTION INTRAVENOUS at 13:21

## 2025-05-21 RX ADMIN — Medication 20 MG: at 15:00

## 2025-05-21 RX ADMIN — HYDROMORPHONE HYDROCHLORIDE 0.5 MG: 1 INJECTION, SOLUTION INTRAMUSCULAR; INTRAVENOUS; SUBCUTANEOUS at 18:08

## 2025-05-21 RX ADMIN — FENTANYL CITRATE 50 MCG: 50 INJECTION, SOLUTION INTRAMUSCULAR; INTRAVENOUS at 08:32

## 2025-05-21 RX ADMIN — ROCURONIUM BROMIDE 20 MG: 10 INJECTION, SOLUTION INTRAVENOUS at 11:29

## 2025-05-21 RX ADMIN — HYDROMORPHONE HYDROCHLORIDE 0.5 MG: 1 INJECTION, SOLUTION INTRAMUSCULAR; INTRAVENOUS; SUBCUTANEOUS at 17:42

## 2025-05-21 RX ADMIN — CEFAZOLIN 2 G: 1 INJECTION, POWDER, FOR SOLUTION INTRAMUSCULAR; INTRAVENOUS at 13:16

## 2025-05-21 RX ADMIN — PANTOPRAZOLE SODIUM 40 MG: 40 TABLET, DELAYED RELEASE ORAL at 20:56

## 2025-05-21 RX ADMIN — Medication 80 MCG: at 15:28

## 2025-05-21 RX ADMIN — ROCURONIUM BROMIDE 20 MG: 10 INJECTION, SOLUTION INTRAVENOUS at 12:22

## 2025-05-21 RX ADMIN — ROCURONIUM BROMIDE 10 MG: 10 INJECTION, SOLUTION INTRAVENOUS at 16:29

## 2025-05-21 SDOH — ECONOMIC STABILITY: HOUSING INSECURITY: AT ANY TIME IN THE PAST 12 MONTHS, WERE YOU HOMELESS OR LIVING IN A SHELTER (INCLUDING NOW)?: NO

## 2025-05-21 SDOH — HEALTH STABILITY: MENTAL HEALTH: HOW OFTEN DO YOU HAVE SIX OR MORE DRINKS ON ONE OCCASION?: NEVER

## 2025-05-21 SDOH — HEALTH STABILITY: MENTAL HEALTH: HOW OFTEN DO YOU HAVE A DRINK CONTAINING ALCOHOL?: 2-4 TIMES A MONTH

## 2025-05-21 SDOH — SOCIAL STABILITY: SOCIAL INSECURITY: WITHIN THE LAST YEAR, HAVE YOU BEEN AFRAID OF YOUR PARTNER OR EX-PARTNER?: NO

## 2025-05-21 SDOH — SOCIAL STABILITY: SOCIAL INSECURITY
WITHIN THE LAST YEAR, HAVE YOU BEEN KICKED, HIT, SLAPPED, OR OTHERWISE PHYSICALLY HURT BY YOUR PARTNER OR EX-PARTNER?: NO

## 2025-05-21 SDOH — SOCIAL STABILITY: SOCIAL INSECURITY: DOES ANYONE TRY TO KEEP YOU FROM HAVING/CONTACTING OTHER FRIENDS OR DOING THINGS OUTSIDE YOUR HOME?: NO

## 2025-05-21 SDOH — ECONOMIC STABILITY: INCOME INSECURITY: IN THE PAST 12 MONTHS HAS THE ELECTRIC, GAS, OIL, OR WATER COMPANY THREATENED TO SHUT OFF SERVICES IN YOUR HOME?: NO

## 2025-05-21 SDOH — ECONOMIC STABILITY: HOUSING INSECURITY: IN THE PAST 12 MONTHS, HOW MANY TIMES HAVE YOU MOVED WHERE YOU WERE LIVING?: 0

## 2025-05-21 SDOH — ECONOMIC STABILITY: FOOD INSECURITY: WITHIN THE PAST 12 MONTHS, THE FOOD YOU BOUGHT JUST DIDN'T LAST AND YOU DIDN'T HAVE MONEY TO GET MORE.: NEVER TRUE

## 2025-05-21 SDOH — HEALTH STABILITY: MENTAL HEALTH: HOW MANY DRINKS CONTAINING ALCOHOL DO YOU HAVE ON A TYPICAL DAY WHEN YOU ARE DRINKING?: 1 OR 2

## 2025-05-21 SDOH — ECONOMIC STABILITY: HOUSING INSECURITY: IN THE LAST 12 MONTHS, WAS THERE A TIME WHEN YOU WERE NOT ABLE TO PAY THE MORTGAGE OR RENT ON TIME?: NO

## 2025-05-21 SDOH — SOCIAL STABILITY: SOCIAL INSECURITY
WITHIN THE LAST YEAR, HAVE YOU BEEN RAPED OR FORCED TO HAVE ANY KIND OF SEXUAL ACTIVITY BY YOUR PARTNER OR EX-PARTNER?: NO

## 2025-05-21 SDOH — ECONOMIC STABILITY: FOOD INSECURITY: HOW HARD IS IT FOR YOU TO PAY FOR THE VERY BASICS LIKE FOOD, HOUSING, MEDICAL CARE, AND HEATING?: NOT HARD AT ALL

## 2025-05-21 SDOH — SOCIAL STABILITY: SOCIAL INSECURITY: WITHIN THE LAST YEAR, HAVE YOU BEEN HUMILIATED OR EMOTIONALLY ABUSED IN OTHER WAYS BY YOUR PARTNER OR EX-PARTNER?: NO

## 2025-05-21 SDOH — SOCIAL STABILITY: SOCIAL INSECURITY: ARE YOU OR HAVE YOU BEEN THREATENED OR ABUSED PHYSICALLY, EMOTIONALLY, OR SEXUALLY BY ANYONE?: NO

## 2025-05-21 SDOH — ECONOMIC STABILITY: FOOD INSECURITY: WITHIN THE PAST 12 MONTHS, YOU WORRIED THAT YOUR FOOD WOULD RUN OUT BEFORE YOU GOT THE MONEY TO BUY MORE.: NEVER TRUE

## 2025-05-21 SDOH — ECONOMIC STABILITY: TRANSPORTATION INSECURITY: IN THE PAST 12 MONTHS, HAS LACK OF TRANSPORTATION KEPT YOU FROM MEDICAL APPOINTMENTS OR FROM GETTING MEDICATIONS?: NO

## 2025-05-21 SDOH — SOCIAL STABILITY: SOCIAL INSECURITY: DO YOU FEEL ANYONE HAS EXPLOITED OR TAKEN ADVANTAGE OF YOU FINANCIALLY OR OF YOUR PERSONAL PROPERTY?: NO

## 2025-05-21 SDOH — SOCIAL STABILITY: SOCIAL INSECURITY: ABUSE: ADULT

## 2025-05-21 SDOH — SOCIAL STABILITY: SOCIAL INSECURITY: HAVE YOU HAD ANY THOUGHTS OF HARMING ANYONE ELSE?: NO

## 2025-05-21 SDOH — SOCIAL STABILITY: SOCIAL INSECURITY: ARE THERE ANY APPARENT SIGNS OF INJURIES/BEHAVIORS THAT COULD BE RELATED TO ABUSE/NEGLECT?: NO

## 2025-05-21 SDOH — SOCIAL STABILITY: SOCIAL INSECURITY: DO YOU FEEL UNSAFE GOING BACK TO THE PLACE WHERE YOU ARE LIVING?: NO

## 2025-05-21 SDOH — SOCIAL STABILITY: SOCIAL INSECURITY: HAVE YOU HAD THOUGHTS OF HARMING ANYONE ELSE?: NO

## 2025-05-21 SDOH — SOCIAL STABILITY: SOCIAL INSECURITY
ASK PARENT OR GUARDIAN: ARE THERE TIMES WHEN YOU, YOUR CHILD(REN), OR ANY MEMBER OF YOUR HOUSEHOLD FEEL UNSAFE, HARMED, OR THREATENED AROUND PERSONS WITH WHOM YOU KNOW OR LIVE?: NO

## 2025-05-21 SDOH — SOCIAL STABILITY: SOCIAL INSECURITY: HAS ANYONE EVER THREATENED TO HURT YOUR FAMILY OR YOUR PETS?: NO

## 2025-05-21 SDOH — SOCIAL STABILITY: SOCIAL INSECURITY: WERE YOU ABLE TO COMPLETE ALL THE BEHAVIORAL HEALTH SCREENINGS?: YES

## 2025-05-21 ASSESSMENT — ACTIVITIES OF DAILY LIVING (ADL)
LACK_OF_TRANSPORTATION: NO
GROOMING: INDEPENDENT
FEEDING YOURSELF: INDEPENDENT
HEARING - RIGHT EAR: FUNCTIONAL
BATHING: INDEPENDENT
LACK_OF_TRANSPORTATION: NO
ADEQUATE_TO_COMPLETE_ADL: YES
HEARING - LEFT EAR: FUNCTIONAL
JUDGMENT_ADEQUATE_SAFELY_COMPLETE_DAILY_ACTIVITIES: YES
TOILETING: INDEPENDENT
WALKS IN HOME: INDEPENDENT
PATIENT'S MEMORY ADEQUATE TO SAFELY COMPLETE DAILY ACTIVITIES?: YES
DRESSING YOURSELF: INDEPENDENT

## 2025-05-21 ASSESSMENT — PAIN - FUNCTIONAL ASSESSMENT
PAIN_FUNCTIONAL_ASSESSMENT: 0-10
PAIN_FUNCTIONAL_ASSESSMENT: UNABLE TO SELF-REPORT

## 2025-05-21 ASSESSMENT — PATIENT HEALTH QUESTIONNAIRE - PHQ9
2. FEELING DOWN, DEPRESSED OR HOPELESS: NOT AT ALL
SUM OF ALL RESPONSES TO PHQ9 QUESTIONS 1 & 2: 0
1. LITTLE INTEREST OR PLEASURE IN DOING THINGS: NOT AT ALL

## 2025-05-21 ASSESSMENT — PAIN SCALES - GENERAL
PAIN_LEVEL: 4
PAINLEVEL_OUTOF10: 3
PAINLEVEL_OUTOF10: 0 - NO PAIN
PAINLEVEL_OUTOF10: 4
PAINLEVEL_OUTOF10: 4

## 2025-05-21 ASSESSMENT — COGNITIVE AND FUNCTIONAL STATUS - GENERAL
DRESSING REGULAR UPPER BODY CLOTHING: A LITTLE
PATIENT BASELINE BEDBOUND: NO
PERSONAL GROOMING: A LITTLE
CLIMB 3 TO 5 STEPS WITH RAILING: A LITTLE
HELP NEEDED FOR BATHING: A LITTLE
MOVING FROM LYING ON BACK TO SITTING ON SIDE OF FLAT BED WITH BEDRAILS: A LITTLE
EATING MEALS: A LITTLE
WALKING IN HOSPITAL ROOM: A LITTLE
DAILY ACTIVITIY SCORE: 18
DRESSING REGULAR LOWER BODY CLOTHING: A LITTLE
TURNING FROM BACK TO SIDE WHILE IN FLAT BAD: A LITTLE
MOBILITY SCORE: 18
MOVING TO AND FROM BED TO CHAIR: A LITTLE
STANDING UP FROM CHAIR USING ARMS: A LITTLE
TOILETING: A LITTLE

## 2025-05-21 ASSESSMENT — COLUMBIA-SUICIDE SEVERITY RATING SCALE - C-SSRS
6. HAVE YOU EVER DONE ANYTHING, STARTED TO DO ANYTHING, OR PREPARED TO DO ANYTHING TO END YOUR LIFE?: NO
1. IN THE PAST MONTH, HAVE YOU WISHED YOU WERE DEAD OR WISHED YOU COULD GO TO SLEEP AND NOT WAKE UP?: NO
2. HAVE YOU ACTUALLY HAD ANY THOUGHTS OF KILLING YOURSELF?: NO

## 2025-05-21 ASSESSMENT — LIFESTYLE VARIABLES
AUDIT-C TOTAL SCORE: 2
AUDIT-C TOTAL SCORE: 2
SKIP TO QUESTIONS 9-10: 1
SKIP TO QUESTIONS 9-10: 1

## 2025-05-21 ASSESSMENT — PAIN DESCRIPTION - LOCATION
LOCATION: ABDOMEN
LOCATION: ABDOMEN

## 2025-05-21 NOTE — ANESTHESIA POSTPROCEDURE EVALUATION
Patient: Rdorigue Palacios    Procedure Summary       Date: 05/21/25 Room / Location: [facility] OR 05 / [facility] OR    Anesthesia Start: 0819 Anesthesia Stop: 1754    Procedures:       CYSTECTOMY, ROBOT-ASSISTED, LAPAROSCOPIC, WITH URETEROILEAL CONDUIT CREATION      NEPHRECTOMY, ROBOT-ASSISTED (Right: Abdomen) Diagnosis:       Malignant neoplasm of urinary bladder, unspecified site (Multi)      XGP (xanthogranulomatous pyelonephritis)      (Malignant neoplasm of urinary bladder, unspecified site (Multi) [C67.9])      (XGP (xanthogranulomatous pyelonephritis) [N11.8])    Surgeons: James Borrero MD Responsible Provider: Long Gaston MD    Anesthesia Type: general ASA Status: 3            Anesthesia Type: general        Anesthesia Post Evaluation    Patient location during evaluation: PACU  Patient participation: complete - patient participated  Level of consciousness: awake  Pain score: 4  Pain management: adequate  Airway patency: patent  Cardiovascular status: acceptable  Respiratory status: acceptable  Hydration status: acceptable  Postoperative Nausea and Vomiting: none        There were no known notable events for this encounter.

## 2025-05-21 NOTE — ANESTHESIA PROCEDURE NOTES
Peripheral Block    Patient location during procedure: pre-op  Medication administered at: 5/21/2025 7:48 AM  End time: 5/21/2025 8:00 AM  Reason for block: at surgeon's request and post-op pain management  Staffing  Performed: attending and resident   Authorized by: Nathalia Stevens MD    Performed by: Lama Malina MD  Preanesthetic Checklist  Completed: patient identified, IV checked, site marked, risks and benefits discussed, surgical consent, monitors and equipment checked, pre-op evaluation and timeout performed   Timeout performed at: 5/21/2025 7:48 AM  Peripheral Block  Patient position: laying flat  Prep: ChloraPrep  Patient monitoring: heart rate, continuous pulse ox and cardiac monitor  Block type: QL  Laterality: B/L  Injection technique: single-shot  Guidance: ultrasound guided  Local infiltration: lidocaine  Infiltration strength: 1 %  Dose: 3 mL  Needle  Needle type: short-bevel   Needle gauge: 22 G  Needle length: 8 cm  Needle localization: ultrasound guidance     image stored in chart  Assessment  Injection assessment: negative aspiration for heme, no paresthesia on injection, incremental injection and local visualized surrounding nerve on ultrasound  Heart rate change: no  Slow fractionated injection: yes  Additional Notes  Quadratus lumborum block:    Prior to procedure: Following a focused history, procedure-related and patient-specific complications were discussed. Risks, benefits, and alternatives were explained. Informed, written consent was provided by the patient and/or surrogate decision maker for the block. Anticoagulation (if any) was held per [place] guidelines. ASA monitors were applied. Patient was positioned, prepped with chlorhexidine, and draped with sterile towels.     Ultrasound guidance was used to identify the quadratus lumborum and surrounding structures. The needle was visualized throughout the procedure. Aspiration was negative. A total of 16 mcg precedex, 30 cc of 0.5%  ropivacaine, dexamethasone 4mg, and 1:200,000 epinephrine, was divided and injected bilaterally. Patient tolerated procedure well.     Timeout by Jessica SU

## 2025-05-21 NOTE — ANESTHESIA PROCEDURE NOTES
Airway  Date/Time: 5/21/2025 8:35 AM  Reason: elective    Airway not difficult    Staffing  Performed: resident   Authorized by: Anjum Mattson MD    Performed by: Bam Jacques DO  Patient location during procedure: OR    Patient Condition  Indications for airway management: anesthesia  Patient position: sniffing  Planned trial extubation  Sedation level: deep     Final Airway Details   Preoxygenated: yes  Final airway type: endotracheal airway  Successful airway: ETT  Cuffed: yes   Successful intubation technique: direct laryngoscopy  Adjuncts used in placement: intubating stylet  Endotracheal tube insertion site: oral  Blade: Abby  Blade size: #4  ETT size (mm): 7.5  Cormack-Lehane Classification: grade I - full view of glottis  Placement verified by: chest auscultation and capnometry   Measured from: lips  ETT to lips (cm): 23  Number of attempts at approach: 1

## 2025-05-21 NOTE — ANESTHESIA PROCEDURE NOTES
Peripheral IV  Date/Time: 5/21/2025 8:40 AM      Placement  Needle size: 16 G  Laterality: right  Location: hand  Local anesthetic: none  Site prep: alcohol  Technique: anatomical landmarks  Attempts: 1

## 2025-05-21 NOTE — ANESTHESIA PREPROCEDURE EVALUATION
Patient: Rodrigue Palacios    Procedure Information       Anesthesia Start Date/Time: 05/21/25 0819    Procedures:       CYSTECTOMY, ROBOT-ASSISTED, LAPAROSCOPIC, WITH URETEROILEAL CONDUIT CREATION      NEPHRECTOMY, ROBOT-ASSISTED (Right: Abdomen)    Location: [facility] OR  / Virtual [facility] OR    Surgeons: James Borrero MD            Relevant Problems   Cardiac   (+) Benign essential hypertension   (+) Hypercholesterolemia   (+) Murmur      Pulmonary   (+) SCOOTER (obstructive sleep apnea)      GI   (+) GERD (gastroesophageal reflux disease)      /Renal   (+) Calculus, renal   (+) Kidney calculi      Endocrine   (+) Type 2 diabetes mellitus      Musculoskeletal   (+) Rheumatoid arthritis with rheumatoid factor of multiple sites without organ or systems involvement (Multi)       Clinical information reviewed:    Allergies  Meds               NPO Detail:  NPO/Void Status  Carbohydrate Drink Given Prior to Surgery? : N  Date of Last Liquid: 05/20/25  Time of Last Liquid: 1830  Date of Last Solid: 05/20/25  Time of Last Solid: 1830  Last Intake Type: Clear fluids  Time of Last Void: 0500         Physical Exam    Airway  Mallampati: II     Cardiovascular - normal exam  Rhythm: regular  Rate: normal     Dental    Pulmonary - normal exam   Abdominal - normal exam           Anesthesia Plan    History of general anesthesia?: unknown/emergency  History of complications of general anesthesia?: no    ASA 3     general     intravenous induction   Anesthetic plan and risks discussed with patient.    Plan discussed with resident.

## 2025-05-21 NOTE — CONSULTS
Rodrigue Palacios is a 69 y.o. year old male patient who presents for Procedure(s):  CYSTECTOMY, ROBOT-ASSISTED, LAPAROSCOPIC, WITH URETEROILEAL CONDUIT CREATION  NEPHRECTOMY, ROBOT-ASSISTED with James Borrero MD on 5/21/2025.  Acute Pain consulted for assistance with pain control.     Anticipated Postop Pain Issues -   Palliative: typically relieved with IV analgesics and regional local anesthetics  Provocative: typically with movement  Quality: typically burning and aching  Radiation: typically none  Severity: typically severe 8-10/10  Timing: typically constant    Medical History[1]     Surgical History[2]     Family History[3]     Social History     Socioeconomic History    Marital status:      Spouse name: Not on file    Number of children: Not on file    Years of education: Not on file    Highest education level: Not on file   Occupational History    Not on file   Tobacco Use    Smoking status: Never     Passive exposure: Never    Smokeless tobacco: Never   Vaping Use    Vaping status: Never Used   Substance and Sexual Activity    Alcohol use: Yes     Comment: rare-couple drinks a month    Drug use: Never    Sexual activity: Defer   Other Topics Concern    Not on file   Social History Narrative    Not on file     Social Drivers of Health     Financial Resource Strain: Low Risk  (4/7/2025)    Overall Financial Resource Strain (CARDIA)     Difficulty of Paying Living Expenses: Not hard at all   Recent Concern: Financial Resource Strain - Medium Risk (1/30/2025)    Received from [facility]    Overall Financial Resource Strain (CARDIA)     Difficulty of Paying Living Expenses: Somewhat hard   Food Insecurity: No Food Insecurity (4/7/2025)    Hunger Vital Sign     Worried About Running Out of Food in the Last Year: Never true     Ran Out of Food in the Last Year: Never true   Transportation Needs: No Transportation Needs (4/7/2025)    PRAPARE - Transportation     Lack of Transportation (Medical): No      Lack of Transportation (Non-Medical): No   Physical Activity: Sufficiently Active (4/7/2025)    Exercise Vital Sign     Days of Exercise per Week: 7 days     Minutes of Exercise per Session: 30 min   Stress: Stress Concern Present (4/7/2025)    [employer] - Occupational Stress Questionnaire     Feeling of Stress : To some extent   Social Connections: Socially Isolated (4/7/2025)    Social Connection and Isolation Panel [NHANES]     Frequency of Communication with Friends and Family: More than three times a week     Frequency of Social Gatherings with Friends and Family: Three times a week     Attends Jewish Services: Never     Active Member of Clubs or Organizations: No     Attends Club or Organization Meetings: Never     Marital Status:    Intimate Partner Violence: Not At Risk (4/7/2025)    Humiliation, Afraid, Rape, and Kick questionnaire     Fear of Current or Ex-Partner: No     Emotionally Abused: No     Physically Abused: No     Sexually Abused: No   Housing Stability: Unknown (4/7/2025)    Housing Stability Vital Sign     Unable to Pay for Housing in the Last Year: No     Number of Times Moved in the Last Year: Not on file     Homeless in the Last Year: No        RX Allergies[4]      Review of Systems  Gen: No fatigue, anorexia, insomnia, fever.   Eyes: No vision loss, double vision, drainage, eye pain.   ENT: No pharyngitis, dry mouth, no hearing changes or ear discharge  Cardiac: No chest pain, palpitations, syncope, near syncope.   Pulmonary: No shortness of breath, cough, hemoptysis.   Heme/lymph: No swollen glands, fever, bleeding.   GI: No abdominal pain, change in bowel habits, melena, hematemesis, hematochezia, nausea, vomiting, diarrhea.   : No discharge, dysuria, frequency, urgency, hematuria.  Endo: No polyuria or weight loss.   Musculoskeletal: Negative for any pain or loss of ROM/weakness  Skin: No rashes or lesions  Neuro: Normal speech, no numbness  or weakness. No gait difficulties  Review of systems is otherwise negative unless stated above or in history of present illness.    Physical Exam:  Constitutional:  no distress, alert and cooperative  Eyes: clear sclera  Head/Neck: No apparent injury, trachea midline  Respiratory/Thorax: Patent airways, thorax symmetric, breathing comfortably  Cardiovascular: no pitting edema  Gastrointestinal: Nondistended  Musculoskeletal: ROM intact  Extremities: no clubbing  Neurological: alert, payne x4  Psychological: Appropriate affect    Results for orders placed or performed during the hospital encounter of 05/21/25 (from the past 24 hours)   POCT GLUCOSE   Result Value Ref Range    POCT Glucose 155 (H) 74 - 99 mg/dL        Rodrigue Palacios is a 69 y.o. year old male patient who presents for Procedure(s):  CYSTECTOMY, ROBOT-ASSISTED, LAPAROSCOPIC, WITH URETEROILEAL CONDUIT CREATION  NEPHRECTOMY, ROBOT-ASSISTED with James Borrero MD on 5/21/2025. Acute Pain consulted for assistance with pain control.     Plan:    - Bilateral single shot quadratus lumborum blocks performed pre-operatively 05/21/25  - Pain medications per primary team  - Will see on POD1 if inpatient    Acute Pain Resident  pg [phone] ph [phone]         [1]   Past Medical History:  Diagnosis Date    Arthritis     rhuematoid    Bladder cancer (Multi)     CKD (chronic kidney disease)     Dorsalgia, unspecified 07/28/2020    Back pain without radiation    GERD (gastroesophageal reflux disease)     controlled    Hearing aid worn     HL (hearing loss)     Hyperlipidemia     Hypertension     Local infection of the skin and subcutaneous tissue, unspecified 10/11/2013    Nonvenomous insect bite with infection    Nephrolithiasis     Other muscle spasm 02/14/2018    Muscle spasms of neck    Other specified diseases of anus and rectum 03/22/2013    Anal pain    Personal history of other diseases of the digestive system 09/18/2013    History of gastroenteritis    Personal  history of other diseases of the respiratory system 04/27/2020    History of acute bronchitis    Personal history of other diseases of the respiratory system 06/24/2019    History of upper respiratory infection    Personal history of other infectious and parasitic diseases     History of candidiasis of mouth    Personal history of other specified conditions 03/22/2013    History of abnormal weight loss    Personal history of other specified conditions 02/24/2014    History of numbness    Personal history of other specified conditions 07/14/2017    History of abdominal pain    Pneumonia, unspecified organism 01/25/2016    Pneumonia involving right lung    Rash and other nonspecific skin eruption 04/29/2013    Rash    Sleep apnea     CPAP    Type 2 diabetes mellitus    [2]   Past Surgical History:  Procedure Laterality Date    BLADDER SURGERY  2025    Removal malignant tumor    CHOLECYSTECTOMY  07/07/2016    Cholecystectomy    HERNIA REPAIR  07/07/2016    Inguinal Hernia Repair    SHOULDER SURGERY Right    [3]   Family History  Problem Relation Name Age of Onset    Ovarian cancer Mother      Other (asbestosis) Father     [4]   Allergies  Allergen Reactions    Orencia [Abatacept] Hives

## 2025-05-21 NOTE — OP NOTE
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]     Procedures  CYSTECTOMY, ROBOT-ASSISTED, LAPAROSCOPIC, WITH URETEROILEAL CONDUIT CREATION  63063 - CT UNLISTED LAPAROSCOPY PROCEDURE BLADDER    CYSTECTOMY, ROBOT-ASSISTED, LAPAROSCOPIC, WITH URETEROILEAL CONDUIT CREATION  30382 - CT URETEROILEAL CONDUIT W/INTESTINE ANASTOMOSIS    NEPHRECTOMY, ROBOT-ASSISTED  22012 - CT LAPAROSCOPY NEPHRECTOMY W/TOTAL URETERECTOMY      Surgeons   Panel 1:     * James Borrero - Primary  Panel 2:     * James Borrero - Primary    Resident/Fellow/Other Assistant:  Surgeons and Role:  Panel 1:     * Connor Russell MD - Resident - Assisting     * Jason Araujo MD - Resident - Observing     * ANUJ Eduardo-C - JESSICA First Assist  Panel 2:     * OZIEL EduardoC - JESSICA First Assist    Staff:   Circulator: Nguyen Remyub Person: Kayli  Scrub Person: Jayme Montaño Circulator: Ivy Montaño Scrub: Chinmay    Anesthesia Staff: Anesthesiologist: Long Gaston MD; Anjum Mattson MD  Anesthesia Resident: Alcon Jason MD; Adele Valderrama MD; Bam Jacques DO  Frontline Breaker: IVORY Ingram    Procedure Summary  Anesthesia: General  ASA: III  Estimated Blood Loss: 200 mL  Intra-op Medications:   Administrations occurring from 745 to  on 25:   Medication Name Total Dose   sodium chloride 0.9 % irrigation solution 3,000 mL   sterile water irrigation solution 1,000 mL   albumin human bottle 5% 250 mL   ceFAZolin  (Ancef) vial 1 g 4 g   dexAMETHasone (Decadron) 4 mg/mL 4 mg   droperidol (Inapsine) injection 0.625 mg   ertapenem (INVanz) 1 g 1 g   fentaNYL (Sublimaze) injection 50 mcg/mL 100 mcg   HYDROmorphone (Dilaudid) injection 1 mg/mL 1 mg   ketamine (Ketalar) 10 mg/mL injection 150 mg   LR bolus Cannot be calculated   lidocaine (Xylocaine) injection 2 % 200 mg   metroNIDAZOLE (Flagyl)  mg/100 mL (premix) 500 mg   midazolam PF (Versed) injection 1 mg/mL 2 mg   ondansetron 2 mg/mL 4 mg   phenylephrine 40 mcg/mL syringe 10 mL 160 mcg   propofol (Diprivan) injection 10 mg/mL 200 mg   rocuronium (ZeMuron) 50 mg/5 mL injection 240 mg   sugammadex (Bridion) 200 mg/2 mL injection 200 mg   heparin (porcine) injection 5,000 Units 5,000 Units              Anesthesia Record               Intraprocedure I/O Totals          Intake    Ketamine 0.00 mL    The total shown is the total volume documented since Anesthesia Start was filed.    LR bolus 3000.00 mL    Total Intake 3000 mL       Output    Urine 370 mL    Est. Blood Loss 200 mL    Total Output 570 mL       Net    Net Volume 2430 mL          Specimen:   ID Type Source Tests Collected by Time   1 : BILATERAL  PELVIC LYMPH NODES Tissue PELVIC LYMPH NODE DISSECTION BILATERAL SURGICAL PATHOLOGY EXAM Rashed JHONY Borrero MD 5/21/2025 1511   2 : BLADDER ,PROSTATE, AND SEMINAL VESICLES Tissue BLADDER CYSTOPROSTATECTOMY SURGICAL PATHOLOGY EXAM Rashed JHONY Borrero MD 5/21/2025 1513   3 : KIDNEY RADICAL NEPHROURETERECTOMY RIGHT Tissue KIDNEY RADICAL NEPHROURETERECTOMY RIGHT SURGICAL PATHOLOGY EXAM Rashed JHONY Borrero MD 5/21/2025 1647   4 : LEFT URETER Tissue URETER RESECTION LEFT SURGICAL PATHOLOGY EXAM Rashed JHONY Borrero MD 5/21/2025 1648                 Drains and/or Catheters:   Closed/Suction Drain LUQ Bulb (Active)   Dressing Status Clean;Dry 05/21/25 1737       Urostomy RUQ (Active)   Stomal Appliance 2 piece 05/21/25 1740   Site/Stoma Assessment Clean;Intact 05/21/25 1740        [REMOVED] Urethral Catheter Non-latex 16 Fr. (Removed)       [REMOVED] Urethral Catheter Non-latex 16 Fr. (Removed)       Tourniquet Times:         Implants:  Implants       Type Name Action Serial No.      Stent STENT,  URETERAL DIVERSION 7FR 75 CM - [id number] Implanted               Findings: Uncomplicated right simple nephrectomy (adrenal sparing). No significant evidence of retained ginger-renal hematoma. Fairly uncomplicated radical cystectomy. No obvious concerning lymph nodes. Otherwise grossly negative margins. Successful bowel-bowel anastamosis. Negative leak test of left ureteroileal anastamosis.     Indications: Rodrigue Palacios is an 69 y.o. 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     The patient was seen in the preoperative area. The risks, benefits, complications, treatment options, non-operative alternatives, expected recovery and outcomes were discussed with the patient. The possibilities of reaction to medication, pulmonary aspiration, injury to surrounding structures, bleeding, recurrent infection, the need for additional procedures, failure to diagnose a condition, and creating a complication requiring transfusion or operation were discussed with the patient. The patient concurred with the proposed plan, giving informed consent.  The site of surgery was properly noted/marked if necessary per policy. The patient has been actively warmed in preoperative area. Preoperative antibiotics have been ordered and given within 1 hours of incision. Venous thrombosis prophylaxis have been ordered including bilateral sequential compression devices    Procedure Details:     Patient was consented and antibiotics were administered on call to the operating room. Once in the operating theater, general endotracheal anesthesia was established. Patient was positioned in left lateral decubitus  position, right flank up, secured to the table.  All pressure points were padded, abdomen was prepped and draped.  Timeout was performed.  Aquino catheter was placed sterilely. Peritoneal cavity was insufflated with a Veress needle, entered with an 8-mm Visiport trocar superolateral to the umbilicus.  The rest of the robotic and assistant ports were placed under direct vision. The liver was retracted using a laparoscopic Soto clamp that was attached to the abdominal side wall underneath the liver. Next, we carefully mobilized the ascending colon medially.  The plane was ultimately developed in an avascular fashion.  The bowel itself looked unremarkable and not inflamed.  Next, we carefully identified the ureter and gonadal vessels.  We were able to develop the plane between the right gonadal vein and the ureter, and lifted the ureter up while leaving the gonadal vein down, dissecting cranially until we encountered the right renal vein and renal artery. Of note, we mobilized the colon as well as the ureter fairly caudad as this would later assist in our dissection of the ureter during the cystectomy portion of the procedure.  The artery and vein were dissected out and stapled with an endovascular stapler.  We then dissected the adrenal away from the kidney, again placing clips in a suprahilar location to control all the communicating branches between the kidney and adrenal.  Once this was done, we were able to take the lateral attachments. We later transected the right ureter in between clips. Once the specimen was completely divided, the renal fossa was irrigated copiously, hemostasis was ensured, and hemostatic products were placed on the hilum and adrenal as well as under the liver. The specimen was left in the right paracolic gutter for later retrieval. We then removed all of our robotic instruments, undocked the robot and removed the ports and continued with the cystectomy portion of the procedure. Aquino  catheter was removed during this portion of the case.    The patient was repositioned supine. He was then prepped and draped again.   Aquino catheter was placed sterilely in the field again.  We were able to utilize one of our prior port incisions to re-insufflate the abdomen by carefully placing a robotic port.  In order to maintain insufflation, the additional port sites we wouldn't utilized were closed with staples and covered with tegaderm. The 12 mm assistant air seal port had fascia that was closed with 0-vicryl suture prior to stapling. The rest of the robotic and assistant ports were then placed under direct vision in the standard fashion with the camera port cephalad to the umbilicus in the midline. We identified the left ureter. The left ureter was traced and dissected down to ureterovesical junction, clipped, and transected between clips. Bilateral pedicles of the bladder was thickened and highly vascularized, and these were sealed and divided with the vessel sealer device. We developed a plane between the bladder and the rectum, then prostate and the rectum, and then between the bladder and the pelvic vessels on both sides. This was continued all the way down to the periurethral tissues bilaterally.  The bladder was then intraperitonealized by dividing the urachus, and median umbilical ligaments, placed in traction.  Dorsal venous complex was oversewn with 0 V-lock and transected.  Prostatic urethral junction was defined, and urethra was dissected through the perineal membrane.  Catheter was taken out, an x-large hemo-thu clip was placed across the urethra, and the urethra was transected distal to the clip, essentially removing the membranous urethra en-bloc with the specimen. Hemostasis was good. We removed lymphatic tissue overlying external, internal, common iliac lymph nodes and obturator fossa bilaterally and placed those in an EndoCatch bag.  We used clips and/or bipolar device to secure lymphatic  and/or vascular channels.  We brought the left ureter to the right side of retroperitoneum underneath an avascular plane underneath the sigmoid mesentery, but anterior to the common iliac vessels. A 19 Lokesh drain was placed through one of the left ports and placed in the pelvis, and secured using 2-0 nylon suture. We undocked the robot, watched all the ports out, enlarged the camera port site cephalad and ginger-umbically to extract the specimen, which included the bladder, prostate, seminal vesicles, as well as the right kidney and ureter.  We harvested a 15 cm portion of terminal ileum, 20 cm proximal to the ileocecal valve, using a KAYLA stapler device.  Side-to-side continuity with an 80mm KAYLA was reestablished, and the defect was sealed with a 60mm TA device, and the suture line was imbricated with interrupted 3-0 vicryl sutures. The mesenteric window was closed.  The left ureter was then spatulated widely after removal of any excess ureter to achieve a tension-free anastomosis and anastomosed to the proximal portion of the conduit with two running 4-0 PDS absorbable sutures. The anastomosis were tested for leak by overfilling the conduit. Once this was done, we excised a portion of the skin predetermined for the stoma, matured to skin with 2-0 vicryl absorbable suture.  Left stent was secured to the skin with 3-0 nylon suture.  The midline incision was closed with running #1 PDS suture.  SubQ tissue was closed with Vicryl on the larger incisions, and the skin with staples due to infection risk.  Appliance was placed.  The patient was extubated and taken to the PACU in stable condition.        Evidence of Infection: No   Complications:  None; patient tolerated the procedure well.    Disposition: PACU - hemodynamically stable.  Condition: stable             Task Performed by JESSICA First Assist or Physician Assistant:   Kathryn LESLIE/NP, was necessary to assist on this case due to the nature of the case and  difficulty. During the case Kathryn served as my assist by bedsiding      Attending Attestation: I was present and scrubbed for the entire procedure.    James Borrero  Phone Number: [phone]

## 2025-05-21 NOTE — ANESTHESIA PROCEDURE NOTES
Arterial Line:    Date/Time: 5/21/2025 8:40 AM    Staffing  Performed: resident   Authorized by: Anjum Mattson MD    Performed by: Bam Jacques DO    An arterial line was placed. Procedure performed using surface landmarks.in the OR for the following indication(s): continuous blood pressure monitoring and blood sampling needed.    A 20 gauge (size), 1 and 3/4 inch (length) (type) catheter was placed into the Left radial artery, secured by tapeEvents:  patient tolerated procedure well with no complications.

## 2025-05-22 LAB
ANION GAP SERPL CALC-SCNC: 12 MMOL/L (ref 10–20)
BUN SERPL-MCNC: 27 MG/DL (ref 6–23)
CALCIUM SERPL-MCNC: 8.3 MG/DL (ref 8.6–10.6)
CHLORIDE SERPL-SCNC: 110 MMOL/L (ref 98–107)
CO2 SERPL-SCNC: 22 MMOL/L (ref 21–32)
CREAT SERPL-MCNC: 1.61 MG/DL (ref 0.5–1.3)
EGFRCR SERPLBLD CKD-EPI 2021: 46 ML/MIN/1.73M*2
ERYTHROCYTE [DISTWIDTH] IN BLOOD BY AUTOMATED COUNT: 14 % (ref 11.5–14.5)
GLUCOSE BLD MANUAL STRIP-MCNC: 125 MG/DL (ref 74–99)
GLUCOSE BLD MANUAL STRIP-MCNC: 167 MG/DL (ref 74–99)
GLUCOSE SERPL-MCNC: 150 MG/DL (ref 74–99)
HCT VFR BLD AUTO: 32.9 % (ref 41–52)
HGB BLD-MCNC: 10.4 G/DL (ref 13.5–17.5)
MCH RBC QN AUTO: 27.8 PG (ref 26–34)
MCHC RBC AUTO-ENTMCNC: 31.6 G/DL (ref 32–36)
MCV RBC AUTO: 88 FL (ref 80–100)
NRBC BLD-RTO: 0 /100 WBCS (ref 0–0)
PLATELET # BLD AUTO: 223 X10*3/UL (ref 150–450)
POTASSIUM SERPL-SCNC: 4 MMOL/L (ref 3.5–5.3)
RBC # BLD AUTO: 3.74 X10*6/UL (ref 4.5–5.9)
SODIUM SERPL-SCNC: 140 MMOL/L (ref 136–145)
WBC # BLD AUTO: 11.5 X10*3/UL (ref 4.4–11.3)

## 2025-05-22 PROCEDURE — 85027 COMPLETE CBC AUTOMATED: CPT | Performed by: STUDENT IN AN ORGANIZED HEALTH CARE EDUCATION/TRAINING PROGRAM

## 2025-05-22 PROCEDURE — 99231 SBSQ HOSP IP/OBS SF/LOW 25: CPT

## 2025-05-22 PROCEDURE — 97165 OT EVAL LOW COMPLEX 30 MIN: CPT | Mod: GO

## 2025-05-22 PROCEDURE — 97535 SELF CARE MNGMENT TRAINING: CPT | Mod: GO

## 2025-05-22 PROCEDURE — 2500000004 HC RX 250 GENERAL PHARMACY W/ HCPCS (ALT 636 FOR OP/ED): Performed by: STUDENT IN AN ORGANIZED HEALTH CARE EDUCATION/TRAINING PROGRAM

## 2025-05-22 PROCEDURE — 80048 BASIC METABOLIC PNL TOTAL CA: CPT | Performed by: STUDENT IN AN ORGANIZED HEALTH CARE EDUCATION/TRAINING PROGRAM

## 2025-05-22 PROCEDURE — 97116 GAIT TRAINING THERAPY: CPT | Mod: GP | Performed by: PHYSICAL THERAPIST

## 2025-05-22 PROCEDURE — 1170000001 HC PRIVATE ONCOLOGY ROOM DAILY

## 2025-05-22 PROCEDURE — 97161 PT EVAL LOW COMPLEX 20 MIN: CPT | Mod: GP | Performed by: PHYSICAL THERAPIST

## 2025-05-22 PROCEDURE — 2500000001 HC RX 250 WO HCPCS SELF ADMINISTERED DRUGS (ALT 637 FOR MEDICARE OP): Performed by: STUDENT IN AN ORGANIZED HEALTH CARE EDUCATION/TRAINING PROGRAM

## 2025-05-22 PROCEDURE — 36415 COLL VENOUS BLD VENIPUNCTURE: CPT | Performed by: STUDENT IN AN ORGANIZED HEALTH CARE EDUCATION/TRAINING PROGRAM

## 2025-05-22 PROCEDURE — 2500000002 HC RX 250 W HCPCS SELF ADMINISTERED DRUGS (ALT 637 FOR MEDICARE OP, ALT 636 FOR OP/ED): Performed by: STUDENT IN AN ORGANIZED HEALTH CARE EDUCATION/TRAINING PROGRAM

## 2025-05-22 PROCEDURE — 82947 ASSAY GLUCOSE BLOOD QUANT: CPT

## 2025-05-22 RX ADMIN — HEPARIN SODIUM 5000 UNITS: 5000 INJECTION, SOLUTION INTRAVENOUS; SUBCUTANEOUS at 13:07

## 2025-05-22 RX ADMIN — SODIUM CHLORIDE 100 ML/HR: 0.9 INJECTION, SOLUTION INTRAVENOUS at 17:13

## 2025-05-22 RX ADMIN — ALVIMOPAN 12 MG: 12 CAPSULE ORAL at 20:06

## 2025-05-22 RX ADMIN — INSULIN LISPRO 2 UNITS: 100 INJECTION, SOLUTION INTRAVENOUS; SUBCUTANEOUS at 10:19

## 2025-05-22 RX ADMIN — SODIUM CHLORIDE 100 ML/HR: 0.9 INJECTION, SOLUTION INTRAVENOUS at 07:20

## 2025-05-22 RX ADMIN — MONTELUKAST 10 MG: 10 TABLET, FILM COATED ORAL at 20:06

## 2025-05-22 RX ADMIN — ACETAMINOPHEN 975 MG: 325 TABLET ORAL at 20:06

## 2025-05-22 RX ADMIN — ERTAPENEM SODIUM 1 G: 1 INJECTION INTRAMUSCULAR; INTRAVENOUS at 20:07

## 2025-05-22 RX ADMIN — OXYCODONE 10 MG: 5 TABLET ORAL at 20:06

## 2025-05-22 RX ADMIN — HEPARIN SODIUM 5000 UNITS: 5000 INJECTION, SOLUTION INTRAVENOUS; SUBCUTANEOUS at 05:39

## 2025-05-22 RX ADMIN — PANTOPRAZOLE SODIUM 40 MG: 40 TABLET, DELAYED RELEASE ORAL at 10:04

## 2025-05-22 RX ADMIN — ACETAMINOPHEN 975 MG: 325 TABLET ORAL at 10:02

## 2025-05-22 RX ADMIN — OXYCODONE 10 MG: 5 TABLET ORAL at 10:03

## 2025-05-22 RX ADMIN — TRAZODONE HYDROCHLORIDE 50 MG: 50 TABLET ORAL at 20:06

## 2025-05-22 RX ADMIN — POLYETHYLENE GLYCOL 3350 17 G: 17 POWDER, FOR SOLUTION ORAL at 10:01

## 2025-05-22 RX ADMIN — HEPARIN SODIUM 5000 UNITS: 5000 INJECTION, SOLUTION INTRAVENOUS; SUBCUTANEOUS at 20:07

## 2025-05-22 RX ADMIN — ATORVASTATIN CALCIUM 40 MG: 40 TABLET, FILM COATED ORAL at 10:03

## 2025-05-22 RX ADMIN — ALVIMOPAN 12 MG: 12 CAPSULE ORAL at 10:04

## 2025-05-22 RX ADMIN — OXYCODONE 10 MG: 5 TABLET ORAL at 05:38

## 2025-05-22 ASSESSMENT — COGNITIVE AND FUNCTIONAL STATUS - GENERAL
MOVING TO AND FROM BED TO CHAIR: A LITTLE
MOBILITY SCORE: 18
WALKING IN HOSPITAL ROOM: A LITTLE
TOILETING: A LITTLE
DRESSING REGULAR LOWER BODY CLOTHING: A LITTLE
DRESSING REGULAR LOWER BODY CLOTHING: A LITTLE
TOILETING: A LITTLE
MOBILITY SCORE: 24
PERSONAL GROOMING: A LITTLE
EATING MEALS: A LITTLE
MOVING FROM LYING ON BACK TO SITTING ON SIDE OF FLAT BED WITH BEDRAILS: A LITTLE
HELP NEEDED FOR BATHING: A LITTLE
MOBILITY SCORE: 24
DAILY ACTIVITIY SCORE: 19
DRESSING REGULAR UPPER BODY CLOTHING: A LITTLE
DAILY ACTIVITIY SCORE: 18
CLIMB 3 TO 5 STEPS WITH RAILING: A LITTLE
DRESSING REGULAR UPPER BODY CLOTHING: A LITTLE
PERSONAL GROOMING: A LITTLE
TURNING FROM BACK TO SIDE WHILE IN FLAT BAD: A LITTLE
STANDING UP FROM CHAIR USING ARMS: A LITTLE
HELP NEEDED FOR BATHING: A LITTLE

## 2025-05-22 ASSESSMENT — PAIN - FUNCTIONAL ASSESSMENT
PAIN_FUNCTIONAL_ASSESSMENT: 0-10

## 2025-05-22 ASSESSMENT — ACTIVITIES OF DAILY LIVING (ADL)
ADL_ASSISTANCE: INDEPENDENT
HOME_MANAGEMENT_TIME_ENTRY: 8
ADL_ASSISTANCE: INDEPENDENT
BATHING_ASSISTANCE: MINIMAL

## 2025-05-22 ASSESSMENT — PAIN SCALES - GENERAL
PAINLEVEL_OUTOF10: 5 - MODERATE PAIN
PAINLEVEL_OUTOF10: 8
PAINLEVEL_OUTOF10: 2
PAINLEVEL_OUTOF10: 8
PAINLEVEL_OUTOF10: 7
PAINLEVEL_OUTOF10: 5 - MODERATE PAIN
PAINLEVEL_OUTOF10: 8
PAINLEVEL_OUTOF10: 6
PAINLEVEL_OUTOF10: 8

## 2025-05-22 ASSESSMENT — PAIN DESCRIPTION - LOCATION
LOCATION: ABDOMEN
LOCATION: ABDOMEN

## 2025-05-22 NOTE — PROGRESS NOTES
Postop Pain HPI -   Palliative: relieved with IV analgesics and regional local anesthetics  Provocative: movement  Quality:  burning and aching  Radiation:  none  Severity:  7/10  Timing: constant    24-HOUR OPIOID CONSUMPTION:  Oxy 10 mg    Scheduled medications  Scheduled Medications[1]  Continuous medications  Continuous Medications[2]  PRN medications  PRN Medications[3]     Physical Exam:  Constitutional:  no distress, alert and cooperative  Eyes: clear sclera  Head/Neck: No apparent injury, trachea midline  Respiratory/Thorax: Patent airways, thorax symmetric, breathing comfortably  Cardiovascular: no pitting edema  Gastrointestinal: Nondistended  Musculoskeletal: ROM intact  Extremities: no clubbing  Neurological: alert, payne x4  Psychological: Appropriate affect    Results for orders placed or performed during the hospital encounter of 05/21/25 (from the past 24 hours)   POCT GLUCOSE   Result Value Ref Range    POCT Glucose 252 (H) 74 - 99 mg/dL   CBC   Result Value Ref Range    WBC 13.9 (H) 4.4 - 11.3 x10*3/uL    nRBC 0.0 0.0 - 0.0 /100 WBCs    RBC 4.02 (L) 4.50 - 5.90 x10*6/uL    Hemoglobin 11.3 (L) 13.5 - 17.5 g/dL    Hematocrit 36.2 (L) 41.0 - 52.0 %    MCV 90 80 - 100 fL    MCH 28.1 26.0 - 34.0 pg    MCHC 31.2 (L) 32.0 - 36.0 g/dL    RDW 13.6 11.5 - 14.5 %    Platelets 250 150 - 450 x10*3/uL   Renal function panel   Result Value Ref Range    Glucose 281 (H) 74 - 99 mg/dL    Sodium 139 136 - 145 mmol/L    Potassium 4.6 3.5 - 5.3 mmol/L    Chloride 107 98 - 107 mmol/L    Bicarbonate 20 (L) 21 - 32 mmol/L    Anion Gap 17 10 - 20 mmol/L    Urea Nitrogen 28 (H) 6 - 23 mg/dL    Creatinine 1.82 (H) 0.50 - 1.30 mg/dL    eGFR 40 (L) >60 mL/min/1.73m*2    Calcium 8.4 (L) 8.6 - 10.6 mg/dL    Phosphorus 4.3 2.5 - 4.9 mg/dL    Albumin 3.9 3.4 - 5.0 g/dL   CBC   Result Value Ref Range    WBC 11.5 (H) 4.4 - 11.3 x10*3/uL    nRBC 0.0 0.0 - 0.0 /100 WBCs    RBC 3.74 (L) 4.50 - 5.90 x10*6/uL    Hemoglobin 10.4 (L) 13.5 -  17.5 g/dL    Hematocrit 32.9 (L) 41.0 - 52.0 %    MCV 88 80 - 100 fL    MCH 27.8 26.0 - 34.0 pg    MCHC 31.6 (L) 32.0 - 36.0 g/dL    RDW 14.0 11.5 - 14.5 %    Platelets 223 150 - 450 x10*3/uL   Basic metabolic panel   Result Value Ref Range    Glucose 150 (H) 74 - 99 mg/dL    Sodium 140 136 - 145 mmol/L    Potassium 4.0 3.5 - 5.3 mmol/L    Chloride 110 (H) 98 - 107 mmol/L    Bicarbonate 22 21 - 32 mmol/L    Anion Gap 12 10 - 20 mmol/L    Urea Nitrogen 27 (H) 6 - 23 mg/dL    Creatinine 1.61 (H) 0.50 - 1.30 mg/dL    eGFR 46 (L) >60 mL/min/1.73m*2    Calcium 8.3 (L) 8.6 - 10.6 mg/dL   POCT GLUCOSE   Result Value Ref Range    POCT Glucose 167 (H) 74 - 99 mg/dL      Plan:     - Bilateral single shot quadratus lumborum blocks performed pre-operatively 05/21/25  - - Gabapentin 300mg at bedtime  - Lidocaine patches around affected site as needed  - IV Toradol 30mg Q6H for 6 doses per surgical service  - IV Mg 2g Q8H x 3 doses  - Continuous pulse ox     Acute pain will sign off.      Acute Pain Resident  pg [phone] ph [phone]          [1] acetaminophen, 975 mg, oral, q6h  alvimopan, 12 mg, oral, BID  atorvastatin, 40 mg, oral, Daily  ertapenem, 1 g, intravenous, q24h  heparin (porcine), 5,000 Units, subcutaneous, q8h  insulin lispro, 0-10 Units, subcutaneous, TID AC  montelukast, 10 mg, oral, Nightly  pantoprazole, 40 mg, oral, Daily  polyethylene glycol, 17 g, oral, Daily  traZODone, 50 mg, oral, Nightly  [2] sodium chloride 0.9%, 100 mL/hr, Last Rate: 100 mL/hr (05/22/25 0720)  [3] PRN medications: HYDROmorphone, naloxone, ondansetron **OR** ondansetron, oxyCODONE, oxyCODONE, oxygen

## 2025-05-22 NOTE — CARE PLAN
The patient's goals for the shift include      The clinical goals for the shift include pain control   Problem: Pain  Goal: Takes deep breaths with improved pain control throughout the shift  Outcome: Progressing  Goal: Turns in bed with improved pain control throughout the shift  Outcome: Progressing  Goal: Walks with improved pain control throughout the shift  Outcome: Progressing  Goal: Performs ADL's with improved pain control throughout shift  Outcome: Progressing  Goal: Participates in PT with improved pain control throughout the shift  Outcome: Progressing  Goal: Free from opioid side effects throughout the shift  Outcome: Progressing  Goal: Free from acute confusion related to pain meds throughout the shift  Outcome: Progressing

## 2025-05-22 NOTE — PROGRESS NOTES
Mr. Rodrigue Palacios is a 68 y/o M w/PMH HTN, HLD, murmur, SCOOTER (CPAP), EF 60%, CKD, stones, DM2, GERD, RA, atrophic R kidney and BC. Patient is now POD 0 s/p RAL right Nx and RARC+IC ([place]) on 5/21.  Patient is progressing well for POD 1. Continue limited sips and chips and encourage ambulation today. ADOD 5/26, [facility]-PT/OT/SN.     Care Transitions Note 5/22 @ 3:39pm     Plan per Medical/Surgical Team: progressing well, limited sips and chips, encourage ambulation  Status: inpatient   Payor Source: Medicare part A & B, [employer]  Discharge disposition: home with [facility]  Expected date of discharge: 5/26  Barriers: none   PCP / Primary Oncologist: Daxa Moreland CNP   Preferred Pharmacy: [facility]   Preferred home care agency: [facility]  DME: Crutches; would like [facility] as recommended by PT     Met with patient at bedside, introduced self and role. Patient lives at home alone, but has a college son who is home temporarily to assist with care as well as a close friend. Patient would like a [facility] as recommended by PT, will send referral. Patient reports he will have a ride home at discharge. No falls reported at home. Demographics and insurance verifed with patient. Patient would like [facility] for SN/PT/OT at discharge. Will continue to follow for any discharge planning needs. Olga Neri RN TCC

## 2025-05-22 NOTE — SIGNIFICANT EVENT
Postoperative Check    Subjective:  70 y/o M w/PMH HTN, HLD, murmur, SCOOTER (CPAP), EF 60%, CKD, stones, DM2, GERD, RA, atrophic R kidney and BC. Patient is now POD 0 s/p RAL right Nx and RARC+IC ([facility]) 5/21 . Patient reports that abdominal pain is well controlled. They deny N/V, dizziness, lightheadedness, Chest pain, SOA, or any other complaints.     Objective:  Visit Vitals  /72   Pulse 93   Temp 37.1 °C (98.8 °F)   Resp 16      I/O last 3 completed shifts:  In: 3000 [IV Piggyback:3000]  Out: 735 [Urine:520; Drains:15; Blood:200]    Physical Exam  General: laying in bed, NAD  Head: normocephalic, atraumatic  ENT: mucosa are moist   Respiratory: nonlabored breathing on nasal cannula   CV: RRR  GI: abdomen is soft, nontender, nondistended, Incisions CDI dressed with island dressings, MANJU with s/s output  : IC pink, patent, well perfused with single stent in place draining clear yellow urine  MSK: MAI  Extremities: no swelling or deformity of b/l LEs   Neuro: CN II-XII grossly intact. Sensation to light touch intact    A/P:   Patient is POD 0 s/p RARC+IC ([facility]) 5/21.     PACU labs as follows: Cr- 1.82 (1.5), WBC- 13.9, Hgb- 11.3 (11.1)  PACU KUB: left stent in good position on my review    Neuro:   -Multimodal pain control via scheduled tylenol, lidoderm patches, robaxin, PRN oxycodone/dilaudid, melatonin. Continue home trazodone    CV:  -Monitor vitals  -AM CBC  -Continue home atorvastatin    Pulm:  -IS  -Wean O2  -Home singulair  -RT for PM CPAP    GI:  - Sips and chips, bowel regimen, PRN ondansetron, entereg till ROBF    :  -Monitor I/O  - NS @ 100 ml/hr  -AM BMP  -WOCN    Endo:   -SSI lispro    ID:   Ertapenem 5d (5/25)    Prophy:  - SQH  - Pantoprazole    Dispo:  - RNF    Patient is recovering well postoperatively. Will continue to monitor.       Deepika Gaston MD  PGY4 Urology  [id number]

## 2025-05-22 NOTE — CONSULTS
Wound Care Consult     Visit Date: 2025      Patient Name: Rodrigue Palacios         MRN: [id number]             Reason for Consult: assess ileal conduit/ education         Wound History: Patient  w/PMH HTN, HLD, murmur, SCOOTER (CPAP), EF 60%, CKD, stones, DM2, GERD, RA, atrophic R kidney and BC. Patient is now POD 1 s/p RAL right Nx and RARC+IC ([place])       Pertinent Labs:       Assessment:                  Urostomy RUQ (Active)   Placement Date/Time: 25 1634   Placed by: [place]  Hand Hygiene Completed: Yes  Location: RUQ   Number of days: 1      Urostomy RUQ (Active)   Stomal Appliance 2 piece;Clean;Dry;Intact 25 1033   Site/Stoma Assessment Clean;Intact 25 1033   Peristomal Assessment Clean;Intact 25 1033   Treatment Site care 25 1030   Output (mL) 0 mL 25 1515   Ostomy type: ileal conduit         color: red      protruding: budded with 1 stent intact   Functioning: blood tinged urine   Mucocutaneous junction: intact  Peristomal skin: KAMI  Pouchin piece pouch placed OR   Ostomy Education: Initiated basic ostomy teaching. Will initiate pouch change on    Plan: assess stoma/pouching     Wound Plan: Ostomy Team will follow       Flaquita MENDEZ   2025  5:12 PM

## 2025-05-22 NOTE — CARE PLAN
The clinical goals for the shift include Patient will remain safe  Problem: Pain  Goal: Takes deep breaths with improved pain control throughout the shift  5/22/2025 1508 by Cee Pham RN  Outcome: Progressing  5/22/2025 1507 by Cee Pham RN  Outcome: Progressing  Goal: Turns in bed with improved pain control throughout the shift  5/22/2025 1508 by Cee Pham RN  Outcome: Progressing  5/22/2025 1507 by Cee Pham RN  Outcome: Progressing  Goal: Walks with improved pain control throughout the shift  5/22/2025 1508 by Cee Pham RN  Outcome: Progressing  5/22/2025 1507 by Cee Pham RN  Outcome: Progressing  Goal: Performs ADL's with improved pain control throughout shift  5/22/2025 1508 by Cee Pham RN  Outcome: Progressing  5/22/2025 1507 by Cee Pham RN  Outcome: Progressing  Goal: Participates in PT with improved pain control throughout the shift  5/22/2025 1508 by Cee Pham RN  Outcome: Progressing  5/22/2025 1507 by Cee Pham RN  Outcome: Progressing  Goal: Free from opioid side effects throughout the shift  5/22/2025 1508 by Cee Pham RN  Outcome: Progressing  5/22/2025 1507 by Cee Pham RN  Outcome: Progressing  Goal: Free from acute confusion related to pain meds throughout the shift  5/22/2025 1508 by Cee Pham RN  Outcome: Progressing  5/22/2025 1507 by Cee Pham RN  Outcome: Progressing     Problem: Pain - Adult  Goal: Verbalizes/displays adequate comfort level or baseline comfort level  5/22/2025 1508 by Cee Pham RN  Outcome: Progressing  5/22/2025 1507 by Cee Pham RN  Outcome: Progressing     Problem: Safety - Adult  Goal: Free from fall injury  5/22/2025 1508 by Cee Pham RN  Outcome: Progressing  5/22/2025 1507 by Cee Pham RN  Outcome: Progressing     Problem: Discharge Planning  Goal: Discharge to home or other facility with appropriate resources  5/22/2025 1508 by Cee Pham, RN  Outcome:  Progressing  5/22/2025 1507 by Cee Pham RN  Outcome: Progressing     Problem: Chronic Conditions and Co-morbidities  Goal: Patient's chronic conditions and co-morbidity symptoms are monitored and maintained or improved  5/22/2025 1508 by Cee Pham RN  Outcome: Progressing  5/22/2025 1507 by Cee Pham RN  Outcome: Progressing     Problem: Nutrition  Goal: Nutrient intake appropriate for maintaining nutritional needs  5/22/2025 1508 by Cee Pham RN  Outcome: Progressing  5/22/2025 1507 by Cee Pham RN  Outcome: Progressing   The patient's goals for the shift include

## 2025-05-22 NOTE — PROGRESS NOTES
Physical Therapy    Physical Therapy Evaluation & Treatment    Patient Name: Rodrigue Palacios  MRN: [id number]  Department: [facility]  Room: [facility]5024-A  Today's Date: 5/22/2025   Time Calculation  Start Time: 1013  Stop Time: 1036  Time Calculation (min): 23 min    Assessment/Plan   PT Assessment  PT Assessment Results: Decreased strength, Decreased endurance, Decreased mobility, Pain  Rehab Prognosis: Good  Barriers to Discharge Home: No anticipated barriers  Evaluation/Treatment Tolerance: Patient limited by pain  Medical Staff Made Aware: Yes  End of Session Communication: Bedside nurse  Assessment Comment: Pt presents to physical therapy following admission for laparoscopic cycsectomy. Pt participated in gait assessment and training and required CGA for this task. Pt would benefit from continued skilled physical therapy to improve voerall functional mobility.  End of Session Patient Position: Up in chair, Alarm off, not on at start of session   IP OR SWING BED PT PLAN  Inpatient or Swing Bed: Inpatient  PT Plan  Treatment/Interventions: Bed mobility, Transfer training, Gait training, Balance training, Stair training, Neuromuscular re-education, Range of motion, Strengthening, Endurance training, Therapeutic exercise, Therapeutic activity, Home exercise program, Postural re-education, Positioning  PT Plan: Ongoing PT  PT Frequency: 3 times per week  PT Discharge Recommendations: Low intensity level of continued care  Equipment Recommended upon Discharge: Wheeled walker  PT Recommended Transfer Status: Assist x1  PT - OK to Discharge: Yes (Once medically cleared)      Subjective     PT Visit Info:  PT Received On: 05/22/25  General Visit Information:  General  Reason for Referral: s/p RAL right Nx and RARC+IC ([facility]) on 5/21  Referred By: HTN, HLD, murmur, SCOOTER (CPAP), EF 60%, CKD, stones, DM2, GERD, RA,  Family/Caregiver Present: No  Co-Treatment: OT  Co-Treatment Reason: To maximize safety and functional  mobility  Prior to Session Communication: Bedside nurse  Patient Position Received: Bed, 3 rail up, Alarm off, not on at start of session  Preferred Learning Style: verbal  General Comment: Pt was agreeable to therapy  Home Living:  Home Living  Type of Home: House  Lives With: Alone  Home Adaptive Equipment: None  Home Layout: Multi-level, Bed/bath upstairs, Full bath main level  Home Access: Stairs to enter without rails  Entrance Stairs-Rails: None  Entrance Stairs-Number of Steps: 3  Bathroom Shower/Tub:  (tub shower 2nd floor, WIS 1st floor)  Bathroom Toilet: Standard  Bathroom Equipment: None  Prior Level of Function:  Prior Function Per Pt/Caregiver Report  Level of [place]: Independent with ADLs and functional transfers, Independent with homemaking with ambulation  ADL Assistance: Independent  Homemaking Assistance: Independent  Ambulatory Assistance: Independent (without AD)  Vocational: Retired  Precautions:  Precautions  Medical Precautions: Abdominal precautions  Post-Surgical Precautions: Abdominal surgery precautions        Objective   Pain:  Pain Assessment  Pain Assessment: 0-10  0-10 (Numeric) Pain Score: 8  Pain Location: Abdomen  Pain Interventions: Repositioned  Cognition:  Cognition  Orientation Level: Oriented X4    General Assessments:         Activity Tolerance  Endurance: Tolerates 10 - 20 min exercise with multiple rests    Sensation  Light Touch: No apparent deficits    Strength  Strength Comments: Pt demonstrated >3/5 strength when assessed with functional observation  Static Sitting Balance  Static Sitting-Balance Support: Feet supported, Bilateral upper extremity supported  Static Sitting-Level of Assistance: Close supervision  Dynamic Sitting Balance  Dynamic Sitting-Balance Support: Feet supported, Bilateral upper extremity supported  Dynamic Sitting-Level of Assistance: Close supervision    Static Standing Balance  Static Standing-Balance Support: Bilateral upper extremity  supported  Static Standing-Level of Assistance: Contact guard  Dynamic Standing Balance  Dynamic Standing-Balance Support: Bilateral upper extremity supported  Dynamic Standing-Level of Assistance: Contact guard  Functional Assessments:  Bed Mobility  Bed Mobility: Yes  Bed Mobility 1  Bed Mobility 1: Supine to sitting  Level of Assistance 1: Minimum assistance  Bed Mobility Comments 1: Performed with HOB elevated and use of handrails, cues for logroll technique    Transfers  Transfer: Yes  Transfer 1  Transfer From 1: Sit to  Transfer to 1: Stand  Technique 1: Sit to stand  Transfer Device 1: Walker  Transfer Level of Assistance 1: Minimum assistance  Trials/Comments 1: x1 1st attempt  Transfers 2  Transfer From 2: Stand to  Transfer to 2: Sit  Technique 2: Stand to sit, Sit to stand  Transfer Device 2: Walker  Transfer Level of Assistance 2: Contact guard  Trials/Comments 2: x2 trials during session  Transfers 3  Transfer From 3: Toilet to  Transfer to 3: Stand  Technique 3: Sit to stand  Transfer Device 3: Walker  Transfer Level of Assistance 3: Contact guard    Ambulation/Gait Training  Ambulation/Gait Training Performed: Yes  Ambulation/Gait Training 1  Surface 1: Level tile  Device 1: Rolling walker  Assistance 1: Contact guard  Quality of Gait 1: Forward flexed posture, Decreased step length  Comments/Distance (ft) 1: 20 feet    Treatments:  Ambulation/Gait Training  Ambulation/Gait Training Performed: Yes    Device 2: Rolling walker  Assistance 2: Contact guard  Quality of Gait 2: Decreased step length, Forward flexed posture  Comments/Distance (ft) 2: Pt ambualted an additional 30 feet during gait trial for treatment. Pt educated on upright posture, assistive device recommendations and activity recommendations.  Outcome Measures:  [facility] Basic Mobility  Turning from your back to your side while in a flat bed without using bedrails: A little  Moving from lying on your back to sitting on the side of a flat bed  without using bedrails: A little  Moving to and from bed to chair (including a wheelchair): A little  Standing up from a chair using your arms (e.g. wheelchair or bedside chair): A little  To walk in hospital room: A little  Climbing 3-5 steps with railing: A little  Basic Mobility - Total Score: 18    Encounter Problems       Encounter Problems (Active)       PT Problem       Pt will perform supine to sit transfer independently  (Progressing)       Start:  05/22/25    Expected End:  06/05/25            Pt will ascend/descend 15 steps with mod I  (Progressing)       Start:  05/22/25    Expected End:  06/05/25            Pt will ambulate 300 feet with mod I and LRAD  (Progressing)       Start:  05/22/25    Expected End:  06/05/25            Pt will perform sit to stand transfer with mod I and LRAD  (Progressing)       Start:  05/22/25    Expected End:  06/05/25               Pain - Adult              Education Documentation  Precautions, taught by Valeria Willams PT at 5/22/2025 12:14 PM.  Learner: Patient  Readiness: Acceptance  Method: Explanation  Response: Verbalizes Understanding  Comment: Pt educated on PT POC    Body Mechanics, taught by Valeria Willams PT at 5/22/2025 12:14 PM.  Learner: Patient  Readiness: Acceptance  Method: Explanation  Response: Verbalizes Understanding  Comment: Pt educated on PT POC    Mobility Training, taught by Valeria Willams PT at 5/22/2025 12:14 PM.  Learner: Patient  Readiness: Acceptance  Method: Explanation  Response: Verbalizes Understanding  Comment: Pt educated on PT POC    Education Comments  No comments found.

## 2025-05-22 NOTE — PROGRESS NOTES
Occupational Therapy    Evaluation and Treatment    Patient Name: Rodrigue Palacios  MRN: [id number]  Today's Date: 5/22/2025  Room: [address] [place] [address]  Time Calculation  Start Time: 1013  Stop Time: 1036  Time Calculation (min): 23 min    Assessment  IP OT Assessment  OT Assessment: Decreased I/ADLs, functional mobiltiy & transfers.  Prognosis: Good  Barriers to Discharge Home: No anticipated barriers  Evaluation/Treatment Tolerance: Patient tolerated treatment well  Medical Staff Made Aware: Yes  End of Session Communication: Bedside nurse  End of Session Patient Position: Up in chair, Alarm off, not on at start of session  Plan:  Inpatient Plan  Treatment Interventions: ADL retraining, Functional transfer training, UE strengthening/ROM, Endurance training, Patient/family training, Equipment evaluation/education, Compensatory technique education  OT Frequency: 2 times per week  OT Discharge Recommendations: Low intensity level of continued care  Equipment Recommended upon Discharge: Wheeled walker  OT Recommended Transfer Status: Assist of 1, Assistive equipment (Comment) (rolling walker)  OT - OK to Discharge: Yes  OT Assessment  OT Assessment Results: Decreased ADL status, Decreased functional mobility, Decreased IADLs, Decreased endurance  Prognosis: Good  Evaluation/Treatment Tolerance: Patient tolerated treatment well  Medical Staff Made Aware: Yes  Strengths: Ability to acquire knowledge, Attitude of self, Capable of completing ADLs semi/independent, Premorbid level of function  Barriers to Participation: Comorbidities    Subjective   Current Problem:  1. Malignant neoplasm of urinary bladder, unspecified site (Multi)  Surgical Pathology Exam    Surgical Pathology Exam      2. Bladder cancer (Multi)        3. Research subject  Research collection:  1.2mL Lavendar EDTA - [facility]    Research collection:  1.2mL Lavendar EDTA - Clinic [facility]      4. XGP (xanthogranulomatous pyelonephritis)  Surgical Pathology Exam     Surgical Pathology Exam        General:  Reason for Referral: s/p RAL right Nx and RARC+IC ([facility]) on 5/21  Referred By: HTN, HLD, murmur, SCOOTER (CPAP), EF 60%, CKD, stones, DM2, GERD, RA,  Co-Treatment: PT  Co-Treatment Reason: To maximize functional safety with functional mobiltiy  Prior to Session Communication: Bedside nurse  Patient Position Received: Bed, 3 rail up, Alarm off, not on at start of session  Family/Caregiver Present: No  General Comment: Pt receptive to therapy, participates fully.   Precautions:  Medical Precautions: Abdominal precautions  Post-Surgical Precautions: Abdominal surgery precautions  Precautions Comment: x1 drain    Pain:  Pain Assessment  Pain Assessment: 0-10  0-10 (Numeric) Pain Score: 8  Pain Location: Abdomen  Pain Interventions: Ambulation/increased activity, Repositioned, Rest  Lines/Tubes/Drains:  Closed/Suction Drain LUQ Bulb (Active)   Number of days: 0       Urostomy RUQ (Active)   Number of days: 0     Objective   Cognition:  Overall Cognitive Status: Within Functional Limits  Orientation Level: Oriented X4  Following Commands: Follows one step commands without difficulty     Home Living:  Type of Home: House  Lives With: Alone  Home Adaptive Equipment: None  Home Layout: Multi-level, Laundry in basement, Full bath main level, Bed/bath upstairs, Stairs to alternate level with rails  Alternate Level Stairs-Number of Steps: ~15  Home Access: Stairs to enter without rails  Entrance Stairs-Rails: None  Entrance Stairs-Number of Steps: 3  Bathroom Shower/Tub: Tub/shower unit, Walk-in shower  Bathroom Toilet: Standard  Bathroom Equipment: None  Bathroom Accessibility: tub/shower 1st floor, walk in shower 2nd floor   Prior Function:  Level of [place]: Independent with ADLs and functional transfers, Independent with homemaking with ambulation  ADL Assistance: Independent  Homemaking Assistance: Independent  Ambulatory Assistance: Independent  Vocational: Retired  Leisure:  hiking  Hand Dominance: Right  Prior Function Comments: +drives, denies falls  IADL History:  Homemaking Responsibilities: Yes (Primary for all IADLs)  ADL:  Eating Assistance: Independent (anticipated)  Grooming Assistance: Stand by  Grooming Deficit: Supervision/safety, Wash/dry hands (standing at sink)  Bathing Assistance: Minimal (anticipated)  UE Dressing Assistance: Stand by  UE Dressing Deficit: Setup (anticipated)  LE Dressing Assistance:  (CGA)  LE Dressing Deficit: Don/doff R sock, Don/doff L sock  Toileting Assistance with Device:  (CGA anticipated)  Activity Tolerance:  Endurance: Tolerates 10 - 20 min exercise with multiple rests  Balance:  Dynamic Sitting Balance  Dynamic Sitting-Level of Assistance: Contact guard  Dynamic Sitting-Balance: Lateral lean, Reaching for objects (during LB dressing)  Dynamic Standing Balance  Dynamic Standing-Balance Support: Bilateral upper extremity supported, No upper extremity supported  Dynamic Standing-Level of Assistance: Contact guard, Close supervision  Dynamic Standing-Comments: CGA for functional mobiltiy, SBA fduring hand hygiene  Static Sitting Balance  Static Sitting-Level of Assistance: Close supervision  Static Standing Balance  Static Standing-Balance Support: Bilateral upper extremity supported (at FWW)  Static Standing-Level of Assistance: Close supervision  Bed Mobility/Transfers: Bed Mobility/Transfers: Bed Mobility  Bed Mobility: Yes  Bed Mobility 1  Bed Mobility 1: Supine to sitting  Level of Assistance 1: Minimum assistance  Bed Mobility Comments 1: cued for log roll sequencing & technique, use of bed rail, HOB elevated  Functional Mobility  Functional Mobility Performed: Yes  Functional Mobility 1  Surface 1: Level tile  Device 1: Rolling walker  Assistance 1: Contact guard  Comments 1: Pt completed functional mobitliy x max household distance in hallway  Functional Mobility 2  Surface 2: Level tile  Device 2: Rolling walker  Assistance 2: Contact  guard  Comments 2: Pt completed functional mobiltiy kari room to/from bathroom   and Transfers  Transfer: Yes  Transfer 1  Transfer From 1: Bed to  Transfer to 1: Stand  Technique 1: Sit to stand  Transfer Device 1: Walker  Transfer Level of Assistance 1: Minimum assistance, Minimal verbal cues  Trials/Comments 1: cued for hand placement and safe walker management  Transfers 2  Transfer From 2: Stand to  Transfer to 2: Sit  Technique 2: Stand to sit  Transfer Device 2: Walker  Transfer Level of Assistance 2: Contact guard  Trials/Comments 2: cued for hand placement  IADL's:   Homemaking Responsibilities: Yes (Primary for all IADLs)  Vision: Vision - Basic Assessment  Current Vision: Wears contacts  Sensation:  Light Touch: No apparent deficits  Sensation Comment: denies N/T  Strength:  Strength Comments: B UEs at least 3+/5 based on functional observation  Perception:  Inattention/Neglect: Appears intact  Initiation: Appears intact  Motor Planning: Appears intact  Perseveration: Not present  Coordination:  Movements are Fluid and Coordinated: Yes   Hand Function:  Hand Function  Gross Grasp: Functional  Coordination: Functional  Extremities:   RUE   RUE : Within Functional Limits, LUE   LUE: Within Functional Limits,      Outcome Measures: [facility] Daily Activity  Putting on and taking off regular lower body clothing: A little  Bathing (including washing, rinsing, drying): A little  Putting on and taking off regular upper body clothing: A little  Toileting, which includes using toilet, bedpan or urinal: A little  Taking care of personal grooming such as brushing teeth: A little  Eating Meals: None  Daily Activity - Total Score: 19  Brief Confusion Assessment Method (bCAM)  Feature 1: Altered Mental Status or Fluctuating Course: No  CAM Result: CAM -      ,          Education Documentation  Body Mechanics, taught by Pili Feliciano OT at 5/22/2025 12:30 PM.  Learner: Patient  Readiness: Acceptance  Method:  Explanation, Demonstration  Response: Verbalizes Understanding, Demonstrated Understanding  Comment: OT POC, abdominal precautions, log roll, safe ADL/transfer/mobility techniques    Precautions, taught by Pili Feliciano OT at 5/22/2025 12:30 PM.  Learner: Patient  Readiness: Acceptance  Method: Explanation, Demonstration  Response: Verbalizes Understanding, Demonstrated Understanding  Comment: OT POC, abdominal precautions, log roll, safe ADL/transfer/mobility techniques    ADL Training, taught by Pili Feliciano OT at 5/22/2025 12:30 PM.  Learner: Patient  Readiness: Acceptance  Method: Explanation, Demonstration  Response: Verbalizes Understanding, Demonstrated Understanding  Comment: OT POC, abdominal precautions, log roll, safe ADL/transfer/mobility techniques    Education Comments  No comments found.        Goals:   Encounter Problems       Encounter Problems (Active)       ADLs       Patient will perform UB and LB bathing with modified independent level of assistance. (Progressing)       Start:  05/22/25    Expected End:  06/05/25            Patient with complete lower body dressing with modified independent level of assistance donning and doffing all LE clothes  with PRN adaptive equipment. (Progressing)       Start:  05/22/25    Expected End:  06/05/25            Patient will complete daily grooming tasks with independent level of assistance and PRN adaptive equipment while standing. (Progressing)       Start:  05/22/25    Expected End:  06/05/25            Patient will complete toileting including hygiene clothing management/hygiene with independent level of assistance. (Progressing)       Start:  05/22/25    Expected End:  06/05/25               BALANCE       Pt will maintain dynamic standing balance during ADL task with modified independent level of assistance in order to demonstrate decreased risk of falling and improved postural control. (Progressing)       Start:  05/22/25    Expected End:  06/05/25                MOBILITY       Patient will perform Functional mobility max Household distances/Community Distances with modified independent level of assistance and least restrictive device in order to improve safety and functional mobility. (Progressing)       Start:  05/22/25    Expected End:  06/05/25               TRANSFERS       Patient will perform bed mobility independent level of assistance. (Progressing)       Start:  05/22/25    Expected End:  06/05/25            Patient will complete all functional transfers with least restrictive device with modified independent level of assistance. (Progressing)       Start:  05/22/25    Expected End:  06/05/25                   Treatment Completed on Evaluation    Activities of Daily Living: Toilet Transfers  Toilet Transfer Type: To and from  Toilet Transfer to: Standard toilet  Toilet Transfer Technique:  (sit<>stand)  Toilet Transfers: Contact guard  Toilet Transfers Comments: +use of grab bar  Grooming  Grooming Level of Assistance: Close supervision  Grooming Where Assessed: Standing sinkside  Grooming Comments: Pt completed hand hygiene     LE Dressing  LE Dressing: Yes  Sock Level of Assistance: Contact guard, Minimal verbal cues  LE Dressing Where Assessed: Edge of bed  LE Dressing Comments: cued Pt to perform sock doffing/donning with figure-4 technique. Pt able however did report some discomfort. Verbal education on AE to decrease pain with task, will continue to assess & provide further education as needed.         05/22/25 at 12:31 PM   Pili Feliciano, OT   Rehab Office: [phone]

## 2025-05-22 NOTE — PROGRESS NOTES
"  [facility]  Daily Progress Note      Patient: Rodrigue Palacios  Age/Sex: 69 y.o., male  Date of surgery: 05/21/2025   Admit Date: 5/21/2025   Length of Stay: 1      Subjective:  NAEO. Patient has no complaints. Abdominal pain is well controlled.  He is tolerating sips and chips, denies N/V. He denies BM or passing flatus. No fevers, chills or SOB.     Objective    Physical Exam                                                                                                                        Gen: NAD, resting comfortably in bed with CPAP  Neuro: AOx3  Skin: Non-jaundiced  HEENT:  NCAT, anicteric sclera  CV:  Regular rate in chart  Resp:  Non-labored breathing, wearing CPAP  Abd:  Soft, appropriately tender around incisions, non-distended, no guarding/rebound/peritonitis, multiple lap and midline incision covered in dressings, MANJU drain SS 95cc output  : IC pink, patent, well perfused with 1 blue stent in place draining pink-tinged urine 1.9 L output  Extremities: No cyanosis or clubbing  Lymph: No lower extremity edema      Vitals:    05/21/25 2015 05/21/25 2305 05/22/25 0535 05/22/25 0733   BP: 149/71 134/72 128/68 131/63   BP Location: Right arm  Right arm Right arm   Patient Position: Lying  Lying Lying   Pulse: 84 93 72 71   Resp: 16 16 16 16   Temp: 37.2 °C (99 °F) 37.1 °C (98.8 °F) 36.8 °C (98.2 °F) 36.8 °C (98.2 °F)   TempSrc: Temporal  Temporal Temporal   SpO2: 92% 94% 96% 95%   Weight: 98.4 kg (216 lb 14.9 oz)      Height: 1.905 m (6' 3\")        05/20 1900 - 05/22 0659  In: 3950 [I.V.:900]  Out: 2225 [Urine:1930; Drains:95]    Labs  Results for orders placed or performed during the hospital encounter of 05/21/25 (from the past 24 hours)   POCT GLUCOSE   Result Value Ref Range    POCT Glucose 252 (H) 74 - 99 mg/dL   CBC   Result Value Ref Range    WBC 13.9 (H) 4.4 - 11.3 x10*3/uL    nRBC 0.0 0.0 - 0.0 /100 WBCs    RBC 4.02 (L) 4.50 - 5.90 x10*6/uL    Hemoglobin 11.3 (L) 13.5 - 17.5 g/dL    " Hematocrit 36.2 (L) 41.0 - 52.0 %    MCV 90 80 - 100 fL    MCH 28.1 26.0 - 34.0 pg    MCHC 31.2 (L) 32.0 - 36.0 g/dL    RDW 13.6 11.5 - 14.5 %    Platelets 250 150 - 450 x10*3/uL   Renal function panel   Result Value Ref Range    Glucose 281 (H) 74 - 99 mg/dL    Sodium 139 136 - 145 mmol/L    Potassium 4.6 3.5 - 5.3 mmol/L    Chloride 107 98 - 107 mmol/L    Bicarbonate 20 (L) 21 - 32 mmol/L    Anion Gap 17 10 - 20 mmol/L    Urea Nitrogen 28 (H) 6 - 23 mg/dL    Creatinine 1.82 (H) 0.50 - 1.30 mg/dL    eGFR 40 (L) >60 mL/min/1.73m*2    Calcium 8.4 (L) 8.6 - 10.6 mg/dL    Phosphorus 4.3 2.5 - 4.9 mg/dL    Albumin 3.9 3.4 - 5.0 g/dL       Medications:  Current Medications[1]        Assessment & Plan  Mr. Rodrigue Palacios is a 68 y/o M w/PMH HTN, HLD, murmur, SCOOTER (CPAP), EF 60%, CKD, stones, DM2, GERD, RA, atrophic R kidney and BC. Patient is now POD 0 s/p RAL right Nx and RARC+IC ([facility]) on 5/21.  Patient is progressing well for POD 1. Continue limited sips and chips and encourage ambulation today.     Neuro:   -Multimodal pain control via scheduled Tylenol, Lidoderm patches, Robaxin, PRN Oxycodone/Dilaudid, Melatonin  - continue home Trazodone     CV:  -Monitor vitals  -AM CBC  -Continue home Atorvastatin     Pulm:  -IS  -Wean O2  -Home Singulair  -RT for PM CPAP     GI:  - Continue Sips and chips  - bowel regimen  - PRN ondansetron  - Entereg until ROBF     :  -Monitor I/O  - NS @ 100 ml/hr  - AM BMP  - WOCN     Endo:   - SSI Lispro     ID:   - Ertapenem 5d (EOT 5/25)     Prophy:  - SQH  - Pantoprazole     Dispo:  - RNF    Discussed with Chief Resident, Dr. Shabazz and to be discussed with attending Dr. Borrero.    --------------------------------------------  Angelika Chase PA-C  Urology  Consult Uro: [phone]  After 5pm and Weekends: [phone]           [1]   Current Facility-Administered Medications   Medication Dose Route Frequency Provider Last Rate Last Admin    acetaminophen (Tylenol) tablet 975 mg  975 mg oral q6h  Connor Russell MD   975 mg at 05/21/25 2056    alvimopan (Entereg) capsule 12 mg  12 mg oral BID Connor Russell MD   12 mg at 05/21/25 2209    atorvastatin (Lipitor) tablet 40 mg  40 mg oral Daily Connor Russell MD   40 mg at 05/21/25 2056    ertapenem (INVanz) 1 g in sodium chloride 0.9% IV 50 mL  1 g intravenous q24h Connor Russell MD   Stopped at 05/21/25 2240    heparin (porcine) injection 5,000 Units  5,000 Units subcutaneous q8h Connor Russell MD   5,000 Units at 05/22/25 0539    HYDROmorphone (Dilaudid) injection 0.2 mg  0.2 mg intravenous q4h PRN Connor Russell MD        insulin lispro injection 0-10 Units  0-10 Units subcutaneous TID AC Connor Russell MD        montelukast (Singulair) tablet 10 mg  10 mg oral Nightly Connor Russell MD   10 mg at 05/21/25 2056    naloxone (Narcan) injection 0.2 mg  0.2 mg intravenous q5 min PRN Connor Russell MD        ondansetron (Zofran) tablet 4 mg  4 mg oral q8h PRN Connor Russell MD        Or    ondansetron (Zofran) injection 4 mg  4 mg intravenous q8h PRN Connor Russell MD        oxyCODONE (Roxicodone) immediate release tablet 10 mg  10 mg oral q4h PRN Connor Russell MD   10 mg at 05/22/25 0538    oxyCODONE (Roxicodone) immediate release tablet 5 mg  5 mg oral q6h PRN Connor Russell MD        oxygen (O2) therapy   inhalation Continuous PRN - O2/gases Deepika Gaston MD        pantoprazole (ProtoNix) EC tablet 40 mg  40 mg oral Daily Connor Russell MD   40 mg at 05/21/25 2056    polyethylene glycol (Glycolax, Miralax) packet 17 g  17 g oral Daily Connor Russell MD        sodium chloride 0.9% infusion  100 mL/hr intravenous Continuous Connor Russell  mL/hr at 05/22/25 0720 100 mL/hr at 05/22/25 0720    traZODone (Desyrel) tablet 50 mg  50 mg oral Nightly Connor Russell MD   50 mg at 05/21/25 2056

## 2025-05-23 LAB
ALBUMIN SERPL BCP-MCNC: 3.5 G/DL (ref 3.4–5)
ANION GAP SERPL CALC-SCNC: 11 MMOL/L (ref 10–20)
BUN SERPL-MCNC: 24 MG/DL (ref 6–23)
CALCIUM SERPL-MCNC: 8.6 MG/DL (ref 8.6–10.6)
CHLORIDE SERPL-SCNC: 109 MMOL/L (ref 98–107)
CO2 SERPL-SCNC: 24 MMOL/L (ref 21–32)
CREAT SERPL-MCNC: 1.38 MG/DL (ref 0.5–1.3)
EGFRCR SERPLBLD CKD-EPI 2021: 55 ML/MIN/1.73M*2
ERYTHROCYTE [DISTWIDTH] IN BLOOD BY AUTOMATED COUNT: 13.7 % (ref 11.5–14.5)
GLUCOSE BLD MANUAL STRIP-MCNC: 144 MG/DL (ref 74–99)
GLUCOSE BLD MANUAL STRIP-MCNC: 165 MG/DL (ref 74–99)
GLUCOSE BLD MANUAL STRIP-MCNC: 166 MG/DL (ref 74–99)
GLUCOSE BLD MANUAL STRIP-MCNC: 189 MG/DL (ref 74–99)
GLUCOSE SERPL-MCNC: 151 MG/DL (ref 74–99)
HCT VFR BLD AUTO: 34 % (ref 41–52)
HGB BLD-MCNC: 10.8 G/DL (ref 13.5–17.5)
MCH RBC QN AUTO: 27.8 PG (ref 26–34)
MCHC RBC AUTO-ENTMCNC: 31.8 G/DL (ref 32–36)
MCV RBC AUTO: 88 FL (ref 80–100)
NRBC BLD-RTO: 0 /100 WBCS (ref 0–0)
PHOSPHATE SERPL-MCNC: 1.8 MG/DL (ref 2.5–4.9)
PLATELET # BLD AUTO: 209 X10*3/UL (ref 150–450)
POTASSIUM SERPL-SCNC: 3.8 MMOL/L (ref 3.5–5.3)
RBC # BLD AUTO: 3.88 X10*6/UL (ref 4.5–5.9)
SODIUM SERPL-SCNC: 140 MMOL/L (ref 136–145)
WBC # BLD AUTO: 10.5 X10*3/UL (ref 4.4–11.3)

## 2025-05-23 PROCEDURE — 80069 RENAL FUNCTION PANEL: CPT

## 2025-05-23 PROCEDURE — 2500000004 HC RX 250 GENERAL PHARMACY W/ HCPCS (ALT 636 FOR OP/ED): Mod: JZ

## 2025-05-23 PROCEDURE — 85027 COMPLETE CBC AUTOMATED: CPT

## 2025-05-23 PROCEDURE — 36415 COLL VENOUS BLD VENIPUNCTURE: CPT

## 2025-05-23 PROCEDURE — 2500000002 HC RX 250 W HCPCS SELF ADMINISTERED DRUGS (ALT 637 FOR MEDICARE OP, ALT 636 FOR OP/ED)

## 2025-05-23 PROCEDURE — 2500000004 HC RX 250 GENERAL PHARMACY W/ HCPCS (ALT 636 FOR OP/ED): Mod: JZ | Performed by: STUDENT IN AN ORGANIZED HEALTH CARE EDUCATION/TRAINING PROGRAM

## 2025-05-23 PROCEDURE — 1170000001 HC PRIVATE ONCOLOGY ROOM DAILY

## 2025-05-23 PROCEDURE — 2500000001 HC RX 250 WO HCPCS SELF ADMINISTERED DRUGS (ALT 637 FOR MEDICARE OP)

## 2025-05-23 PROCEDURE — 2500000002 HC RX 250 W HCPCS SELF ADMINISTERED DRUGS (ALT 637 FOR MEDICARE OP, ALT 636 FOR OP/ED): Performed by: STUDENT IN AN ORGANIZED HEALTH CARE EDUCATION/TRAINING PROGRAM

## 2025-05-23 PROCEDURE — 2500000001 HC RX 250 WO HCPCS SELF ADMINISTERED DRUGS (ALT 637 FOR MEDICARE OP): Performed by: STUDENT IN AN ORGANIZED HEALTH CARE EDUCATION/TRAINING PROGRAM

## 2025-05-23 PROCEDURE — 2500000004 HC RX 250 GENERAL PHARMACY W/ HCPCS (ALT 636 FOR OP/ED): Performed by: STUDENT IN AN ORGANIZED HEALTH CARE EDUCATION/TRAINING PROGRAM

## 2025-05-23 PROCEDURE — 82947 ASSAY GLUCOSE BLOOD QUANT: CPT

## 2025-05-23 RX ORDER — METHOCARBAMOL 500 MG/1
500 TABLET, FILM COATED ORAL EVERY 8 HOURS SCHEDULED
Status: DISPENSED | OUTPATIENT
Start: 2025-05-23

## 2025-05-23 RX ORDER — HYDRALAZINE HYDROCHLORIDE 20 MG/ML
10 INJECTION INTRAMUSCULAR; INTRAVENOUS EVERY 6 HOURS PRN
Status: DISPENSED | OUTPATIENT
Start: 2025-05-23

## 2025-05-23 RX ORDER — SODIUM CHLORIDE 9 MG/ML
75 INJECTION, SOLUTION INTRAVENOUS CONTINUOUS
Status: DISCONTINUED | OUTPATIENT
Start: 2025-05-23 | End: 2025-05-24

## 2025-05-23 RX ORDER — AMLODIPINE BESYLATE 5 MG/1
5 TABLET ORAL DAILY
Status: DISPENSED | OUTPATIENT
Start: 2025-05-23

## 2025-05-23 RX ORDER — POTASSIUM CHLORIDE 20 MEQ/1
40 TABLET, EXTENDED RELEASE ORAL ONCE
Status: COMPLETED | OUTPATIENT
Start: 2025-05-23 | End: 2025-05-23

## 2025-05-23 RX ADMIN — OXYCODONE 10 MG: 5 TABLET ORAL at 21:23

## 2025-05-23 RX ADMIN — POLYETHYLENE GLYCOL 3350 17 G: 17 POWDER, FOR SOLUTION ORAL at 08:53

## 2025-05-23 RX ADMIN — SODIUM CHLORIDE 75 ML/HR: 0.9 INJECTION, SOLUTION INTRAVENOUS at 14:11

## 2025-05-23 RX ADMIN — HYDRALAZINE HYDROCHLORIDE 10 MG: 20 INJECTION INTRAMUSCULAR; INTRAVENOUS at 15:22

## 2025-05-23 RX ADMIN — HYDRALAZINE HYDROCHLORIDE 10 MG: 20 INJECTION INTRAMUSCULAR; INTRAVENOUS at 21:24

## 2025-05-23 RX ADMIN — HEPARIN SODIUM 5000 UNITS: 5000 INJECTION, SOLUTION INTRAVENOUS; SUBCUTANEOUS at 04:32

## 2025-05-23 RX ADMIN — OXYCODONE 10 MG: 5 TABLET ORAL at 06:27

## 2025-05-23 RX ADMIN — METHOCARBAMOL 500 MG: 500 TABLET ORAL at 20:44

## 2025-05-23 RX ADMIN — POTASSIUM CHLORIDE 40 MEQ: 1500 TABLET, EXTENDED RELEASE ORAL at 10:59

## 2025-05-23 RX ADMIN — OXYCODONE 10 MG: 5 TABLET ORAL at 16:56

## 2025-05-23 RX ADMIN — SODIUM CHLORIDE 75 ML/HR: 0.9 INJECTION, SOLUTION INTRAVENOUS at 08:52

## 2025-05-23 RX ADMIN — TRAZODONE HYDROCHLORIDE 50 MG: 50 TABLET ORAL at 20:42

## 2025-05-23 RX ADMIN — METHOCARBAMOL 500 MG: 500 TABLET ORAL at 13:45

## 2025-05-23 RX ADMIN — ACETAMINOPHEN 975 MG: 325 TABLET ORAL at 08:54

## 2025-05-23 RX ADMIN — ACETAMINOPHEN 975 MG: 325 TABLET ORAL at 13:45

## 2025-05-23 RX ADMIN — HEPARIN SODIUM 5000 UNITS: 5000 INJECTION, SOLUTION INTRAVENOUS; SUBCUTANEOUS at 13:44

## 2025-05-23 RX ADMIN — ALVIMOPAN 12 MG: 12 CAPSULE ORAL at 20:43

## 2025-05-23 RX ADMIN — ATORVASTATIN CALCIUM 40 MG: 40 TABLET, FILM COATED ORAL at 08:55

## 2025-05-23 RX ADMIN — METHOCARBAMOL 500 MG: 500 TABLET ORAL at 08:55

## 2025-05-23 RX ADMIN — ACETAMINOPHEN 975 MG: 325 TABLET ORAL at 20:42

## 2025-05-23 RX ADMIN — INSULIN LISPRO 7 UNITS: 100 INJECTION, SOLUTION INTRAVENOUS; SUBCUTANEOUS at 13:44

## 2025-05-23 RX ADMIN — HEPARIN SODIUM 5000 UNITS: 5000 INJECTION, SOLUTION INTRAVENOUS; SUBCUTANEOUS at 20:42

## 2025-05-23 RX ADMIN — ALVIMOPAN 12 MG: 12 CAPSULE ORAL at 08:55

## 2025-05-23 RX ADMIN — AMLODIPINE BESYLATE 5 MG: 5 TABLET ORAL at 08:55

## 2025-05-23 RX ADMIN — MONTELUKAST 10 MG: 10 TABLET, FILM COATED ORAL at 20:42

## 2025-05-23 RX ADMIN — OXYCODONE 10 MG: 5 TABLET ORAL at 13:46

## 2025-05-23 RX ADMIN — PANTOPRAZOLE SODIUM 40 MG: 40 TABLET, DELAYED RELEASE ORAL at 08:54

## 2025-05-23 RX ADMIN — ERTAPENEM SODIUM 1 G: 1 INJECTION INTRAMUSCULAR; INTRAVENOUS at 20:42

## 2025-05-23 ASSESSMENT — PAIN - FUNCTIONAL ASSESSMENT
PAIN_FUNCTIONAL_ASSESSMENT: 0-10

## 2025-05-23 ASSESSMENT — COGNITIVE AND FUNCTIONAL STATUS - GENERAL
DRESSING REGULAR LOWER BODY CLOTHING: A LITTLE
HELP NEEDED FOR BATHING: A LITTLE
EATING MEALS: A LITTLE
PERSONAL GROOMING: A LITTLE
DAILY ACTIVITIY SCORE: 24
MOVING TO AND FROM BED TO CHAIR: A LITTLE
DRESSING REGULAR UPPER BODY CLOTHING: A LITTLE
MOBILITY SCORE: 23
TOILETING: A LITTLE
DAILY ACTIVITIY SCORE: 18
MOBILITY SCORE: 23
MOVING TO AND FROM BED TO CHAIR: A LITTLE

## 2025-05-23 ASSESSMENT — PAIN DESCRIPTION - LOCATION
LOCATION: ABDOMEN

## 2025-05-23 ASSESSMENT — PAIN SCALES - GENERAL
PAINLEVEL_OUTOF10: 7
PAINLEVEL_OUTOF10: 8
PAINLEVEL_OUTOF10: 7
PAINLEVEL_OUTOF10: 4
PAINLEVEL_OUTOF10: 4

## 2025-05-23 NOTE — CARE PLAN
Problem: Pain  Goal: Takes deep breaths with improved pain control throughout the shift  Outcome: Progressing  Goal: Turns in bed with improved pain control throughout the shift  Outcome: Progressing  Goal: Walks with improved pain control throughout the shift  Outcome: Progressing  Goal: Performs ADL's with improved pain control throughout shift  Outcome: Progressing  Goal: Participates in PT with improved pain control throughout the shift  Outcome: Progressing  Goal: Free from opioid side effects throughout the shift  Outcome: Progressing  Goal: Free from acute confusion related to pain meds throughout the shift  Outcome: Progressing     Problem: Pain - Adult  Goal: Verbalizes/displays adequate comfort level or baseline comfort level  Outcome: Progressing     Problem: Safety - Adult  Goal: Free from fall injury  Outcome: Progressing     Problem: Chronic Conditions and Co-morbidities  Goal: Patient's chronic conditions and co-morbidity symptoms are monitored and maintained or improved  Outcome: Progressing

## 2025-05-23 NOTE — CARE PLAN
The patient's goals for the shift include      The clinical goals for the shift include Patient will remain safe      Problem: Pain  Goal: Takes deep breaths with improved pain control throughout the shift  Outcome: Progressing  Goal: Turns in bed with improved pain control throughout the shift  Outcome: Progressing  Goal: Walks with improved pain control throughout the shift  Outcome: Progressing  Goal: Performs ADL's with improved pain control throughout shift  Outcome: Progressing  Goal: Participates in PT with improved pain control throughout the shift  Outcome: Progressing  Goal: Free from opioid side effects throughout the shift  Outcome: Progressing  Goal: Free from acute confusion related to pain meds throughout the shift  Outcome: Progressing     Problem: Pain - Adult  Goal: Verbalizes/displays adequate comfort level or baseline comfort level  Outcome: Progressing     Problem: Safety - Adult  Goal: Free from fall injury  Outcome: Progressing     Problem: Discharge Planning  Goal: Discharge to home or other facility with appropriate resources  Outcome: Progressing     Problem: Chronic Conditions and Co-morbidities  Goal: Patient's chronic conditions and co-morbidity symptoms are monitored and maintained or improved  Outcome: Progressing     Problem: Nutrition  Goal: Nutrient intake appropriate for maintaining nutritional needs  Outcome: Progressing

## 2025-05-23 NOTE — PROGRESS NOTES
05/23/25 1500   Discharge Planning   Living Arrangements Alone   Support Systems Family members;Friends/neighbors   Type of Residence Private residence   Who is requesting discharge planning? Provider   Home or Post Acute Services In home services   Type of Home Care Services Home nursing visits;Home OT;Home PT   Expected Discharge Disposition Home H     TCC Note:  PT recommended a wheeled walker for home, referral sent to [facility]. Received authorization to dispense from closet. WW delivered to pt's bedside.   Deepika Sarah RN TCC

## 2025-05-23 NOTE — CONSULTS
Wound Care Consult     Visit Date: 2025      Patient Name: Rodrigue Palacios         MRN: [id number]             Reason for Consult: urostomy pouch change and lesson         Wound History:   PMH HTN, HLD, murmur, SCOOTER (CPAP), EF 60%, CKD, stones, DM2, GERD, RA, atrophic R kidney and BC. Patient is now POD 1 s/p RAL right Nx and RARC+IC ([facility])      Assessment:    Urostomy RUQ (Active)   Placement Date/Time: 25 1634   Placed by: [facility]  Hand Hygiene Completed: Yes  Location: RUQ   Number of days: 1      Urostomy RUQ (Active)   Present on Admission to Healthcare Facility N 25 1100   Stomal Appliance 2 piece;Changed 25 1100   Site/Stoma Assessment Clean;Red 25 1100   Peristomal Assessment Clean;Intact 25 1100   Treatment Pouch change;Site care 25 1100   Output (mL) 500 mL 25 1003     Ostomy type: ileal conduit size: 1 1/8 oval  color: red, clean, and moist  protruding: budded   Umberto: none. One blue stent   Functioning: clear, yellow urine.   Mucocutaneous junction: intact   Peristomal skin: clean, dry and intact   Pouchin piece [place] RED flat wafer, barrier ring and urostomy pouch   Ostomy Education: The patient was hands on during the ostomy lesson today. The patient assisted with removing the pouch, cleanse/prep the peristomal skin, cut the wafer and applied the pouch. The patient is able to open/close the pouch and attach the gravity bag. Educational booklet and additional pouches left at bedside.   Plan: assess stoma/pouchin    Wound Plan: Next ostomy lesson will be .     Arturo Abreu RN-BC, CWON  2025  1:53 PM

## 2025-05-23 NOTE — PROGRESS NOTES
[facility]  Daily Progress Note      Patient: Rodrigue Palacios  Age/Sex: 69 y.o., male  Date of surgery: 05/21/2025   Admit Date: 5/21/2025   Length of Stay: 2      Subjective:  NAEO. Patient has no complaints. Abdominal pain is well controlled.  Tolerating clears, denies N/V. He denies BM or passing flatus. No fevers, chills or SOB. Getting out of bed and ambulating.   IC output 1.8L   MANJU 40cc serosang    Objective    Physical Exam                                                                                                                        Gen: NAD, resting comfortably in bed with CPAP  Neuro: AOx3  Skin: Non-jaundiced  HEENT:  NCAT, anicteric sclera  CV:  Regular rate in chart  Resp:  Non-labored breathing, wearing CPAP  Abd:  Soft, appropriately tender around incisions, non-distended, no guarding/rebound/peritonitis, multiple lap and midline incision covered in dressings, MANJU drain SS output  : IC pink, patent, well perfused with 1 blue stent in place draining pink-tinged urine Extremities: No cyanosis or clubbing  Lymph: No lower extremity edema      Vitals:    05/22/25 1515 05/22/25 1916 05/23/25 0030 05/23/25 0427   BP: 157/73 169/77 163/84 161/83   BP Location: Right arm Right arm Right arm Right arm   Patient Position: Lying Lying Lying Lying   Pulse: 75 82 85 68   Resp: 16 16 16 16   Temp: 36.8 °C (98.2 °F) 37 °C (98.6 °F) 37.2 °C (99 °F) 37.1 °C (98.8 °F)   TempSrc: Temporal Temporal Temporal Temporal   SpO2: 96% 95% 90% 92%   Weight:       Height:         05/21 1900 - 05/23 0659  In: 2801.7 [P.O.:100; I.V.:2601.7]  Out: 3330 [Urine:3210; Drains:120]    Labs  Results for orders placed or performed during the hospital encounter of 05/21/25 (from the past 24 hours)   POCT GLUCOSE   Result Value Ref Range    POCT Glucose 167 (H) 74 - 99 mg/dL   POCT GLUCOSE   Result Value Ref Range    POCT Glucose 125 (H) 74 - 99 mg/dL       Medications:  Current Medications[1]        Assessment & Plan    Rodrigue Palacios is a 70 y/o M w/PMH HTN, HLD, murmur, SCOOTER (CPAP), EF 60%, CKD, stones, DM2, GERD, RA, atrophic R kidney and BC. Patient is now s/p RAL right Nx and RARC+IC ([facility]) on 5/21.  Patient is progressing well.     Neuro:   -Multimodal pain control via scheduled Tylenol, Robaxin, PRN Oxycodone/Dilaudid, Melatonin  - continue home Trazodone     CV:  -Monitor vitals  -AM CBC  -Continue home Atorvastatin  - Start amlodpine 5mg daily for high blood pressure, continue holding lisinopril     Pulm:  -IS  -Wean O2  -Home Singulair  -RT for PM CPAP     GI:  - CLD  - bowel regimen  - PRN ondansetron  - Entereg until ROBF     :  -Monitor I/O  - NS @ 75 ml/hr  - AM BMP  - WOCN  - Hold home lisinopril     Endo:   - SSI Lispro     ID:   - Ertapenem 5d (EOT 5/25)     Prophy:  - SQH  - Pantoprazole     Dispo:  - RNF    Discussed with Chief Resident, Dr. Shabazz and to be discussed with attending Dr. Borrero.    --------------------------------------------  Vicente Sainz MD  Urology  Consult Uro: [id number]  After 5pm and Weekends: [phone]           [1]   Current Facility-Administered Medications   Medication Dose Route Frequency Provider Last Rate Last Admin    acetaminophen (Tylenol) tablet 975 mg  975 mg oral q6h Connor Russell MD   975 mg at 05/22/25 2006    alvimopan (Entereg) capsule 12 mg  12 mg oral BID Connor Russell MD   12 mg at 05/22/25 2006    atorvastatin (Lipitor) tablet 40 mg  40 mg oral Daily Connor Russell MD   40 mg at 05/22/25 1003    ertapenem (INVanz) 1 g in sodium chloride 0.9% IV 50 mL  1 g intravenous q24h Connor Russell MD   Stopped at 05/22/25 2050    heparin (porcine) injection 5,000 Units  5,000 Units subcutaneous q8h Connor Russell MD   5,000 Units at 05/23/25 0432    HYDROmorphone (Dilaudid) injection 0.2 mg  0.2 mg intravenous q4h PRN Connor Russell MD        insulin lispro injection 0-10 Units  0-10 Units subcutaneous TID AC Connor Russell MD   2 Units at 05/22/25 1019    montelukast (Singulair) tablet 10 mg   10 mg oral Nightly Connor Russell MD   10 mg at 05/22/25 2006    naloxone (Narcan) injection 0.2 mg  0.2 mg intravenous q5 min PRN Connor Russell MD        ondansetron (Zofran) tablet 4 mg  4 mg oral q8h PRN Connor Russell MD        Or    ondansetron (Zofran) injection 4 mg  4 mg intravenous q8h PRN Connor Russell MD        oxyCODONE (Roxicodone) immediate release tablet 10 mg  10 mg oral q4h PRN Connor Russell MD   10 mg at 05/23/25 0627    oxyCODONE (Roxicodone) immediate release tablet 5 mg  5 mg oral q6h PRN Connor Russell MD        oxygen (O2) therapy   inhalation Continuous PRN - O2/gases Deepika Gaston MD        pantoprazole (ProtoNix) EC tablet 40 mg  40 mg oral Daily Connor Russell MD   40 mg at 05/22/25 1004    polyethylene glycol (Glycolax, Miralax) packet 17 g  17 g oral Daily Connor Russell MD   17 g at 05/22/25 1001    sodium chloride 0.9% infusion  100 mL/hr intravenous Continuous Connor Russell  mL/hr at 05/22/25 2301 100 mL/hr at 05/22/25 2301    traZODone (Desyrel) tablet 50 mg  50 mg oral Nightly Connor Russell MD   50 mg at 05/22/25 2006

## 2025-05-24 LAB
ALBUMIN SERPL BCP-MCNC: 3.5 G/DL (ref 3.4–5)
ANION GAP SERPL CALC-SCNC: 15 MMOL/L (ref 10–20)
BLOOD EXPIRATION DATE: NORMAL
BUN SERPL-MCNC: 22 MG/DL (ref 6–23)
CALCIUM SERPL-MCNC: 8.5 MG/DL (ref 8.6–10.6)
CHLORIDE SERPL-SCNC: 107 MMOL/L (ref 98–107)
CO2 SERPL-SCNC: 22 MMOL/L (ref 21–32)
CREAT SERPL-MCNC: 1.22 MG/DL (ref 0.5–1.3)
DISPENSE STATUS: NORMAL
EGFRCR SERPLBLD CKD-EPI 2021: 64 ML/MIN/1.73M*2
ERYTHROCYTE [DISTWIDTH] IN BLOOD BY AUTOMATED COUNT: 13.9 % (ref 11.5–14.5)
GLUCOSE BLD MANUAL STRIP-MCNC: 133 MG/DL (ref 74–99)
GLUCOSE BLD MANUAL STRIP-MCNC: 139 MG/DL (ref 74–99)
GLUCOSE BLD MANUAL STRIP-MCNC: 156 MG/DL (ref 74–99)
GLUCOSE SERPL-MCNC: 136 MG/DL (ref 74–99)
HCT VFR BLD AUTO: 37.9 % (ref 41–52)
HGB BLD-MCNC: 11.5 G/DL (ref 13.5–17.5)
MAGNESIUM SERPL-MCNC: 2.18 MG/DL (ref 1.6–2.4)
MCH RBC QN AUTO: 28.5 PG (ref 26–34)
MCHC RBC AUTO-ENTMCNC: 30.3 G/DL (ref 32–36)
MCV RBC AUTO: 94 FL (ref 80–100)
NRBC BLD-RTO: 0 /100 WBCS (ref 0–0)
PHOSPHATE SERPL-MCNC: 1.9 MG/DL (ref 2.5–4.9)
PLATELET # BLD AUTO: 229 X10*3/UL (ref 150–450)
POTASSIUM SERPL-SCNC: 3.7 MMOL/L (ref 3.5–5.3)
PRODUCT BLOOD TYPE: 600
PRODUCT CODE: NORMAL
RBC # BLD AUTO: 4.04 X10*6/UL (ref 4.5–5.9)
SODIUM SERPL-SCNC: 140 MMOL/L (ref 136–145)
UNIT ABO: NORMAL
UNIT NUMBER: NORMAL
UNIT RH: NORMAL
UNIT VOLUME: 302
UNIT VOLUME: 312
UNIT VOLUME: 330
UNIT VOLUME: 350
WBC # BLD AUTO: 8.6 X10*3/UL (ref 4.4–11.3)
XM INTEP: NORMAL

## 2025-05-24 PROCEDURE — 2500000001 HC RX 250 WO HCPCS SELF ADMINISTERED DRUGS (ALT 637 FOR MEDICARE OP): Performed by: STUDENT IN AN ORGANIZED HEALTH CARE EDUCATION/TRAINING PROGRAM

## 2025-05-24 PROCEDURE — 1170000001 HC PRIVATE ONCOLOGY ROOM DAILY

## 2025-05-24 PROCEDURE — 85027 COMPLETE CBC AUTOMATED: CPT

## 2025-05-24 PROCEDURE — 80069 RENAL FUNCTION PANEL: CPT

## 2025-05-24 PROCEDURE — 2500000001 HC RX 250 WO HCPCS SELF ADMINISTERED DRUGS (ALT 637 FOR MEDICARE OP)

## 2025-05-24 PROCEDURE — 2500000005 HC RX 250 GENERAL PHARMACY W/O HCPCS: Performed by: STUDENT IN AN ORGANIZED HEALTH CARE EDUCATION/TRAINING PROGRAM

## 2025-05-24 PROCEDURE — 2500000002 HC RX 250 W HCPCS SELF ADMINISTERED DRUGS (ALT 637 FOR MEDICARE OP, ALT 636 FOR OP/ED): Performed by: STUDENT IN AN ORGANIZED HEALTH CARE EDUCATION/TRAINING PROGRAM

## 2025-05-24 PROCEDURE — 36415 COLL VENOUS BLD VENIPUNCTURE: CPT

## 2025-05-24 PROCEDURE — 99222 1ST HOSP IP/OBS MODERATE 55: CPT | Performed by: ANESTHESIOLOGY

## 2025-05-24 PROCEDURE — 2500000004 HC RX 250 GENERAL PHARMACY W/ HCPCS (ALT 636 FOR OP/ED): Performed by: STUDENT IN AN ORGANIZED HEALTH CARE EDUCATION/TRAINING PROGRAM

## 2025-05-24 PROCEDURE — 2500000004 HC RX 250 GENERAL PHARMACY W/ HCPCS (ALT 636 FOR OP/ED): Mod: JZ | Performed by: STUDENT IN AN ORGANIZED HEALTH CARE EDUCATION/TRAINING PROGRAM

## 2025-05-24 PROCEDURE — 82947 ASSAY GLUCOSE BLOOD QUANT: CPT

## 2025-05-24 PROCEDURE — 83735 ASSAY OF MAGNESIUM: CPT | Performed by: STUDENT IN AN ORGANIZED HEALTH CARE EDUCATION/TRAINING PROGRAM

## 2025-05-24 RX ORDER — POTASSIUM CHLORIDE 20 MEQ/1
20 TABLET, EXTENDED RELEASE ORAL ONCE
Status: COMPLETED | OUTPATIENT
Start: 2025-05-24 | End: 2025-05-24

## 2025-05-24 RX ORDER — LISINOPRIL 20 MG/1
20 TABLET ORAL 2 TIMES DAILY
Status: DISPENSED | OUTPATIENT
Start: 2025-05-24

## 2025-05-24 RX ORDER — BACITRACIN ZINC 500 UNIT/G
OINTMENT IN PACKET (EA) TOPICAL 3 TIMES DAILY
Status: DISPENSED | OUTPATIENT
Start: 2025-05-24

## 2025-05-24 RX ADMIN — OXYCODONE 10 MG: 5 TABLET ORAL at 21:13

## 2025-05-24 RX ADMIN — ACETAMINOPHEN 975 MG: 325 TABLET ORAL at 11:37

## 2025-05-24 RX ADMIN — METHOCARBAMOL 500 MG: 500 TABLET ORAL at 21:13

## 2025-05-24 RX ADMIN — AMLODIPINE BESYLATE 5 MG: 5 TABLET ORAL at 08:56

## 2025-05-24 RX ADMIN — ONDANSETRON 4 MG: 2 INJECTION INTRAMUSCULAR; INTRAVENOUS at 10:21

## 2025-05-24 RX ADMIN — BACITRACIN 1 APPLICATION: 500 OINTMENT TOPICAL at 14:07

## 2025-05-24 RX ADMIN — METHOCARBAMOL 500 MG: 500 TABLET ORAL at 04:47

## 2025-05-24 RX ADMIN — ERTAPENEM SODIUM 1 G: 1 INJECTION INTRAMUSCULAR; INTRAVENOUS at 21:12

## 2025-05-24 RX ADMIN — OXYCODONE 10 MG: 5 TABLET ORAL at 04:44

## 2025-05-24 RX ADMIN — LISINOPRIL 20 MG: 20 TABLET ORAL at 21:13

## 2025-05-24 RX ADMIN — SODIUM CHLORIDE 75 ML/HR: 0.9 INJECTION, SOLUTION INTRAVENOUS at 04:45

## 2025-05-24 RX ADMIN — POTASSIUM CHLORIDE 20 MEQ: 1500 TABLET, EXTENDED RELEASE ORAL at 12:40

## 2025-05-24 RX ADMIN — PANTOPRAZOLE SODIUM 40 MG: 40 TABLET, DELAYED RELEASE ORAL at 08:55

## 2025-05-24 RX ADMIN — ACETAMINOPHEN 975 MG: 325 TABLET ORAL at 04:36

## 2025-05-24 RX ADMIN — HEPARIN SODIUM 5000 UNITS: 5000 INJECTION, SOLUTION INTRAVENOUS; SUBCUTANEOUS at 11:38

## 2025-05-24 RX ADMIN — ATORVASTATIN CALCIUM 40 MG: 40 TABLET, FILM COATED ORAL at 08:55

## 2025-05-24 RX ADMIN — ONDANSETRON HYDROCHLORIDE 4 MG: 4 TABLET, FILM COATED ORAL at 21:13

## 2025-05-24 RX ADMIN — MONTELUKAST 10 MG: 10 TABLET, FILM COATED ORAL at 21:14

## 2025-05-24 RX ADMIN — BACITRACIN 1 APPLICATION: 500 OINTMENT TOPICAL at 21:13

## 2025-05-24 RX ADMIN — HEPARIN SODIUM 5000 UNITS: 5000 INJECTION, SOLUTION INTRAVENOUS; SUBCUTANEOUS at 21:13

## 2025-05-24 RX ADMIN — HEPARIN SODIUM 5000 UNITS: 5000 INJECTION, SOLUTION INTRAVENOUS; SUBCUTANEOUS at 04:36

## 2025-05-24 RX ADMIN — INSULIN LISPRO 2 UNITS: 100 INJECTION, SOLUTION INTRAVENOUS; SUBCUTANEOUS at 12:40

## 2025-05-24 RX ADMIN — TRAZODONE HYDROCHLORIDE 50 MG: 50 TABLET ORAL at 21:13

## 2025-05-24 RX ADMIN — BACITRACIN 1 APPLICATION: 500 OINTMENT TOPICAL at 08:55

## 2025-05-24 RX ADMIN — OXYCODONE 10 MG: 5 TABLET ORAL at 08:56

## 2025-05-24 RX ADMIN — OXYCODONE 10 MG: 5 TABLET ORAL at 14:11

## 2025-05-24 RX ADMIN — METHOCARBAMOL 500 MG: 500 TABLET ORAL at 14:11

## 2025-05-24 RX ADMIN — ACETAMINOPHEN 975 MG: 325 TABLET ORAL at 18:10

## 2025-05-24 ASSESSMENT — PAIN SCALES - GENERAL
PAINLEVEL_OUTOF10: 3
PAINLEVEL_OUTOF10: 4
PAINLEVEL_OUTOF10: 7
PAINLEVEL_OUTOF10: 7
PAINLEVEL_OUTOF10: 6
PAINLEVEL_OUTOF10: 4
PAINLEVEL_OUTOF10: 7

## 2025-05-24 ASSESSMENT — ENCOUNTER SYMPTOMS
PSYCHIATRIC NEGATIVE: 1
RESPIRATORY NEGATIVE: 1
ACTIVITY CHANGE: 1
DIARRHEA: 1
ABDOMINAL PAIN: 1
MUSCULOSKELETAL NEGATIVE: 1
ALLERGIC/IMMUNOLOGIC NEGATIVE: 1
APPETITE CHANGE: 1
NAUSEA: 1
EYES NEGATIVE: 1
ENDOCRINE NEGATIVE: 1

## 2025-05-24 ASSESSMENT — PAIN - FUNCTIONAL ASSESSMENT
PAIN_FUNCTIONAL_ASSESSMENT: 0-10

## 2025-05-24 ASSESSMENT — PAIN DESCRIPTION - LOCATION
LOCATION: ABDOMEN
LOCATION: ABDOMEN

## 2025-05-24 ASSESSMENT — PAIN DESCRIPTION - DESCRIPTORS: DESCRIPTORS: ACHING

## 2025-05-24 NOTE — CARE PLAN
The patient's goals for the shift include  pain control    The clinical goals for the shift include remain safe    Over the shift, the patient did not make progress toward the following goals. Barriers to progression include recent surgery. Recommendations to address these barriers include frequent pain assessment, offer pain meds as ordered.

## 2025-05-24 NOTE — PROGRESS NOTES
[facility]  Daily Progress Note      Patient: Rodrigue Palacios  Age/Sex: 69 y.o., male  Date of surgery: 05/21/2025   Admit Date: 5/21/2025   Length of Stay: 3      Subjective:  NAEO other than persistent BP to 160 systolic but remains asymptomatic  Objective    Physical Exam                                                                                                                        Gen: NAD, resting comfortably in bed with CPAP  Neuro: AOx3  Skin: Non-jaundiced  HEENT:  NCAT, anicteric sclera  CV:  Regular rate in chart  Resp:  Non-labored breathing, wearing CPAP  Abd:  Soft, appropriately tender around incisions, non-distended, no guarding/rebound/peritonitis, multiple lap and midline incision covered in dressings which were removed today with some skin irritation noted, MANJU drain SS output  : IC pink, patent, well perfused with 1 blue stent in place draining pink-tinged urine   Extremities: No cyanosis or clubbing  Lymph: No lower extremity edema      Vitals:    05/23/25 1704 05/23/25 2043 05/24/25 0019 05/24/25 0424   BP: 163/80 166/83 139/69 165/82   BP Location: Left arm Left arm Left arm Left arm   Patient Position: Lying Lying Lying Lying   Pulse: 85 74 80 76   Resp: 16 16 16 16   Temp: 36.9 °C (98.4 °F) 36.7 °C (98.1 °F) 36.6 °C (97.9 °F) 36.4 °C (97.5 °F)   TempSrc: Temporal Temporal Temporal Temporal   SpO2: 96% 93% 96% 98%   Weight:       Height:         05/22 1900 - 05/24 0659  In: 3021.7 [P.O.:120; I.V.:2851.7]  Out: 3865 [Urine:3200; Drains:665]    Labs  Results for orders placed or performed during the hospital encounter of 05/21/25 (from the past 24 hours)   POCT GLUCOSE   Result Value Ref Range    POCT Glucose 144 (H) 74 - 99 mg/dL   CBC   Result Value Ref Range    WBC 10.5 4.4 - 11.3 x10*3/uL    nRBC 0.0 0.0 - 0.0 /100 WBCs    RBC 3.88 (L) 4.50 - 5.90 x10*6/uL    Hemoglobin 10.8 (L) 13.5 - 17.5 g/dL    Hematocrit 34.0 (L) 41.0 - 52.0 %    MCV 88 80 - 100 fL    MCH 27.8 26.0 -  34.0 pg    MCHC 31.8 (L) 32.0 - 36.0 g/dL    RDW 13.7 11.5 - 14.5 %    Platelets 209 150 - 450 x10*3/uL   Renal function panel   Result Value Ref Range    Glucose 151 (H) 74 - 99 mg/dL    Sodium 140 136 - 145 mmol/L    Potassium 3.8 3.5 - 5.3 mmol/L    Chloride 109 (H) 98 - 107 mmol/L    Bicarbonate 24 21 - 32 mmol/L    Anion Gap 11 10 - 20 mmol/L    Urea Nitrogen 24 (H) 6 - 23 mg/dL    Creatinine 1.38 (H) 0.50 - 1.30 mg/dL    eGFR 55 (L) >60 mL/min/1.73m*2    Calcium 8.6 8.6 - 10.6 mg/dL    Phosphorus 1.8 (L) 2.5 - 4.9 mg/dL    Albumin 3.5 3.4 - 5.0 g/dL   POCT GLUCOSE   Result Value Ref Range    POCT Glucose 189 (H) 74 - 99 mg/dL   POCT GLUCOSE   Result Value Ref Range    POCT Glucose 166 (H) 74 - 99 mg/dL   POCT GLUCOSE   Result Value Ref Range    POCT Glucose 165 (H) 74 - 99 mg/dL       Medications:  Current Medications[1]        Assessment & Plan  Mr. Rodrigue Palacios is a 68 y/o M w/PMH HTN, HLD, murmur, SCOOTER (CPAP), EF 60%, CKD, stones, DM2, GERD, RA, atrophic R kidney and BC. Patient is now s/p RAL right Nx and RARC+IC ([facility]) on 5/21.  Patient is progressing well.     Neuro:   -Multimodal pain control via scheduled Tylenol, Robaxin, PRN Oxycodone/Dilaudid, Melatonin  - continue home Trazodone     CV:  -Monitor vitals  -AM CBC  -Continue home Atorvastatin  - Start amlodpine 5mg daily for high blood pressure, continue holding lisinopril   - Will reach out to medicine for recs regarding persistently elevated BP    Pulm:  -IS  -Wean O2  -Home Singulair  -RT for PM CPAP     GI:  - CLD  - bowel regimen  - PRN ondansetron  - Entereg until ROBF. discontinue 5/24     :  -Monitor I/O  - NS @ 75 ml/hr  - AM BMP  - WOCN  - Hold home lisinopril     Endo:   - SSI Lispro     ID:   - Ertapenem 5d (EOT 5/25)  - bacitracin to skin irritation from bandages     Prophy:  - SQH-> may transition to eliquis 5/24  - Pantoprazole     Dispo:  - RNF    Discussed with Chief Resident, Dr. Russell and to be discussed with attending   Artiour.    --------------------------------------------  Deepika Gaston MD  Urology  Consult Uro: [phone]  After 5pm and Weekends: [phone]           [1]   Current Facility-Administered Medications   Medication Dose Route Frequency Provider Last Rate Last Admin    acetaminophen (Tylenol) tablet 975 mg  975 mg oral q6h Connor Russell MD   975 mg at 05/24/25 0436    alvimopan (Entereg) capsule 12 mg  12 mg oral BID Connor Russell MD   12 mg at 05/23/25 2043    amLODIPine (Norvasc) tablet 5 mg  5 mg oral Daily Vicente Sainz MD   5 mg at 05/23/25 0855    atorvastatin (Lipitor) tablet 40 mg  40 mg oral Daily Connor Russell MD   40 mg at 05/23/25 0855    bacitracin ointment   Topical TID Deepika Gaston MD        ertapenem (INVanz) 1 g in sodium chloride 0.9% IV 50 mL  1 g intravenous q24h Connor Russell MD   Stopped at 05/23/25 2112    heparin (porcine) injection 5,000 Units  5,000 Units subcutaneous q8h Connor Russell MD   5,000 Units at 05/24/25 0436    hydrALAZINE (Apresoline) injection 10 mg  10 mg intravenous q6h PRN Vicente Sainz MD   10 mg at 05/23/25 2124    HYDROmorphone (Dilaudid) injection 0.2 mg  0.2 mg intravenous q4h PRN Connor Russell MD        insulin lispro injection 0-10 Units  0-10 Units subcutaneous TID AC Connor Russell MD   7 Units at 05/23/25 1344    methocarbamol (Robaxin) tablet 500 mg  500 mg oral q8h CHRISTOPHER Vicente Sainz MD   500 mg at 05/24/25 0447    montelukast (Singulair) tablet 10 mg  10 mg oral Nightly Connor Russell MD   10 mg at 05/23/25 2042    naloxone (Narcan) injection 0.2 mg  0.2 mg intravenous q5 min PRN Connor Russell MD        ondansetron (Zofran) tablet 4 mg  4 mg oral q8h PRN Connor Russell MD        Or    ondansetron (Zofran) injection 4 mg  4 mg intravenous q8h PRN Connor Russell MD        oxyCODONE (Roxicodone) immediate release tablet 10 mg  10 mg oral q4h PRN Connor Russell MD   10 mg at 05/24/25 0444    oxyCODONE (Roxicodone) immediate release tablet 5 mg  5 mg oral q6h PRN Connor Russell MD         oxygen (O2) therapy   inhalation Continuous PRN - O2/gases Deepika Gaston MD        pantoprazole (ProtoNix) EC tablet 40 mg  40 mg oral Daily Connor Russell MD   40 mg at 05/23/25 0854    polyethylene glycol (Glycolax, Miralax) packet 17 g  17 g oral Daily Connor Russell MD   17 g at 05/23/25 0853    sodium chloride 0.9% infusion  75 mL/hr intravenous Continuous Jason Araujo MD 75 mL/hr at 05/24/25 0445 75 mL/hr at 05/24/25 0445    traZODone (Desyrel) tablet 50 mg  50 mg oral Nightly Connor Russell MD   50 mg at 05/23/25 2041

## 2025-05-24 NOTE — CONSULTS
Inpatient consult to Perioperative Medicine  Consult performed by: Syed Langley MD  Consult ordered by: James Borrero MD  Reason for consult: Perioperative management of hypertension and T2DM          Reason For Consult  Periop HTN and T2DM management    History Of Present Illness  70 y/o M w/PMH HTN, HLD, murmur, SCOOTER (CPAP), EF 60%, CKD, stones, DM2, GERD, RA, atrophic R kidney and BC. Patient is now POD 0 s/p RAL right Nx and RARC+IC ([facility]) 5/21 .       Past Medical History  He has a past medical history of Arthritis, Bladder cancer (Multi), CKD (chronic kidney disease), Dorsalgia, unspecified (07/28/2020), GERD (gastroesophageal reflux disease), Hearing aid worn, HL (hearing loss), Hyperlipidemia, Hypertension, Local infection of the skin and subcutaneous tissue, unspecified (10/11/2013), Nephrolithiasis, Other muscle spasm (02/14/2018), Other specified diseases of anus and rectum (03/22/2013), Personal history of other diseases of the digestive system (09/18/2013), Personal history of other diseases of the respiratory system (04/27/2020), Personal history of other diseases of the respiratory system (06/24/2019), Personal history of other infectious and parasitic diseases, Personal history of other specified conditions (03/22/2013), Personal history of other specified conditions (02/24/2014), Personal history of other specified conditions (07/14/2017), Pneumonia, unspecified organism (01/25/2016), Rash and other nonspecific skin eruption (04/29/2013), Sleep apnea, and Type 2 diabetes mellitus.    Surgical History  He has a past surgical history that includes Hernia repair (07/07/2016); Cholecystectomy (07/07/2016); Shoulder surgery (Right); and Bladder surgery (2025).     Social History  He reports that he has never smoked. He has never been exposed to tobacco smoke. He has never used smokeless tobacco. He reports current alcohol use. He reports that he does not use drugs.    Family History  Family  History[1]     Allergies  Orencia [abatacept]    Review of Systems  Review of Systems   Constitutional:  Positive for activity change and appetite change.   HENT: Negative.     Eyes: Negative.    Respiratory: Negative.     Gastrointestinal:  Positive for abdominal pain, diarrhea and nausea.   Endocrine: Negative.    Genitourinary: Negative.    Musculoskeletal: Negative.    Skin: Negative.    Allergic/Immunologic: Negative.    Psychiatric/Behavioral: Negative.           Physical Exam  AOX 3, mild pedal edema trace++ katina  Abd drain with serosangui draiange  S1S2  Lungs clear  Abd mild distension+, IC draining urine       Last Recorded Vitals  /84 (BP Location: Left arm, Patient Position: Lying)   Pulse 69   Temp 36.4 °C (97.5 °F) (Temporal)   Resp 16   Wt 98.4 kg (216 lb 14.9 oz)   SpO2 97%     Relevant Results  Results for orders placed or performed during the hospital encounter of 05/21/25 (from the past 24 hours)   POCT GLUCOSE   Result Value Ref Range    POCT Glucose 165 (H) 74 - 99 mg/dL   CBC   Result Value Ref Range    WBC 8.6 4.4 - 11.3 x10*3/uL    nRBC 0.0 0.0 - 0.0 /100 WBCs    RBC 4.04 (L) 4.50 - 5.90 x10*6/uL    Hemoglobin 11.5 (L) 13.5 - 17.5 g/dL    Hematocrit 37.9 (L) 41.0 - 52.0 %    MCV 94 80 - 100 fL    MCH 28.5 26.0 - 34.0 pg    MCHC 30.3 (L) 32.0 - 36.0 g/dL    RDW 13.9 11.5 - 14.5 %    Platelets 229 150 - 450 x10*3/uL   Renal function panel   Result Value Ref Range    Glucose 136 (H) 74 - 99 mg/dL    Sodium 140 136 - 145 mmol/L    Potassium 3.7 3.5 - 5.3 mmol/L    Chloride 107 98 - 107 mmol/L    Bicarbonate 22 21 - 32 mmol/L    Anion Gap 15 10 - 20 mmol/L    Urea Nitrogen 22 6 - 23 mg/dL    Creatinine 1.22 0.50 - 1.30 mg/dL    eGFR 64 >60 mL/min/1.73m*2    Calcium 8.5 (L) 8.6 - 10.6 mg/dL    Phosphorus 1.9 (L) 2.5 - 4.9 mg/dL    Albumin 3.5 3.4 - 5.0 g/dL   Magnesium   Result Value Ref Range    Magnesium 2.18 1.60 - 2.40 mg/dL   POCT GLUCOSE   Result Value Ref Range    POCT Glucose 156  (H) 74 - 99 mg/dL   POCT GLUCOSE   Result Value Ref Range    POCT Glucose 133 (H) 74 - 99 mg/dL         Assessment/Plan     69yr old male postop day 2 from Radical cystectomy+ IC + Robotic rt Nephrectomy of atrophic nonfunctioning kidney based on lasix renal scan    HTN- Baseline meds include Lisinopril 40mg/d + Amlodipine  Urology team resumed Amlodipine but BP intermittently elevated.  Reasoanble to resume Lisinopril at 20mg BID and monitor.  Preop HARMAN has now almost resolved.    T2DM- At baseline on Lantus 25u at bedtime  Cannot afford SGLT2i and GLP1 agonists  Cannot take Glitazones due to concerns for bladder Ca and UTI though he may now be a candidate for this class of drugs  Nervertheless he was initiated on Glimeperisde 4mg/d in April 2025 by his Endocrinologist.  FBS currently < 180mg% on SSI and oral clears  Reasonable to start Lantus 12u at bedtime with SSI if BS > 180mg% when  diet advanced.    Will follow  Discussed with oncall Urology resident      Syed Langley MD             [1]   Family History  Problem Relation Name Age of Onset    Ovarian cancer Mother      Other (asbestosis) Father

## 2025-05-24 NOTE — CARE PLAN
The patient's goals for the shift include      The clinical goals for the shift include remain safe      Problem: Pain  Goal: Takes deep breaths with improved pain control throughout the shift  Outcome: Progressing  Goal: Turns in bed with improved pain control throughout the shift  Outcome: Progressing  Goal: Walks with improved pain control throughout the shift  Outcome: Progressing  Goal: Performs ADL's with improved pain control throughout shift  Outcome: Progressing  Goal: Participates in PT with improved pain control throughout the shift  Outcome: Progressing  Goal: Free from opioid side effects throughout the shift  Outcome: Progressing  Goal: Free from acute confusion related to pain meds throughout the shift  Outcome: Progressing     Problem: Pain - Adult  Goal: Verbalizes/displays adequate comfort level or baseline comfort level  Outcome: Progressing     Problem: Safety - Adult  Goal: Free from fall injury  Outcome: Progressing     Problem: Discharge Planning  Goal: Discharge to home or other facility with appropriate resources  Outcome: Progressing     Problem: Chronic Conditions and Co-morbidities  Goal: Patient's chronic conditions and co-morbidity symptoms are monitored and maintained or improved  Outcome: Progressing     Problem: Nutrition  Goal: Nutrient intake appropriate for maintaining nutritional needs  Outcome: Progressing

## 2025-05-25 LAB
ALBUMIN SERPL BCP-MCNC: 3.2 G/DL (ref 3.4–5)
ANION GAP SERPL CALC-SCNC: 12 MMOL/L (ref 10–20)
BUN SERPL-MCNC: 21 MG/DL (ref 6–23)
CALCIUM SERPL-MCNC: 8.5 MG/DL (ref 8.6–10.6)
CHLORIDE SERPL-SCNC: 108 MMOL/L (ref 98–107)
CO2 SERPL-SCNC: 24 MMOL/L (ref 21–32)
CREAT SERPL-MCNC: 1.18 MG/DL (ref 0.5–1.3)
EGFRCR SERPLBLD CKD-EPI 2021: 67 ML/MIN/1.73M*2
ERYTHROCYTE [DISTWIDTH] IN BLOOD BY AUTOMATED COUNT: 13.5 % (ref 11.5–14.5)
GLUCOSE BLD MANUAL STRIP-MCNC: 130 MG/DL (ref 74–99)
GLUCOSE BLD MANUAL STRIP-MCNC: 134 MG/DL (ref 74–99)
GLUCOSE BLD MANUAL STRIP-MCNC: 196 MG/DL (ref 74–99)
GLUCOSE SERPL-MCNC: 120 MG/DL (ref 74–99)
HCT VFR BLD AUTO: 34.9 % (ref 41–52)
HGB BLD-MCNC: 11 G/DL (ref 13.5–17.5)
MAGNESIUM SERPL-MCNC: 2.09 MG/DL (ref 1.6–2.4)
MCH RBC QN AUTO: 28.4 PG (ref 26–34)
MCHC RBC AUTO-ENTMCNC: 31.5 G/DL (ref 32–36)
MCV RBC AUTO: 90 FL (ref 80–100)
NRBC BLD-RTO: 0 /100 WBCS (ref 0–0)
PHOSPHATE SERPL-MCNC: 2.9 MG/DL (ref 2.5–4.9)
PLATELET # BLD AUTO: 223 X10*3/UL (ref 150–450)
POTASSIUM SERPL-SCNC: 3.8 MMOL/L (ref 3.5–5.3)
RBC # BLD AUTO: 3.87 X10*6/UL (ref 4.5–5.9)
SODIUM SERPL-SCNC: 140 MMOL/L (ref 136–145)
WBC # BLD AUTO: 7.6 X10*3/UL (ref 4.4–11.3)

## 2025-05-25 PROCEDURE — 2500000001 HC RX 250 WO HCPCS SELF ADMINISTERED DRUGS (ALT 637 FOR MEDICARE OP): Performed by: STUDENT IN AN ORGANIZED HEALTH CARE EDUCATION/TRAINING PROGRAM

## 2025-05-25 PROCEDURE — 83735 ASSAY OF MAGNESIUM: CPT | Performed by: STUDENT IN AN ORGANIZED HEALTH CARE EDUCATION/TRAINING PROGRAM

## 2025-05-25 PROCEDURE — 2500000004 HC RX 250 GENERAL PHARMACY W/ HCPCS (ALT 636 FOR OP/ED): Mod: JZ | Performed by: STUDENT IN AN ORGANIZED HEALTH CARE EDUCATION/TRAINING PROGRAM

## 2025-05-25 PROCEDURE — 2500000004 HC RX 250 GENERAL PHARMACY W/ HCPCS (ALT 636 FOR OP/ED): Performed by: STUDENT IN AN ORGANIZED HEALTH CARE EDUCATION/TRAINING PROGRAM

## 2025-05-25 PROCEDURE — 80069 RENAL FUNCTION PANEL: CPT

## 2025-05-25 PROCEDURE — 2500000002 HC RX 250 W HCPCS SELF ADMINISTERED DRUGS (ALT 637 FOR MEDICARE OP, ALT 636 FOR OP/ED)

## 2025-05-25 PROCEDURE — 1170000001 HC PRIVATE ONCOLOGY ROOM DAILY

## 2025-05-25 PROCEDURE — 2500000005 HC RX 250 GENERAL PHARMACY W/O HCPCS: Performed by: STUDENT IN AN ORGANIZED HEALTH CARE EDUCATION/TRAINING PROGRAM

## 2025-05-25 PROCEDURE — 2500000001 HC RX 250 WO HCPCS SELF ADMINISTERED DRUGS (ALT 637 FOR MEDICARE OP)

## 2025-05-25 PROCEDURE — 36415 COLL VENOUS BLD VENIPUNCTURE: CPT

## 2025-05-25 PROCEDURE — 82947 ASSAY GLUCOSE BLOOD QUANT: CPT

## 2025-05-25 PROCEDURE — 85027 COMPLETE CBC AUTOMATED: CPT

## 2025-05-25 RX ORDER — ENOXAPARIN SODIUM 100 MG/ML
40 INJECTION SUBCUTANEOUS DAILY
Status: DISPENSED | OUTPATIENT
Start: 2025-05-25

## 2025-05-25 RX ORDER — PROCHLORPERAZINE EDISYLATE 5 MG/ML
10 INJECTION INTRAMUSCULAR; INTRAVENOUS EVERY 6 HOURS PRN
Status: ACTIVE | OUTPATIENT
Start: 2025-05-25

## 2025-05-25 RX ORDER — PROCHLORPERAZINE 25 MG/1
25 SUPPOSITORY RECTAL EVERY 12 HOURS PRN
Status: ACTIVE | OUTPATIENT
Start: 2025-05-25

## 2025-05-25 RX ORDER — PROCHLORPERAZINE MALEATE 10 MG
10 TABLET ORAL EVERY 6 HOURS PRN
Status: DISPENSED | OUTPATIENT
Start: 2025-05-25

## 2025-05-25 RX ORDER — POTASSIUM CHLORIDE 20 MEQ/1
40 TABLET, EXTENDED RELEASE ORAL ONCE
Status: COMPLETED | OUTPATIENT
Start: 2025-05-25 | End: 2025-05-25

## 2025-05-25 RX ADMIN — METHOCARBAMOL 500 MG: 500 TABLET ORAL at 14:27

## 2025-05-25 RX ADMIN — BACITRACIN 1 APPLICATION: 500 OINTMENT TOPICAL at 14:27

## 2025-05-25 RX ADMIN — POTASSIUM CHLORIDE 40 MEQ: 1500 TABLET, EXTENDED RELEASE ORAL at 10:56

## 2025-05-25 RX ADMIN — PROCHLORPERAZINE MALEATE 10 MG: 10 TABLET ORAL at 13:12

## 2025-05-25 RX ADMIN — ERTAPENEM SODIUM 1 G: 1 INJECTION INTRAMUSCULAR; INTRAVENOUS at 20:51

## 2025-05-25 RX ADMIN — TRAZODONE HYDROCHLORIDE 50 MG: 50 TABLET ORAL at 20:50

## 2025-05-25 RX ADMIN — LISINOPRIL 20 MG: 20 TABLET ORAL at 09:41

## 2025-05-25 RX ADMIN — ENOXAPARIN SODIUM 40 MG: 100 INJECTION SUBCUTANEOUS at 12:43

## 2025-05-25 RX ADMIN — ONDANSETRON HYDROCHLORIDE 4 MG: 4 TABLET, FILM COATED ORAL at 07:47

## 2025-05-25 RX ADMIN — METHOCARBAMOL 500 MG: 500 TABLET ORAL at 05:38

## 2025-05-25 RX ADMIN — ATORVASTATIN CALCIUM 40 MG: 40 TABLET, FILM COATED ORAL at 09:42

## 2025-05-25 RX ADMIN — HEPARIN SODIUM 5000 UNITS: 5000 INJECTION, SOLUTION INTRAVENOUS; SUBCUTANEOUS at 04:42

## 2025-05-25 RX ADMIN — MONTELUKAST 10 MG: 10 TABLET, FILM COATED ORAL at 20:50

## 2025-05-25 RX ADMIN — ACETAMINOPHEN 975 MG: 325 TABLET ORAL at 18:03

## 2025-05-25 RX ADMIN — ACETAMINOPHEN 975 MG: 325 TABLET ORAL at 10:56

## 2025-05-25 RX ADMIN — BACITRACIN 1 APPLICATION: 500 OINTMENT TOPICAL at 20:51

## 2025-05-25 RX ADMIN — AMLODIPINE BESYLATE 5 MG: 5 TABLET ORAL at 09:42

## 2025-05-25 RX ADMIN — BACITRACIN 1 APPLICATION: 500 OINTMENT TOPICAL at 09:42

## 2025-05-25 RX ADMIN — PANTOPRAZOLE SODIUM 40 MG: 40 TABLET, DELAYED RELEASE ORAL at 09:41

## 2025-05-25 RX ADMIN — METHOCARBAMOL 500 MG: 500 TABLET ORAL at 21:10

## 2025-05-25 RX ADMIN — LISINOPRIL 20 MG: 20 TABLET ORAL at 20:58

## 2025-05-25 ASSESSMENT — PAIN SCALES - GENERAL
PAINLEVEL_OUTOF10: 5 - MODERATE PAIN
PAINLEVEL_OUTOF10: 0 - NO PAIN

## 2025-05-25 ASSESSMENT — COGNITIVE AND FUNCTIONAL STATUS - GENERAL
MOBILITY SCORE: 24
MOBILITY SCORE: 24
DAILY ACTIVITIY SCORE: 24

## 2025-05-25 NOTE — NURSING NOTE
Lovenox teaching    Taught patient verbally about administering a Lovenox injection. I explained the areas that he can administer to, taught how to disinfect the area with alcohol swabs, and then how to inject the syringe into the skin. I also taught him how to engage the safety on the syringe. Patient explained back and then performed the administration himself while I was watching.

## 2025-05-25 NOTE — PROGRESS NOTES
[facility]  Daily Progress Note      Patient: Rodrigue Palacios  Age/Sex: 69 y.o., male  Date of surgery: 05/21/2025   Admit Date: 5/21/2025   Length of Stay: 4      Subjective:  No acute events overnight   Afebrile and HDS, BP intermittently high   Urostomy 1.4L   MANJU 483cc ss  Bmx2  Endorses some nausea without emesis  Ambulating   Passing gas  Pain controlled     Objective    Physical Exam                                                                                                                        Gen: NAD, resting comfortably in bed with CPAP  Neuro: AOx3  Skin: Non-jaundiced  HEENT:  NCAT, anicteric sclera  CV:  Regular rate in chart  Resp:  Non-labored breathing, wearing CPAP  Abd:  Soft, appropriately tender around incisions, non-distended, no guarding/rebound/peritonitis, multiple lap and midline incision c/d/i, MANJU drain SS output  : IC pink, patent, well perfused with 1 blue stent in place draining pink-tinged urine   Extremities: No cyanosis or clubbing  Lymph: No lower extremity edema      Vitals:    05/24/25 2112 05/25/25 0018 05/25/25 0431 05/25/25 0730   BP: 161/84 126/70 161/83 145/84   BP Location:    Left arm   Patient Position:    Lying   Pulse: 59 83 73 73   Resp: 16 18 16 16   Temp: 36.3 °C (97.3 °F) 36.2 °C (97.2 °F) 36.3 °C (97.3 °F) 36.7 °C (98.1 °F)   TempSrc: Temporal   Temporal   SpO2: 96% 96% 95% 96%   Weight:       Height:         05/23 1900 - 05/25 0659  In: 2181.3 [P.O.:600; I.V.:1581.3]  Out: 3078 [Urine:2300; Drains:778]    Labs  Results for orders placed or performed during the hospital encounter of 05/21/25 (from the past 24 hours)   POCT GLUCOSE   Result Value Ref Range    POCT Glucose 156 (H) 74 - 99 mg/dL   POCT GLUCOSE   Result Value Ref Range    POCT Glucose 133 (H) 74 - 99 mg/dL   POCT GLUCOSE   Result Value Ref Range    POCT Glucose 139 (H) 74 - 99 mg/dL       Medications:  Current Medications[1]        Assessment & Plan  Mr. Rodrigue Palacios is a 68 y/o M w/PMH  HTN, HLD, murmur, SCOOTER (CPAP), EF 60%, CKD, stones, DM2, GERD, RA, atrophic R kidney and BC. Patient is now s/p RAL right Nx and RARC+IC ([facility]) on 5/21.  Patient is progressing well.     Neuro:   -Multimodal pain control via scheduled Tylenol, Robaxin, PRN Oxycodone/Dilaudid, Melatonin  - continue home Trazodone     CV:  -Monitor vitals  -AM CBC  -Continue home Atorvastatin  - Continue amlodpine 5mg daily for high blood pressure  - Appreciate medicine recs, resume lisinopril 20mg BID    Pulm:  -IS  -Wean O2  -Home Singulair  -RT for PM CPAP     GI:  - Advance to regular diet  - DC bowel regimen  - PRN ondansetron     :  -Monitor I/O  - DC mIVF  - AM BMP  - WOCN  - Hold home lisinopril     Endo:   - SSI Lispro     ID:   - Ertapenem 5d (EOT 5/25)- Stop date today  - bacitracin to skin irritation from bandages     Prophy:  - SQH-> may transition to lovenox 5/25  - Pantoprazole     Dispo:  - RNF    Discussed with Chief Resident, Dr. Russell and to be discussed with attending Dr. Borrero.    --------------------------------------------  Vicente Sainz MD  Urology  Consult Uro: [phone]  After 5pm and Weekends: [id number]           [1]   Current Facility-Administered Medications   Medication Dose Route Frequency Provider Last Rate Last Admin    acetaminophen (Tylenol) tablet 975 mg  975 mg oral q6h Connor Russell MD   975 mg at 05/24/25 1810    amLODIPine (Norvasc) tablet 5 mg  5 mg oral Daily Vicente Sainz MD   5 mg at 05/24/25 0856    atorvastatin (Lipitor) tablet 40 mg  40 mg oral Daily Connor Russell MD   40 mg at 05/24/25 0855    bacitracin ointment   Topical TID Deepika Gaston MD   1 Application at 05/24/25 2113    enoxaparin (Lovenox) syringe 40 mg  40 mg subcutaneous Daily Te Velazquez MD        ertapenem (INVanz) 1 g in sodium chloride 0.9% IV 50 mL  1 g intravenous q24h Connor Russell MD   Stopped at 05/24/25 2119    hydrALAZINE (Apresoline) injection 10 mg  10 mg intravenous q6h PRN Vicente Sainz MD   10 mg at  05/23/25 2124    HYDROmorphone (Dilaudid) injection 0.2 mg  0.2 mg intravenous q4h PRN Connor Russell MD        insulin lispro injection 0-10 Units  0-10 Units subcutaneous TID AC Connor Russell MD   2 Units at 05/24/25 1240    lisinopril tablet 20 mg  20 mg oral BID Te Velazquez MD   20 mg at 05/24/25 2113    methocarbamol (Robaxin) tablet 500 mg  500 mg oral q8h CHRISTOPHER Vicente MD Alistair   500 mg at 05/25/25 0538    montelukast (Singulair) tablet 10 mg  10 mg oral Nightly Connor Russell MD   10 mg at 05/24/25 2114    naloxone (Narcan) injection 0.2 mg  0.2 mg intravenous q5 min PRN Connor Russell MD        ondansetron (Zofran) tablet 4 mg  4 mg oral q8h PRN Connor Russell MD   4 mg at 05/25/25 0747    Or    ondansetron (Zofran) injection 4 mg  4 mg intravenous q8h PRN Connor Russell MD   4 mg at 05/24/25 1021    oxyCODONE (Roxicodone) immediate release tablet 10 mg  10 mg oral q4h PRN Connor Russell MD   10 mg at 05/24/25 2113    oxyCODONE (Roxicodone) immediate release tablet 5 mg  5 mg oral q6h PRN Connor Russell MD        oxygen (O2) therapy   inhalation Continuous PRN - O2/gases Deepika Gaston MD        pantoprazole (ProtoNix) EC tablet 40 mg  40 mg oral Daily Connor Russell MD   40 mg at 05/24/25 0855    traZODone (Desyrel) tablet 50 mg  50 mg oral Nightly Connor Russell MD   50 mg at 05/24/25 2113

## 2025-05-25 NOTE — CARE PLAN
The patient's goals for the shift include      The clinical goals for the shift include pt will remain free from falls during the shift and will tolerate diet change      Problem: Nutrition  Goal: Nutrient intake appropriate for maintaining nutritional needs  Outcome: Progressing

## 2025-05-25 NOTE — CARE PLAN
The clinical goals for the shift include pain control    Problem: Pain  Goal: Takes deep breaths with improved pain control throughout the shift  Outcome: Progressing  Goal: Turns in bed with improved pain control throughout the shift  Outcome: Progressing  Goal: Walks with improved pain control throughout the shift  Outcome: Progressing  Goal: Performs ADL's with improved pain control throughout shift  Outcome: Progressing  Goal: Participates in PT with improved pain control throughout the shift  Outcome: Progressing  Goal: Free from opioid side effects throughout the shift  Outcome: Progressing  Goal: Free from acute confusion related to pain meds throughout the shift  Outcome: Progressing     Problem: Safety - Adult  Goal: Free from fall injury  Outcome: Progressing     Problem: Discharge Planning  Goal: Discharge to home or other facility with appropriate resources  Outcome: Progressing     Problem: Chronic Conditions and Co-morbidities  Goal: Patient's chronic conditions and co-morbidity symptoms are monitored and maintained or improved  Outcome: Progressing     Problem: Nutrition  Goal: Nutrient intake appropriate for maintaining nutritional needs  Outcome: Progressing

## 2025-05-26 VITALS
BODY MASS INDEX: 26.97 KG/M2 | HEIGHT: 75 IN | OXYGEN SATURATION: 96 % | RESPIRATION RATE: 16 BRPM | WEIGHT: 216.93 LBS | SYSTOLIC BLOOD PRESSURE: 154 MMHG | TEMPERATURE: 97.7 F | DIASTOLIC BLOOD PRESSURE: 76 MMHG | HEART RATE: 71 BPM

## 2025-05-26 LAB
ALBUMIN SERPL BCP-MCNC: 3.1 G/DL (ref 3.4–5)
ANION GAP SERPL CALC-SCNC: 12 MMOL/L (ref 10–20)
BUN SERPL-MCNC: 21 MG/DL (ref 6–23)
C DIF TOX TCDA+TCDB STL QL NAA+PROBE: NOT DETECTED
CALCIUM SERPL-MCNC: 8.8 MG/DL (ref 8.6–10.6)
CHLORIDE SERPL-SCNC: 111 MMOL/L (ref 98–107)
CO2 SERPL-SCNC: 24 MMOL/L (ref 21–32)
CREAT SERPL-MCNC: 1.36 MG/DL (ref 0.5–1.3)
EGFRCR SERPLBLD CKD-EPI 2021: 56 ML/MIN/1.73M*2
ERYTHROCYTE [DISTWIDTH] IN BLOOD BY AUTOMATED COUNT: 13.4 % (ref 11.5–14.5)
GLUCOSE BLD MANUAL STRIP-MCNC: 131 MG/DL (ref 74–99)
GLUCOSE BLD MANUAL STRIP-MCNC: 134 MG/DL (ref 74–99)
GLUCOSE BLD MANUAL STRIP-MCNC: 166 MG/DL (ref 74–99)
GLUCOSE BLD MANUAL STRIP-MCNC: 167 MG/DL (ref 74–99)
GLUCOSE SERPL-MCNC: 160 MG/DL (ref 74–99)
HCT VFR BLD AUTO: 35.8 % (ref 41–52)
HGB BLD-MCNC: 11.2 G/DL (ref 13.5–17.5)
MCH RBC QN AUTO: 27.5 PG (ref 26–34)
MCHC RBC AUTO-ENTMCNC: 31.3 G/DL (ref 32–36)
MCV RBC AUTO: 88 FL (ref 80–100)
NRBC BLD-RTO: 0 /100 WBCS (ref 0–0)
PHOSPHATE SERPL-MCNC: 3.5 MG/DL (ref 2.5–4.9)
PLATELET # BLD AUTO: 234 X10*3/UL (ref 150–450)
POTASSIUM SERPL-SCNC: 4 MMOL/L (ref 3.5–5.3)
RBC # BLD AUTO: 4.07 X10*6/UL (ref 4.5–5.9)
SODIUM SERPL-SCNC: 143 MMOL/L (ref 136–145)
WBC # BLD AUTO: 8.1 X10*3/UL (ref 4.4–11.3)

## 2025-05-26 PROCEDURE — 2500000005 HC RX 250 GENERAL PHARMACY W/O HCPCS: Performed by: STUDENT IN AN ORGANIZED HEALTH CARE EDUCATION/TRAINING PROGRAM

## 2025-05-26 PROCEDURE — 85027 COMPLETE CBC AUTOMATED: CPT

## 2025-05-26 PROCEDURE — 2500000001 HC RX 250 WO HCPCS SELF ADMINISTERED DRUGS (ALT 637 FOR MEDICARE OP): Performed by: STUDENT IN AN ORGANIZED HEALTH CARE EDUCATION/TRAINING PROGRAM

## 2025-05-26 PROCEDURE — 2500000004 HC RX 250 GENERAL PHARMACY W/ HCPCS (ALT 636 FOR OP/ED): Mod: JZ

## 2025-05-26 PROCEDURE — 2500000001 HC RX 250 WO HCPCS SELF ADMINISTERED DRUGS (ALT 637 FOR MEDICARE OP)

## 2025-05-26 PROCEDURE — 1170000001 HC PRIVATE ONCOLOGY ROOM DAILY

## 2025-05-26 PROCEDURE — 36415 COLL VENOUS BLD VENIPUNCTURE: CPT

## 2025-05-26 PROCEDURE — 2500000002 HC RX 250 W HCPCS SELF ADMINISTERED DRUGS (ALT 637 FOR MEDICARE OP, ALT 636 FOR OP/ED): Performed by: STUDENT IN AN ORGANIZED HEALTH CARE EDUCATION/TRAINING PROGRAM

## 2025-05-26 PROCEDURE — 2500000004 HC RX 250 GENERAL PHARMACY W/ HCPCS (ALT 636 FOR OP/ED): Performed by: STUDENT IN AN ORGANIZED HEALTH CARE EDUCATION/TRAINING PROGRAM

## 2025-05-26 PROCEDURE — 82947 ASSAY GLUCOSE BLOOD QUANT: CPT

## 2025-05-26 PROCEDURE — 2500000004 HC RX 250 GENERAL PHARMACY W/ HCPCS (ALT 636 FOR OP/ED): Mod: JZ | Performed by: STUDENT IN AN ORGANIZED HEALTH CARE EDUCATION/TRAINING PROGRAM

## 2025-05-26 PROCEDURE — 80069 RENAL FUNCTION PANEL: CPT

## 2025-05-26 PROCEDURE — 87493 C DIFF AMPLIFIED PROBE: CPT

## 2025-05-26 RX ORDER — SODIUM CHLORIDE, SODIUM LACTATE, POTASSIUM CHLORIDE, CALCIUM CHLORIDE 600; 310; 30; 20 MG/100ML; MG/100ML; MG/100ML; MG/100ML
50 INJECTION, SOLUTION INTRAVENOUS CONTINUOUS
Status: DISCONTINUED | OUTPATIENT
Start: 2025-05-26 | End: 2025-05-27

## 2025-05-26 RX ADMIN — BACITRACIN 1 APPLICATION: 500 OINTMENT TOPICAL at 20:55

## 2025-05-26 RX ADMIN — ATORVASTATIN CALCIUM 40 MG: 40 TABLET, FILM COATED ORAL at 08:12

## 2025-05-26 RX ADMIN — SODIUM CHLORIDE, SODIUM LACTATE, POTASSIUM CHLORIDE, AND CALCIUM CHLORIDE 50 ML/HR: .6; .31; .03; .02 INJECTION, SOLUTION INTRAVENOUS at 08:21

## 2025-05-26 RX ADMIN — OXYCODONE 10 MG: 5 TABLET ORAL at 08:13

## 2025-05-26 RX ADMIN — ONDANSETRON HYDROCHLORIDE 4 MG: 4 TABLET, FILM COATED ORAL at 08:12

## 2025-05-26 RX ADMIN — BACITRACIN 1 APPLICATION: 500 OINTMENT TOPICAL at 08:13

## 2025-05-26 RX ADMIN — MONTELUKAST 10 MG: 10 TABLET, FILM COATED ORAL at 20:55

## 2025-05-26 RX ADMIN — ACETAMINOPHEN 975 MG: 325 TABLET ORAL at 17:47

## 2025-05-26 RX ADMIN — ACETAMINOPHEN 975 MG: 325 TABLET ORAL at 11:54

## 2025-05-26 RX ADMIN — METHOCARBAMOL 500 MG: 500 TABLET ORAL at 20:55

## 2025-05-26 RX ADMIN — LISINOPRIL 20 MG: 20 TABLET ORAL at 08:12

## 2025-05-26 RX ADMIN — PANTOPRAZOLE SODIUM 40 MG: 40 TABLET, DELAYED RELEASE ORAL at 08:12

## 2025-05-26 RX ADMIN — TRAZODONE HYDROCHLORIDE 50 MG: 50 TABLET ORAL at 20:55

## 2025-05-26 RX ADMIN — BACITRACIN 1 APPLICATION: 500 OINTMENT TOPICAL at 14:00

## 2025-05-26 RX ADMIN — METHOCARBAMOL 500 MG: 500 TABLET ORAL at 13:59

## 2025-05-26 RX ADMIN — ONDANSETRON 4 MG: 2 INJECTION INTRAMUSCULAR; INTRAVENOUS at 20:04

## 2025-05-26 RX ADMIN — INSULIN LISPRO 2 UNITS: 100 INJECTION, SOLUTION INTRAVENOUS; SUBCUTANEOUS at 08:21

## 2025-05-26 RX ADMIN — ACETAMINOPHEN 975 MG: 325 TABLET ORAL at 05:28

## 2025-05-26 RX ADMIN — AMLODIPINE BESYLATE 5 MG: 5 TABLET ORAL at 08:13

## 2025-05-26 RX ADMIN — LISINOPRIL 20 MG: 20 TABLET ORAL at 20:55

## 2025-05-26 RX ADMIN — METHOCARBAMOL 500 MG: 500 TABLET ORAL at 05:28

## 2025-05-26 RX ADMIN — ENOXAPARIN SODIUM 40 MG: 100 INJECTION SUBCUTANEOUS at 08:12

## 2025-05-26 RX ADMIN — INSULIN LISPRO 2 UNITS: 100 INJECTION, SOLUTION INTRAVENOUS; SUBCUTANEOUS at 18:49

## 2025-05-26 ASSESSMENT — PAIN SCALES - GENERAL: PAINLEVEL_OUTOF10: 7

## 2025-05-26 ASSESSMENT — PAIN DESCRIPTION - DESCRIPTORS: DESCRIPTORS: ACHING

## 2025-05-26 ASSESSMENT — PAIN - FUNCTIONAL ASSESSMENT: PAIN_FUNCTIONAL_ASSESSMENT: 0-10

## 2025-05-26 NOTE — CARE PLAN
The patient's goals for the shift include  pain control, up out of bed    The clinical goals for the shift include pt will tolerate diet and rest comfortably through shift 0700 5/26    Over the shift, the patient did not make progress toward the following goals. Barriers to progression include recent surgery. Recommendations to address these barriers include frequent pain assessment, offer pain meds as prescribed.

## 2025-05-26 NOTE — CARE PLAN
The clinical goals for the shift include pt will tolerate diet and rest comfortably through shift 0700 5/26      Problem: Pain - Adult  Goal: Verbalizes/displays adequate comfort level or baseline comfort level  Outcome: Progressing     Problem: Safety - Adult  Goal: Free from fall injury  Outcome: Progressing     Problem: Discharge Planning  Goal: Discharge to home or other facility with appropriate resources  Outcome: Progressing     Problem: Chronic Conditions and Co-morbidities  Goal: Patient's chronic conditions and co-morbidity symptoms are monitored and maintained or improved  Outcome: Progressing     Problem: Nutrition  Goal: Nutrient intake appropriate for maintaining nutritional needs  Outcome: Progressing

## 2025-05-26 NOTE — PROGRESS NOTES
[facility]  Daily Progress Note      Patient: Rodrigue Palacios  Age/Sex: 69 y.o., male  Date of surgery: 05/21/2025   Admit Date: 5/21/2025   Length of Stay: 5      Subjective:  No acute events overnight   Afebrile and HDS  Urostomy 1.6L   MANJU 145cc ss  Endorsing multiple loose stools, Bmx4  Ambulating   Passing gas  Pain controlled   Cr 1.3(1.1)    Objective    Physical Exam                                                                                                                        Gen: NAD, resting comfortably in bed with CPAP  Neuro: AOx3  Skin: Non-jaundiced  HEENT:  NCAT, anicteric sclera  CV:  Regular rate in chart  Resp:  Non-labored breathing, wearing CPAP  Abd:  Soft, appropriately tender around incisions, non-distended, no guarding/rebound/peritonitis, multiple lap and midline incision c/d/i, MANJU drain SS output  : IC pink, patent, well perfused with 1 blue stent in place draining pink-tinged urine   Extremities: No cyanosis or clubbing  Lymph: No lower extremity edema      Vitals:    05/25/25 1947 05/26/25 0002 05/26/25 0444 05/26/25 0736   BP: 167/82 154/76 143/83 146/74   BP Location: Left arm Left arm Left arm Left arm   Patient Position: Lying Lying Lying Lying   Pulse: 76 71 80 73   Resp: 16 16 16 16   Temp: 36.2 °C (97.2 °F) 36.5 °C (97.7 °F) 36.2 °C (97.2 °F) 36.4 °C (97.5 °F)   TempSrc: Temporal Temporal Temporal Temporal   SpO2: 96% 96% 96% 97%   Weight:       Height:         05/24 1900 - 05/26 0659  In: 770 [P.O.:720]  Out: 2778 [Urine:2450; Drains:328]    Labs  Results for orders placed or performed during the hospital encounter of 05/21/25 (from the past 24 hours)   POCT GLUCOSE   Result Value Ref Range    POCT Glucose 130 (H) 74 - 99 mg/dL   POCT GLUCOSE   Result Value Ref Range    POCT Glucose 134 (H) 74 - 99 mg/dL   POCT GLUCOSE   Result Value Ref Range    POCT Glucose 196 (H) 74 - 99 mg/dL   CBC   Result Value Ref Range    WBC 8.1 4.4 - 11.3 x10*3/uL    nRBC 0.0 0.0 - 0.0  /100 WBCs    RBC 4.07 (L) 4.50 - 5.90 x10*6/uL    Hemoglobin 11.2 (L) 13.5 - 17.5 g/dL    Hematocrit 35.8 (L) 41.0 - 52.0 %    MCV 88 80 - 100 fL    MCH 27.5 26.0 - 34.0 pg    MCHC 31.3 (L) 32.0 - 36.0 g/dL    RDW 13.4 11.5 - 14.5 %    Platelets 234 150 - 450 x10*3/uL   Renal function panel   Result Value Ref Range    Glucose 160 (H) 74 - 99 mg/dL    Sodium 143 136 - 145 mmol/L    Potassium 4.0 3.5 - 5.3 mmol/L    Chloride 111 (H) 98 - 107 mmol/L    Bicarbonate 24 21 - 32 mmol/L    Anion Gap 12 10 - 20 mmol/L    Urea Nitrogen 21 6 - 23 mg/dL    Creatinine 1.36 (H) 0.50 - 1.30 mg/dL    eGFR 56 (L) >60 mL/min/1.73m*2    Calcium 8.8 8.6 - 10.6 mg/dL    Phosphorus 3.5 2.5 - 4.9 mg/dL    Albumin 3.1 (L) 3.4 - 5.0 g/dL       Medications:  Current Medications[1]        Assessment & Plan  Mr. Rodrigue Palacios is a 70 y/o M w/PMH HTN, HLD, murmur, SCOOTER (CPAP), EF 60%, CKD, stones, DM2, GERD, RA, atrophic R kidney and BC. Patient is now s/p RAL right Nx and RARC+IC ([facility]) on 5/21.  Patient is progressing well.     Neuro:   -Multimodal pain control via scheduled Tylenol, Robaxin, PRN Oxycodone/Dilaudid, Melatonin  - continue home Trazodone     CV:  -Monitor vitals  -AM CBC  -Continue home Atorvastatin  - Continue amlodpine 5mg daily for high blood pressure  - Appreciate medicine recs, continue lisinopril 20mg BID    Pulm:  -IS  -Wean O2  -Home Singulair  -RT for PM CPAP     GI:  - Regular diet  - DC bowel regimen  - PRN ondansetron  - Obtain C diff PCR given diarrhea     :  -Monitor I/O  - LR 50cc/hr given increased Cr 1.3(1.1), HARMAN likely prerenal in etiology in the setting of diarrhea  - AM BMP  - WOCN  - Maintain ureteral stent and MANJU drain   - Will need to obtain MANJU Cr prio to DC    Endo:   - SSI Lispro     ID:   - Ertapenem 5d (EOT 5/25)- completed  - bacitracin to skin irritation from bandages     Prophy:  - Lovenox 5/25  - Pantoprazole     Dispo:  - RNF    Discussed with Chief Resident, Dr. Russell and to be discussed  with attending Dr. Borrero.    --------------------------------------------  Vicente Sainz MD  Urology  Consult Uro: [phone]  After 5pm and Weekends: [phone]           [1]   Current Facility-Administered Medications   Medication Dose Route Frequency Provider Last Rate Last Admin    acetaminophen (Tylenol) tablet 975 mg  975 mg oral q6h Connor Russell MD   975 mg at 05/26/25 0528    amLODIPine (Norvasc) tablet 5 mg  5 mg oral Daily Vicente Sainz MD   5 mg at 05/25/25 0942    atorvastatin (Lipitor) tablet 40 mg  40 mg oral Daily Connor Russell MD   40 mg at 05/25/25 0942    bacitracin ointment   Topical TID Deepika Gaston MD   1 Application at 05/25/25 2051    enoxaparin (Lovenox) syringe 40 mg  40 mg subcutaneous Daily Te Velazquez MD   40 mg at 05/25/25 1243    hydrALAZINE (Apresoline) injection 10 mg  10 mg intravenous q6h PRN Vicente Sainz MD   10 mg at 05/23/25 2124    HYDROmorphone (Dilaudid) injection 0.2 mg  0.2 mg intravenous q4h PRN Connor Russell MD        insulin lispro injection 0-10 Units  0-10 Units subcutaneous TID AC Connor Russell MD   2 Units at 05/24/25 1240    lactated Ringer's infusion  50 mL/hr intravenous Continuous Vicente Sainz MD        lisinopril tablet 20 mg  20 mg oral BID Te Velazquez MD   20 mg at 05/25/25 2058    methocarbamol (Robaxin) tablet 500 mg  500 mg oral q8h CHRISTOPHER Vicente Sainz MD   500 mg at 05/26/25 0528    montelukast (Singulair) tablet 10 mg  10 mg oral Nightly Connor Russell MD   10 mg at 05/25/25 2050    naloxone (Narcan) injection 0.2 mg  0.2 mg intravenous q5 min PRN Connor Russell MD        ondansetron (Zofran) tablet 4 mg  4 mg oral q8h PRN Connor Russell MD   4 mg at 05/25/25 0747    Or    ondansetron (Zofran) injection 4 mg  4 mg intravenous q8h PRN Connor Russell MD   4 mg at 05/24/25 1021    oxyCODONE (Roxicodone) immediate release tablet 10 mg  10 mg oral q4h PRN Connor Russell MD   10 mg at 05/24/25 2111    oxyCODONE (Roxicodone) immediate release tablet 5 mg  5 mg  oral q6h PRN Connor Russell MD        oxygen (O2) therapy   inhalation Continuous PRN - O2/gases Deepika Gaston MD        pantoprazole (ProtoNix) EC tablet 40 mg  40 mg oral Daily Connor Russell MD   40 mg at 05/25/25 0941    prochlorperazine (Compazine) tablet 10 mg  10 mg oral q6h PRN Vicente Sainz MD   10 mg at 05/25/25 1312    Or    prochlorperazine (Compazine) injection 10 mg  10 mg intravenous q6h PRN Vicente Sainz MD        Or    prochlorperazine (Compazine) suppository 25 mg  25 mg rectal q12h PRN Vicente Sainz MD        traZODone (Desyrel) tablet 50 mg  50 mg oral Nightly Connor Russell MD   50 mg at 05/25/25 2050

## 2025-05-27 ENCOUNTER — DOCUMENTATION (OUTPATIENT)
Dept: HOME HEALTH SERVICES | Facility: HOME HEALTH | Age: 70
End: 2025-05-27
Payer: MEDICARE

## 2025-05-27 ENCOUNTER — HOME HEALTH ADMISSION (OUTPATIENT)
Dept: HOME HEALTH SERVICES | Facility: HOME HEALTH | Age: 70
End: 2025-05-27
Payer: MEDICARE

## 2025-05-27 VITALS
OXYGEN SATURATION: 97 % | BODY MASS INDEX: 26.97 KG/M2 | HEART RATE: 71 BPM | HEIGHT: 75 IN | TEMPERATURE: 97.9 F | WEIGHT: 216.93 LBS | SYSTOLIC BLOOD PRESSURE: 157 MMHG | RESPIRATION RATE: 16 BRPM | DIASTOLIC BLOOD PRESSURE: 80 MMHG

## 2025-05-27 LAB
ALBUMIN SERPL BCP-MCNC: 3.2 G/DL (ref 3.4–5)
ANION GAP SERPL CALC-SCNC: 13 MMOL/L (ref 10–20)
BUN SERPL-MCNC: 21 MG/DL (ref 6–23)
CALCIUM SERPL-MCNC: 8.5 MG/DL (ref 8.6–10.6)
CHLORIDE SERPL-SCNC: 107 MMOL/L (ref 98–107)
CO2 SERPL-SCNC: 24 MMOL/L (ref 21–32)
CREAT FLD-MCNC: 1.43 MG/DL
CREAT SERPL-MCNC: 1.43 MG/DL (ref 0.5–1.3)
EGFRCR SERPLBLD CKD-EPI 2021: 53 ML/MIN/1.73M*2
ERYTHROCYTE [DISTWIDTH] IN BLOOD BY AUTOMATED COUNT: 13.2 % (ref 11.5–14.5)
GLUCOSE BLD MANUAL STRIP-MCNC: 143 MG/DL (ref 74–99)
GLUCOSE SERPL-MCNC: 160 MG/DL (ref 74–99)
HCT VFR BLD AUTO: 35.2 % (ref 41–52)
HGB BLD-MCNC: 11.5 G/DL (ref 13.5–17.5)
MCH RBC QN AUTO: 28.3 PG (ref 26–34)
MCHC RBC AUTO-ENTMCNC: 32.7 G/DL (ref 32–36)
MCV RBC AUTO: 87 FL (ref 80–100)
NRBC BLD-RTO: 0 /100 WBCS (ref 0–0)
PHOSPHATE SERPL-MCNC: 3.6 MG/DL (ref 2.5–4.9)
PLATELET # BLD AUTO: 232 X10*3/UL (ref 150–450)
POTASSIUM SERPL-SCNC: 3.9 MMOL/L (ref 3.5–5.3)
RBC # BLD AUTO: 4.06 X10*6/UL (ref 4.5–5.9)
SODIUM SERPL-SCNC: 140 MMOL/L (ref 136–145)
WBC # BLD AUTO: 9.6 X10*3/UL (ref 4.4–11.3)

## 2025-05-27 PROCEDURE — 2500000001 HC RX 250 WO HCPCS SELF ADMINISTERED DRUGS (ALT 637 FOR MEDICARE OP)

## 2025-05-27 PROCEDURE — 36415 COLL VENOUS BLD VENIPUNCTURE: CPT

## 2025-05-27 PROCEDURE — 82947 ASSAY GLUCOSE BLOOD QUANT: CPT

## 2025-05-27 PROCEDURE — 84100 ASSAY OF PHOSPHORUS: CPT

## 2025-05-27 PROCEDURE — 2500000004 HC RX 250 GENERAL PHARMACY W/ HCPCS (ALT 636 FOR OP/ED): Performed by: STUDENT IN AN ORGANIZED HEALTH CARE EDUCATION/TRAINING PROGRAM

## 2025-05-27 PROCEDURE — 2500000001 HC RX 250 WO HCPCS SELF ADMINISTERED DRUGS (ALT 637 FOR MEDICARE OP): Performed by: STUDENT IN AN ORGANIZED HEALTH CARE EDUCATION/TRAINING PROGRAM

## 2025-05-27 PROCEDURE — 2500000004 HC RX 250 GENERAL PHARMACY W/ HCPCS (ALT 636 FOR OP/ED): Mod: JZ | Performed by: STUDENT IN AN ORGANIZED HEALTH CARE EDUCATION/TRAINING PROGRAM

## 2025-05-27 PROCEDURE — 2500000004 HC RX 250 GENERAL PHARMACY W/ HCPCS (ALT 636 FOR OP/ED): Mod: JZ

## 2025-05-27 PROCEDURE — 82570 ASSAY OF URINE CREATININE: CPT

## 2025-05-27 PROCEDURE — 2500000005 HC RX 250 GENERAL PHARMACY W/O HCPCS: Performed by: STUDENT IN AN ORGANIZED HEALTH CARE EDUCATION/TRAINING PROGRAM

## 2025-05-27 PROCEDURE — 85027 COMPLETE CBC AUTOMATED: CPT

## 2025-05-27 RX ORDER — OXYCODONE HYDROCHLORIDE 5 MG/1
5 TABLET ORAL EVERY 6 HOURS PRN
Qty: 10 TABLET | Refills: 0 | Status: SHIPPED | OUTPATIENT
Start: 2025-05-27 | End: 2025-06-08 | Stop reason: HOSPADM

## 2025-05-27 RX ORDER — ONDANSETRON 4 MG/1
8 TABLET, FILM COATED ORAL EVERY 8 HOURS PRN
Qty: 60 TABLET | Refills: 0 | Status: SHIPPED | OUTPATIENT
Start: 2025-05-27 | End: 2025-06-26

## 2025-05-27 RX ORDER — ENOXAPARIN SODIUM 100 MG/ML
40 INJECTION SUBCUTANEOUS DAILY
Qty: 30 EACH | Refills: 0 | Status: SHIPPED | OUTPATIENT
Start: 2025-05-27 | End: 2025-06-26

## 2025-05-27 RX ORDER — LISINOPRIL 40 MG/1
20 TABLET ORAL EVERY 12 HOURS
Qty: 30 TABLET | Refills: 2 | Status: SHIPPED | OUTPATIENT
Start: 2025-05-27 | End: 2025-08-25

## 2025-05-27 RX ORDER — SULFAMETHOXAZOLE AND TRIMETHOPRIM 400; 80 MG/1; MG/1
1 TABLET ORAL 2 TIMES DAILY
Qty: 28 TABLET | Refills: 0 | Status: SHIPPED | OUTPATIENT
Start: 2025-05-27 | End: 2025-06-10

## 2025-05-27 RX ORDER — POLYETHYLENE GLYCOL 3350 17 G/17G
17 POWDER, FOR SOLUTION ORAL DAILY
Qty: 10 PACKET | Refills: 0 | Status: SHIPPED | OUTPATIENT
Start: 2025-05-27 | End: 2025-06-08 | Stop reason: HOSPADM

## 2025-05-27 RX ADMIN — ACETAMINOPHEN 975 MG: 325 TABLET ORAL at 12:14

## 2025-05-27 RX ADMIN — ONDANSETRON HYDROCHLORIDE 4 MG: 4 TABLET, FILM COATED ORAL at 08:49

## 2025-05-27 RX ADMIN — AMLODIPINE BESYLATE 5 MG: 5 TABLET ORAL at 08:50

## 2025-05-27 RX ADMIN — PANTOPRAZOLE SODIUM 40 MG: 40 TABLET, DELAYED RELEASE ORAL at 08:50

## 2025-05-27 RX ADMIN — ENOXAPARIN SODIUM 40 MG: 100 INJECTION SUBCUTANEOUS at 08:50

## 2025-05-27 RX ADMIN — ATORVASTATIN CALCIUM 40 MG: 40 TABLET, FILM COATED ORAL at 08:50

## 2025-05-27 RX ADMIN — BACITRACIN 1 APPLICATION: 500 OINTMENT TOPICAL at 08:50

## 2025-05-27 RX ADMIN — SODIUM CHLORIDE, SODIUM LACTATE, POTASSIUM CHLORIDE, AND CALCIUM CHLORIDE 50 ML/HR: .6; .31; .03; .02 INJECTION, SOLUTION INTRAVENOUS at 05:21

## 2025-05-27 RX ADMIN — LISINOPRIL 20 MG: 20 TABLET ORAL at 08:50

## 2025-05-27 ASSESSMENT — COGNITIVE AND FUNCTIONAL STATUS - GENERAL
DAILY ACTIVITIY SCORE: 24
MOBILITY SCORE: 24
DAILY ACTIVITIY SCORE: 24
MOBILITY SCORE: 24

## 2025-05-27 ASSESSMENT — PAIN SCALES - GENERAL
PAINLEVEL_OUTOF10: 3
PAINLEVEL_OUTOF10: 0 - NO PAIN

## 2025-05-27 ASSESSMENT — PAIN - FUNCTIONAL ASSESSMENT: PAIN_FUNCTIONAL_ASSESSMENT: 0-10

## 2025-05-27 NOTE — DOCUMENTATION CLARIFICATION NOTE
PATIENT:               DANNA BARLOW  ACCT #:                  [id number]  MRN:                       [id number]  :                       1955  ADMIT DATE:       2025 6:31 AM  DISCH DATE:  RESPONDING PROVIDER #:        [id number]          PROVIDER RESPONSE TEXT:    HARMAN on CKD 3b    CDI QUERY TEXT:    Clarification        Instruction:    Based on your assessment of the patient and the clinical information, please provide the requested documentation by clicking on the appropriate radio button and enter any additional information if prompted.    Question: Please clarify a diagnosis for the patient renal status    When answering this query, please exercise your independent professional judgment. The fact that a question is being asked, does not imply that any particular answer is desired or expected.    The patient's clinical indicators include:  Clinical Information:  68 y/o male with atrophic right kidney and bladder cancer admitted for surgery.      Clinical Indicators:  The patient has the following labs:  Date: BUN/Cr/GFR  25: 21.45/52  25: .82/40  25: .61/46  25: .38/      Treatment:  -Monitored with daily labs  -IVF: LR bolus x5 on , NS infusion started on       Risk Factors:  -History of CKD  -S/P RAL right Nx and RARC+IC on 25  Options provided:  -- Chronic Kidney Disease, Please specify stage below  -- HARMAN on CKD, Please specify stage below  -- Other - I will add my own diagnosis  -- Refer to Clinical Documentation Reviewer    Query created by: Blanca Penn on 2025 11:29 AM      Electronically signed by:  TEMO POWELL-CNP 2025 7:35 AM

## 2025-05-27 NOTE — CARE PLAN
The patient's goals for the shift include      The clinical goals for the shift include pt will remain free from falls during the shift

## 2025-05-27 NOTE — CARE PLAN
Problem: Pain  Goal: Takes deep breaths with improved pain control throughout the shift  Outcome: Progressing  Goal: Turns in bed with improved pain control throughout the shift  Outcome: Progressing  Goal: Walks with improved pain control throughout the shift  Outcome: Progressing  Goal: Performs ADL's with improved pain control throughout shift  Outcome: Progressing  Goal: Participates in PT with improved pain control throughout the shift  Outcome: Progressing  Goal: Free from opioid side effects throughout the shift  Outcome: Progressing  Goal: Free from acute confusion related to pain meds throughout the shift  Outcome: Progressing      I have reviewed and confirmed nurses' notes...

## 2025-05-27 NOTE — DISCHARGE SUMMARY
Discharge Diagnosis  Bladder cancer (Multi)    Issues Requiring Follow-Up  Pathology   Postoperative recovery   Stent removal    Test Results Pending At Discharge  Pending Labs       Order Current Status    CBC In process    Renal function panel In process    Surgical Pathology Exam In process            Hospital Course  Patient was admitted to the hospital on 5/21 for a planned cystectomy w/ ileal conduit creation and R nephroureterectomy. Surgery was uncomplicated and the patient tolerated the procedure well. He was transferred to [facility] for postop recovery. Diet was advanced as tolerated and pain was controlled on PO pain meds.  creatinine was tested and was unremarkable. MANJU drain was subsequently removed on day of discharge. Hospital course remarkable for diarrhea , however, C diff was negative. He was transitioned to Lovenox inpatient. Ostomy teaching and Lovenox injection teaching were done prior to discharge. Patient was discharged on 05/27/25. His vitals were stable, labs were normal, tolerating regular diet, having bowel function, and pain under control. Patient was discharged with instructions to follow up in clinic. He was discharged on bactrim until stent removal in clinic and Lovenox for 30 days.      Pertinent Physical Exam At Time of Discharge  Physical Exam    General: resting comfortably in bed  Neuro: alert and oriented, no obvious focal deficit   Head/Neck: normocephalic, atraumatic  ENT: moist mucous membranes  CV: regular rate and rhythm  Pulm: nonlabored breathing on room air, no conversational dyspnea  Abd: soft, nondistended, appropriately tender, surgical incisions c/d/i  : urostomy pink/viable, blue stent in place, clear yellow urine in bag  MSK: SPAIN, no gross deformities  Psych: mood and behavior normal    Home Medications     Medication List      START taking these medications     enoxaparin 40 mg/0.4 mL syringe; Commonly known as: Lovenox; Inject 0.4   mL (40 mg) under the skin once  "daily.   ondansetron 4 mg tablet; Commonly known as: Zofran; Take 2 tablets (8   mg) by mouth every 8 hours if needed for nausea or vomiting.   oxyCODONE 5 mg immediate release tablet; Commonly known as: Roxicodone;   Take 1 tablet (5 mg) by mouth every 6 hours if needed for severe pain (7 -   10) for up to 5 days.   polyethylene glycol 17 gram packet; Commonly known as: Glycolax,   Miralax; Take 17 g by mouth once daily for 10 days.     CONTINUE taking these medications     acetaminophen 500 mg tablet; Commonly known as: Tylenol   atorvastatin 40 mg tablet; Commonly known as: Lipitor; TAKE ONE TABLET   BY MOUTH DAILY   Basaglar KwikPen U-100 Insulin 100 unit/mL (3 mL) pen; Generic drug:   insulin glargine; Use up to 30 units daily   BD Insulin Syringe 1 mL 26 x 1/2\" syringe; Generic drug: insulin   syringe-needle U-100   glimepiride 4 mg tablet; Commonly known as: AmaryL; Take 1 tablet (4 mg)   by mouth once daily in the morning. Take before meals.   lisinopril 40 mg tablet; TAKE ONE TABLET BY MOUTH DAILY   montelukast 10 mg tablet; Commonly known as: Singulair   pantoprazole 40 mg EC tablet; Commonly known as: ProtoNix; TAKE ONE   TABLET BY MOUTH EVERY DAY   pen needle, diabetic 32 gauge x 5/32\" needle; Commonly known as: BD Luann   2nd Gen Pen Needle; use DAILY   tadalafil 20 mg tablet; TAKE ONE TABLET BY MOUTH ONCE DAILY   traZODone 50 mg tablet; Commonly known as: Desyrel; Take 1-2 tablets   ( mg) by mouth once daily.     STOP taking these medications     chlorhexidine 4 % external liquid; Commonly known as: Hibiclens   ciprofloxacin 500 mg tablet; Commonly known as: Cipro   folic acid 1 mg tablet; Commonly known as: Folvite   HYDROcodone-acetaminophen 5-325 mg tablet; Commonly known as: Norco       Outpatient Follow-Up  Future Appointments   Date Time Provider Department Center   6/9/2025  2:40 PM James Borrero MD [id number] [facility]   7/31/2025  1:00 PM Antonia HudsonD [id number] [place] "   8/7/2025  1:00 PM Miles Peters MD [id number] [facility]   10/16/2025  2:20 PM SHERRY Blanco-CNP [id number] [facility]       Vicente Sainz MD

## 2025-05-27 NOTE — HH CARE COORDINATION
Home Care received a Referral for Nursing, Physical Therapy, and Occupational Therapy. We have processed the referral for a Start of Care on 5/29/25.     If you have any questions or concerns, please feel free to contact us at [phone]. Follow the prompts, enter your five digit zip code, and you will be directed to your care team on EAST 1.

## 2025-05-29 ENCOUNTER — HOME CARE VISIT (OUTPATIENT)
Dept: HOME HEALTH SERVICES | Facility: HOME HEALTH | Age: 70
End: 2025-05-29
Payer: MEDICARE

## 2025-05-29 VITALS
HEIGHT: 75 IN | TEMPERATURE: 98.1 F | HEART RATE: 82 BPM | RESPIRATION RATE: 16 BRPM | DIASTOLIC BLOOD PRESSURE: 76 MMHG | SYSTOLIC BLOOD PRESSURE: 118 MMHG | OXYGEN SATURATION: 95 % | WEIGHT: 215 LBS | BODY MASS INDEX: 26.73 KG/M2

## 2025-05-29 PROCEDURE — 169592 NO-PAY CLAIM PROCEDURE

## 2025-05-29 PROCEDURE — G0299 HHS/HOSPICE OF RN EA 15 MIN: HCPCS | Mod: HHH

## 2025-05-29 SDOH — ECONOMIC STABILITY: FOOD INSECURITY: MEALS PER DAY: 3

## 2025-05-29 SDOH — ECONOMIC STABILITY: HOUSING INSECURITY: HOME SAFETY: FRONT STEPS ARE WOODEN AND IN NEED OF REPAIR

## 2025-05-29 ASSESSMENT — ACTIVITIES OF DAILY LIVING (ADL)
TOILETING: STAND BY ASSIST
HOUSEKEEPING ASSESSED: 1
TOILETING: 1
AMBULATION ASSISTANCE: 1
GROOMING_CURRENT_FUNCTION: STAND BY ASSIST
DRESSING_UB_CURRENT_FUNCTION: STAND BY ASSIST
TELEPHONE USE ASSESSED: 1
AMBULATION ASSISTANCE: STAND BY ASSIST
CURRENT_FUNCTION: STAND BY ASSIST
OASIS_M1830: 03
PREPARING MEALS: DEPENDENT
FEEDING: STAND BY ASSIST
USING THE TELPHONE: INDEPENDENT
BATHING ASSESSED: 1
SHOPPING: DEPENDENT
LAUNDRY: DEPENDENT
LIGHT HOUSEKEEPING: DEPENDENT
PHYSICAL TRANSFERS ASSESSED: 1
BATHING_CURRENT_FUNCTION: STAND BY ASSIST
ENTERING_EXITING_HOME: MODERATE ASSIST
SHOPPING ASSESSED: 1
LAUNDRY ASSESSED: 1
FEEDING ASSESSED: 1
ORAL_CARE_CURRENT_FUNCTION: INDEPENDENT
DRESSING_LB_CURRENT_FUNCTION: STAND BY ASSIST
GROOMING ASSESSED: 1
ORAL_CARE_ASSESSED: 1
TRANSPORTATION: DEPENDENT
TRANSPORTATION ASSESSED: 1

## 2025-05-29 ASSESSMENT — ENCOUNTER SYMPTOMS
DYSPNEA ACTIVITY LEVEL: AFTER AMBULATING MORE THAN 20 FT
ARTHRALGIAS: 1
HIGHEST PAIN SEVERITY IN PAST 24 HOURS: 6/10
STOOL FREQUENCY: DAILY
LOWEST PAIN SEVERITY IN PAST 24 HOURS: 4/10
PAIN: 1
OCCASIONAL FEELINGS OF UNSTEADINESS: 0
DYSPNEA ON EXERTION: 1
APPETITE LEVEL: FAIR
PAIN LOCATION: ABDOMEN
NAUSEA: 1
PERSON REPORTING PAIN: PATIENT
SHORTNESS OF BREATH: 1
SUBJECTIVE PAIN PROGRESSION: GRADUALLY IMPROVING
PAIN LOCATION - PAIN SEVERITY: 4/10
HYPERTENSION: 1
BOWEL PATTERN NORMAL: 1
LAST BOWEL MOVEMENT: 67353

## 2025-05-30 ENCOUNTER — TELEPHONE (OUTPATIENT)
Dept: UROLOGY | Facility: CLINIC | Age: 70
End: 2025-05-30
Payer: MEDICARE

## 2025-05-30 ENCOUNTER — HOME CARE VISIT (OUTPATIENT)
Dept: HOME HEALTH SERVICES | Facility: HOME HEALTH | Age: 70
End: 2025-05-30
Payer: MEDICARE

## 2025-05-30 NOTE — TELEPHONE ENCOUNTER
Patient called requesting a note stating his son has been his caregiver after his diagnosis and surgery for his son's college. Note was written and uploaded to [employer].

## 2025-05-30 NOTE — LETTER
May 30, 2025     Rodrigue Palacios    Patient: Rodrigue Palacios   YOB: 1955   Date of Visit: 5/30/2025       Dear Whom it May Concern,     Tom Palacios has been and remains the main caregiver at this time for our patient Rodrigue Palacios due to his diagnosis and surgery. If you have questions or need further information please do not hesitate to call the office at [phone].    Sincerely,     ONUR Zaamn Dr., MD  ______________________________________________________________________________________

## 2025-05-31 ENCOUNTER — HOME CARE VISIT (OUTPATIENT)
Dept: HOME HEALTH SERVICES | Facility: HOME HEALTH | Age: 70
End: 2025-05-31
Payer: MEDICARE

## 2025-05-31 VITALS — DIASTOLIC BLOOD PRESSURE: 74 MMHG | HEART RATE: 82 BPM | SYSTOLIC BLOOD PRESSURE: 142 MMHG | TEMPERATURE: 98.5 F

## 2025-05-31 PROCEDURE — G0151 HHCP-SERV OF PT,EA 15 MIN: HCPCS | Mod: HHH

## 2025-05-31 SDOH — HEALTH STABILITY: PHYSICAL HEALTH: EXERCISE TYPE: LE THER EX

## 2025-05-31 ASSESSMENT — ACTIVITIES OF DAILY LIVING (ADL)
AMBULATION ASSISTANCE: INDEPENDENT
PHYSICAL TRANSFERS ASSESSED: 1
AMBULATION_DISTANCE/DURATION_TOLERATED: 150 FT
CURRENT_FUNCTION: INDEPENDENT
AMBULATION ASSISTANCE ON FLAT SURFACES: 1
AMBULATION ASSISTANCE: 1

## 2025-05-31 ASSESSMENT — GAIT ASSESSMENTS
PATH: 2 - STRAIGHT WITHOUT WALKING AID
STEP CONTINUITY: 1 - STEPS APPEAR CONTINUOUS
INITIATION OF GAIT IMMEDIATELY AFTER GO: 1 - NO HESITANCY
STEP SYMMETRY: 1 - RIGHT AND LEFT STEP LENGTH APPEAR EQUAL
PATH SCORE: 2
BALANCE AND GAIT SCORE: 25
TRUNK: 2 - NO SWAY, NO FLEXION, NO USE OF ARMS, NO WALKING AID
GAIT SCORE: 12
WALKING STANCE: 1 - HEELS ALMOST TOUCHING WHILE WALKING
TRUNK SCORE: 2

## 2025-05-31 ASSESSMENT — ENCOUNTER SYMPTOMS
PAIN LOCATION - PAIN SEVERITY: 3/10
PAIN LOCATION: ABDOMEN
PAIN: 1
PERSON REPORTING PAIN: PATIENT

## 2025-05-31 ASSESSMENT — BALANCE ASSESSMENTS
TURNING 360 DEGREES STEPS: 1 - CONTINUOUS STEPS
EYES CLOSED AT MAXIMUM POSITION NUDGED: 0 - UNSTEADY
SITTING BALANCE: 1 - STEADY, SAFE
ARISES: 1 - ABLE, USES ARMS TO HELP
NUDGED SCORE: 2
ATTEMPTS TO ARISE: 2 - ABLE TO RISE, ONE ATTEMPT
BALANCE SCORE: 13
IMMEDIATE STANDING BALANCE FIRST 5 SECONDS: 2 - STEADY WITHOUT WALKER OR OTHER SUPPORT
ARISING SCORE: 1
SITTING DOWN: 1 - USES ARMS OR NOT SMOOTH MOTION
STANDING BALANCE: 2 - NARROW STANCE WITHOUT SUPPORT
NUDGED: 2 - STEADY

## 2025-06-02 ENCOUNTER — HOME CARE VISIT (OUTPATIENT)
Dept: HOME HEALTH SERVICES | Facility: HOME HEALTH | Age: 70
End: 2025-06-02
Payer: MEDICARE

## 2025-06-02 VITALS
OXYGEN SATURATION: 97 % | SYSTOLIC BLOOD PRESSURE: 142 MMHG | RESPIRATION RATE: 16 BRPM | DIASTOLIC BLOOD PRESSURE: 62 MMHG | HEART RATE: 88 BPM | TEMPERATURE: 98.1 F

## 2025-06-02 PROCEDURE — G0300 HHS/HOSPICE OF LPN EA 15 MIN: HCPCS | Mod: HHH

## 2025-06-02 SDOH — ECONOMIC STABILITY: FOOD INSECURITY: MEALS PER DAY: 3

## 2025-06-02 SDOH — ECONOMIC STABILITY: HOUSING INSECURITY: HOME SAFETY: FRONT STEPS ARE WOODEN AND IN NEED OF REPAIR

## 2025-06-02 ASSESSMENT — ENCOUNTER SYMPTOMS
DYSPNEA ON EXERTION: 1
APPETITE LEVEL: FAIR
STOOL FREQUENCY: DAILY
HYPERTENSION: 1
SHORTNESS OF BREATH: 1
BOWEL PATTERN NORMAL: 1
DYSPNEA ACTIVITY LEVEL: AFTER AMBULATING MORE THAN 20 FT
NAUSEA: 1

## 2025-06-02 ASSESSMENT — ACTIVITIES OF DAILY LIVING (ADL)
CURRENT_FUNCTION: INDEPENDENT
AMBULATION ASSISTANCE: INDEPENDENT

## 2025-06-03 DIAGNOSIS — C67.3 MALIGNANT NEOPLASM OF ANTERIOR WALL OF URINARY BLADDER (MULTI): ICD-10-CM

## 2025-06-04 ENCOUNTER — HOSPITAL ENCOUNTER (INPATIENT)
Facility: HOSPITAL | Age: 70
LOS: 4 days | Discharge: HOME | End: 2025-06-08
Attending: INTERNAL MEDICINE | Admitting: INTERNAL MEDICINE
Payer: MEDICARE

## 2025-06-04 ENCOUNTER — APPOINTMENT (OUTPATIENT)
Dept: RADIOLOGY | Facility: HOSPITAL | Age: 70
DRG: 638 | End: 2025-06-04
Payer: MEDICARE

## 2025-06-04 ENCOUNTER — APPOINTMENT (OUTPATIENT)
Dept: CARDIOLOGY | Facility: HOSPITAL | Age: 70
DRG: 638 | End: 2025-06-04
Payer: MEDICARE

## 2025-06-04 DIAGNOSIS — N39.0 URINARY TRACT INFECTION ASSOCIATED WITH CATHETERIZATION OF URINARY TRACT, UNSPECIFIED INDWELLING URINARY CATHETER TYPE, INITIAL ENCOUNTER: ICD-10-CM

## 2025-06-04 DIAGNOSIS — N17.9 AKI (ACUTE KIDNEY INJURY): Primary | ICD-10-CM

## 2025-06-04 DIAGNOSIS — C67.9 MALIGNANT NEOPLASM OF URINARY BLADDER, UNSPECIFIED SITE (MULTI): ICD-10-CM

## 2025-06-04 DIAGNOSIS — N17.9 ACUTE RENAL FAILURE SUPERIMPOSED ON CHRONIC KIDNEY DISEASE, UNSPECIFIED ACUTE RENAL FAILURE TYPE, UNSPECIFIED CKD STAGE: ICD-10-CM

## 2025-06-04 DIAGNOSIS — T83.511A URINARY TRACT INFECTION ASSOCIATED WITH CATHETERIZATION OF URINARY TRACT, UNSPECIFIED INDWELLING URINARY CATHETER TYPE, INITIAL ENCOUNTER: ICD-10-CM

## 2025-06-04 DIAGNOSIS — N18.9 ACUTE RENAL FAILURE SUPERIMPOSED ON CHRONIC KIDNEY DISEASE, UNSPECIFIED ACUTE RENAL FAILURE TYPE, UNSPECIFIED CKD STAGE: ICD-10-CM

## 2025-06-04 DIAGNOSIS — N18.4 ACUTE RENAL FAILURE SUPERIMPOSED ON STAGE 4 CHRONIC KIDNEY DISEASE, UNSPECIFIED ACUTE RENAL FAILURE TYPE (MULTI): ICD-10-CM

## 2025-06-04 DIAGNOSIS — N17.9 ACUTE RENAL FAILURE SUPERIMPOSED ON STAGE 4 CHRONIC KIDNEY DISEASE, UNSPECIFIED ACUTE RENAL FAILURE TYPE (MULTI): ICD-10-CM

## 2025-06-04 DIAGNOSIS — E16.2 HYPOGLYCEMIA: ICD-10-CM

## 2025-06-04 LAB
ALBUMIN SERPL BCP-MCNC: 3.2 G/DL (ref 3.4–5)
ALP SERPL-CCNC: 60 U/L (ref 33–136)
ALT SERPL W P-5'-P-CCNC: 13 U/L (ref 10–52)
ANION GAP SERPL CALC-SCNC: 14 MMOL/L (ref 10–20)
APPEARANCE UR: ABNORMAL
AST SERPL W P-5'-P-CCNC: 17 U/L (ref 9–39)
ATRIAL RATE: 93 BPM
BACTERIA #/AREA URNS AUTO: ABNORMAL /HPF
BASOPHILS # BLD AUTO: 0.02 X10*3/UL (ref 0–0.1)
BASOPHILS NFR BLD AUTO: 0.2 %
BILIRUB SERPL-MCNC: 0.2 MG/DL (ref 0–1.2)
BILIRUB UR STRIP.AUTO-MCNC: NEGATIVE MG/DL
BNP SERPL-MCNC: 142 PG/ML (ref 0–99)
BUN SERPL-MCNC: 20 MG/DL (ref 6–23)
CALCIUM SERPL-MCNC: 8.9 MG/DL (ref 8.6–10.3)
CARDIAC TROPONIN I PNL SERPL HS: 13 NG/L (ref 0–20)
CARDIAC TROPONIN I PNL SERPL HS: 13 NG/L (ref 0–20)
CHLORIDE SERPL-SCNC: 111 MMOL/L (ref 98–107)
CO2 SERPL-SCNC: 20 MMOL/L (ref 21–32)
COLOR UR: COLORLESS
CREAT SERPL-MCNC: 2.98 MG/DL (ref 0.5–1.3)
EGFRCR SERPLBLD CKD-EPI 2021: 22 ML/MIN/1.73M*2
EOSINOPHIL # BLD AUTO: 0.06 X10*3/UL (ref 0–0.7)
EOSINOPHIL NFR BLD AUTO: 0.5 %
ERYTHROCYTE [DISTWIDTH] IN BLOOD BY AUTOMATED COUNT: 13.5 % (ref 11.5–14.5)
GLUCOSE BLD MANUAL STRIP-MCNC: 106 MG/DL (ref 74–99)
GLUCOSE BLD MANUAL STRIP-MCNC: 124 MG/DL (ref 74–99)
GLUCOSE BLD MANUAL STRIP-MCNC: 56 MG/DL (ref 74–99)
GLUCOSE BLD MANUAL STRIP-MCNC: 68 MG/DL (ref 74–99)
GLUCOSE BLD MANUAL STRIP-MCNC: 84 MG/DL (ref 74–99)
GLUCOSE BLD MANUAL STRIP-MCNC: 96 MG/DL (ref 74–99)
GLUCOSE SERPL-MCNC: 119 MG/DL (ref 74–99)
GLUCOSE UR STRIP.AUTO-MCNC: NORMAL MG/DL
HCT VFR BLD AUTO: 32.7 % (ref 41–52)
HGB BLD-MCNC: 10.7 G/DL (ref 13.5–17.5)
HOLD SPECIMEN: NORMAL
IMM GRANULOCYTES # BLD AUTO: 0.04 X10*3/UL (ref 0–0.7)
IMM GRANULOCYTES NFR BLD AUTO: 0.3 % (ref 0–0.9)
KETONES UR STRIP.AUTO-MCNC: NEGATIVE MG/DL
LACTATE SERPL-SCNC: 0.6 MMOL/L (ref 0.4–2)
LEUKOCYTE ESTERASE UR QL STRIP.AUTO: ABNORMAL
LYMPHOCYTES # BLD AUTO: 0.39 X10*3/UL (ref 1.2–4.8)
LYMPHOCYTES NFR BLD AUTO: 3.3 %
MAGNESIUM SERPL-MCNC: 2.13 MG/DL (ref 1.6–2.4)
MCH RBC QN AUTO: 28.5 PG (ref 26–34)
MCHC RBC AUTO-ENTMCNC: 32.7 G/DL (ref 32–36)
MCV RBC AUTO: 87 FL (ref 80–100)
MONOCYTES # BLD AUTO: 0.84 X10*3/UL (ref 0.1–1)
MONOCYTES NFR BLD AUTO: 7.1 %
MUCOUS THREADS #/AREA URNS AUTO: ABNORMAL /LPF
NEUTROPHILS # BLD AUTO: 10.44 X10*3/UL (ref 1.2–7.7)
NEUTROPHILS NFR BLD AUTO: 88.6 %
NITRITE UR QL STRIP.AUTO: ABNORMAL
NRBC BLD-RTO: 0 /100 WBCS (ref 0–0)
P AXIS: 41 DEGREES
P OFFSET: 194 MS
P ONSET: 132 MS
PH UR STRIP.AUTO: 6 [PH]
PLATELET # BLD AUTO: 341 X10*3/UL (ref 150–450)
POTASSIUM SERPL-SCNC: 4 MMOL/L (ref 3.5–5.3)
PR INTERVAL: 156 MS
PROT SERPL-MCNC: 6.4 G/DL (ref 6.4–8.2)
PROT UR STRIP.AUTO-MCNC: ABNORMAL MG/DL
Q ONSET: 210 MS
QRS COUNT: 15 BEATS
QRS DURATION: 104 MS
QT INTERVAL: 392 MS
QTC CALCULATION(BAZETT): 487 MS
QTC FREDERICIA: 454 MS
R AXIS: -37 DEGREES
RBC # BLD AUTO: 3.76 X10*6/UL (ref 4.5–5.9)
RBC # UR STRIP.AUTO: ABNORMAL MG/DL
RBC #/AREA URNS AUTO: >20 /HPF
SODIUM SERPL-SCNC: 141 MMOL/L (ref 136–145)
SP GR UR STRIP.AUTO: 1.01
SQUAMOUS #/AREA URNS AUTO: ABNORMAL /HPF
T AXIS: 60 DEGREES
T OFFSET: 406 MS
UROBILINOGEN UR STRIP.AUTO-MCNC: NORMAL MG/DL
VENTRICULAR RATE: 93 BPM
WBC # BLD AUTO: 11.8 X10*3/UL (ref 4.4–11.3)
WBC #/AREA URNS AUTO: ABNORMAL /HPF
WBC CLUMPS #/AREA URNS AUTO: ABNORMAL /HPF
YEAST BUDDING #/AREA UR COMP ASSIST: PRESENT /HPF
YEAST HYPHAE #/AREA UR COMP ASSIST: PRESENT /HPF

## 2025-06-04 PROCEDURE — 1100000001 HC PRIVATE ROOM DAILY: Mod: IPSPLIT

## 2025-06-04 PROCEDURE — 74176 CT ABD & PELVIS W/O CONTRAST: CPT

## 2025-06-04 PROCEDURE — 80053 COMPREHEN METABOLIC PANEL: CPT | Performed by: PHYSICIAN ASSISTANT

## 2025-06-04 PROCEDURE — 2500000004 HC RX 250 GENERAL PHARMACY W/ HCPCS (ALT 636 FOR OP/ED)

## 2025-06-04 PROCEDURE — 2500000001 HC RX 250 WO HCPCS SELF ADMINISTERED DRUGS (ALT 637 FOR MEDICARE OP): Mod: IPSPLIT | Performed by: HOSPITALIST

## 2025-06-04 PROCEDURE — 87040 BLOOD CULTURE FOR BACTERIA: CPT | Mod: GENLAB | Performed by: PHYSICIAN ASSISTANT

## 2025-06-04 PROCEDURE — 74176 CT ABD & PELVIS W/O CONTRAST: CPT | Performed by: RADIOLOGY

## 2025-06-04 PROCEDURE — 99285 EMERGENCY DEPT VISIT HI MDM: CPT | Mod: 25

## 2025-06-04 PROCEDURE — 82947 ASSAY GLUCOSE BLOOD QUANT: CPT

## 2025-06-04 PROCEDURE — 85025 COMPLETE CBC W/AUTO DIFF WBC: CPT | Performed by: PHYSICIAN ASSISTANT

## 2025-06-04 PROCEDURE — 2500000004 HC RX 250 GENERAL PHARMACY W/ HCPCS (ALT 636 FOR OP/ED): Mod: IPSPLIT | Performed by: HOSPITALIST

## 2025-06-04 PROCEDURE — 84484 ASSAY OF TROPONIN QUANT: CPT | Performed by: PHYSICIAN ASSISTANT

## 2025-06-04 PROCEDURE — 96365 THER/PROPH/DIAG IV INF INIT: CPT

## 2025-06-04 PROCEDURE — 87086 URINE CULTURE/COLONY COUNT: CPT | Mod: GENLAB | Performed by: PHYSICIAN ASSISTANT

## 2025-06-04 PROCEDURE — 82947 ASSAY GLUCOSE BLOOD QUANT: CPT | Mod: IPSPLIT

## 2025-06-04 PROCEDURE — 2500000004 HC RX 250 GENERAL PHARMACY W/ HCPCS (ALT 636 FOR OP/ED): Mod: IPSPLIT

## 2025-06-04 PROCEDURE — 83880 ASSAY OF NATRIURETIC PEPTIDE: CPT | Performed by: PHYSICIAN ASSISTANT

## 2025-06-04 PROCEDURE — 36415 COLL VENOUS BLD VENIPUNCTURE: CPT | Performed by: PHYSICIAN ASSISTANT

## 2025-06-04 PROCEDURE — 93005 ELECTROCARDIOGRAM TRACING: CPT

## 2025-06-04 PROCEDURE — 83605 ASSAY OF LACTIC ACID: CPT | Performed by: PHYSICIAN ASSISTANT

## 2025-06-04 PROCEDURE — 81001 URINALYSIS AUTO W/SCOPE: CPT | Performed by: PHYSICIAN ASSISTANT

## 2025-06-04 PROCEDURE — 96375 TX/PRO/DX INJ NEW DRUG ADDON: CPT

## 2025-06-04 PROCEDURE — 94760 N-INVAS EAR/PLS OXIMETRY 1: CPT

## 2025-06-04 PROCEDURE — 96361 HYDRATE IV INFUSION ADD-ON: CPT

## 2025-06-04 PROCEDURE — 83735 ASSAY OF MAGNESIUM: CPT | Performed by: PHYSICIAN ASSISTANT

## 2025-06-04 PROCEDURE — 2500000004 HC RX 250 GENERAL PHARMACY W/ HCPCS (ALT 636 FOR OP/ED): Performed by: PHYSICIAN ASSISTANT

## 2025-06-04 RX ORDER — SODIUM CHLORIDE 9 MG/ML
100 INJECTION, SOLUTION INTRAVENOUS CONTINUOUS
Status: ACTIVE | OUTPATIENT
Start: 2025-06-04 | End: 2025-06-05

## 2025-06-04 RX ORDER — SODIUM CHLORIDE 9 MG/ML
INJECTION, SOLUTION INTRAVENOUS
Status: COMPLETED
Start: 2025-06-04 | End: 2025-06-04

## 2025-06-04 RX ORDER — TRAZODONE HYDROCHLORIDE 50 MG/1
50 TABLET ORAL NIGHTLY
Status: DISCONTINUED | OUTPATIENT
Start: 2025-06-04 | End: 2025-06-08 | Stop reason: HOSPADM

## 2025-06-04 RX ORDER — ATORVASTATIN CALCIUM 40 MG/1
40 TABLET, FILM COATED ORAL DAILY
Status: DISCONTINUED | OUTPATIENT
Start: 2025-06-05 | End: 2025-06-08 | Stop reason: HOSPADM

## 2025-06-04 RX ORDER — LISINOPRIL 20 MG/1
20 TABLET ORAL EVERY 12 HOURS
Status: DISCONTINUED | OUTPATIENT
Start: 2025-06-04 | End: 2025-06-08 | Stop reason: HOSPADM

## 2025-06-04 RX ORDER — ONDANSETRON HYDROCHLORIDE 2 MG/ML
4 INJECTION, SOLUTION INTRAVENOUS EVERY 8 HOURS PRN
Status: DISCONTINUED | OUTPATIENT
Start: 2025-06-04 | End: 2025-06-08 | Stop reason: HOSPADM

## 2025-06-04 RX ORDER — HEPARIN SODIUM 5000 [USP'U]/ML
5000 INJECTION, SOLUTION INTRAVENOUS; SUBCUTANEOUS EVERY 8 HOURS
Status: DISCONTINUED | OUTPATIENT
Start: 2025-06-04 | End: 2025-06-06

## 2025-06-04 RX ORDER — CEFTRIAXONE 1 G/50ML
1 INJECTION, SOLUTION INTRAVENOUS ONCE
Status: DISCONTINUED | OUTPATIENT
Start: 2025-06-04 | End: 2025-06-04

## 2025-06-04 RX ORDER — ENOXAPARIN SODIUM 100 MG/ML
40 INJECTION SUBCUTANEOUS DAILY
Status: DISCONTINUED | OUTPATIENT
Start: 2025-06-05 | End: 2025-06-04

## 2025-06-04 RX ORDER — SODIUM CHLORIDE 9 MG/ML
50 INJECTION, SOLUTION INTRAVENOUS CONTINUOUS
Status: DISCONTINUED | OUTPATIENT
Start: 2025-06-04 | End: 2025-06-05

## 2025-06-04 RX ORDER — SULFAMETHOXAZOLE AND TRIMETHOPRIM 800; 160 MG/1; MG/1
0.5 TABLET ORAL 2 TIMES DAILY
Status: DISCONTINUED | OUTPATIENT
Start: 2025-06-05 | End: 2025-06-08 | Stop reason: HOSPADM

## 2025-06-04 RX ORDER — DEXTROSE 50 % IN WATER (D50W) INTRAVENOUS SYRINGE
25
Status: DISCONTINUED | OUTPATIENT
Start: 2025-06-04 | End: 2025-06-08 | Stop reason: HOSPADM

## 2025-06-04 RX ORDER — ACETAMINOPHEN 325 MG/1
650 TABLET ORAL EVERY 4 HOURS PRN
Status: DISCONTINUED | OUTPATIENT
Start: 2025-06-04 | End: 2025-06-08 | Stop reason: HOSPADM

## 2025-06-04 RX ORDER — DEXTROSE 50 % IN WATER (D50W) INTRAVENOUS SYRINGE
12.5
Status: DISCONTINUED | OUTPATIENT
Start: 2025-06-04 | End: 2025-06-08 | Stop reason: HOSPADM

## 2025-06-04 RX ORDER — GLIMEPIRIDE 2 MG/1
4 TABLET ORAL
Status: DISCONTINUED | OUTPATIENT
Start: 2025-06-05 | End: 2025-06-08 | Stop reason: HOSPADM

## 2025-06-04 RX ORDER — GUAIFENESIN/DEXTROMETHORPHAN 100-10MG/5
5 SYRUP ORAL EVERY 4 HOURS PRN
Status: DISCONTINUED | OUTPATIENT
Start: 2025-06-04 | End: 2025-06-08 | Stop reason: HOSPADM

## 2025-06-04 RX ORDER — PANTOPRAZOLE SODIUM 40 MG/1
40 TABLET, DELAYED RELEASE ORAL
Status: DISCONTINUED | OUTPATIENT
Start: 2025-06-05 | End: 2025-06-08 | Stop reason: HOSPADM

## 2025-06-04 RX ORDER — CEFTRIAXONE 1 G/50ML
1 INJECTION, SOLUTION INTRAVENOUS EVERY 24 HOURS
Status: DISCONTINUED | OUTPATIENT
Start: 2025-05-29 | End: 2025-06-08 | Stop reason: HOSPADM

## 2025-06-04 RX ORDER — POLYETHYLENE GLYCOL 3350 17 G/17G
17 POWDER, FOR SOLUTION ORAL DAILY PRN
Status: DISCONTINUED | OUTPATIENT
Start: 2025-06-04 | End: 2025-06-08 | Stop reason: HOSPADM

## 2025-06-04 RX ORDER — FAMOTIDINE 10 MG/ML
INJECTION, SOLUTION INTRAVENOUS
Status: COMPLETED
Start: 2025-06-04 | End: 2025-06-04

## 2025-06-04 RX ORDER — NYSTATIN 100000 [USP'U]/ML
5 SUSPENSION ORAL 4 TIMES DAILY
Status: DISCONTINUED | OUTPATIENT
Start: 2025-06-04 | End: 2025-06-08 | Stop reason: HOSPADM

## 2025-06-04 RX ORDER — GUAIFENESIN 600 MG/1
600 TABLET, EXTENDED RELEASE ORAL EVERY 12 HOURS PRN
Status: DISCONTINUED | OUTPATIENT
Start: 2025-06-04 | End: 2025-06-08 | Stop reason: HOSPADM

## 2025-06-04 RX ORDER — FAMOTIDINE 10 MG/ML
20 INJECTION, SOLUTION INTRAVENOUS ONCE
Status: COMPLETED | OUTPATIENT
Start: 2025-06-04 | End: 2025-06-04

## 2025-06-04 RX ADMIN — LISINOPRIL 20 MG: 20 TABLET ORAL at 22:13

## 2025-06-04 RX ADMIN — FAMOTIDINE 20 MG: 10 INJECTION INTRAVENOUS at 16:43

## 2025-06-04 RX ADMIN — SODIUM CHLORIDE 50 ML/HR: 0.9 INJECTION, SOLUTION INTRAVENOUS at 16:44

## 2025-06-04 RX ADMIN — TRAZODONE HYDROCHLORIDE 50 MG: 50 TABLET ORAL at 22:13

## 2025-06-04 RX ADMIN — SODIUM CHLORIDE: 9 INJECTION, SOLUTION INTRAVENOUS at 17:24

## 2025-06-04 RX ADMIN — HEPARIN SODIUM 5000 UNITS: 5000 INJECTION INTRAVENOUS; SUBCUTANEOUS at 22:13

## 2025-06-04 RX ADMIN — CEFTRIAXONE 1 G: 1 INJECTION, SOLUTION INTRAVENOUS at 15:42

## 2025-06-04 RX ADMIN — NYSTATIN 500000 UNITS: 100000 SUSPENSION ORAL at 22:13

## 2025-06-04 RX ADMIN — FAMOTIDINE 20 MG: 10 INJECTION, SOLUTION INTRAVENOUS at 16:43

## 2025-06-04 RX ADMIN — SODIUM CHLORIDE 1000 ML: 0.9 INJECTION, SOLUTION INTRAVENOUS at 13:46

## 2025-06-04 SDOH — ECONOMIC STABILITY: INCOME INSECURITY: IN THE PAST 12 MONTHS HAS THE ELECTRIC, GAS, OIL, OR WATER COMPANY THREATENED TO SHUT OFF SERVICES IN YOUR HOME?: NO

## 2025-06-04 SDOH — SOCIAL STABILITY: SOCIAL NETWORK
IN A TYPICAL WEEK, HOW MANY TIMES DO YOU TALK ON THE PHONE WITH FAMILY, FRIENDS, OR NEIGHBORS?: MORE THAN THREE TIMES A WEEK

## 2025-06-04 SDOH — SOCIAL STABILITY: SOCIAL INSECURITY: WITHIN THE LAST YEAR, HAVE YOU BEEN HUMILIATED OR EMOTIONALLY ABUSED IN OTHER WAYS BY YOUR PARTNER OR EX-PARTNER?: NO

## 2025-06-04 SDOH — ECONOMIC STABILITY: HOUSING INSECURITY: IN THE PAST 12 MONTHS, HOW MANY TIMES HAVE YOU MOVED WHERE YOU WERE LIVING?: 0

## 2025-06-04 SDOH — SOCIAL STABILITY: SOCIAL INSECURITY: WERE YOU ABLE TO COMPLETE ALL THE BEHAVIORAL HEALTH SCREENINGS?: YES

## 2025-06-04 SDOH — HEALTH STABILITY: MENTAL HEALTH
DO YOU FEEL STRESS - TENSE, RESTLESS, NERVOUS, OR ANXIOUS, OR UNABLE TO SLEEP AT NIGHT BECAUSE YOUR MIND IS TROUBLED ALL THE TIME - THESE DAYS?: TO SOME EXTENT

## 2025-06-04 SDOH — ECONOMIC STABILITY: FOOD INSECURITY: HOW HARD IS IT FOR YOU TO PAY FOR THE VERY BASICS LIKE FOOD, HOUSING, MEDICAL CARE, AND HEATING?: NOT VERY HARD

## 2025-06-04 SDOH — ECONOMIC STABILITY: HOUSING INSECURITY: IN THE LAST 12 MONTHS, WAS THERE A TIME WHEN YOU WERE NOT ABLE TO PAY THE MORTGAGE OR RENT ON TIME?: NO

## 2025-06-04 SDOH — SOCIAL STABILITY: SOCIAL INSECURITY: ARE YOU OR HAVE YOU BEEN THREATENED OR ABUSED PHYSICALLY, EMOTIONALLY, OR SEXUALLY BY ANYONE?: NO

## 2025-06-04 SDOH — SOCIAL STABILITY: SOCIAL INSECURITY: ABUSE: ADULT

## 2025-06-04 SDOH — ECONOMIC STABILITY: FOOD INSECURITY: WITHIN THE PAST 12 MONTHS, YOU WORRIED THAT YOUR FOOD WOULD RUN OUT BEFORE YOU GOT THE MONEY TO BUY MORE.: NEVER TRUE

## 2025-06-04 SDOH — SOCIAL STABILITY: SOCIAL NETWORK
DO YOU BELONG TO ANY CLUBS OR ORGANIZATIONS SUCH AS CHURCH GROUPS, UNIONS, FRATERNAL OR ATHLETIC GROUPS, OR SCHOOL GROUPS?: NO

## 2025-06-04 SDOH — ECONOMIC STABILITY: FOOD INSECURITY: WITHIN THE PAST 12 MONTHS, THE FOOD YOU BOUGHT JUST DIDN'T LAST AND YOU DIDN'T HAVE MONEY TO GET MORE.: NEVER TRUE

## 2025-06-04 SDOH — SOCIAL STABILITY: SOCIAL INSECURITY: DO YOU FEEL UNSAFE GOING BACK TO THE PLACE WHERE YOU ARE LIVING?: NO

## 2025-06-04 SDOH — SOCIAL STABILITY: SOCIAL NETWORK: HOW OFTEN DO YOU ATTEND CHURCH OR RELIGIOUS SERVICES?: NEVER

## 2025-06-04 SDOH — ECONOMIC STABILITY: HOUSING INSECURITY: AT ANY TIME IN THE PAST 12 MONTHS, WERE YOU HOMELESS OR LIVING IN A SHELTER (INCLUDING NOW)?: NO

## 2025-06-04 SDOH — SOCIAL STABILITY: SOCIAL INSECURITY: ARE YOU MARRIED, WIDOWED, DIVORCED, SEPARATED, NEVER MARRIED, OR LIVING WITH A PARTNER?: DIVORCED

## 2025-06-04 SDOH — SOCIAL STABILITY: SOCIAL INSECURITY: WITHIN THE LAST YEAR, HAVE YOU BEEN AFRAID OF YOUR PARTNER OR EX-PARTNER?: NO

## 2025-06-04 SDOH — SOCIAL STABILITY: SOCIAL INSECURITY: ARE THERE ANY APPARENT SIGNS OF INJURIES/BEHAVIORS THAT COULD BE RELATED TO ABUSE/NEGLECT?: NO

## 2025-06-04 SDOH — SOCIAL STABILITY: SOCIAL INSECURITY: DO YOU FEEL ANYONE HAS EXPLOITED OR TAKEN ADVANTAGE OF YOU FINANCIALLY OR OF YOUR PERSONAL PROPERTY?: NO

## 2025-06-04 SDOH — SOCIAL STABILITY: SOCIAL NETWORK: HOW OFTEN DO YOU ATTEND MEETINGS OF THE CLUBS OR ORGANIZATIONS YOU BELONG TO?: NEVER

## 2025-06-04 SDOH — ECONOMIC STABILITY: TRANSPORTATION INSECURITY: IN THE PAST 12 MONTHS, HAS LACK OF TRANSPORTATION KEPT YOU FROM MEDICAL APPOINTMENTS OR FROM GETTING MEDICATIONS?: NO

## 2025-06-04 SDOH — SOCIAL STABILITY: SOCIAL NETWORK: HOW OFTEN DO YOU GET TOGETHER WITH FRIENDS OR RELATIVES?: THREE TIMES A WEEK

## 2025-06-04 SDOH — SOCIAL STABILITY: SOCIAL INSECURITY: HAVE YOU HAD ANY THOUGHTS OF HARMING ANYONE ELSE?: NO

## 2025-06-04 SDOH — SOCIAL STABILITY: SOCIAL INSECURITY: HAVE YOU HAD THOUGHTS OF HARMING ANYONE ELSE?: NO

## 2025-06-04 ASSESSMENT — PAIN SCALES - GENERAL
PAINLEVEL_OUTOF10: 0 - NO PAIN
PAINLEVEL_OUTOF10: 0 - NO PAIN

## 2025-06-04 ASSESSMENT — PATIENT HEALTH QUESTIONNAIRE - PHQ9
1. LITTLE INTEREST OR PLEASURE IN DOING THINGS: NOT AT ALL
2. FEELING DOWN, DEPRESSED OR HOPELESS: NOT AT ALL
SUM OF ALL RESPONSES TO PHQ9 QUESTIONS 1 & 2: 0

## 2025-06-04 ASSESSMENT — COGNITIVE AND FUNCTIONAL STATUS - GENERAL
MOVING FROM LYING ON BACK TO SITTING ON SIDE OF FLAT BED WITH BEDRAILS: A LITTLE
MOBILITY SCORE: 17
STANDING UP FROM CHAIR USING ARMS: A LITTLE
DAILY ACTIVITIY SCORE: 22
PATIENT BASELINE BEDBOUND: NO
MOVING TO AND FROM BED TO CHAIR: A LITTLE
HELP NEEDED FOR BATHING: A LITTLE
MOVING FROM LYING ON BACK TO SITTING ON SIDE OF FLAT BED WITH BEDRAILS: A LITTLE
PERSONAL GROOMING: A LITTLE
PATIENT BASELINE BEDBOUND: NO
CLIMB 3 TO 5 STEPS WITH RAILING: A LOT
DAILY ACTIVITIY SCORE: 24
WALKING IN HOSPITAL ROOM: A LITTLE
TURNING FROM BACK TO SIDE WHILE IN FLAT BAD: A LITTLE
MOBILITY SCORE: 23

## 2025-06-04 ASSESSMENT — ACTIVITIES OF DAILY LIVING (ADL)
FEEDING YOURSELF: INDEPENDENT
WALKS IN HOME: INDEPENDENT
DRESSING YOURSELF: INDEPENDENT
HEARING - LEFT EAR: HEARING AID
ADEQUATE_TO_COMPLETE_ADL: YES
BATHING: INDEPENDENT
HEARING - RIGHT EAR: HEARING AID
PATIENT'S MEMORY ADEQUATE TO SAFELY COMPLETE DAILY ACTIVITIES?: YES
LACK_OF_TRANSPORTATION: NO
GROOMING: INDEPENDENT
TOILETING: INDEPENDENT
LACK_OF_TRANSPORTATION: NO
LACK_OF_TRANSPORTATION: NO
JUDGMENT_ADEQUATE_SAFELY_COMPLETE_DAILY_ACTIVITIES: YES

## 2025-06-04 ASSESSMENT — LIFESTYLE VARIABLES
AUDIT-C TOTAL SCORE: 2
HOW MANY STANDARD DRINKS CONTAINING ALCOHOL DO YOU HAVE ON A TYPICAL DAY: 1 OR 2
SKIP TO QUESTIONS 9-10: 1
HOW OFTEN DO YOU HAVE 6 OR MORE DRINKS ON ONE OCCASION: NEVER
HOW OFTEN DO YOU HAVE A DRINK CONTAINING ALCOHOL: 2-4 TIMES A MONTH
AUDIT-C TOTAL SCORE: 2

## 2025-06-04 ASSESSMENT — COLUMBIA-SUICIDE SEVERITY RATING SCALE - C-SSRS
6. HAVE YOU EVER DONE ANYTHING, STARTED TO DO ANYTHING, OR PREPARED TO DO ANYTHING TO END YOUR LIFE?: NO
2. HAVE YOU ACTUALLY HAD ANY THOUGHTS OF KILLING YOURSELF?: NO
1. IN THE PAST MONTH, HAVE YOU WISHED YOU WERE DEAD OR WISHED YOU COULD GO TO SLEEP AND NOT WAKE UP?: NO

## 2025-06-04 ASSESSMENT — PAIN - FUNCTIONAL ASSESSMENT
PAIN_FUNCTIONAL_ASSESSMENT: 0-10
PAIN_FUNCTIONAL_ASSESSMENT: 0-10

## 2025-06-04 NOTE — ED PROVIDER NOTES
HPI   No chief complaint on file.      Is a 69-year-old male coming in for hypoglycemia.  Patient was at home and was found by his son in bed unresponsive.  When EMS arrived they noticed that his blood sugar was in the low 40s.  They gave him D50 and put him on  D10 infusion.  He is now alert and oriented.  Patient does have a history of diabetes.  He states that he uses a long-acting insulin at night.  He  did recently undergo surgery having bladder and kidney removed due to a malignancy.  He states since this time his diet has changed and he is not eating as much.  Patient otherwise reports that his healing from his surgery is going well.  He denies any fevers.  He is on an antibiotic to prevent infection and he states he is having some diarrhea associated with this.  Currently now resting in the hospital bed he states he has no complaints and feels back to his normal.      History provided by:  Patient and EMS personnel          Patient History   Medical History[1]  Surgical History[2]  Family History[3]  Social History[4]    Physical Exam   ED Triage Vitals [06/04/25 1225]   Temperature Heart Rate Respirations BP   36.9 °C (98.5 °F) 97 16 153/79      Pulse Ox Temp Source Heart Rate Source Patient Position   98 % Oral Monitor Sitting      BP Location FiO2 (%)     Left arm --       Physical Exam  Vitals and nursing note reviewed.   Constitutional:       General: He is not in acute distress.     Appearance: Normal appearance. He is not toxic-appearing.   HENT:      Head: Normocephalic and atraumatic.      Nose: Nose normal.      Mouth/Throat:      Mouth: Mucous membranes are moist.      Pharynx: Oropharynx is clear.   Eyes:      Extraocular Movements: Extraocular movements intact.      Conjunctiva/sclera: Conjunctivae normal.      Pupils: Pupils are equal, round, and reactive to light.   Cardiovascular:      Rate and Rhythm: Normal rate and regular rhythm.      Pulses: Normal pulses.      Heart sounds: Normal heart  sounds.   Pulmonary:      Effort: Pulmonary effort is normal. No respiratory distress.      Breath sounds: Normal breath sounds.   Abdominal:      General: Abdomen is flat. Bowel sounds are normal.      Palpations: Abdomen is soft.      Tenderness: There is no abdominal tenderness.      Comments: Surgical wounds present to the abdomen that appear well-healing without any signs of infection or concern.   Musculoskeletal:         General: Normal range of motion.      Cervical back: Normal range of motion and neck supple.   Skin:     General: Skin is warm and dry.      Coloration: Skin is not jaundiced or pale.      Findings: No bruising.   Neurological:      General: No focal deficit present.      Mental Status: He is alert and oriented to person, place, and time. Mental status is at baseline.   Psychiatric:         Mood and Affect: Mood normal.         Behavior: Behavior normal.           ED Course & MDM   ED Course as of 06/04/25 1531   Wed Jun 04, 2025   1243 EKG completed at 1232 shows on my interpretation normal sinus rhythm with a ventricular rate of 93 bpm.  No signs of STEMI.  PVCs present.  QTc 487 ms. [RS]      ED Course User Index  [RS] Chinmay Hernandez PA-C         Diagnoses as of 06/04/25 1531   HARMAN (acute kidney injury)   Urinary tract infection associated with catheterization of urinary tract, unspecified indwelling urinary catheter type, initial encounter   Hypoglycemia                 No data recorded                                 Medical Decision Making  Summary:  Medical Decision Making:   Patient presented as described in HPI. Patient case including ROS, PE, and treatment and plan discussed with ED attending if attached as cosigner. Results from labs and or imaging included below if completed. Rodrigue Palacios  is a 69 y.o. coming in for Patient presents with:  Hypoglycemia: Patient was found unresponsive by his son in his bed. He called ems . Upon arrival they found him to be unresponsive and have  a blood sugar of 41.  He was given D10 and after a few minutes he became responsive and his blood sugar erick. The bag of D10 was continued in the ambulance. He is awake and talking with no complaints upon arrival   .  Patient came in due to hypoglycemia.  He was given a D10 infusion by EMS.  He is now alert and oriented.  Patient has no complaints and feels back to his baseline and normal.  He did recently have surgery.  He has had diarrhea secondary to taking antibiotics from surgery believes.  He has not had fevers.  No chest pain or shortness of breath.  Due to his hypoglycemia workup was completed.  He was able to eat a diet consistent with carbs for his blood sugars.  Patient had no complaints.  Lab work did show an HARMAN comparison to prior.  Spoke with patient's urologist who advised to get a CT scan without contrast and after the results of the CT he believes that the patient can stay here for hydration.  Patient will be hospitalized here at [place].  Blood cultures were completed and patient was placed on Rocephin due to the concerns for UTI however is on antibiotics previously before coming in.      Shared decision making was completed for required hospital stay. I also explained the plan and treatment course. Patient/guardian is in agreement with plan, treatment course, and state that they will comply.    Labs Reviewed  CBC WITH AUTO DIFFERENTIAL - Abnormal     WBC                           11.8 (*)               nRBC                          0.0                    RBC                           3.76 (*)               Hemoglobin                    10.7 (*)               Hematocrit                    32.7 (*)               MCV                           87                     MCH                           28.5                   MCHC                          32.7                   RDW                           13.5                   Platelets                     341                    Neutrophils %                  88.6                   Immature Granulocytes %, Automated   0.3                    Lymphocytes %                 3.3                    Monocytes %                   7.1                    Eosinophils %                 0.5                    Basophils %                   0.2                    Neutrophils Absolute          10.44 (*)               Immature Granulocytes Absolute, Au*   0.04                   Lymphocytes Absolute          0.39 (*)               Monocytes Absolute            0.84                   Eosinophils Absolute          0.06                   Basophils Absolute            0.02                COMPREHENSIVE METABOLIC PANEL - Abnormal     Glucose                       119 (*)                Sodium                        141                    Potassium                     4.0                    Chloride                      111 (*)                Bicarbonate                   20 (*)                 Anion Gap                     14                     Urea Nitrogen                 20                     Creatinine                    2.98 (*)               eGFR                          22 (*)                 Calcium                       8.9                    Albumin                       3.2 (*)                Alkaline Phosphatase          60                     Total Protein                 6.4                    AST                           17                     Bilirubin, Total              0.2                    ALT                           13                  B-TYPE NATRIURETIC PEPTIDE - Abnormal     BNP                           142 (*)                  Narrative:    <100 pg/mL - Heart failure unlikely                100-299 pg/mL - Intermediate probability of acute heart                                failure exacerbation. Correlate with clinical                                context and patient history.                  >=300 pg/mL - Heart Failure likely. Correlate with clinical                                 context and patient history.                                BNP testing is performed using different testing methodology at [facility] than at other [facility]. Direct result comparisons should only be made within the same method.                   URINALYSIS WITH REFLEX CULTURE AND MICROSCOPIC - Abnormal     Color, Urine                  Colorless (*)               Appearance, Urine             Turbid (*)               Specific Gravity, Urine       1.008                  pH, Urine                     6.0                    Protein, Urine                50 (1+) (*)               Glucose, Urine                Normal                 Blood, Urine                  0.5 (2+) (*)               Ketones, Urine                NEGATIVE                Bilirubin, Urine              NEGATIVE                Urobilinogen, Urine           Normal                 Nitrite, Urine                2+ (*)                 Leukocyte Esterase, Urine     500 Tino/uL (*)            MICROSCOPIC ONLY, URINE - Abnormal     WBC, Urine                    21-50 (*)               WBC Clumps, Urine             OCCASIONAL                RBC, Urine                    >20 (*)                Squamous Epithelial Cells, Urine                          Bacteria, Urine               2+ (*)                 Budding Yeast, Urine          PRESENT (*)               Yeast Hyphae, Urine           PRESENT (*)               Mucus, Urine                  FEW                 POCT GLUCOSE - Abnormal     POCT Glucose                  68 (*)              MAGNESIUM - Normal     Magnesium                     2.13                LACTATE - Normal     Lactate                       0.6                      Narrative: Venipuncture immediately after or during the administration of Metamizole may lead to falsely low results. Testing should be performed immediately prior to Metamizole dosing.  SERIAL TROPONIN-INITIAL - Normal     Troponin I,  High Sensitivity   13                       Narrative: Less than 99th percentile of normal range cutoff-                Female and children under 18 years old <14 ng/L; Male <21 ng/L: Negative                Repeat testing should be performed if clinically indicated.                                 Female and children under 18 years old 14-50 ng/L; Male 21-50 ng/L:                Consistent with possible cardiac damage and possible increased clinical                 risk. Serial measurements may help to assess extent of myocardial damage.                                 >50 ng/L: Consistent with cardiac damage, increased clinical risk and                myocardial infarction. Serial measurements may help assess extent of                 myocardial damage.                                  NOTE: Children less than 1 year old may have higher baseline troponin                 levels and results should be interpreted in conjunction with the overall                 clinical context.                                 NOTE: Troponin I testing is performed using a different                 testing methodology at [facility]] than at other                 [facility]. Direct result comparisons should only                 be made within the same method.  SERIAL TROPONIN, 1 HOUR - Normal     Troponin I, High Sensitivity   13                       Narrative: Less than 99th percentile of normal range cutoff-                Female and children under 18 years old <14 ng/L; Male <21 ng/L: Negative                Repeat testing should be performed if clinically indicated.                                 Female and children under 18 years old 14-50 ng/L; Male 21-50 ng/L:                Consistent with possible cardiac damage and possible increased clinical                 risk. Serial measurements may help to assess extent of myocardial damage.                                 >50 ng/L: Consistent with cardiac damage,  increased clinical risk and                myocardial infarction. Serial measurements may help assess extent of                 myocardial damage.                                  NOTE: Children less than 1 year old may have higher baseline troponin                 levels and results should be interpreted in conjunction with the overall                 clinical context.                                 NOTE: Troponin I testing is performed using a different                 testing methodology at [facility] than at other                 [facility]. Direct result comparisons should only                 be made within the same method.  URINE CULTURE  BLOOD CULTURE  BLOOD CULTURE  TROPONIN SERIES- (INITIAL, 1 HR)       Narrative: The following orders were created for panel order Troponin I Series, High Sensitivity (0, 1 HR).                Procedure                               Abnormality         Status                                   ---------                               -----------         ------                                   Troponin I, High Sensiti...[[id number]]  Normal              Final result                             Troponin, High Sensitivi...[[id number]]  Normal              Final result                                             Please view results for these tests on the individual orders.  URINALYSIS WITH REFLEX CULTURE AND MICROSCOPIC       Narrative: The following orders were created for panel order Urinalysis with Reflex Culture and Microscopic.                Procedure                               Abnormality         Status                                   ---------                               -----------         ------                                   Urinalysis with Reflex C...[[id number]]  Abnormal            Final result                             Extra Urine Gray Tube[[id number]]                            In process                                                Please view results for these tests on the individual orders.  EXTRA URINE GRAY TUBE   CT abdomen pelvis wo IV contrast   Final Result    1. Postoperative changes, as above.    2. Small to moderate left-sided hydronephrosis with left ureteral    stent placement.    3. Colonic diverticulosis, without evidence of acute diverticulitis.    4. No evidence of bowel obstruction, free intraperitoneal air or    abnormal intra-abdominal fluid collection.          MACRO:    None          Signed by: Kaleb Glaser 6/4/2025 3:10 PM    Dictation workstation:   [id number]                            Tests/Medications/Escalations of Care considered but not given: As in [facility]    Patient care discussed with: N/A  Social Determinants affecting care: N/A    Final diagnosis and disposition as documented     ED Course as of 06/04/25 1532  ------------------------------------------------------------  Time: 06/04 1243  Comment: EKG completed at 1232 shows on my interpretation normal sinus rhythm with a ventricular rate of 93 bpm.  No signs of STEMI.  PVCs present.  QTc 487 ms.  By: Chinmay Hernandez PA-C    ------------------------------------------------------------  Diagnoses as of 06/04/25 1532  HARMAN (acute kidney injury)  Urinary tract infection associated with catheterization of urinary tract, unspecified indwelling urinary catheter type, initial encounter  Hypoglycemia       Shared decision making was completed and determined that patient will be hospitalized. I discussed the differential; results and admit plan with the patient and/or family/friend/caregiver if present.  I emphasized the importance of hospitalization need for re-evaluation/continued monitoring/interventions. They agreed that if they feel their condition is worsening or if they have any other concern they should alert staff immediately for further assistance. I gave the patient an opportunity to ask all questions they had and answered all of them accordingly. The patient  and/or family/friend/caregiver expressed understanding verbally and that they would comply.    Disposition:  Hospitalize to medical floor under Dr. Martinez per their orders. Discussed findings and treatment done here in ED with admitting physician. It would be a risk to discharge the patient in their condition due to possibility of deterioration in their condition and the need for urgent interventions.    This note has been transcribed using voice recognition and may contain grammatical errors, misplaced words, incorrect words, incorrect phrases or other errors.         Procedure  Procedures       [1]   Past Medical History:  Diagnosis Date    Arthritis     rhuematoid    Bladder cancer (Multi)     CKD (chronic kidney disease)     Dorsalgia, unspecified 07/28/2020    Back pain without radiation    GERD (gastroesophageal reflux disease)     controlled    Hearing aid worn     HL (hearing loss)     Hyperlipidemia     Hypertension     Local infection of the skin and subcutaneous tissue, unspecified 10/11/2013    Nonvenomous insect bite with infection    Nephrolithiasis     Other muscle spasm 02/14/2018    Muscle spasms of neck    Other specified diseases of anus and rectum 03/22/2013    Anal pain    Personal history of other diseases of the digestive system 09/18/2013    History of gastroenteritis    Personal history of other diseases of the respiratory system 04/27/2020    History of acute bronchitis    Personal history of other diseases of the respiratory system 06/24/2019    History of upper respiratory infection    Personal history of other infectious and parasitic diseases     History of candidiasis of mouth    Personal history of other specified conditions 03/22/2013    History of abnormal weight loss    Personal history of other specified conditions 02/24/2014    History of numbness    Personal history of other specified conditions 07/14/2017    History of abdominal pain    Pneumonia, unspecified organism 01/25/2016     Pneumonia involving right lung    Rash and other nonspecific skin eruption 04/29/2013    Rash    Sleep apnea     CPAP    Type 2 diabetes mellitus    [2]   Past Surgical History:  Procedure Laterality Date    BLADDER SURGERY  2025    Removal malignant tumor    CHOLECYSTECTOMY  07/07/2016    Cholecystectomy    HERNIA REPAIR  07/07/2016    Inguinal Hernia Repair    SHOULDER SURGERY Right    [3]   Family History  Problem Relation Name Age of Onset    Ovarian cancer Mother      Other (asbestosis) Father     [4]   Social History  Tobacco Use    Smoking status: Never     Passive exposure: Never    Smokeless tobacco: Never   Vaping Use    Vaping status: Never Used   Substance Use Topics    Alcohol use: Yes     Comment: rare-couple drinks a month    Drug use: Never        Chinmay Hernandez PA-C  06/04/25 1534       Hitesh Silvestre MD  06/06/25 0721

## 2025-06-04 NOTE — NURSING NOTE
Pt came up came to med surg from ED. A&OX4. Pts assessment was done skin intact with only surgical lap sites noted with staples. Pt does have urostomy tube with drain bag.  Pt Stated not to be in any pain at this time just has a little tenderness on abdomen area. Vitals have been taken. Blood pressure a little elevated. {T has call light within reach and no new needs at this time,

## 2025-06-04 NOTE — ED TRIAGE NOTES
Patient was found unresponsive by his son in his bed. He called ems . Upon arrival they found him to be unresponsive and have a blood sugar of 41. He was given D10 and after a few minutes he became responsive and his blood sugar erick. The bag of D10 was continued in the ambulance. He is awake and talking with no complaints upon arrival

## 2025-06-05 ENCOUNTER — HOME CARE VISIT (OUTPATIENT)
Dept: HOME HEALTH SERVICES | Facility: HOME HEALTH | Age: 70
End: 2025-06-05
Payer: MEDICARE

## 2025-06-05 PROBLEM — N18.9 ACUTE ON CHRONIC RENAL FAILURE: Status: ACTIVE | Noted: 2025-06-04

## 2025-06-05 PROBLEM — B37.0 ORAL CANDIDIASIS: Status: ACTIVE | Noted: 2025-06-05

## 2025-06-05 PROBLEM — E16.2 HYPOGLYCEMIA: Status: ACTIVE | Noted: 2025-06-05

## 2025-06-05 PROBLEM — N30.00 ACUTE CYSTITIS WITHOUT HEMATURIA: Status: ACTIVE | Noted: 2025-06-05

## 2025-06-05 PROBLEM — N17.9 AKI (ACUTE KIDNEY INJURY): Status: RESOLVED | Noted: 2025-06-04 | Resolved: 2025-06-05

## 2025-06-05 LAB
ANION GAP SERPL CALC-SCNC: 13 MMOL/L (ref 10–20)
BUN SERPL-MCNC: 21 MG/DL (ref 6–23)
CALCIUM SERPL-MCNC: 9.1 MG/DL (ref 8.6–10.3)
CHLORIDE SERPL-SCNC: 113 MMOL/L (ref 98–107)
CO2 SERPL-SCNC: 22 MMOL/L (ref 21–32)
CREAT SERPL-MCNC: 2.63 MG/DL (ref 0.5–1.3)
EGFRCR SERPLBLD CKD-EPI 2021: 26 ML/MIN/1.73M*2
ERYTHROCYTE [DISTWIDTH] IN BLOOD BY AUTOMATED COUNT: 13.8 % (ref 11.5–14.5)
GLUCOSE BLD MANUAL STRIP-MCNC: 119 MG/DL (ref 74–99)
GLUCOSE BLD MANUAL STRIP-MCNC: 122 MG/DL (ref 74–99)
GLUCOSE BLD MANUAL STRIP-MCNC: 142 MG/DL (ref 74–99)
GLUCOSE BLD MANUAL STRIP-MCNC: 151 MG/DL (ref 74–99)
GLUCOSE BLD MANUAL STRIP-MCNC: 173 MG/DL (ref 74–99)
GLUCOSE SERPL-MCNC: 113 MG/DL (ref 74–99)
HCT VFR BLD AUTO: 33.3 % (ref 41–52)
HGB BLD-MCNC: 10.5 G/DL (ref 13.5–17.5)
MCH RBC QN AUTO: 28 PG (ref 26–34)
MCHC RBC AUTO-ENTMCNC: 31.5 G/DL (ref 32–36)
MCV RBC AUTO: 89 FL (ref 80–100)
NRBC BLD-RTO: 0 /100 WBCS (ref 0–0)
PLATELET # BLD AUTO: 379 X10*3/UL (ref 150–450)
POTASSIUM SERPL-SCNC: 4.8 MMOL/L (ref 3.5–5.3)
RBC # BLD AUTO: 3.75 X10*6/UL (ref 4.5–5.9)
SODIUM SERPL-SCNC: 143 MMOL/L (ref 136–145)
WBC # BLD AUTO: 9.1 X10*3/UL (ref 4.4–11.3)

## 2025-06-05 PROCEDURE — 1100000001 HC PRIVATE ROOM DAILY: Mod: IPSPLIT

## 2025-06-05 PROCEDURE — 80048 BASIC METABOLIC PNL TOTAL CA: CPT | Mod: IPSPLIT | Performed by: NURSE PRACTITIONER

## 2025-06-05 PROCEDURE — 85027 COMPLETE CBC AUTOMATED: CPT | Mod: IPSPLIT | Performed by: NURSE PRACTITIONER

## 2025-06-05 PROCEDURE — 2500000001 HC RX 250 WO HCPCS SELF ADMINISTERED DRUGS (ALT 637 FOR MEDICARE OP): Mod: IPSPLIT | Performed by: HOSPITALIST

## 2025-06-05 PROCEDURE — 36415 COLL VENOUS BLD VENIPUNCTURE: CPT | Mod: IPSPLIT | Performed by: NURSE PRACTITIONER

## 2025-06-05 PROCEDURE — 2500000001 HC RX 250 WO HCPCS SELF ADMINISTERED DRUGS (ALT 637 FOR MEDICARE OP): Mod: IPSPLIT

## 2025-06-05 PROCEDURE — 94760 N-INVAS EAR/PLS OXIMETRY 1: CPT | Mod: IPSPLIT

## 2025-06-05 PROCEDURE — 2500000004 HC RX 250 GENERAL PHARMACY W/ HCPCS (ALT 636 FOR OP/ED): Mod: IPSPLIT | Performed by: NURSE PRACTITIONER

## 2025-06-05 PROCEDURE — 2500000004 HC RX 250 GENERAL PHARMACY W/ HCPCS (ALT 636 FOR OP/ED): Mod: IPSPLIT | Performed by: HOSPITALIST

## 2025-06-05 PROCEDURE — 2500000001 HC RX 250 WO HCPCS SELF ADMINISTERED DRUGS (ALT 637 FOR MEDICARE OP): Mod: IPSPLIT | Performed by: NURSE PRACTITIONER

## 2025-06-05 PROCEDURE — 99223 1ST HOSP IP/OBS HIGH 75: CPT

## 2025-06-05 PROCEDURE — 82947 ASSAY GLUCOSE BLOOD QUANT: CPT | Mod: IPSPLIT

## 2025-06-05 PROCEDURE — 2500000002 HC RX 250 W HCPCS SELF ADMINISTERED DRUGS (ALT 637 FOR MEDICARE OP, ALT 636 FOR OP/ED): Mod: IPSPLIT | Performed by: HOSPITALIST

## 2025-06-05 RX ORDER — ALUMINUM HYDROXIDE, MAGNESIUM HYDROXIDE, AND SIMETHICONE 1200; 120; 1200 MG/30ML; MG/30ML; MG/30ML
30 SUSPENSION ORAL 4 TIMES DAILY PRN
Status: DISCONTINUED | OUTPATIENT
Start: 2025-06-05 | End: 2025-06-08 | Stop reason: HOSPADM

## 2025-06-05 RX ORDER — CALCIUM CARBONATE 200(500)MG
500 TABLET,CHEWABLE ORAL EVERY 8 HOURS PRN
Status: DISCONTINUED | OUTPATIENT
Start: 2025-06-05 | End: 2025-06-08 | Stop reason: HOSPADM

## 2025-06-05 RX ORDER — SODIUM CHLORIDE 9 MG/ML
10 INJECTION, SOLUTION INTRAVENOUS CONTINUOUS PRN
Status: DISCONTINUED | OUTPATIENT
Start: 2025-06-05 | End: 2025-06-08 | Stop reason: HOSPADM

## 2025-06-05 RX ORDER — ALUMINUM HYDROXIDE, MAGNESIUM HYDROXIDE, AND SIMETHICONE 1200; 120; 1200 MG/30ML; MG/30ML; MG/30ML
30 SUSPENSION ORAL 4 TIMES DAILY PRN
Status: DISCONTINUED | OUTPATIENT
Start: 2025-06-05 | End: 2025-06-05

## 2025-06-05 RX ADMIN — NYSTATIN 500000 UNITS: 100000 SUSPENSION ORAL at 06:26

## 2025-06-05 RX ADMIN — CEFTRIAXONE 1 G: 1 INJECTION, SOLUTION INTRAVENOUS at 08:57

## 2025-06-05 RX ADMIN — HEPARIN SODIUM 5000 UNITS: 5000 INJECTION INTRAVENOUS; SUBCUTANEOUS at 06:26

## 2025-06-05 RX ADMIN — LISINOPRIL 20 MG: 20 TABLET ORAL at 08:57

## 2025-06-05 RX ADMIN — ALUMINUM HYDROXIDE, MAGNESIUM HYDROXIDE, AND SIMETHICONE 30 ML: 1200; 120; 1200 SUSPENSION ORAL at 13:22

## 2025-06-05 RX ADMIN — ATORVASTATIN CALCIUM 40 MG: 40 TABLET, FILM COATED ORAL at 08:57

## 2025-06-05 RX ADMIN — NYSTATIN 500000 UNITS: 100000 SUSPENSION ORAL at 13:21

## 2025-06-05 RX ADMIN — HEPARIN SODIUM 5000 UNITS: 5000 INJECTION INTRAVENOUS; SUBCUTANEOUS at 13:22

## 2025-06-05 RX ADMIN — NYSTATIN 500000 UNITS: 100000 SUSPENSION ORAL at 20:53

## 2025-06-05 RX ADMIN — HEPARIN SODIUM 5000 UNITS: 5000 INJECTION INTRAVENOUS; SUBCUTANEOUS at 20:53

## 2025-06-05 RX ADMIN — PANTOPRAZOLE SODIUM 40 MG: 40 TABLET, DELAYED RELEASE ORAL at 06:26

## 2025-06-05 RX ADMIN — TRAZODONE HYDROCHLORIDE 50 MG: 50 TABLET ORAL at 20:53

## 2025-06-05 RX ADMIN — ACETAMINOPHEN 650 MG: 325 TABLET, FILM COATED ORAL at 20:53

## 2025-06-05 RX ADMIN — SULFAMETHOXAZOLE AND TRIMETHOPRIM 0.5 TABLET: 800; 160 TABLET ORAL at 08:57

## 2025-06-05 RX ADMIN — LISINOPRIL 20 MG: 20 TABLET ORAL at 20:53

## 2025-06-05 RX ADMIN — ALUMINUM HYDROXIDE, MAGNESIUM HYDROXIDE, AND SIMETHICONE 30 ML: 1200; 120; 1200 SUSPENSION ORAL at 18:46

## 2025-06-05 RX ADMIN — NYSTATIN 500000 UNITS: 100000 SUSPENSION ORAL at 16:37

## 2025-06-05 RX ADMIN — CALCIUM CARBONATE (ANTACID) CHEW TAB 500 MG 1 TABLET: 500 CHEW TAB at 18:46

## 2025-06-05 ASSESSMENT — COGNITIVE AND FUNCTIONAL STATUS - GENERAL
DAILY ACTIVITIY SCORE: 22
WALKING IN HOSPITAL ROOM: A LITTLE
CLIMB 3 TO 5 STEPS WITH RAILING: A LITTLE
MOVING FROM LYING ON BACK TO SITTING ON SIDE OF FLAT BED WITH BEDRAILS: A LITTLE
STANDING UP FROM CHAIR USING ARMS: A LITTLE
TURNING FROM BACK TO SIDE WHILE IN FLAT BAD: A LITTLE
HELP NEEDED FOR BATHING: A LITTLE
PERSONAL GROOMING: A LITTLE
MOBILITY SCORE: 18
MOVING TO AND FROM BED TO CHAIR: A LITTLE

## 2025-06-05 ASSESSMENT — ENCOUNTER SYMPTOMS
EYES NEGATIVE: 1
NEUROLOGICAL NEGATIVE: 1
RESPIRATORY NEGATIVE: 1
CONFUSION: 1
DENIES PAIN: 1
CARDIOVASCULAR NEGATIVE: 1
APPETITE CHANGE: 1
MUSCULOSKELETAL NEGATIVE: 1
HEMATOLOGIC/LYMPHATIC NEGATIVE: 1
ENDOCRINE NEGATIVE: 1
ALLERGIC/IMMUNOLOGIC NEGATIVE: 1
GASTROINTESTINAL NEGATIVE: 1
LAST BOWEL MOVEMENT: 67358

## 2025-06-05 ASSESSMENT — ACTIVITIES OF DAILY LIVING (ADL): LACK_OF_TRANSPORTATION: NO

## 2025-06-05 ASSESSMENT — PAIN - FUNCTIONAL ASSESSMENT: PAIN_FUNCTIONAL_ASSESSMENT: 0-10

## 2025-06-05 ASSESSMENT — PAIN SCALES - GENERAL
PAINLEVEL_OUTOF10: 0 - NO PAIN
PAINLEVEL_OUTOF10: 2

## 2025-06-05 NOTE — CASE COMMUNICATION
Called to set up visit for Friday, patient is currently hospitalized. at [facility] due to hyptension.   I can see him when he gets back home. Transfer in completed.

## 2025-06-05 NOTE — CARE PLAN
The patient's goals for the shift include  resting    The clinical goals for the shift include Patient's BG will remain >80 this shift    Assumed care of patient at 1930. Patient denies pain this shift. Patient's BG 96 @ HS and fortified with snack. Home medications reviewed with patient and glimepiride on hold at this time. Patient has remained safe from fall/injury and is compliant with safety measures. Patient resting in bed with call light within reach.

## 2025-06-05 NOTE — H&P
History Of Present Illness  Rodrigue Palacios is a 69 y.o. male presenting with hypoglycemia. Pt states that he has been on a new medication that he was told would affect his blood sugar. He states that he has cut down his insulin from 20 units to 10 units but his blood sugar still went too low. He states that he blacked out. Per ED documentation, his son found him in bed unresponsive. EMS gave D50 and started a D10 infusion. He is alert and oriented on exam. Pt recently had cystectomy w/ileal conduit creation and right nephroureterectomy on 5/21. He states he developed thrush after and has not had much of an appetite since. Otherwise, he is healing appropriately. ED workup revealed worsening kidney function, as well as UTI. Pt was admitted to med/surg for further care.     ED VS: T36.9, HR 97, RR 16, /79, Sp02 98%RA    Imaging: CT abd/pelvis- 1. Postoperative changes, as above.  2. Small to moderate left-sided hydronephrosis with left ureteral  stent placement.  3. Colonic diverticulosis, without evidence of acute diverticulitis.  4. No evidence of bowel obstruction, free intraperitoneal air or  abnormal intra-abdominal fluid collection.    Labs: Glu 119, Na 141, K 4.0, Bun/creat 20/2.98, , Trop 13, WBC 11.8, H/H 10.7/32.7, Plt 341 , UA 2+ nitrite, 500 leuks      Past Medical History  Medical History[1]    Surgical History  Surgical History[2]     Social History  He reports that he has never smoked. He has never been exposed to tobacco smoke. He has never used smokeless tobacco. He reports current alcohol use. He reports that he does not use drugs.    Family History  Family History[3]     Allergies  Orencia [abatacept]    Review of Systems   Constitutional:  Positive for appetite change.   HENT: Negative.     Eyes: Negative.    Respiratory: Negative.     Cardiovascular: Negative.    Gastrointestinal: Negative.    Endocrine: Negative.    Genitourinary: Negative.    Musculoskeletal: Negative.    Skin:  "Negative.    Allergic/Immunologic: Negative.    Neurological: Negative.    Hematological: Negative.    Psychiatric/Behavioral:  Positive for confusion.         Physical Exam  Vitals reviewed.   HENT:      Head: Normocephalic and atraumatic.      Right Ear: External ear normal.      Left Ear: External ear normal.      Nose: Nose normal.      Mouth/Throat:      Pharynx: Oropharynx is clear.   Eyes:      Conjunctiva/sclera: Conjunctivae normal.   Cardiovascular:      Rate and Rhythm: Normal rate and regular rhythm.      Pulses: Normal pulses.      Heart sounds: Normal heart sounds.   Pulmonary:      Effort: Pulmonary effort is normal.      Breath sounds: Normal breath sounds.   Abdominal:      Comments: Post op staples in place, clean and dry   Nephrostomy    Musculoskeletal:         General: Normal range of motion.      Cervical back: Normal range of motion and neck supple.   Skin:     General: Skin is dry.   Neurological:      General: No focal deficit present.      Mental Status: He is alert and oriented to person, place, and time.   Psychiatric:         Mood and Affect: Mood normal.         Behavior: Behavior normal.       Last Recorded Vitals  Blood pressure 158/82, pulse 90, temperature 36.9 °C (98.4 °F), temperature source Temporal, resp. rate 17, height 1.905 m (6' 3\"), weight 92.5 kg (204 lb), SpO2 95%.    Relevant Results  Scheduled medications  Scheduled Medications[4]  Continuous medications  Continuous Medications[5]  PRN medications  PRN Medications[6]    Results for orders placed or performed during the hospital encounter of 06/04/25 (from the past 24 hours)   POCT GLUCOSE   Result Value Ref Range    POCT Glucose 68 (L) 74 - 99 mg/dL   CBC and Auto Differential   Result Value Ref Range    WBC 11.8 (H) 4.4 - 11.3 x10*3/uL    nRBC 0.0 0.0 - 0.0 /100 WBCs    RBC 3.76 (L) 4.50 - 5.90 x10*6/uL    Hemoglobin 10.7 (L) 13.5 - 17.5 g/dL    Hematocrit 32.7 (L) 41.0 - 52.0 %    MCV 87 80 - 100 fL    MCH 28.5 26.0 - " 34.0 pg    MCHC 32.7 32.0 - 36.0 g/dL    RDW 13.5 11.5 - 14.5 %    Platelets 341 150 - 450 x10*3/uL    Neutrophils % 88.6 40.0 - 80.0 %    Immature Granulocytes %, Automated 0.3 0.0 - 0.9 %    Lymphocytes % 3.3 13.0 - 44.0 %    Monocytes % 7.1 2.0 - 10.0 %    Eosinophils % 0.5 0.0 - 6.0 %    Basophils % 0.2 0.0 - 2.0 %    Neutrophils Absolute 10.44 (H) 1.20 - 7.70 x10*3/uL    Immature Granulocytes Absolute, Automated 0.04 0.00 - 0.70 x10*3/uL    Lymphocytes Absolute 0.39 (L) 1.20 - 4.80 x10*3/uL    Monocytes Absolute 0.84 0.10 - 1.00 x10*3/uL    Eosinophils Absolute 0.06 0.00 - 0.70 x10*3/uL    Basophils Absolute 0.02 0.00 - 0.10 x10*3/uL   Comprehensive metabolic panel   Result Value Ref Range    Glucose 119 (H) 74 - 99 mg/dL    Sodium 141 136 - 145 mmol/L    Potassium 4.0 3.5 - 5.3 mmol/L    Chloride 111 (H) 98 - 107 mmol/L    Bicarbonate 20 (L) 21 - 32 mmol/L    Anion Gap 14 10 - 20 mmol/L    Urea Nitrogen 20 6 - 23 mg/dL    Creatinine 2.98 (H) 0.50 - 1.30 mg/dL    eGFR 22 (L) >60 mL/min/1.73m*2    Calcium 8.9 8.6 - 10.3 mg/dL    Albumin 3.2 (L) 3.4 - 5.0 g/dL    Alkaline Phosphatase 60 33 - 136 U/L    Total Protein 6.4 6.4 - 8.2 g/dL    AST 17 9 - 39 U/L    Bilirubin, Total 0.2 0.0 - 1.2 mg/dL    ALT 13 10 - 52 U/L   Magnesium   Result Value Ref Range    Magnesium 2.13 1.60 - 2.40 mg/dL   Lactate   Result Value Ref Range    Lactate 0.6 0.4 - 2.0 mmol/L   B-Type Natriuretic Peptide   Result Value Ref Range     (H) 0 - 99 pg/mL   Troponin I, High Sensitivity, Initial   Result Value Ref Range    Troponin I, High Sensitivity 13 0 - 20 ng/L   Urinalysis with Reflex Culture and Microscopic   Result Value Ref Range    Color, Urine Colorless (N) Light-Yellow, Yellow, Dark-Yellow    Appearance, Urine Turbid (N) Clear    Specific Gravity, Urine 1.008 1.005 - 1.035    pH, Urine 6.0 5.0, 5.5, 6.0, 6.5, 7.0, 7.5, 8.0    Protein, Urine 50 (1+) (A) NEGATIVE, 10 (TRACE), 20 (TRACE) mg/dL    Glucose, Urine Normal Normal  mg/dL    Blood, Urine 0.5 (2+) (A) NEGATIVE mg/dL    Ketones, Urine NEGATIVE NEGATIVE mg/dL    Bilirubin, Urine NEGATIVE NEGATIVE mg/dL    Urobilinogen, Urine Normal Normal mg/dL    Nitrite, Urine 2+ (A) NEGATIVE    Leukocyte Esterase, Urine 500 Tino/uL (A) NEGATIVE   Extra Urine Gray Tube   Result Value Ref Range    Extra Tube Hold for add-ons.    Microscopic Only, Urine   Result Value Ref Range    WBC, Urine 21-50 (A) 1-5, NONE /HPF    WBC Clumps, Urine OCCASIONAL Reference range not established. /HPF    RBC, Urine >20 (A) NONE, 1-2, 3-5 /HPF    Squamous Epithelial Cells, Urine 1-9 (SPARSE) Reference range not established. /HPF    Bacteria, Urine 2+ (A) NONE SEEN /HPF    Budding Yeast, Urine PRESENT (A) NONE /HPF    Yeast Hyphae, Urine PRESENT (A) NONE /HPF    Mucus, Urine FEW Reference range not established. /LPF   ECG 12 lead   Result Value Ref Range    Ventricular Rate 93 BPM    Atrial Rate 93 BPM    NM Interval 156 ms    QRS Duration 104 ms    QT Interval 392 ms    QTC Calculation(Bazett) 487 ms    P Axis 41 degrees    R Axis -37 degrees    T Axis 60 degrees    QRS Count 15 beats    Q Onset 210 ms    P Onset 132 ms    P Offset 194 ms    T Offset 406 ms    QTC Fredericia 454 ms   POCT GLUCOSE   Result Value Ref Range    POCT Glucose 106 (H) 74 - 99 mg/dL   Troponin, High Sensitivity, 1 Hour   Result Value Ref Range    Troponin I, High Sensitivity 13 0 - 20 ng/L   POCT GLUCOSE   Result Value Ref Range    POCT Glucose 124 (H) 74 - 99 mg/dL   Blood Culture    Specimen: Peripheral Venipuncture; Blood culture   Result Value Ref Range    Blood Culture Loaded on Instrument - Culture in progress    Blood Culture    Specimen: Peripheral Venipuncture; Blood culture   Result Value Ref Range    Blood Culture Loaded on Instrument - Culture in progress    POCT GLUCOSE   Result Value Ref Range    POCT Glucose 84 74 - 99 mg/dL   POCT GLUCOSE   Result Value Ref Range    POCT Glucose 56 (L) 74 - 99 mg/dL   POCT GLUCOSE   Result  Value Ref Range    POCT Glucose 96 74 - 99 mg/dL   POCT GLUCOSE   Result Value Ref Range    POCT Glucose 122 (H) 74 - 99 mg/dL   CBC   Result Value Ref Range    WBC 9.1 4.4 - 11.3 x10*3/uL    nRBC 0.0 0.0 - 0.0 /100 WBCs    RBC 3.75 (L) 4.50 - 5.90 x10*6/uL    Hemoglobin 10.5 (L) 13.5 - 17.5 g/dL    Hematocrit 33.3 (L) 41.0 - 52.0 %    MCV 89 80 - 100 fL    MCH 28.0 26.0 - 34.0 pg    MCHC 31.5 (L) 32.0 - 36.0 g/dL    RDW 13.8 11.5 - 14.5 %    Platelets 379 150 - 450 x10*3/uL   Basic metabolic panel   Result Value Ref Range    Glucose 113 (H) 74 - 99 mg/dL    Sodium 143 136 - 145 mmol/L    Potassium 4.8 3.5 - 5.3 mmol/L    Chloride 113 (H) 98 - 107 mmol/L    Bicarbonate 22 21 - 32 mmol/L    Anion Gap 13 10 - 20 mmol/L    Urea Nitrogen 21 6 - 23 mg/dL    Creatinine 2.63 (H) 0.50 - 1.30 mg/dL    eGFR 26 (L) >60 mL/min/1.73m*2    Calcium 9.1 8.6 - 10.3 mg/dL   POCT GLUCOSE   Result Value Ref Range    POCT Glucose 119 (H) 74 - 99 mg/dL     *Note: Due to a large number of results and/or encounters for the requested time period, some results have not been displayed. A complete set of results can be found in Results Review.      Assessment & Plan  Acute on chronic renal failure    Type 2 diabetes mellitus    Hypoglycemia    Acute cystitis without hematuria    Benign essential hypertension    Hypercholesterolemia    SCOOTER (obstructive sleep apnea)    GERD (gastroesophageal reflux disease)    Oral candidiasis    Insomnia      #DM Type II with hypoglycemia  -BS on arrival 68, 40s at home per EMS  -Hold glimepiride, consider discontinuing at discharge   -SSI with hypoglycemia protocol  -Monitor BG    #Acute on chronic renal failure   -Baseline creat ~ 1.4 - 1.5   -Bun/creat 20/2.98 > 21/2.63  -Continue NS @ 100ml/hr   -Avoid nephrotoxic medications   -Daily BMP     #UTI  #S/P cystectomy w/ileal conduit and nephrectomy   -OR with Dr. Borrero 5/21   -UA 2+ nitrite, 500 leuks   -Urine cx pending   -IV ceftriaxone (Day 2)  -Monitor  surgical sites, staples in place     #Essential HTN  #HLD  -Continue atorvastatin, lisinopril  -Monitor BP and HR     #SCOOTER with CPAP  -Monitor SP02   -Continue home CPAP     #GERD  -Continue famotidine, pantoprazole     #Oral candidiasis   -Continue nystatin     #Insomnia  -Continue trazodone     DVT ppx  -heparin subcutaneous    PUD ppx  -pantoprazole    F: NS @ 100ml/hr  E: Replete per protocol  N: ADA  A: PIV    Disposition: Pt requires more than 2 inpatient days at this time   Code Status: Full Code     YUNG Nava  Attending Attestation:    Patient was seen and examined face to face, history and physical was taken personally at bedside the APRN-CNP, was present for the whole duration of the exam who participated in the documentation of this note. I performed the medical decision-making components (assessment and plan of care). I have reviewed the documentation and verified the findings in the note as written with additions or exceptions as stated in the body of this note. He recently had cystoprostatectomy, and right nephroureterectomy, was on insulin and on sulfonylurea with glimepiride.  Was found unresponsive by son was found to have sugar of 40 EMS called patient was given D50 and he woke up.  He was admitted to the hospital he was seen this morning doing well awake alert oriented x 3, denies chest pain, cough, shortness of breath no nausea vomiting abdominal pain.  On exam heart regular, lungs clear, abdomen soft bowel sounds are present, incisions over abdominal incisional area healing staples are still in place.  Lower extremities no edema.  Patient seems to be having some acute on chronic renal failure with creatinine of 2.90 he does have some leukocytosis of 11.8 and does have some pyuria.  Patient hypoglycemic shock was most likely secondary to sulfonylurea in the setting of renal failure, will discontinue glimepiride, continue with Lantus insulin sliding scale of insulin, D5 if patient's  sugar continue to be dropping, will continue with antibiotics for possible urinary tract infection.    Dr. Lydia Campbell MD  Internal Medicine        [1]   Past Medical History:  Diagnosis Date    Arthritis     rhuematoid    Bladder cancer (Multi)     CKD (chronic kidney disease)     Dorsalgia, unspecified 07/28/2020    Back pain without radiation    GERD (gastroesophageal reflux disease)     controlled    Hearing aid worn     HL (hearing loss)     Hyperlipidemia     Hypertension     Local infection of the skin and subcutaneous tissue, unspecified 10/11/2013    Nonvenomous insect bite with infection    Nephrolithiasis     Other muscle spasm 02/14/2018    Muscle spasms of neck    Other specified diseases of anus and rectum 03/22/2013    Anal pain    Personal history of other diseases of the digestive system 09/18/2013    History of gastroenteritis    Personal history of other diseases of the respiratory system 04/27/2020    History of acute bronchitis    Personal history of other diseases of the respiratory system 06/24/2019    History of upper respiratory infection    Personal history of other infectious and parasitic diseases     History of candidiasis of mouth    Personal history of other specified conditions 03/22/2013    History of abnormal weight loss    Personal history of other specified conditions 02/24/2014    History of numbness    Personal history of other specified conditions 07/14/2017    History of abdominal pain    Pneumonia, unspecified organism 01/25/2016    Pneumonia involving right lung    Rash and other nonspecific skin eruption 04/29/2013    Rash    Sleep apnea     CPAP    Type 2 diabetes mellitus    [2]   Past Surgical History:  Procedure Laterality Date    BLADDER SURGERY  2025    Removal malignant tumor    CHOLECYSTECTOMY  07/07/2016    Cholecystectomy    HERNIA REPAIR  07/07/2016    Inguinal Hernia Repair    SHOULDER SURGERY Right    [3]   Family History  Problem Relation Name Age of  Onset    Ovarian cancer Mother      Other (asbestosis) Father     [4] atorvastatin, 40 mg, oral, Daily  cefTRIAXone, 1 g, intravenous, q24h  [Held by provider] glimepiride, 4 mg, oral, Daily before breakfast  heparin, 5,000 Units, subcutaneous, q8h  lisinopril, 20 mg, oral, q12h  nystatin, 5 mL, Swish & Swallow, 4x daily  pantoprazole, 40 mg, oral, Daily before breakfast  sulfamethoxazole-trimethoprim, 0.5 tablet, oral, BID  traZODone, 50 mg, oral, Nightly  [5] sodium chloride 0.9%, 100 mL/hr  sodium chloride 0.9%, 10 mL/hr  [6] PRN medications: acetaminophen, benzocaine-menthol, dextromethorphan-guaifenesin, dextrose, dextrose, glucagon, glucagon, guaiFENesin, ondansetron, polyethylene glycol, sodium chloride 0.9%

## 2025-06-05 NOTE — CARE PLAN
The patient's goals for the shift include  to get better and go home     The clinical goals for the shift include Pt blood glucose will remain above 80 this shift

## 2025-06-05 NOTE — PROGRESS NOTES
06/05/25 1222   Discharge Planning   Living Arrangements Alone  (Son is at college and stays sometimes)   Support Systems Family members;Children;Friends/neighbors   Assistance Needed Patient in a 2 story house with basement and lives alone with his cat.  He has a tub bathroom on the 2nd floor and a walk in shower on the first floor. He says his son is at college and stays at his house sometimes.  He says he is independent with  ADLS, IADLS, ambullation and is retired.  He does drive.  DME:  WW, Glucometer, inhaler, CPAP.  Confirmed address, pharmacy and PCP [facility]   Number of Stairs to Enter Residence 3  (Upstairs/basement)   Number of Stairs Within Residence 12   Do you have animals or pets at home? Yes   Type of Animals or Pets cat   Home or Post Acute Services In home services   Type of Home Care Services Home nursing visits;Home OT;Home PT   Expected Discharge Disposition Home Health   Does the patient need discharge transport arranged? No   Financial Resource Strain   How hard is it for you to pay for the very basics like food, housing, medical care, and heating? Not hard   Housing Stability   In the last 12 months, was there a time when you were not able to pay the mortgage or rent on time? N   In the past 12 months, how many times have you moved where you were living? 0   At any time in the past 12 months, were you homeless or living in a shelter (including now)? N   Transportation Needs   In the past 12 months, has lack of transportation kept you from medical appointments or from getting medications? no   In the past 12 months, has lack of transportation kept you from meetings, work, or from getting things needed for daily living? No   Patient Choice   Provider Choice list and CMS website (https://medicare.gov/care-compare#search) for post-acute Quality and Resource Measure Data were provided and reviewed with: Patient   Stroke Family Assessment   Stroke Family Assessment Needed No   Intensity of  Service   Intensity of Service 0-30 min     12:40 PM  Confirmed with [facility]-patient is active with N, PT/OT

## 2025-06-05 NOTE — CARE PLAN
The patient's goals for the shift include  to be better and go home     The clinical goals for the shift include Pt blood glucose will remain above 80 this shift    Problem: Pain - Adult  Goal: Verbalizes/displays adequate comfort level or baseline comfort level  6/5/2025 1201 by Lakshmi Lancaster RN  Outcome: Progressing  6/5/2025 1106 by Lakshmi Lancaster RN  Outcome: Progressing     Problem: Safety - Adult  Goal: Free from fall injury  6/5/2025 1201 by Lakshmi Lancaster RN  Outcome: Progressing  6/5/2025 1106 by Lakshmi Lancaster RN  Outcome: Progressing     Problem: Discharge Planning  Goal: Discharge to home or other facility with appropriate resources  6/5/2025 1201 by Lakshmi Lancaster RN  Outcome: Progressing  6/5/2025 1106 by Lakshmi Lancaster RN  Outcome: Progressing     Problem: Chronic Conditions and Co-morbidities  Goal: Patient's chronic conditions and co-morbidity symptoms are monitored and maintained or improved  6/5/2025 1201 by Lakshmi Lancaster RN  Outcome: Progressing  6/5/2025 1106 by Lakshmi Lancaster RN  Outcome: Progressing     Problem: Nutrition  Goal: Nutrient intake appropriate for maintaining nutritional needs  6/5/2025 1201 by Lakshmi Lancaster RN  Outcome: Progressing  6/5/2025 1106 by Lakshmi Lancaster RN  Outcome: Progressing     Problem: Diabetes  Goal: Achieve decreasing blood glucose levels by end of shift  6/5/2025 1201 by Lakshmi Lancaster RN  Outcome: Progressing  6/5/2025 1106 by Lakshmi Lancaster RN  Outcome: Progressing  Goal: Increase stability of blood glucose readings by end of shift  6/5/2025 1201 by Lakshmi Lancaster RN  Outcome: Progressing  6/5/2025 1106 by Lakshmi Lancaster RN  Outcome: Progressing  Goal: Maintain electrolyte levels within acceptable range throughout shift  6/5/2025 1201 by Lakshmi Lancaster RN  Outcome: Progressing  6/5/2025 1106 by Lakshmi Lancaster RN  Outcome: Progressing  Goal: Maintain glucose levels >70mg/dl to <250mg/dl throughout  shift  6/5/2025 1201 by Lakshmi Lancaster RN  Outcome: Progressing  6/5/2025 1106 by Lakshmi Lancaster RN  Outcome: Progressing  Goal: No changes in neurological exam by end of shift  6/5/2025 1201 by Lakshmi Lancaster RN  Outcome: Progressing  6/5/2025 1106 by Lakshmi Lancaster RN  Outcome: Progressing  Goal: Vital signs within normal range for age by end of shift  6/5/2025 1201 by Lakshmi Lancaster RN  Outcome: Progressing  6/5/2025 1106 by Lakshmi Lancaster RN  Outcome: Progressing  Goal: Decrease in ketones present in urine by end of shift  Outcome: Progressing  Goal: Learn about and adhere to nutrition recommendations by end of shift  Outcome: Progressing  Goal: Increase self care and/or family involovement by end of shift  Outcome: Progressing  Goal: Receive DSME education by end of shift  Outcome: Progressing     Problem: Diabetes  Goal: Increase stability of blood glucose readings by end of shift  6/5/2025 1201 by Lakshmi Lancaster RN  Outcome: Progressing  6/5/2025 1106 by Lakshmi Lancaster RN  Outcome: Progressing   Pt had stable vitals throughout the shift. Pt states he does feel like he has some gas pain/stomach ache. Have medication to try a relieve pain. Pt has had stable blood sugars throughout this shift. Pt has tolerated IV ATB. Pt has no needs at this time and call light within reach.

## 2025-06-06 LAB
ANION GAP SERPL CALC-SCNC: 15 MMOL/L (ref 10–20)
BACTERIA UR CULT: ABNORMAL
BUN SERPL-MCNC: 26 MG/DL (ref 6–23)
CALCIUM SERPL-MCNC: 9.5 MG/DL (ref 8.6–10.3)
CHLORIDE SERPL-SCNC: 111 MMOL/L (ref 98–107)
CO2 SERPL-SCNC: 22 MMOL/L (ref 21–32)
CREAT SERPL-MCNC: 2.9 MG/DL (ref 0.5–1.3)
EGFRCR SERPLBLD CKD-EPI 2021: 23 ML/MIN/1.73M*2
ERYTHROCYTE [DISTWIDTH] IN BLOOD BY AUTOMATED COUNT: 13.3 % (ref 11.5–14.5)
GLUCOSE BLD MANUAL STRIP-MCNC: 168 MG/DL (ref 74–99)
GLUCOSE BLD MANUAL STRIP-MCNC: 207 MG/DL (ref 74–99)
GLUCOSE BLD MANUAL STRIP-MCNC: 225 MG/DL (ref 74–99)
GLUCOSE BLD MANUAL STRIP-MCNC: 259 MG/DL (ref 74–99)
GLUCOSE SERPL-MCNC: 169 MG/DL (ref 74–99)
HCT VFR BLD AUTO: 34 % (ref 41–52)
HGB BLD-MCNC: 10.9 G/DL (ref 13.5–17.5)
MCH RBC QN AUTO: 28.4 PG (ref 26–34)
MCHC RBC AUTO-ENTMCNC: 32.1 G/DL (ref 32–36)
MCV RBC AUTO: 89 FL (ref 80–100)
NRBC BLD-RTO: 0 /100 WBCS (ref 0–0)
PLATELET # BLD AUTO: 384 X10*3/UL (ref 150–450)
POTASSIUM SERPL-SCNC: 5.1 MMOL/L (ref 3.5–5.3)
RBC # BLD AUTO: 3.84 X10*6/UL (ref 4.5–5.9)
SODIUM SERPL-SCNC: 143 MMOL/L (ref 136–145)
WBC # BLD AUTO: 9.2 X10*3/UL (ref 4.4–11.3)

## 2025-06-06 PROCEDURE — 2500000004 HC RX 250 GENERAL PHARMACY W/ HCPCS (ALT 636 FOR OP/ED): Mod: IPSPLIT | Performed by: HOSPITALIST

## 2025-06-06 PROCEDURE — 99233 SBSQ HOSP IP/OBS HIGH 50: CPT | Performed by: NURSE PRACTITIONER

## 2025-06-06 PROCEDURE — 85027 COMPLETE CBC AUTOMATED: CPT | Mod: IPSPLIT

## 2025-06-06 PROCEDURE — 2500000004 HC RX 250 GENERAL PHARMACY W/ HCPCS (ALT 636 FOR OP/ED): Mod: IPSPLIT | Performed by: NURSE PRACTITIONER

## 2025-06-06 PROCEDURE — 1100000001 HC PRIVATE ROOM DAILY: Mod: IPSPLIT

## 2025-06-06 PROCEDURE — 2500000001 HC RX 250 WO HCPCS SELF ADMINISTERED DRUGS (ALT 637 FOR MEDICARE OP): Mod: IPSPLIT | Performed by: NURSE PRACTITIONER

## 2025-06-06 PROCEDURE — 82947 ASSAY GLUCOSE BLOOD QUANT: CPT | Mod: IPSPLIT

## 2025-06-06 PROCEDURE — 2500000001 HC RX 250 WO HCPCS SELF ADMINISTERED DRUGS (ALT 637 FOR MEDICARE OP): Mod: IPSPLIT | Performed by: HOSPITALIST

## 2025-06-06 PROCEDURE — 94760 N-INVAS EAR/PLS OXIMETRY 1: CPT | Mod: IPSPLIT

## 2025-06-06 PROCEDURE — 80048 BASIC METABOLIC PNL TOTAL CA: CPT | Mod: IPSPLIT

## 2025-06-06 PROCEDURE — 36415 COLL VENOUS BLD VENIPUNCTURE: CPT | Mod: IPSPLIT

## 2025-06-06 RX ORDER — SODIUM CHLORIDE 9 MG/ML
75 INJECTION, SOLUTION INTRAVENOUS CONTINUOUS
Status: DISCONTINUED | OUTPATIENT
Start: 2025-06-06 | End: 2025-06-06

## 2025-06-06 RX ORDER — SODIUM CHLORIDE, SODIUM LACTATE, POTASSIUM CHLORIDE, CALCIUM CHLORIDE 600; 310; 30; 20 MG/100ML; MG/100ML; MG/100ML; MG/100ML
75 INJECTION, SOLUTION INTRAVENOUS CONTINUOUS
Status: DISPENSED | OUTPATIENT
Start: 2025-06-06 | End: 2025-06-07

## 2025-06-06 RX ORDER — ENOXAPARIN SODIUM 100 MG/ML
30 INJECTION SUBCUTANEOUS EVERY 24 HOURS
Status: DISCONTINUED | OUTPATIENT
Start: 2025-06-06 | End: 2025-06-08 | Stop reason: HOSPADM

## 2025-06-06 RX ADMIN — HEPARIN SODIUM 5000 UNITS: 5000 INJECTION INTRAVENOUS; SUBCUTANEOUS at 06:35

## 2025-06-06 RX ADMIN — NYSTATIN 500000 UNITS: 100000 SUSPENSION ORAL at 17:29

## 2025-06-06 RX ADMIN — PANTOPRAZOLE SODIUM 40 MG: 40 TABLET, DELAYED RELEASE ORAL at 06:41

## 2025-06-06 RX ADMIN — LISINOPRIL 20 MG: 20 TABLET ORAL at 20:19

## 2025-06-06 RX ADMIN — SODIUM CHLORIDE, SODIUM LACTATE, POTASSIUM CHLORIDE, AND CALCIUM CHLORIDE 75 ML/HR: 600; 310; 30; 20 INJECTION, SOLUTION INTRAVENOUS at 16:01

## 2025-06-06 RX ADMIN — CEFTRIAXONE 1 G: 1 INJECTION, SOLUTION INTRAVENOUS at 08:20

## 2025-06-06 RX ADMIN — LISINOPRIL 20 MG: 20 TABLET ORAL at 08:20

## 2025-06-06 RX ADMIN — ATORVASTATIN CALCIUM 40 MG: 40 TABLET, FILM COATED ORAL at 08:20

## 2025-06-06 RX ADMIN — NYSTATIN 500000 UNITS: 100000 SUSPENSION ORAL at 20:19

## 2025-06-06 RX ADMIN — ONDANSETRON 4 MG: 2 INJECTION INTRAMUSCULAR; INTRAVENOUS at 13:32

## 2025-06-06 RX ADMIN — TRAZODONE HYDROCHLORIDE 50 MG: 50 TABLET ORAL at 20:19

## 2025-06-06 RX ADMIN — NYSTATIN 500000 UNITS: 100000 SUSPENSION ORAL at 06:41

## 2025-06-06 RX ADMIN — ENOXAPARIN SODIUM 30 MG: 30 INJECTION SUBCUTANEOUS at 14:09

## 2025-06-06 RX ADMIN — NYSTATIN 500000 UNITS: 100000 SUSPENSION ORAL at 13:56

## 2025-06-06 ASSESSMENT — PAIN SCALES - GENERAL
PAINLEVEL_OUTOF10: 0 - NO PAIN
PAINLEVEL_OUTOF10: 0 - NO PAIN

## 2025-06-06 ASSESSMENT — PAIN - FUNCTIONAL ASSESSMENT: PAIN_FUNCTIONAL_ASSESSMENT: 0-10

## 2025-06-06 NOTE — PROGRESS NOTES
Rodrigue Palacios is a 69 y.o. male on day 2 of admission presenting with Acute on chronic renal failure.    Subjective   He is resting in bed today, was just up to the bathroom and tolerated his activity well.  He is feeling a little bit better, urostomy functioning well.  We discussed lab results and his kidney function specifically.  Will continue to monitor until creatinine plateaus or starts declining.  All questions answered.     Objective     Physical Exam  Constitutional:       General: He is not in acute distress.     Appearance: Normal appearance. He is not toxic-appearing.   HENT:      Head: Normocephalic and atraumatic.      Mouth/Throat:      Mouth: Mucous membranes are moist.   Eyes:      Extraocular Movements: Extraocular movements intact.      Pupils: Pupils are equal, round, and reactive to light.   Cardiovascular:      Rate and Rhythm: Normal rate and regular rhythm.      Pulses: Normal pulses.      Heart sounds: Normal heart sounds. No murmur heard.     No gallop.   Pulmonary:      Effort: Pulmonary effort is normal. No respiratory distress.      Breath sounds: Normal breath sounds. No wheezing, rhonchi or rales.   Abdominal:      General: Bowel sounds are normal. There is no distension.      Palpations: Abdomen is soft.      Tenderness: There is no abdominal tenderness. There is no guarding or rebound.      Comments: Urostomy stoma, pouched, to gravity   Musculoskeletal:         General: No swelling, tenderness, deformity or signs of injury. Normal range of motion.      Cervical back: Normal range of motion and neck supple.   Skin:     General: Skin is warm and dry.      Capillary Refill: Capillary refill takes less than 2 seconds.      Coloration: Skin is pale. Skin is not jaundiced.      Findings: No bruising or rash.   Neurological:      General: No focal deficit present.      Mental Status: He is alert and oriented to person, place, and time. Mental status is at baseline.      Cranial Nerves:  "No cranial nerve deficit.      Sensory: No sensory deficit.      Motor: No weakness.      Gait: Gait normal.   Psychiatric:         Mood and Affect: Mood normal.         Behavior: Behavior normal.       Last Recorded Vitals  Blood pressure 159/76, pulse 80, temperature 36.6 °C (97.9 °F), temperature source Temporal, resp. rate 16, height 1.905 m (6' 3\"), weight 92.5 kg (204 lb), SpO2 97%.  Intake/Output last 3 Shifts:  I/O last 3 completed shifts:  In: 1611.7 (17.4 mL/kg) [P.O.:870; I.V.:691.7 (7.5 mL/kg); IV Piggyback:50]  Out: 7500 (81.1 mL/kg) [Urine:7500 (2.3 mL/kg/hr)]  Weight: 92.5 kg     Scheduled medications  Scheduled Medications[1]  Continuous medications  Continuous Medications[2]  PRN medications  PRN Medications[3]    Relevant Results  ECG 12 lead  Result Date: 6/4/2025  Sinus rhythm with frequent Premature ventricular complexes Left axis deviation Moderate voltage criteria for LVH, may be normal variant Prolonged QT Abnormal ECG When compared with ECG of 17-MAR-2025 08:09, Premature ventricular complexes are now Present    CT abdomen pelvis wo IV contrast  Result Date: 6/4/2025  1. Postoperative changes, as above. 2. Small to moderate left-sided hydronephrosis with left ureteral stent placement. 3. Colonic diverticulosis, without evidence of acute diverticulitis. 4. No evidence of bowel obstruction, free intraperitoneal air or abnormal intra-abdominal fluid collection.   MACRO: None   Signed by: Kaleb Glaser 6/4/2025 3:10 PM Dictation workstation:   [id number]     Latest Reference Range & Units 06/05/25 06:41 06/06/25 07:03   GLUCOSE 74 - 99 mg/dL 113 (H) 169 (H)   SODIUM 136 - 145 mmol/L 143 143   POTASSIUM 3.5 - 5.3 mmol/L 4.8 5.1   CHLORIDE 98 - 107 mmol/L 113 (H) 111 (H)   Bicarbonate 21 - 32 mmol/L 22 22   Anion Gap 10 - 20 mmol/L 13 15   Blood Urea Nitrogen 6 - 23 mg/dL 21 26 (H)   Creatinine 0.50 - 1.30 mg/dL 2.63 (H) 2.90 (H)   EGFR >60 mL/min/1.73m*2 26 (L) 23 (L)   Calcium 8.6 - 10.3 mg/dL " 9.1 9.5   WBC 4.4 - 11.3 x10*3/uL 9.1 9.2   nRBC 0.0 - 0.0 /100 WBCs 0.0 0.0   RBC 4.50 - 5.90 x10*6/uL 3.75 (L) 3.84 (L)   HEMOGLOBIN 13.5 - 17.5 g/dL 10.5 (L) 10.9 (L)   HEMATOCRIT 41.0 - 52.0 % 33.3 (L) 34.0 (L)   MCV 80 - 100 fL 89 89   MCH 26.0 - 34.0 pg 28.0 28.4   MCHC 32.0 - 36.0 g/dL 31.5 (L) 32.1   RED CELL DISTRIBUTION WIDTH 11.5 - 14.5 % 13.8 13.3   Platelets 150 - 450 x10*3/uL 379 384     Assessment & Plan  Acute on chronic renal failure    Type 2 diabetes mellitus    Hypoglycemia    Acute cystitis without hematuria    Benign essential hypertension    Hypercholesterolemia    SCOOTER (obstructive sleep apnea)    GERD (gastroesophageal reflux disease)    Oral candidiasis    Insomnia      DM Type II with hypoglycemia  -BS on arrival 68, 40s at home per EMS  -Hold glimepiride, consider discontinuing at discharge   -SSI with hypoglycemia protocol  -Monitor BG     Acute on chronic renal failure   -Baseline creat ~ 1.4 - 1.5   -Bun/creat 20/2.98 > 21/2.63 > 26/2.90  -Continue NS @ 75 ml/hr  -Avoid nephrotoxic medications   -Daily BMP      UTI  S/P cystectomy w/ileal conduit and nephrectomy   -OR with Dr. Borrero 5/21   -UA 2+ nitrite, 500 leuks   -Urine cx: coag -ve staph  -IV ceftriaxone (Day 3)  -Monitor surgical sites, staples in place      Essential HTN  HLD  -Continue atorvastatin, lisinopril  -Monitor BP and HR      SCOOTER with CPAP  -Monitor SP02   -Continue home CPAP      GERD  -Continue famotidine, pantoprazole      Oral candidiasis   -Continue nystatin      Insomnia  -Continue trazodone      DVT ppx-lovenox  PUD ppx-pantoprazole   F: NS @ 75ml/hr  E: Replete per protocol  N: ADA  A: PIV   Disposition: Pt requires inpatient days at this time   Code Status: Full Code     Cecily Mejia, APRN-CNP         [1] atorvastatin, 40 mg, oral, Daily  cefTRIAXone, 1 g, intravenous, q24h  enoxaparin, 30 mg, subcutaneous, q24h  [Held by provider] glimepiride, 4 mg, oral, Daily before breakfast  lisinopril, 20 mg,  oral, q12h  nystatin, 5 mL, Swish & Swallow, 4x daily  pantoprazole, 40 mg, oral, Daily before breakfast  [Held by provider] sulfamethoxazole-trimethoprim, 0.5 tablet, oral, BID  traZODone, 50 mg, oral, Nightly  [2] sodium chloride 0.9%, 10 mL/hr  [3] PRN medications: acetaminophen, alum-mag hydroxide-simeth, benzocaine-menthol, calcium carbonate, dextromethorphan-guaifenesin, dextrose, dextrose, glucagon, glucagon, guaiFENesin, ondansetron, polyethylene glycol, sodium chloride 0.9%

## 2025-06-06 NOTE — NURSING NOTE
Assumed care of pt, pt in bed, denied any needs, call light with in reach, will continue to monitor.     0300- pt had uneventful night, denied pain, urostomy draining clear yellow urine, no needs.

## 2025-06-06 NOTE — CONSULTS
"Nutrition Initial Assessment:   Nutrition Assessment    Reason for Assessment: Admission nursing screening (MST=3, Pt had surgery and states that he had thrush and can't eat)    Patient is a 69 y.o. male presenting with Acute on chronic renal failure. Admitted for hypoglycemia, acute-on-chronic renal failure, and UTI. Recent cystoprostatectomy with ileal conduit and right nephroureterectomy (5/21). Now post-op Day 16. Complicated course with poor appetite, oral thrush, fluid shifts, and multiple medication changes.    PMH: Type 2 diabetes mellitus, hypoglycemia, oral candidiasis, obstructive sleep apnea (SCOOTER), gastroesophageal reflux disease (GERD), acute cystitis without hematuria, hypercholesterolemia, benign essential hypertension (HTN), and insomnia.    Nutrition History:  Energy Intake: Poor < 50 %  Pain affecting nutrition status: N/A (unable to talk with pt to assess.)  Food and Nutrient History: Nutrition consult completed via chart review and interdisciplinary input, as patient was not available for in-person interview. Appetite reported poor post-op; only consumed liquids for dinner due to nausea. Per nursing secure chat, he was eating okay this morning but he only ordered liquids for dinner because he was kind of nauseous this afternoon. Pt reports reduced intake for >5 days since surgery.  Food Allergy:  (NKFA)       Anthropometrics:  Height: 190.5 cm (6' 3\")   Weight: 92.5 kg (204 lb)   BMI (Calculated): 25.5  IBW/kg (Dietitian Calculated): 89 kg  Percent of IBW: 104 %       Weight History:   Wt Readings from Last 10 Encounters:   06/04/25 92.5 kg (204 lb)   05/29/25 97.5 kg (215 lb)   05/21/25 98.4 kg (216 lb 14.9 oz)   05/09/25 102 kg (225 lb 4.8 oz)   04/24/25 99.4 kg (219 lb 3.2 oz)   04/16/25 97.1 kg (214 lb)   04/07/25 97.7 kg (215 lb 8 oz)   04/04/25 99.8 kg (220 lb)   03/31/25 101 kg (223 lb 3.2 oz)   03/17/25 102 kg (224 lb)     Weight Change %:  Weight History / % Weight Change: 06/04/25 (92.5 " kg) to 05/29/25 (97.5 kg) = -5.0 kg (-11.0 lb) loss in 6 days, -5.1%.  06/04/25 (92.5 kg) to 05/09/25 (102 kg) = -9.5 kg (-20.9 lb) loss in ~4 weeks, -9.3%  Significant Weight Loss: Yes    Nutrition Focused Physical Exam Findings:    Subcutaneous Fat Loss:   Defer Subcutaneous Fat Loss Assessment: Defer all  Defer All Reason: unable to see pt in person  Muscle Wasting:  Defer Muscle Wasting Assessment: Defer all  Edema:  Edema: none  Physical Findings:  Skin: Positive (Closed suction drain (bulb) placed in the right anterior RUQ, urostomy (right ureterostomy) in the RUQ, surgical wound to the anterior abdomen, and additional laparoscopic surgical wound to the anterior region.)  Digestive System Findings: Nausea  Mouth Findings: Oral candidiasis    Nutrition Significant Labs:  CBC Trend:   Results from last 7 days   Lab Units 06/06/25  0703 06/05/25  0641 06/04/25  1248   WBC AUTO x10*3/uL 9.2 9.1 11.8*   RBC AUTO x10*6/uL 3.84* 3.75* 3.76*   HEMOGLOBIN g/dL 10.9* 10.5* 10.7*   HEMATOCRIT % 34.0* 33.3* 32.7*   MCV fL 89 89 87   PLATELETS AUTO x10*3/uL 384 379 341    , BMP Trend:   Results from last 7 days   Lab Units 06/06/25  0703 06/05/25  0641 06/04/25  1248   GLUCOSE mg/dL 169* 113* 119*   CALCIUM mg/dL 9.5 9.1 8.9   SODIUM mmol/L 143 143 141   POTASSIUM mmol/L 5.1 4.8 4.0   CO2 mmol/L 22 22 20*   CHLORIDE mmol/L 111* 113* 111*   BUN mg/dL 26* 21 20   CREATININE mg/dL 2.90* 2.63* 2.98*   BG POCT trend:   Results from last 7 days   Lab Units 06/06/25  1610 06/06/25  1104 06/06/25  0711 06/05/25  2033 06/05/25  1704   POCT GLUCOSE mg/dL 207* 259* 168* 173* 142*     Nutrition Specific Medications:  Scheduled medications  Scheduled Medications[1]  Continuous medications  Continuous Medications[2]    I/O:   Last BM Date: 06/05/25; Stool Appearance: Soft (06/05/25 0738)    Intake/Output Summary (Last 24 hours) at 6/6/2025 1904  Last data filed at 6/6/2025 1743  Gross per 24 hour   Intake 650 ml   Output 3650 ml   Net  -3000 ml       Dietary Orders (From admission, onward)       Start     Ordered    06/04/25 1833  May Participate in Room Service  ( ROOM SERVICE MAY PARTICIPATE)  Once        Question:  .  Answer:  Yes    06/04/25 1832 06/04/25 1822  Adult diet Consistent Carb; CCD 75 gm/meal  Diet effective now        Question Answer Comment   Diet type Consistent Carb    Carb diet selection: CCD 75 gm/meal        06/04/25 1821                Estimated Needs:   Total Energy Estimated Needs in 24 hours (kCal): 2300 kCal  Method for Estimating Needs: ~25 kcal/kg of actual BW  Total Protein Estimated Needs in 24 Hours (g):  (93-111g)  Method for Estimating 24 Hour Protein Needs: 1-1.2 g/kg of actual BW  Total Fluid Estimated Needs in 24 Hours (mL): 2775 mL  Method for Estimating 24 Hour Fluid Needs: 30kcal/kg or 1 mL/kcal or per medical team.        Nutrition Diagnosis   Malnutrition Diagnosis  Patient has Malnutrition Diagnosis: Yes  Diagnosis Status: New  Malnutrition Diagnosis: Moderate malnutrition related to acute disease or injury  Related to: acute illness and post-surgical recovery, and thrush  As Evidenced by: >5 days of inadequate energy intake and 9.3% weight loss over ~1 month.            Nutrition Interventions/Recommendations   Nutrition prescription for oral nutrition    Nutrition Recommendations:  Individualized Nutrition Prescription Provided for : CCD 75, Glucerna once a day and Anatoly BID.    Nutrition Interventions/Goals:   Meals and Snacks: Carbohydrate-modified diet  Goal: Encourage small, frequent meals -- energy-dense, high-protein.  Continue CCD 75g/meal, consider liberalizing if intake remains <50%  Medical Food Supplement: Commercial beverage medical food supplement therapy  Goal: Anatoly (80-90kcal and 2.5g protein + arginine and glutamine per 1 pkt) and Glucerna (220kcal and 10g protein per 8 fluid oz)  Goal: Recommend B12, zinc, and vitamin C monitoring or repletion due to risk from ileal conduit and  wound burden  Additional Interventions: Anatoly BID - to support surgical wound healing and prevent lean body mass loss in the context of rapid weight loss and recent abdominal surgery.  Glucerna daily - for calorie and protein support with controlled carbohydrate.      Education Documentation  Unable to see or talk with pt in person.        Nutrition Monitoring and Evaluation   Food/Nutrient Related History Monitoring  Monitoring and Evaluation Plan: Intake / amount of food  Intake / Amount of food: Consumes at least 75% or more of meals/snacks/supplements    Anthropometric Measurements  Monitoring and Evaluation Plan: Body weight  Body Weight: Body weight - Maintain stable weight    Biochemical Data, Medical Tests and Procedures  Monitoring and Evaluation Plan: Glucose/endocrine profile, Electrolyte/renal panel  Electrolyte and Renal Panel: Electrolytes within normal limits  Glucose/Endocrine Profile: Glucose within normal limits ( mg/dL)    Physical Exam Findings  Monitoring and Evaluation Plan: Skin, Digestive System  Criteria: Appetite, nausea, and tolerance to supplements  Other: Monitor for signs of skin breakdown around stoma site    Goal Status: New goal(s) identified    Time Spent (min): 75 minutes              [1] atorvastatin, 40 mg, oral, Daily  cefTRIAXone, 1 g, intravenous, q24h  enoxaparin, 30 mg, subcutaneous, q24h  [Held by provider] glimepiride, 4 mg, oral, Daily before breakfast  lisinopril, 20 mg, oral, q12h  nystatin, 5 mL, Swish & Swallow, 4x daily  pantoprazole, 40 mg, oral, Daily before breakfast  [Held by provider] sulfamethoxazole-trimethoprim, 0.5 tablet, oral, BID  traZODone, 50 mg, oral, Nightly     [2] lactated Ringer's, 75 mL/hr, Last Rate: 75 mL/hr (06/06/25 1601)  sodium chloride 0.9%, 10 mL/hr

## 2025-06-06 NOTE — DISCHARGE INSTR - OTHER ORDERS
The Home Care Agency you selected will contact you within 72 hours to schedule a Home Care Visit. If you have any questions prior to the call, please feel free to contact  Home care contact number /[phone] (HOME)      Thank you for choosing [facility] for your Health Care needs.  Also, thank you for allowing us to take you and your families preferences into account when determining your discharge plan.  Stay well!     You may receive a survey in the mail within the next couple weeks. Please take the time to complete it and return it. Your input is ALWAYS important to us. Thank you!  Your Care Transition Team - Radha Shankar  & Jody      For questions about your medications listed on your discharge instructions, please call the Nurses Station at [phone].

## 2025-06-06 NOTE — CARE PLAN
The clinical goals for the shift include Patient will have adequate urine output this shift.    Over the shift, the patient did have adequate urine output. Vitals remained stable, no reports of pain, appetite was good. Nausea reported this afternoon, PRN zofran given which helped. Patient rested in bed most of the shift. Currently in bed with call light in reach, bed alarm on, denies any needs at this time.

## 2025-06-06 NOTE — CARE PLAN
The patient's goals for the shift include  resting    The clinical goals for the shift include Patient will have adequate output this shift      Problem: Pain - Adult  Goal: Verbalizes/displays adequate comfort level or baseline comfort level  Outcome: Progressing     Problem: Safety - Adult  Goal: Free from fall injury  Outcome: Progressing     Problem: Discharge Planning  Goal: Discharge to home or other facility with appropriate resources  Outcome: Progressing     Problem: Chronic Conditions and Co-morbidities  Goal: Patient's chronic conditions and co-morbidity symptoms are monitored and maintained or improved  Outcome: Progressing     Problem: Nutrition  Goal: Nutrient intake appropriate for maintaining nutritional needs  Outcome: Progressing     Problem: Diabetes  Goal: Achieve decreasing blood glucose levels by end of shift  Outcome: Progressing  Goal: Increase stability of blood glucose readings by end of shift  Outcome: Progressing  Goal: Maintain electrolyte levels within acceptable range throughout shift  Outcome: Progressing  Goal: Maintain glucose levels >70mg/dl to <250mg/dl throughout shift  Outcome: Progressing  Goal: No changes in neurological exam by end of shift  Outcome: Progressing  Goal: Vital signs within normal range for age by end of shift  Outcome: Progressing  Goal: Decrease in ketones present in urine by end of shift  Outcome: Progressing  Goal: Learn about and adhere to nutrition recommendations by end of shift  Outcome: Progressing  Goal: Increase self care and/or family involovement by end of shift  Outcome: Progressing  Goal: Receive DSME education by end of shift  Outcome: Progressing     Problem: Fall/Injury  Goal: Not fall by end of shift  Outcome: Progressing  Goal: Be free from injury by end of the shift  Outcome: Progressing  Goal: Verbalize understanding of personal risk factors for fall in the hospital  Outcome: Progressing  Goal: Verbalize understanding of risk factor  reduction measures to prevent injury from fall in the home  Outcome: Progressing  Goal: Use assistive devices by end of the shift  Outcome: Progressing  Goal: Pace activities to prevent fatigue by end of the shift  Outcome: Progressing

## 2025-06-06 NOTE — PROGRESS NOTES
06/06/25 1116   Discharge Planning   Living Arrangements Alone  (Son is at college and stays sometimes)   Support Systems Family members;Children;Friends/neighbors   Assistance Needed Patient in a 2 story house with basement and lives alone with his cat. He has a tub bathroom on the 2nd floor and a walk in shower on the first floor. He says his son is at college and stays at his house sometimes. He says he is independent with ADLS, IADLS, ambullation and is retired. He does drive. DME: [employer], Glucometer, inhaler, CPAP. Confirmed address, pharmacy and PCP Daxa Farnsworth   Type of Residence Private residence   Home or Post Acute Services In home services   Type of Home Care Services Home nursing visits;Home OT;Home PT   Expected Discharge Disposition Home Health  (Notifiedy BERKLEY Bermudez thru Secure Chat;)   Does the patient need discharge transport arranged? No   Patient Choice   Provider Choice list and CMS website (https://medicare.gov/care-compare#search) for post-acute Quality and Resource Measure Data were provided and reviewed with: Patient   Stroke Family Assessment   Stroke Family Assessment Needed No   Intensity of Service   Intensity of Service 0-30 min     Patient medically ready for discharge.  Patient follow up information on discharge instructions.  Patient will be returning home with [facility] resumed (N, PT/OT) . Patient is in agreement with discharge plan.     1:05 pm  Patient not being discharged today 2/2 renal function- possible weekend discharge. BERKLEY Bermudez informed via secure chat.      DC PLAN:  Home w/HC resume N, PT/OT

## 2025-06-07 LAB
ANION GAP SERPL CALC-SCNC: 14 MMOL/L (ref 10–20)
BUN SERPL-MCNC: 27 MG/DL (ref 6–23)
CALCIUM SERPL-MCNC: 9.4 MG/DL (ref 8.6–10.3)
CHLORIDE SERPL-SCNC: 109 MMOL/L (ref 98–107)
CO2 SERPL-SCNC: 22 MMOL/L (ref 21–32)
CREAT SERPL-MCNC: 2.81 MG/DL (ref 0.5–1.3)
EGFRCR SERPLBLD CKD-EPI 2021: 24 ML/MIN/1.73M*2
ERYTHROCYTE [DISTWIDTH] IN BLOOD BY AUTOMATED COUNT: 13.1 % (ref 11.5–14.5)
GLUCOSE BLD MANUAL STRIP-MCNC: 184 MG/DL (ref 74–99)
GLUCOSE BLD MANUAL STRIP-MCNC: 203 MG/DL (ref 74–99)
GLUCOSE BLD MANUAL STRIP-MCNC: 221 MG/DL (ref 74–99)
GLUCOSE BLD MANUAL STRIP-MCNC: 227 MG/DL (ref 74–99)
GLUCOSE SERPL-MCNC: 187 MG/DL (ref 74–99)
HCT VFR BLD AUTO: 33.6 % (ref 41–52)
HGB BLD-MCNC: 10.8 G/DL (ref 13.5–17.5)
MCH RBC QN AUTO: 28.4 PG (ref 26–34)
MCHC RBC AUTO-ENTMCNC: 32.1 G/DL (ref 32–36)
MCV RBC AUTO: 88 FL (ref 80–100)
NRBC BLD-RTO: 0 /100 WBCS (ref 0–0)
PLATELET # BLD AUTO: 377 X10*3/UL (ref 150–450)
POTASSIUM SERPL-SCNC: 4.7 MMOL/L (ref 3.5–5.3)
RBC # BLD AUTO: 3.8 X10*6/UL (ref 4.5–5.9)
SODIUM SERPL-SCNC: 140 MMOL/L (ref 136–145)
WBC # BLD AUTO: 8.4 X10*3/UL (ref 4.4–11.3)

## 2025-06-07 PROCEDURE — 36415 COLL VENOUS BLD VENIPUNCTURE: CPT | Mod: IPSPLIT

## 2025-06-07 PROCEDURE — 1100000001 HC PRIVATE ROOM DAILY: Mod: IPSPLIT

## 2025-06-07 PROCEDURE — 2500000001 HC RX 250 WO HCPCS SELF ADMINISTERED DRUGS (ALT 637 FOR MEDICARE OP): Mod: IPSPLIT | Performed by: HOSPITALIST

## 2025-06-07 PROCEDURE — 2500000001 HC RX 250 WO HCPCS SELF ADMINISTERED DRUGS (ALT 637 FOR MEDICARE OP): Mod: IPSPLIT | Performed by: NURSE PRACTITIONER

## 2025-06-07 PROCEDURE — 82310 ASSAY OF CALCIUM: CPT | Mod: IPSPLIT

## 2025-06-07 PROCEDURE — 2500000004 HC RX 250 GENERAL PHARMACY W/ HCPCS (ALT 636 FOR OP/ED): Mod: IPSPLIT | Performed by: NURSE PRACTITIONER

## 2025-06-07 PROCEDURE — 99233 SBSQ HOSP IP/OBS HIGH 50: CPT | Performed by: NURSE PRACTITIONER

## 2025-06-07 PROCEDURE — 80048 BASIC METABOLIC PNL TOTAL CA: CPT | Mod: IPSPLIT

## 2025-06-07 PROCEDURE — 85027 COMPLETE CBC AUTOMATED: CPT | Mod: IPSPLIT

## 2025-06-07 PROCEDURE — 94760 N-INVAS EAR/PLS OXIMETRY 1: CPT | Mod: IPSPLIT

## 2025-06-07 PROCEDURE — 82947 ASSAY GLUCOSE BLOOD QUANT: CPT | Mod: IPSPLIT

## 2025-06-07 RX ORDER — SODIUM CHLORIDE, SODIUM LACTATE, POTASSIUM CHLORIDE, CALCIUM CHLORIDE 600; 310; 30; 20 MG/100ML; MG/100ML; MG/100ML; MG/100ML
75 INJECTION, SOLUTION INTRAVENOUS CONTINUOUS
Status: DISCONTINUED | OUTPATIENT
Start: 2025-06-07 | End: 2025-06-08 | Stop reason: HOSPADM

## 2025-06-07 RX ADMIN — ATORVASTATIN CALCIUM 40 MG: 40 TABLET, FILM COATED ORAL at 08:07

## 2025-06-07 RX ADMIN — SODIUM CHLORIDE, SODIUM LACTATE, POTASSIUM CHLORIDE, AND CALCIUM CHLORIDE 75 ML/HR: 600; 310; 30; 20 INJECTION, SOLUTION INTRAVENOUS at 20:15

## 2025-06-07 RX ADMIN — NYSTATIN 500000 UNITS: 100000 SUSPENSION ORAL at 12:11

## 2025-06-07 RX ADMIN — NYSTATIN 500000 UNITS: 100000 SUSPENSION ORAL at 05:32

## 2025-06-07 RX ADMIN — CEFTRIAXONE 1 G: 1 INJECTION, SOLUTION INTRAVENOUS at 08:07

## 2025-06-07 RX ADMIN — SODIUM CHLORIDE, SODIUM LACTATE, POTASSIUM CHLORIDE, AND CALCIUM CHLORIDE 75 ML/HR: 600; 310; 30; 20 INJECTION, SOLUTION INTRAVENOUS at 05:32

## 2025-06-07 RX ADMIN — NYSTATIN 500000 UNITS: 100000 SUSPENSION ORAL at 16:43

## 2025-06-07 RX ADMIN — TRAZODONE HYDROCHLORIDE 50 MG: 50 TABLET ORAL at 20:15

## 2025-06-07 RX ADMIN — LISINOPRIL 20 MG: 20 TABLET ORAL at 20:15

## 2025-06-07 RX ADMIN — ONDANSETRON 4 MG: 2 INJECTION INTRAMUSCULAR; INTRAVENOUS at 08:15

## 2025-06-07 RX ADMIN — PANTOPRAZOLE SODIUM 40 MG: 40 TABLET, DELAYED RELEASE ORAL at 05:32

## 2025-06-07 RX ADMIN — ACETAMINOPHEN 650 MG: 325 TABLET, FILM COATED ORAL at 08:57

## 2025-06-07 RX ADMIN — LISINOPRIL 20 MG: 20 TABLET ORAL at 08:08

## 2025-06-07 RX ADMIN — ACETAMINOPHEN 650 MG: 325 TABLET, FILM COATED ORAL at 20:14

## 2025-06-07 RX ADMIN — NYSTATIN 500000 UNITS: 100000 SUSPENSION ORAL at 20:14

## 2025-06-07 RX ADMIN — ENOXAPARIN SODIUM 30 MG: 30 INJECTION SUBCUTANEOUS at 15:04

## 2025-06-07 ASSESSMENT — PAIN SCALES - GENERAL
PAINLEVEL_OUTOF10: 0 - NO PAIN

## 2025-06-07 ASSESSMENT — PAIN - FUNCTIONAL ASSESSMENT: PAIN_FUNCTIONAL_ASSESSMENT: 0-10

## 2025-06-07 NOTE — CARE PLAN
The patient's goals for the shift include      The clinical goals for the shift include Pt will report decrased nausea and have adquate urine output during shift.    No c/o nausea since I took over at 0300. Has been resting quietly. Uneventful night

## 2025-06-07 NOTE — CARE PLAN
The patient's goals for the shift include  Hopefully to go home    The clinical goals for the shift include Pt will tolerate all meals wih no nausea  Pt was able to tolerate all meals and only required one dose of IV zofran prior to receiving his rocephin dose.  IV fluids continue to infuse d/t his creatinine, potential discharge tomorrow.

## 2025-06-07 NOTE — PROGRESS NOTES
Rodrigue Palacios is a 69 y.o. male on day 3 of admission presenting with Acute on chronic renal failure.      Subjective   Rodrigue is very pleasant, anxious to be discharged home. Does have a follow up scheduled with    Surgery to have staples removed. Patient is awake, alert and oriented. Family member is in the room with patient.. Questions answered to satisfaction.     Objective     Last Recorded Vitals  BP (!) 154/92 (BP Location: Left arm, Patient Position: Lying)   Pulse 72   Temp 37 °C (98.6 °F) (Temporal)   Resp 16   Wt 92.5 kg (204 lb)   SpO2 95%   Intake/Output last 3 Shifts:    Intake/Output Summary (Last 24 hours) at 6/7/2025 1316  Last data filed at 6/7/2025 1000  Gross per 24 hour   Intake 1616.48 ml   Output 2650 ml   Net -1033.52 ml       Admission Weight  Weight: 95.3 kg (210 lb) (06/04/25 1225)    Daily Weight  06/04/25 : 92.5 kg (204 lb)    Image Results  ECG 12 lead  Sinus rhythm with frequent Premature ventricular complexes  Left axis deviation  Moderate voltage criteria for LVH, may be normal variant  Prolonged QT  Abnormal ECG  When compared with ECG of 17-MAR-2025 08:09,  Premature ventricular complexes are now Present  CT abdomen pelvis wo IV contrast  Narrative: Interpreted By:  Kaleb Glaser,   STUDY:  CT ABDOMEN PELVIS WO IV CONTRAST; 6/4/2025 2:46 pm      INDICATION:  Signs/Symptoms:post op roladn concerns for obstruction.      COMPARISON:  None.      ACCESSION NUMBER(S):  [id number]      ORDERING CLINICIAN:  ADALBERTO MARCUS      TECHNIQUE:  Contiguous axial images were obtained at 3mm slice thickness through  the abdomen and pelvis without intravenous contrast administration.  Coronal and sagittal reconstructions at 3 mm slice thickness were  performed. Intravenous contrast was not given per the referring  physician's request.      FINDINGS:  The study is limited by the lack of intravenous contrast.      LOWER CHEST:  Evaluation of the visualized lung bases demonstrated minimal  scarring  and/or atelectasis at the lung bases bilaterally. The heart is within  normal limits for size. Dense atherosclerotic calcifications are seen  in the coronary arteries.      ABDOMEN:      LIVER:  The liver is within normal limits for appearance, without evidence of  focal masses.      BILE DUCTS:  No definite intra or extrahepatic biliary dilatation is identified.      GALLBLADDER:  The gallbladder is surgically absent ,with surgical clips seen in the  gallbladder fossa.      PANCREAS:  The pancreas is within normal limits for appearance, without evidence  of focal masses.      SPLEEN:  The spleen is within normal limits for size. No focal splenic mass is  seen.      ADRENAL GLANDS:  No definite adrenal nodules or masses are seen bilaterally.      KIDNEYS AND URETERS:  The patient is status post right nephrectomy and cystectomy with left  ureteral ileal conduit formation. A left-sided double-J ureteral  stent is present in situ. Small to moderate-sized left-sided  hydronephrosis is present. No definite focal renal mass is seen,  though evaluation is severely limited by the lack of intravenous  contrast.      PELVIS:      BLADDER:  The urinary bladder is surgically absent.      REPRODUCTIVE ORGANS:  The prostate is surgically absent.      BOWEL:  Scattered diverticuli are seen throughout the sigmoid colon. The  colon and small bowel are otherwise within normal limits for course,  caliber and appearance, without evidence of wall thickening or  obstruction. The appendix is decompressed. No CT evidence of acute  diverticulitis or appendicitis is seen.      VESSELS:  Moderate atherosclerotic calcifications are seen throughout the  infrarenal abdominal aorta and iliac arteries. The abdominal aorta is  within normal limits for course, caliber and appearance, without  evidence of aneurysm.      PERITONEUM/RETROPERITONEUM/LYMPH NODES:  There is no free intraperitoneal air or free fluid identified. No  gross  mesenteric or retroperitoneal lymphadenopathy is identified.      BONE AND SOFT TISSUE:  There is no evidence of acute fracture identified. No evidence of  abdominal wall mass or hernia is identified.      Impression: 1. Postoperative changes, as above.  2. Small to moderate left-sided hydronephrosis with left ureteral  stent placement.  3. Colonic diverticulosis, without evidence of acute diverticulitis.  4. No evidence of bowel obstruction, free intraperitoneal air or  abnormal intra-abdominal fluid collection.      MACRO:  None      Signed by: Kaleb Glaser 6/4/2025 3:10 PM  Dictation workstation:   [id number]    Results for orders placed or performed during the hospital encounter of 06/04/25 (from the past 24 hours)   POCT GLUCOSE   Result Value Ref Range    POCT Glucose 207 (H) 74 - 99 mg/dL   POCT GLUCOSE   Result Value Ref Range    POCT Glucose 225 (H) 74 - 99 mg/dL   POCT GLUCOSE   Result Value Ref Range    POCT Glucose 184 (H) 74 - 99 mg/dL   CBC   Result Value Ref Range    WBC 8.4 4.4 - 11.3 x10*3/uL    nRBC 0.0 0.0 - 0.0 /100 WBCs    RBC 3.80 (L) 4.50 - 5.90 x10*6/uL    Hemoglobin 10.8 (L) 13.5 - 17.5 g/dL    Hematocrit 33.6 (L) 41.0 - 52.0 %    MCV 88 80 - 100 fL    MCH 28.4 26.0 - 34.0 pg    MCHC 32.1 32.0 - 36.0 g/dL    RDW 13.1 11.5 - 14.5 %    Platelets 377 150 - 450 x10*3/uL   Basic Metabolic Panel   Result Value Ref Range    Glucose 187 (H) 74 - 99 mg/dL    Sodium 140 136 - 145 mmol/L    Potassium 4.7 3.5 - 5.3 mmol/L    Chloride 109 (H) 98 - 107 mmol/L    Bicarbonate 22 21 - 32 mmol/L    Anion Gap 14 10 - 20 mmol/L    Urea Nitrogen 27 (H) 6 - 23 mg/dL    Creatinine 2.81 (H) 0.50 - 1.30 mg/dL    eGFR 24 (L) >60 mL/min/1.73m*2    Calcium 9.4 8.6 - 10.3 mg/dL   POCT GLUCOSE   Result Value Ref Range    POCT Glucose 221 (H) 74 - 99 mg/dL        Physical Exam  Vitals reviewed.   Constitutional:       Appearance: Normal appearance.   HENT:      Head: Normocephalic.      Nose: Nose normal.    Cardiovascular:      Rate and Rhythm: Normal rate and regular rhythm.      Pulses: Normal pulses.      Heart sounds: Normal heart sounds.   Pulmonary:      Effort: Pulmonary effort is normal.      Breath sounds: Normal breath sounds.   Abdominal:      General: Bowel sounds are normal.      Palpations: Abdomen is soft.      Comments: Incision to LLQ - patient recently had a kidney removed. Staples in place.    Skin:     General: Skin is warm and dry.   Neurological:      General: No focal deficit present.      Mental Status: He is alert and oriented to person, place, and time.   Psychiatric:         Mood and Affect: Mood normal.         Behavior: Behavior normal.         Thought Content: Thought content normal.         Scheduled medications  Scheduled Medications[1]  Continuous medications  Continuous Medications[2]  PRN medications  PRN Medications[3]                       Assessment & Plan  Acute on chronic renal failure    Type 2 diabetes mellitus    Hypoglycemia    Acute cystitis without hematuria    Benign essential hypertension    Hypercholesterolemia    SCOOTER (obstructive sleep apnea)    GERD (gastroesophageal reflux disease)    Oral candidiasis    Insomnia    DM Type II with hypoglycemia  -BS on arrival 68, 40s at home per EMS  -Hold glimepiride, consider discontinuing at discharge   -SSI with hypoglycemia protocol  -Monitor BG     Acute on chronic renal failure   -Baseline creat ~ 1.4 - 1.5   -Bun/creat 26/2.9 -> 27/2.81  -Continue NS @ 75 ml/hr  -Avoid nephrotoxic medications   -Daily BMP      UTI  S/P cystectomy w/ileal conduit and nephrectomy   -OR with Dr. Borrero 5/21 - staples intact to RLQ  -UA 2+ nitrite, 500 leuks   -Urine cx: coag -ve staph  -IV ceftriaxone (Day 4)  -Monitor surgical sites, staples in place      Essential HTN  HLD  -Continue atorvastatin, lisinopril  -Monitor BP and HR      SCOOTER with CPAP  -Monitor SP02   -Continue home CPAP      GERD  -Continue famotidine, pantoprazole      Oral  candidiasis   -Continue nystatin      Insomnia  -Continue trazodone      DVT ppx-lovenox  PUD ppx-pantoprazole   F: NS @ 75ml/hr  E: Replete per protocol  N: ADA  A: PIV   Disposition: Pt requires inpatient days at this time   Code Status: Full Code       Tristen ROSS Rigo, APRN-CNP           [1] atorvastatin, 40 mg, oral, Daily  cefTRIAXone, 1 g, intravenous, q24h  enoxaparin, 30 mg, subcutaneous, q24h  [Held by provider] glimepiride, 4 mg, oral, Daily before breakfast  lisinopril, 20 mg, oral, q12h  nystatin, 5 mL, Swish & Swallow, 4x daily  pantoprazole, 40 mg, oral, Daily before breakfast  [Held by provider] sulfamethoxazole-trimethoprim, 0.5 tablet, oral, BID  traZODone, 50 mg, oral, Nightly  [2] lactated Ringer's, 75 mL/hr, Last Rate: 75 mL/hr (06/07/25 0532)  sodium chloride 0.9%, 10 mL/hr  [3] PRN medications: acetaminophen, alum-mag hydroxide-simeth, benzocaine-menthol, calcium carbonate, dextromethorphan-guaifenesin, dextrose, dextrose, glucagon, glucagon, guaiFENesin, ondansetron, polyethylene glycol, sodium chloride 0.9%

## 2025-06-08 VITALS
RESPIRATION RATE: 16 BRPM | BODY MASS INDEX: 25.36 KG/M2 | WEIGHT: 204 LBS | SYSTOLIC BLOOD PRESSURE: 156 MMHG | TEMPERATURE: 97.5 F | OXYGEN SATURATION: 96 % | HEART RATE: 62 BPM | DIASTOLIC BLOOD PRESSURE: 83 MMHG | HEIGHT: 75 IN

## 2025-06-08 LAB
ANION GAP SERPL CALC-SCNC: 14 MMOL/L (ref 10–20)
BACTERIA BLD CULT: NORMAL
BACTERIA BLD CULT: NORMAL
BUN SERPL-MCNC: 26 MG/DL (ref 6–23)
CALCIUM SERPL-MCNC: 9.3 MG/DL (ref 8.6–10.3)
CHLORIDE SERPL-SCNC: 108 MMOL/L (ref 98–107)
CO2 SERPL-SCNC: 25 MMOL/L (ref 21–32)
CREAT SERPL-MCNC: 2.66 MG/DL (ref 0.5–1.3)
EGFRCR SERPLBLD CKD-EPI 2021: 25 ML/MIN/1.73M*2
ERYTHROCYTE [DISTWIDTH] IN BLOOD BY AUTOMATED COUNT: 12.9 % (ref 11.5–14.5)
GLUCOSE BLD MANUAL STRIP-MCNC: 175 MG/DL (ref 74–99)
GLUCOSE BLD MANUAL STRIP-MCNC: 215 MG/DL (ref 74–99)
GLUCOSE SERPL-MCNC: 175 MG/DL (ref 74–99)
HCT VFR BLD AUTO: 34.3 % (ref 41–52)
HGB BLD-MCNC: 10.6 G/DL (ref 13.5–17.5)
MCH RBC QN AUTO: 28.1 PG (ref 26–34)
MCHC RBC AUTO-ENTMCNC: 30.9 G/DL (ref 32–36)
MCV RBC AUTO: 91 FL (ref 80–100)
NRBC BLD-RTO: 0 /100 WBCS (ref 0–0)
PLATELET # BLD AUTO: 354 X10*3/UL (ref 150–450)
POTASSIUM SERPL-SCNC: 4.7 MMOL/L (ref 3.5–5.3)
RBC # BLD AUTO: 3.77 X10*6/UL (ref 4.5–5.9)
SODIUM SERPL-SCNC: 142 MMOL/L (ref 136–145)
WBC # BLD AUTO: 8.2 X10*3/UL (ref 4.4–11.3)

## 2025-06-08 PROCEDURE — 85027 COMPLETE CBC AUTOMATED: CPT | Mod: IPSPLIT

## 2025-06-08 PROCEDURE — 94760 N-INVAS EAR/PLS OXIMETRY 1: CPT | Mod: IPSPLIT

## 2025-06-08 PROCEDURE — 2500000001 HC RX 250 WO HCPCS SELF ADMINISTERED DRUGS (ALT 637 FOR MEDICARE OP): Mod: IPSPLIT | Performed by: HOSPITALIST

## 2025-06-08 PROCEDURE — 2500000001 HC RX 250 WO HCPCS SELF ADMINISTERED DRUGS (ALT 637 FOR MEDICARE OP): Mod: IPSPLIT | Performed by: NURSE PRACTITIONER

## 2025-06-08 PROCEDURE — 82374 ASSAY BLOOD CARBON DIOXIDE: CPT | Mod: IPSPLIT

## 2025-06-08 PROCEDURE — 36415 COLL VENOUS BLD VENIPUNCTURE: CPT | Mod: IPSPLIT

## 2025-06-08 PROCEDURE — 82947 ASSAY GLUCOSE BLOOD QUANT: CPT | Mod: IPSPLIT

## 2025-06-08 PROCEDURE — 99239 HOSP IP/OBS DSCHRG MGMT >30: CPT | Performed by: NURSE PRACTITIONER

## 2025-06-08 PROCEDURE — 2500000004 HC RX 250 GENERAL PHARMACY W/ HCPCS (ALT 636 FOR OP/ED): Mod: IPSPLIT | Performed by: NURSE PRACTITIONER

## 2025-06-08 PROCEDURE — 2500000005 HC RX 250 GENERAL PHARMACY W/O HCPCS: Mod: IPSPLIT | Performed by: NURSE PRACTITIONER

## 2025-06-08 RX ORDER — LIDOCAINE 560 MG/1
1 PATCH PERCUTANEOUS; TOPICAL; TRANSDERMAL DAILY
Status: DISCONTINUED | OUTPATIENT
Start: 2025-06-08 | End: 2025-06-08 | Stop reason: HOSPADM

## 2025-06-08 RX ADMIN — NYSTATIN 500000 UNITS: 100000 SUSPENSION ORAL at 06:06

## 2025-06-08 RX ADMIN — ACETAMINOPHEN 650 MG: 325 TABLET, FILM COATED ORAL at 09:12

## 2025-06-08 RX ADMIN — LIDOCAINE 1 PATCH: 4 PATCH TOPICAL at 10:49

## 2025-06-08 RX ADMIN — SODIUM CHLORIDE, SODIUM LACTATE, POTASSIUM CHLORIDE, AND CALCIUM CHLORIDE 75 ML/HR: 600; 310; 30; 20 INJECTION, SOLUTION INTRAVENOUS at 09:21

## 2025-06-08 RX ADMIN — LISINOPRIL 20 MG: 20 TABLET ORAL at 09:11

## 2025-06-08 RX ADMIN — CEFTRIAXONE 1 G: 1 INJECTION, SOLUTION INTRAVENOUS at 08:58

## 2025-06-08 RX ADMIN — PANTOPRAZOLE SODIUM 40 MG: 40 TABLET, DELAYED RELEASE ORAL at 06:06

## 2025-06-08 RX ADMIN — ATORVASTATIN CALCIUM 40 MG: 40 TABLET, FILM COATED ORAL at 09:11

## 2025-06-08 RX ADMIN — ONDANSETRON 4 MG: 2 INJECTION INTRAMUSCULAR; INTRAVENOUS at 06:06

## 2025-06-08 ASSESSMENT — PAIN - FUNCTIONAL ASSESSMENT: PAIN_FUNCTIONAL_ASSESSMENT: 0-10

## 2025-06-08 ASSESSMENT — PAIN SCALES - GENERAL: PAINLEVEL_OUTOF10: 6

## 2025-06-08 NOTE — CARE PLAN
The patient's goals for the shift include      The clinical goals for the shift include will not have n/v    Goal met. Vss. Patient c/o ain to Rt lower back, new order for lidocaine patch. IV LR and ATB therapy administered. Urostomy intact and patent. Patient discharging to home. Discharge instructions reviewed with patient that includes; change how to take medications, follow up appointments, education on UTI's and hypoglycemia.

## 2025-06-08 NOTE — DISCHARGE SUMMARY
"Discharge Diagnosis  Acute on chronic renal failure    Malnutrition Diagnosis Status: New  Malnutrition Diagnosis: Moderate malnutrition related to acute disease or injury  Related to: acute illness and post-surgical recovery, and thrush  As Evidenced by: >5 days of inadequate energy intake and 9.3% weight loss over ~1 month.  I agree with the dietitian's malnutrition diagnosis.        Issues Requiring Follow-Up  Follow up on Monday 6/9/25 with Dr. Borrero - appointment is in place.     Discharge Meds     Medication List      CONTINUE taking these medications     BD Insulin Syringe 1 mL 26 x 1/2\" syringe; Generic drug: insulin   syringe-needle U-100   pen needle, diabetic 32 gauge x 5/32\" needle; Commonly known as: BD Luann   2nd Gen Pen Needle; use DAILY     ASK your doctor about these medications     acetaminophen 500 mg tablet; Commonly known as: Tylenol   atorvastatin 40 mg tablet; Commonly known as: Lipitor; TAKE ONE TABLET   BY MOUTH DAILY   Basaglar KwikPen U-100 Insulin 100 unit/mL (3 mL) pen; Generic drug:   insulin glargine; Use up to 30 units daily   enoxaparin 40 mg/0.4 mL syringe; Commonly known as: Lovenox; Inject 0.4   mL (40 mg) under the skin once daily.   glimepiride 4 mg tablet; Commonly known as: AmaryL; Take 1 tablet (4 mg)   by mouth once daily in the morning. Take before meals.   lisinopril 40 mg tablet; Take 0.5 tablets (20 mg) by mouth every 12   hours.   montelukast 10 mg tablet; Commonly known as: Singulair   ondansetron 4 mg tablet; Commonly known as: Zofran; Take 2 tablets (8   mg) by mouth every 8 hours if needed for nausea or vomiting.   oxyCODONE 5 mg immediate release tablet; Commonly known as: Roxicodone;   Take 1 tablet (5 mg) by mouth every 6 hours if needed for severe pain (7 -   10) for up to 5 days.; Ask about: Should I take this medication?   pantoprazole 40 mg EC tablet; Commonly known as: ProtoNix; TAKE ONE   TABLET BY MOUTH EVERY DAY   polyethylene glycol 17 gram packet; " "Commonly known as: Glycolax,   Miralax; Take 17 g by mouth once daily for 10 days.; Ask about: Should I   take this medication?   sulfamethoxazole-trimethoprim 400-80 mg tablet; Commonly known as:   Bactrim; Take 1 tablet by mouth 2 times a day for 14 days.   tadalafil 20 mg tablet; TAKE ONE TABLET BY MOUTH ONCE DAILY   traZODone 50 mg tablet; Commonly known as: Desyrel; Take 1-2 tablets   ( mg) by mouth once daily.       Test Results Pending At Discharge  Pending Labs       Order Current Status    Blood Culture Preliminary result    Blood Culture Preliminary result            Hospital Course  \"Rodrigue is a 69-year-old male coming in for hypoglycemia. Patient was at home and was found by his son in bed unresponsive. When EMS arrived they noticed that his blood sugar was in the low 40s. They gave him D50 and put him on D10 infusion. He is now alert and oriented. Patient does have a history of diabetes. He states that he uses a long-acting insulin at night. He did recently undergo surgery having bladder and kidney removed due to a malignancy. He states since this time his diet has changed and he is not eating as much. Patient otherwise reports that his healing from his surgery is going well. He denies any fevers. He is on an antibiotic to prevent infection and he states he is having some diarrhea associated with this.\" Patient did receive IVF and IV antibiotics during his hospital stay. Per patient he is no longer having diarrhea. Blood sugar today is 96. Patient has an appointment with his surgeon tomorrow. Questions answered to satisfaction. Son is going to  patient and will be home with him.        Pertinent Physical Exam At Time of Discharge  Physical Exam  Vitals reviewed.   Constitutional:       Appearance: Normal appearance.   HENT:      Head: Normocephalic.      Nose: Nose normal.   Cardiovascular:      Rate and Rhythm: Normal rate and regular rhythm.      Pulses: Normal pulses.      Heart sounds: " Normal heart sounds.   Pulmonary:      Effort: Pulmonary effort is normal.      Breath sounds: Normal breath sounds.   Abdominal:      General: Bowel sounds are normal.      Palpations: Abdomen is soft.      Comments: Recent right sided nephrectomy, seeing surgeon tomorrow.    Musculoskeletal:         General: Normal range of motion.   Skin:     General: Skin is warm and dry.   Neurological:      General: No focal deficit present.      Mental Status: He is alert and oriented to person, place, and time.   Psychiatric:         Mood and Affect: Mood normal.         Behavior: Behavior normal.         Thought Content: Thought content normal.         Outpatient Follow-Up  Future Appointments   Date Time Provider Department Center   6/9/2025 To Be Determined Gaby Castillo, PT [facility]   6/9/2025  2:40 PM James Borrero MD [id number] [facility]   6/11/2025 To Be Determined Caleb Pierce, OT [facility]   7/31/2025  1:00 PM Kathya Dixon, PharmD [id number] [facility]   8/20/2025  2:30 PM Miles Peters MD [id number] [facility]   10/16/2025  2:20 PM YUNG Blanco [id number] [facility]     Patient seen by Dr Martinez/ Татьяна Sierra CNP on day of discharge.  Stable for discharge to home with son.  Total cumulative time spent in preparation of this discharge including documentation review, coordination of care with the medical team including PT/SW/care coordinators and treating consultants, discussion with patient and pertinent family members and finalization of prescriptions, follow-up appointments, and this discharge summary was approximately 45 minutes.     SHERRY Loza-TIFFANY

## 2025-06-08 NOTE — PROGRESS NOTES
Subjective   Patient ID: Rodrigue Palacios is a 69 y.o. male.      HPI  69 y.o. male presents for a postoperative follow up visit. He is status post cystectomy with ureteroileal conduit creation and right simple nephrectomy on 05/21/2025. Pathology remains in process.     The patient presented to the ED on 06/04/2025 for hypoglycemia. He was unresponsive, when his son found him in bed. He was hospitalized at [facility] after a complete work up was completed. The lab work showed an HARMAN comparison to prior. He was also placed on Rocephin due to the concern for UTI, however was on antibiotics previously. The patient was hospitalized until 06/08/2025. He received IVF and IV antibiotics during his stay and no longer experienced diarrhea. He was stable for discharge.     The patient was prescribed Bactrim after the procedure. He states that he was told that Bactrim can cause hypoglycemia, he was advised to reduce the amount of insulin taken. He reduced his insulin by half. He also states that the antibiotic was giving him thrush.     He expressed that he is experiencing numbness in the top part of his leg.       CT ABDOMEN PELVIS WO IV CONTRAST; 6/4/2025   IMPRESSION:  1. Postoperative changes, as above.  2. Small to moderate left-sided hydronephrosis with left ureteral  stent placement.  3. Colonic diverticulosis, without evidence of acute diverticulitis.  4. No evidence of bowel obstruction, free intraperitoneal air or  abnormal intra-abdominal fluid collection.      Review of Systems  A complete review of systems was performed. All systems are noted to be negative unless indicated in the history of present illness, impression, active problem list, or past histories.     Objective   Physical Exam  All staples removed today in clinic.   Left ureteral stent was also removed in clinic today.   Abdomen soft nontender  Urine in stoma bag clear with minimal mucus    Assessment/Plan   Diagnoses and all orders for this  visit:  Malignant neoplasm of lateral wall of urinary bladder (Multi)  -     Referral to Urology  -     CBC; Future  -     Renal Function Panel; Future      69 y.o. male presents for a postoperative follow up visit. He is status post cystectomy with ureteroileal conduit creation and right simple nephrectomy on 05/21/2025. Pathology remains in process.     I have removed the patient's staples from his incision. His stent was also removed.     I personally reviewed the patient's creatinine that was 2.66. This finding was obtained yesterday prior to discharge. The finding decreased slightly from 2.81, the day prior. The patient's eGFR was 25 yesterday. The kidney function is improving slightly each day.     I have encouraged the patient to increase his hydration. By increasing his hydration, can assist with constipation and kidney function.     I would recommend that the patient discontinue the antibiotic.     I would recommend that the patient undergo a CBC and renal panel on the first week of 07/2025. He can follow up on 07/14/2025 to review the surgical pathology.     Plan:  CBC, renal panel to be completed the first week of July 2025  FUV on 07/14/2025 to review pathology     Scribe Attestation   By signing my name below, I, Jayshree Stout, Scribe attestation that this documentation has been prepared under the direction and in the presence of James Borrero MD.

## 2025-06-09 ENCOUNTER — APPOINTMENT (OUTPATIENT)
Dept: UROLOGY | Facility: CLINIC | Age: 70
End: 2025-06-09
Payer: MEDICARE

## 2025-06-09 ENCOUNTER — PATIENT OUTREACH (OUTPATIENT)
Dept: PRIMARY CARE | Facility: CLINIC | Age: 70
End: 2025-06-09

## 2025-06-09 DIAGNOSIS — C67.2 MALIGNANT NEOPLASM OF LATERAL WALL OF URINARY BLADDER (MULTI): ICD-10-CM

## 2025-06-09 LAB
BACTERIA BLD CULT: NORMAL
BACTERIA BLD CULT: NORMAL

## 2025-06-09 PROCEDURE — 99024 POSTOP FOLLOW-UP VISIT: CPT | Performed by: STUDENT IN AN ORGANIZED HEALTH CARE EDUCATION/TRAINING PROGRAM

## 2025-06-09 PROCEDURE — 3044F HG A1C LEVEL LT 7.0%: CPT | Performed by: STUDENT IN AN ORGANIZED HEALTH CARE EDUCATION/TRAINING PROGRAM

## 2025-06-09 PROCEDURE — 1111F DSCHRG MED/CURRENT MED MERGE: CPT | Performed by: STUDENT IN AN ORGANIZED HEALTH CARE EDUCATION/TRAINING PROGRAM

## 2025-06-09 PROCEDURE — 4010F ACE/ARB THERAPY RXD/TAKEN: CPT | Performed by: STUDENT IN AN ORGANIZED HEALTH CARE EDUCATION/TRAINING PROGRAM

## 2025-06-09 NOTE — PROGRESS NOTES
Discharge Facility: [facility]  Discharge Diagnosis: Acute on chronic renal failure, Malnutrition Diagnosis Status: New   Admission Date: 6/4/2025  Discharge Date: 6/8/2025    PCP Appointment Date: Declined to schedule  Specialist Appointment Date: Office Visit with James Borrero MD (06/09/2025) Urology  Hospital Encounter and Summary Linked: Yes  ED to Hosp-Admission (Discharged) with Lydia Campbell MD (06/04/2025)     See discharge assessment below for further details:     Wrap Up  Wrap Up Additional Comments: 69yoM with PMHx of DM and recent surgery for bladder and kidney cancer presented with hypoglycemia. Patient was at home and was found by his son in bed unresponsive. When EMS arrived they noted his blood sugar was in the low 40s. They gave him D50 and put him on D10 infusion. He was A&O by the time he got to the ED. Patient uses a long-acting insulin at night. He stated since his surgery his diet has changed and he is not eating as much. Patient was also on prophylactic abx post surgery which caused him diarrhea. Patient discharged to home once stabilized with [facility] to resume care. No medication changes made. At time of outreach, the patient stated he is feeling better. He had a surgery follow up with urology earlier in the day and was told he is healing well. He does have some back discomfort, but it is improved. Patient stated he is eating and drinking and his blood sugar was stable today. He declined to schedule a primary care hospital follow up. He will continue to follow up with urology and has an endocrinology follow up scheduled. (6/9/2025  4:05 PM)    Medications  Medications reviewed with patient/caregiver?: No (6/9/2025  4:05 PM)  Is the patient having any side effects they believe may be caused by any medication additions or changes?: No (6/9/2025  4:05 PM)  Does the patient have all medications ordered at discharge?: Not applicable (6/9/2025  4:05 PM)  Care Management  Interventions: No intervention needed (6/9/2025  4:05 PM)  Prescription Comments: No medication changes made. (6/9/2025  4:05 PM)    Appointments  Does the patient have a primary care provider?: Yes (6/9/2025  4:05 PM)  Care Management Interventions: -- (Declined to schedule.) (6/9/2025  4:05 PM)  Has the patient kept scheduled appointments due by today?: Yes (Urology) (6/9/2025  4:05 PM)    Self Management  What is the home health agency?: [facility] (6/9/2025  4:05 PM)  What Durable Medical Equipment (DME) was ordered?: N/A (6/9/2025  4:05 PM)    Patient Teaching  Does the patient have access to their discharge instructions?: Yes (6/9/2025  4:05 PM)  Care Management Interventions: Reviewed instructions with patient (6/9/2025  4:05 PM)  What is the patient's perception of their health status since discharge?: Improving (6/9/2025  4:05 PM)  Is the patient/caregiver able to teach back the hierarchy of who to call/visit for symptoms/problems? PCP, Specialist, Home Health nurse, Urgent Care, ED, 911: Yes (6/9/2025  4:05 PM)  Patient/Caregiver Education Comments: Patient verbalized understanding of discharge instructions. Provided contact information for nonurgent questions or concerns. (6/9/2025  4:05 PM)

## 2025-06-12 ENCOUNTER — HOME CARE VISIT (OUTPATIENT)
Dept: HOME HEALTH SERVICES | Facility: HOME HEALTH | Age: 70
End: 2025-06-12
Payer: MEDICARE

## 2025-06-12 LAB
LAB AP BLOCK FOR ADDITIONAL STUDIES: NORMAL
LABORATORY COMMENT REPORT: NORMAL
PATH REPORT.FINAL DX SPEC: NORMAL
PATH REPORT.GROSS SPEC: NORMAL
PATH REPORT.RELEVANT HX SPEC: NORMAL
PATH REPORT.TOTAL CANCER: NORMAL
PATHOLOGY SYNOPTIC REPORT: NORMAL

## 2025-06-13 ENCOUNTER — TELEPHONE (OUTPATIENT)
Dept: HOME HEALTH SERVICES | Facility: HOME HEALTH | Age: 70
End: 2025-06-13
Payer: MEDICARE

## 2025-06-13 NOTE — TELEPHONE ENCOUNTER
Fatemeh Borrero,     Home Care requires a Home Care Referral (Order [id number]) entered in Epic for Skilled Nursing with [facility].    Please submit the referral in [facility] as soon as you are able, so we may resume services for Mr. Palacios.    Thank you,  [facility] Intake

## 2025-06-13 NOTE — CASE COMMUNICATION
HEllo,   just got a call from this patient, he is home from the [facility] and has been home since sunday. message sent to [facility] to get resumtion orders.     I can follow up next week.   tomorrow i am on  [place].

## 2025-06-14 LAB
ATRIAL RATE: 93 BPM
P AXIS: 41 DEGREES
P OFFSET: 194 MS
P ONSET: 132 MS
PR INTERVAL: 156 MS
Q ONSET: 210 MS
QRS COUNT: 15 BEATS
QRS DURATION: 104 MS
QT INTERVAL: 392 MS
QTC CALCULATION(BAZETT): 487 MS
QTC FREDERICIA: 454 MS
R AXIS: -37 DEGREES
T AXIS: 60 DEGREES
T OFFSET: 406 MS
VENTRICULAR RATE: 93 BPM

## 2025-06-17 ENCOUNTER — DOCUMENTATION (OUTPATIENT)
Dept: HOME HEALTH SERVICES | Facility: HOME HEALTH | Age: 70
End: 2025-06-17
Payer: MEDICARE

## 2025-06-17 DIAGNOSIS — E16.2 HYPOGLYCEMIA: Primary | ICD-10-CM

## 2025-06-17 DIAGNOSIS — C67.9 MALIGNANT NEOPLASM OF URINARY BLADDER, UNSPECIFIED SITE (MULTI): ICD-10-CM

## 2025-06-17 DIAGNOSIS — N17.9 ACUTE RENAL FAILURE, UNSPECIFIED ACUTE RENAL FAILURE TYPE: ICD-10-CM

## 2025-06-17 DIAGNOSIS — N18.9 CHRONIC KIDNEY DISEASE, UNSPECIFIED CKD STAGE: ICD-10-CM

## 2025-06-17 NOTE — HH CARE COORDINATION
Home Care received a Referral to Resume Care for Nursing. We have processed the referral for a Resumption of Care on 6.18.25.     If you have any questions or concerns, please feel free to contact us at [phone]. Follow the prompts, enter your five digit zip code, and you will be directed to your care team on EAST 1.

## 2025-06-18 ENCOUNTER — HOME CARE VISIT (OUTPATIENT)
Dept: HOME HEALTH SERVICES | Facility: HOME HEALTH | Age: 70
End: 2025-06-18
Payer: MEDICARE

## 2025-06-18 VITALS
TEMPERATURE: 98.2 F | SYSTOLIC BLOOD PRESSURE: 110 MMHG | DIASTOLIC BLOOD PRESSURE: 70 MMHG | HEART RATE: 86 BPM | RESPIRATION RATE: 16 BRPM

## 2025-06-18 PROCEDURE — G0299 HHS/HOSPICE OF RN EA 15 MIN: HCPCS | Mod: HHH

## 2025-06-18 NOTE — HOME HEALTH
WOC  home nursing visit   identifed by name and bd    stoma type: urostomy  size:1in  location: rt side   protrustion:slight  mucocutaneous junction:intact, visible sutures  mucosal condition and color: red and moist   Peristomal Skin: damp  Peristomal contour: flat   character of output: urine    pouching system used : 2 piece green soft covnex dennis   accessories used: rmover, powder, skin barrier rigo ring, belt    left 3 [id number]  left 3 01313  left 3 deep convex cut to fit coloplast  left 1 7300  left 1 4237  left 6 6308  left 6 1196

## 2025-06-19 ASSESSMENT — ACTIVITIES OF DAILY LIVING (ADL)
OASIS_M1830: 02
ENTERING_EXITING_HOME: SUPERVISION

## 2025-06-19 ASSESSMENT — ENCOUNTER SYMPTOMS
PERSON REPORTING PAIN: PATIENT
BOWEL PATTERN NORMAL: 1
OCCASIONAL FEELINGS OF UNSTEADINESS: 0
DENIES PAIN: 1
SKIN LESIONS: 1
APPETITE LEVEL: FAIR
STOOL FREQUENCY: DAILY
FATIGUE: 1

## 2025-06-24 ENCOUNTER — HOME CARE VISIT (OUTPATIENT)
Dept: HOME HEALTH SERVICES | Facility: HOME HEALTH | Age: 70
End: 2025-06-24
Payer: MEDICARE

## 2025-06-24 VITALS
DIASTOLIC BLOOD PRESSURE: 70 MMHG | SYSTOLIC BLOOD PRESSURE: 108 MMHG | TEMPERATURE: 98.8 F | HEART RATE: 78 BPM | RESPIRATION RATE: 16 BRPM

## 2025-06-24 PROCEDURE — G0299 HHS/HOSPICE OF RN EA 15 MIN: HCPCS | Mod: HHH

## 2025-06-24 ASSESSMENT — ENCOUNTER SYMPTOMS: DENIES PAIN: 1

## 2025-06-24 ASSESSMENT — ACTIVITIES OF DAILY LIVING (ADL)
HOME_HEALTH_OASIS: 00
OASIS_M1830: 00

## 2025-06-24 NOTE — Clinical Note
home care discharged.   he will be set up for suppllies that will need your signature.   i will do that tomororw. since discharging today have to wait.

## 2025-06-24 NOTE — HOME HEALTH
W[facility] home nursing visit   known to sn   stoma type: urostomy   size:1in   location: rt side   protrustion:slight   mucocutaneous junction:intact, visible sutures   mucosal condition and color: red and moist   Peristomal Skin: intact  Peristomal contour: flat   character of output: urine   pouching system used : 2 piece green soft covnex dennis   accessories used: rmover,  ring,   no leaks since last visit and he did not change. advised should change every 4 or 5 days     recieved ordered supplies.   will needs set up with distribuor.and email back.   on how to reorder

## 2025-06-25 NOTE — HOME HEALTH
supplies [email or web address] and emailed info to patient .   From: Raghu Keyes  Sent: Wednesday, June 25, 2025 5:53 PM  To: Rodrigue Suzanne <[email or web address]>  Subject: supply order information     Mr. Palacios     I ordered your supplies via [employer] you can reorder again on or around  7/25/2025      Your account number is [id number]     dennis [id number]  4 boxes precut flanges  [place] [id number] 2 boxes pouches.   dennis [id number] 2 boxes rings  dennis remover wipes [id number]   2 night bags 2000ml.     Feel free to reach out with any thing.     Raghu

## 2025-06-30 DIAGNOSIS — C67.2 MALIGNANT NEOPLASM OF LATERAL WALL OF URINARY BLADDER (MULTI): ICD-10-CM

## 2025-07-07 ENCOUNTER — HOSPITAL ENCOUNTER (EMERGENCY)
Facility: HOSPITAL | Age: 70
Discharge: ANOTHER HOSPITAL | End: 2025-07-08
Attending: STUDENT IN AN ORGANIZED HEALTH CARE EDUCATION/TRAINING PROGRAM
Payer: MEDICARE

## 2025-07-07 ENCOUNTER — APPOINTMENT (OUTPATIENT)
Dept: CARDIOLOGY | Facility: HOSPITAL | Age: 70
End: 2025-07-07
Payer: MEDICARE

## 2025-07-07 DIAGNOSIS — E16.2 HYPOGLYCEMIA: Primary | ICD-10-CM

## 2025-07-07 LAB
ALBUMIN SERPL BCP-MCNC: 3.4 G/DL (ref 3.4–5)
ALP SERPL-CCNC: 61 U/L (ref 33–136)
ALT SERPL W P-5'-P-CCNC: 4 U/L (ref 10–52)
ANION GAP SERPL CALC-SCNC: 17 MMOL/L (ref 10–20)
ANION GAP SERPL CALC-SCNC: 18 MMOL/L (ref 10–20)
APPEARANCE UR: ABNORMAL
AST SERPL W P-5'-P-CCNC: 10 U/L (ref 9–39)
BACTERIA #/AREA URNS AUTO: ABNORMAL /HPF
BASOPHILS # BLD AUTO: 0.02 X10*3/UL (ref 0–0.1)
BASOPHILS NFR BLD AUTO: 0.1 %
BILIRUB SERPL-MCNC: 0.5 MG/DL (ref 0–1.2)
BILIRUB UR STRIP.AUTO-MCNC: NEGATIVE MG/DL
BNP SERPL-MCNC: 133 PG/ML (ref 0–99)
BUN SERPL-MCNC: 82 MG/DL (ref 6–23)
BUN SERPL-MCNC: 82 MG/DL (ref 6–23)
CALCIUM SERPL-MCNC: 8.8 MG/DL (ref 8.6–10.3)
CALCIUM SERPL-MCNC: 9.3 MG/DL (ref 8.6–10.3)
CARDIAC TROPONIN I PNL SERPL HS: 19 NG/L (ref 0–20)
CARDIAC TROPONIN I PNL SERPL HS: 20 NG/L (ref 0–20)
CHLORIDE SERPL-SCNC: 99 MMOL/L (ref 98–107)
CHLORIDE SERPL-SCNC: 99 MMOL/L (ref 98–107)
CO2 SERPL-SCNC: 20 MMOL/L (ref 21–32)
CO2 SERPL-SCNC: 21 MMOL/L (ref 21–32)
COLOR UR: ABNORMAL
CREAT SERPL-MCNC: 7.73 MG/DL (ref 0.5–1.3)
CREAT SERPL-MCNC: 7.74 MG/DL (ref 0.5–1.3)
EGFRCR SERPLBLD CKD-EPI 2021: 7 ML/MIN/1.73M*2
EGFRCR SERPLBLD CKD-EPI 2021: 7 ML/MIN/1.73M*2
EOSINOPHIL # BLD AUTO: 0.06 X10*3/UL (ref 0–0.7)
EOSINOPHIL NFR BLD AUTO: 0.4 %
ERYTHROCYTE [DISTWIDTH] IN BLOOD BY AUTOMATED COUNT: 14.5 % (ref 11.5–14.5)
GLUCOSE BLD MANUAL STRIP-MCNC: 142 MG/DL (ref 74–99)
GLUCOSE BLD MANUAL STRIP-MCNC: 21 MG/DL (ref 74–99)
GLUCOSE BLD MANUAL STRIP-MCNC: 53 MG/DL (ref 74–99)
GLUCOSE BLD MANUAL STRIP-MCNC: 64 MG/DL (ref 74–99)
GLUCOSE BLD MANUAL STRIP-MCNC: 96 MG/DL (ref 74–99)
GLUCOSE BLD MANUAL STRIP-MCNC: 97 MG/DL (ref 74–99)
GLUCOSE SERPL-MCNC: 104 MG/DL (ref 74–99)
GLUCOSE SERPL-MCNC: 43 MG/DL (ref 74–99)
GLUCOSE UR STRIP.AUTO-MCNC: NORMAL MG/DL
HCT VFR BLD AUTO: 32 % (ref 41–52)
HGB BLD-MCNC: 10.1 G/DL (ref 13.5–17.5)
IMM GRANULOCYTES # BLD AUTO: 0.09 X10*3/UL (ref 0–0.7)
IMM GRANULOCYTES NFR BLD AUTO: 0.6 % (ref 0–0.9)
KETONES UR STRIP.AUTO-MCNC: NEGATIVE MG/DL
LACTATE SERPL-SCNC: 1 MMOL/L (ref 0.4–2)
LEUKOCYTE ESTERASE UR QL STRIP.AUTO: ABNORMAL
LIPASE SERPL-CCNC: 12 U/L (ref 9–82)
LYMPHOCYTES # BLD AUTO: 0.4 X10*3/UL (ref 1.2–4.8)
LYMPHOCYTES NFR BLD AUTO: 2.8 %
MAGNESIUM SERPL-MCNC: 1.8 MG/DL (ref 1.6–2.4)
MCH RBC QN AUTO: 27.4 PG (ref 26–34)
MCHC RBC AUTO-ENTMCNC: 31.6 G/DL (ref 32–36)
MCV RBC AUTO: 87 FL (ref 80–100)
MONOCYTES # BLD AUTO: 0.8 X10*3/UL (ref 0.1–1)
MONOCYTES NFR BLD AUTO: 5.5 %
MUCOUS THREADS #/AREA URNS AUTO: ABNORMAL /LPF
NEUTROPHILS # BLD AUTO: 13.06 X10*3/UL (ref 1.2–7.7)
NEUTROPHILS NFR BLD AUTO: 90.6 %
NITRITE UR QL STRIP.AUTO: NEGATIVE
NRBC BLD-RTO: 0 /100 WBCS (ref 0–0)
PH UR STRIP.AUTO: 8 [PH]
PHOSPHATE SERPL-MCNC: 5.4 MG/DL (ref 2.5–4.9)
PLATELET # BLD AUTO: 191 X10*3/UL (ref 150–450)
POTASSIUM SERPL-SCNC: 4.1 MMOL/L (ref 3.5–5.3)
POTASSIUM SERPL-SCNC: 4.7 MMOL/L (ref 3.5–5.3)
PROT SERPL-MCNC: 6.7 G/DL (ref 6.4–8.2)
PROT UR STRIP.AUTO-MCNC: ABNORMAL MG/DL
RBC # BLD AUTO: 3.69 X10*6/UL (ref 4.5–5.9)
RBC # UR STRIP.AUTO: ABNORMAL MG/DL
RBC #/AREA URNS AUTO: >20 /HPF
SODIUM SERPL-SCNC: 132 MMOL/L (ref 136–145)
SODIUM SERPL-SCNC: 133 MMOL/L (ref 136–145)
SP GR UR STRIP.AUTO: 1.01
SQUAMOUS #/AREA URNS AUTO: ABNORMAL /HPF
UROBILINOGEN UR STRIP.AUTO-MCNC: NORMAL MG/DL
WBC # BLD AUTO: 14.4 X10*3/UL (ref 4.4–11.3)
WBC #/AREA URNS AUTO: >50 /HPF
WBC CLUMPS #/AREA URNS AUTO: ABNORMAL /HPF
YEAST BUDDING #/AREA UR COMP ASSIST: PRESENT /HPF

## 2025-07-07 PROCEDURE — 2500000005 HC RX 250 GENERAL PHARMACY W/O HCPCS: Performed by: STUDENT IN AN ORGANIZED HEALTH CARE EDUCATION/TRAINING PROGRAM

## 2025-07-07 PROCEDURE — 2500000004 HC RX 250 GENERAL PHARMACY W/ HCPCS (ALT 636 FOR OP/ED): Performed by: STUDENT IN AN ORGANIZED HEALTH CARE EDUCATION/TRAINING PROGRAM

## 2025-07-07 PROCEDURE — 81001 URINALYSIS AUTO W/SCOPE: CPT | Performed by: STUDENT IN AN ORGANIZED HEALTH CARE EDUCATION/TRAINING PROGRAM

## 2025-07-07 PROCEDURE — 96365 THER/PROPH/DIAG IV INF INIT: CPT

## 2025-07-07 PROCEDURE — 82947 ASSAY GLUCOSE BLOOD QUANT: CPT

## 2025-07-07 PROCEDURE — 2500000001 HC RX 250 WO HCPCS SELF ADMINISTERED DRUGS (ALT 637 FOR MEDICARE OP): Performed by: STUDENT IN AN ORGANIZED HEALTH CARE EDUCATION/TRAINING PROGRAM

## 2025-07-07 PROCEDURE — 84484 ASSAY OF TROPONIN QUANT: CPT | Performed by: STUDENT IN AN ORGANIZED HEALTH CARE EDUCATION/TRAINING PROGRAM

## 2025-07-07 PROCEDURE — 96376 TX/PRO/DX INJ SAME DRUG ADON: CPT

## 2025-07-07 PROCEDURE — 80048 BASIC METABOLIC PNL TOTAL CA: CPT | Mod: CCI | Performed by: STUDENT IN AN ORGANIZED HEALTH CARE EDUCATION/TRAINING PROGRAM

## 2025-07-07 PROCEDURE — 83880 ASSAY OF NATRIURETIC PEPTIDE: CPT | Performed by: STUDENT IN AN ORGANIZED HEALTH CARE EDUCATION/TRAINING PROGRAM

## 2025-07-07 PROCEDURE — 83690 ASSAY OF LIPASE: CPT | Performed by: STUDENT IN AN ORGANIZED HEALTH CARE EDUCATION/TRAINING PROGRAM

## 2025-07-07 PROCEDURE — 82947 ASSAY GLUCOSE BLOOD QUANT: CPT | Mod: 59,MUEWO

## 2025-07-07 PROCEDURE — 99291 CRITICAL CARE FIRST HOUR: CPT | Performed by: STUDENT IN AN ORGANIZED HEALTH CARE EDUCATION/TRAINING PROGRAM

## 2025-07-07 PROCEDURE — 99285 EMERGENCY DEPT VISIT HI MDM: CPT | Mod: 25 | Performed by: STUDENT IN AN ORGANIZED HEALTH CARE EDUCATION/TRAINING PROGRAM

## 2025-07-07 PROCEDURE — 96366 THER/PROPH/DIAG IV INF ADDON: CPT

## 2025-07-07 PROCEDURE — 84100 ASSAY OF PHOSPHORUS: CPT | Performed by: STUDENT IN AN ORGANIZED HEALTH CARE EDUCATION/TRAINING PROGRAM

## 2025-07-07 PROCEDURE — 93005 ELECTROCARDIOGRAM TRACING: CPT

## 2025-07-07 PROCEDURE — 87086 URINE CULTURE/COLONY COUNT: CPT | Mod: GENLAB | Performed by: STUDENT IN AN ORGANIZED HEALTH CARE EDUCATION/TRAINING PROGRAM

## 2025-07-07 PROCEDURE — 85025 COMPLETE CBC W/AUTO DIFF WBC: CPT | Performed by: STUDENT IN AN ORGANIZED HEALTH CARE EDUCATION/TRAINING PROGRAM

## 2025-07-07 PROCEDURE — 83605 ASSAY OF LACTIC ACID: CPT | Performed by: STUDENT IN AN ORGANIZED HEALTH CARE EDUCATION/TRAINING PROGRAM

## 2025-07-07 PROCEDURE — 80053 COMPREHEN METABOLIC PANEL: CPT | Performed by: STUDENT IN AN ORGANIZED HEALTH CARE EDUCATION/TRAINING PROGRAM

## 2025-07-07 PROCEDURE — 36415 COLL VENOUS BLD VENIPUNCTURE: CPT | Performed by: STUDENT IN AN ORGANIZED HEALTH CARE EDUCATION/TRAINING PROGRAM

## 2025-07-07 PROCEDURE — 2500000005 HC RX 250 GENERAL PHARMACY W/O HCPCS

## 2025-07-07 PROCEDURE — 96368 THER/DIAG CONCURRENT INF: CPT

## 2025-07-07 PROCEDURE — 96374 THER/PROPH/DIAG INJ IV PUSH: CPT | Mod: 59

## 2025-07-07 PROCEDURE — 83735 ASSAY OF MAGNESIUM: CPT | Performed by: STUDENT IN AN ORGANIZED HEALTH CARE EDUCATION/TRAINING PROGRAM

## 2025-07-07 RX ORDER — DEXTROSE MONOHYDRATE 100 MG/ML
50 INJECTION, SOLUTION INTRAVENOUS CONTINUOUS
Status: DISCONTINUED | OUTPATIENT
Start: 2025-07-07 | End: 2025-07-08 | Stop reason: HOSPADM

## 2025-07-07 RX ORDER — DEXTROSE 50 % IN WATER (D50W) INTRAVENOUS SYRINGE
Status: DISCONTINUED
Start: 2025-07-07 | End: 2025-07-08 | Stop reason: HOSPADM

## 2025-07-07 RX ORDER — DEXTROSE 50 % IN WATER (D50W) INTRAVENOUS SYRINGE
Status: COMPLETED
Start: 2025-07-07 | End: 2025-07-07

## 2025-07-07 RX ORDER — DEXTROSE 50 % IN WATER (D50W) INTRAVENOUS SYRINGE
25 ONCE
Status: COMPLETED | OUTPATIENT
Start: 2025-07-07 | End: 2025-07-07

## 2025-07-07 RX ORDER — CEFTRIAXONE 2 G/50ML
2 INJECTION, SOLUTION INTRAVENOUS ONCE
Status: COMPLETED | OUTPATIENT
Start: 2025-07-07 | End: 2025-07-07

## 2025-07-07 RX ORDER — ACETAMINOPHEN 325 MG/1
975 TABLET ORAL ONCE
Status: COMPLETED | OUTPATIENT
Start: 2025-07-07 | End: 2025-07-07

## 2025-07-07 RX ADMIN — DEXTROSE MONOHYDRATE 25 G: 25 INJECTION, SOLUTION INTRAVENOUS at 20:12

## 2025-07-07 RX ADMIN — DEXTROSE MONOHYDRATE 25 G: 25 INJECTION, SOLUTION INTRAVENOUS at 23:23

## 2025-07-07 RX ADMIN — DEXTROSE MONOHYDRATE 50 ML: 25 INJECTION, SOLUTION INTRAVENOUS at 17:20

## 2025-07-07 RX ADMIN — DEXTROSE 50 ML/HR: 10 SOLUTION INTRAVENOUS at 19:29

## 2025-07-07 RX ADMIN — ACETAMINOPHEN 975 MG: 325 TABLET, FILM COATED ORAL at 23:37

## 2025-07-07 RX ADMIN — DEXTROSE MONOHYDRATE 50 ML: 25 INJECTION, SOLUTION INTRAVENOUS at 21:56

## 2025-07-07 RX ADMIN — DEXTROSE 50 % IN WATER (D50W) INTRAVENOUS SYRINGE 25 G: at 20:12

## 2025-07-07 RX ADMIN — CEFTRIAXONE 2 G: 2 INJECTION, SOLUTION INTRAVENOUS at 21:14

## 2025-07-07 RX ADMIN — DEXTROSE MONOHYDRATE: 25 INJECTION, SOLUTION INTRAVENOUS at 19:23

## 2025-07-07 ASSESSMENT — PAIN SCALES - GENERAL
PAINLEVEL_OUTOF10: 0 - NO PAIN

## 2025-07-07 ASSESSMENT — PAIN - FUNCTIONAL ASSESSMENT
PAIN_FUNCTIONAL_ASSESSMENT: 0-10
PAIN_FUNCTIONAL_ASSESSMENT: 0-10

## 2025-07-07 NOTE — Clinical Note
Patient discharged to [facility] ICU. Patient transported by [facility]. All paperwork sent with Squad. Report called to ICU Nurse Nuvia

## 2025-07-07 NOTE — ED NOTES
PATIENT BROUGHT INTO ED BY [facility] AFTER HE WAS FOUND TO BE CONFUSED TODAY BY HIS SON WHO CALLED EMS. PATIENT BG WAS 52 BY EMS, PATIENT GIVEN 25GRAM D5 BAG. PATIENT BG WAS UP  THEN HAD THE FOLOWING NUMBER 111 AND 96. DR EAST GAVE VERBAL ORDER TO GIVE AMP OF D50.     Sarah Ryan RN  07/07/25 3060       Sarah Ryan RN  07/07/25 0658

## 2025-07-08 ENCOUNTER — HOSPITAL ENCOUNTER (INPATIENT)
Facility: HOSPITAL | Age: 70
LOS: 1 days | Discharge: ANOTHER HOSPITAL | End: 2025-07-09
Attending: INTERNAL MEDICINE | Admitting: INTERNAL MEDICINE
Payer: MEDICARE

## 2025-07-08 ENCOUNTER — APPOINTMENT (OUTPATIENT)
Dept: RADIOLOGY | Facility: HOSPITAL | Age: 70
End: 2025-07-08
Payer: MEDICARE

## 2025-07-08 VITALS
SYSTOLIC BLOOD PRESSURE: 124 MMHG | HEIGHT: 75 IN | TEMPERATURE: 99.1 F | DIASTOLIC BLOOD PRESSURE: 79 MMHG | HEART RATE: 80 BPM | RESPIRATION RATE: 18 BRPM | BODY MASS INDEX: 26.73 KG/M2 | WEIGHT: 215 LBS | OXYGEN SATURATION: 98 %

## 2025-07-08 DIAGNOSIS — E16.2 HYPOGLYCEMIA: Primary | ICD-10-CM

## 2025-07-08 PROBLEM — G47.33 OBSTRUCTIVE SLEEP APNEA SYNDROME: Status: ACTIVE | Noted: 2025-07-08

## 2025-07-08 PROBLEM — Z79.4 LONG TERM (CURRENT) USE OF INSULIN (MULTI): Status: ACTIVE | Noted: 2022-11-03

## 2025-07-08 PROBLEM — R01.1 HEART MURMUR: Status: ACTIVE | Noted: 2025-07-08

## 2025-07-08 PROBLEM — G89.29 OTHER CHRONIC PAIN: Status: ACTIVE | Noted: 2022-11-03

## 2025-07-08 PROBLEM — K21.9 GASTROESOPHAGEAL REFLUX DISEASE: Status: ACTIVE | Noted: 2022-11-03

## 2025-07-08 PROBLEM — G47.30 SLEEP APNEA, UNSPECIFIED: Status: ACTIVE | Noted: 2022-11-03

## 2025-07-08 PROBLEM — E78.00 HIGH CHOLESTEROL: Status: ACTIVE | Noted: 2025-07-08

## 2025-07-08 PROBLEM — I83.90 VARICOSE VEINS OF LOWER EXTREMITY: Status: ACTIVE | Noted: 2025-07-08

## 2025-07-08 PROBLEM — M06.9 RA (RHEUMATOID ARTHRITIS): Status: ACTIVE | Noted: 2025-07-08

## 2025-07-08 PROBLEM — Z79.84 LONG TERM (CURRENT) USE OF ORAL HYPOGLYCEMIC DRUGS: Status: ACTIVE | Noted: 2022-11-03

## 2025-07-08 PROBLEM — Z98.890 HISTORY OF ILEAL CONDUIT: Status: ACTIVE | Noted: 2022-11-03

## 2025-07-08 LAB
ALBUMIN SERPL BCP-MCNC: 3.1 G/DL (ref 3.4–5)
ALBUMIN SERPL BCP-MCNC: 3.4 G/DL (ref 3.4–5)
ALP SERPL-CCNC: 54 U/L (ref 33–136)
ALT SERPL W P-5'-P-CCNC: 12 U/L (ref 10–52)
ANION GAP BLDV CALCULATED.4IONS-SCNC: 13 MMOL/L (ref 10–25)
ANION GAP SERPL CALC-SCNC: 15 MMOL/L (ref 10–20)
ANION GAP SERPL CALC-SCNC: 18 MMOL/L (ref 10–20)
AST SERPL W P-5'-P-CCNC: 23 U/L (ref 9–39)
ATRIAL RATE: 79 BPM
B-OH-BUTYR SERPL-SCNC: 0.05 MMOL/L (ref 0.02–0.27)
BASE EXCESS BLDV CALC-SCNC: -3.2 MMOL/L (ref -2–3)
BILIRUB SERPL-MCNC: 0.4 MG/DL (ref 0–1.2)
BODY TEMPERATURE: ABNORMAL
BUN SERPL-MCNC: 86 MG/DL (ref 6–23)
BUN SERPL-MCNC: 88 MG/DL (ref 6–23)
C PEPTIDE SERPL-MCNC: 25.4 NG/ML (ref 0.7–3.9)
CA-I BLDV-SCNC: 1.21 MMOL/L (ref 1.1–1.33)
CALCIUM SERPL-MCNC: 8.7 MG/DL (ref 8.6–10.3)
CALCIUM SERPL-MCNC: 9 MG/DL (ref 8.6–10.3)
CHLORIDE BLDV-SCNC: 100 MMOL/L (ref 98–107)
CHLORIDE SERPL-SCNC: 100 MMOL/L (ref 98–107)
CHLORIDE SERPL-SCNC: 97 MMOL/L (ref 98–107)
CHLORIDE UR-SCNC: <15 MMOL/L
CHLORIDE/CREATININE (MMOL/G) IN URINE: NORMAL
CK SERPL-CCNC: 49 U/L (ref 0–325)
CO2 SERPL-SCNC: 20 MMOL/L (ref 21–32)
CO2 SERPL-SCNC: 22 MMOL/L (ref 21–32)
CORTIS AM PEAK SERPL-MSCNC: 18.6 UG/DL (ref 5–20)
CREAT SERPL-MCNC: 7.3 MG/DL (ref 0.5–1.3)
CREAT SERPL-MCNC: 7.78 MG/DL (ref 0.5–1.3)
CREAT UR-MCNC: 97 MG/DL (ref 20–370)
CREAT UR-MCNC: 97 MG/DL (ref 20–370)
EGFRCR SERPLBLD CKD-EPI 2021: 7 ML/MIN/1.73M*2
EGFRCR SERPLBLD CKD-EPI 2021: 7 ML/MIN/1.73M*2
ERYTHROCYTE [DISTWIDTH] IN BLOOD BY AUTOMATED COUNT: 14.3 % (ref 11.5–14.5)
EST. AVERAGE GLUCOSE BLD GHB EST-MCNC: 154 MG/DL
GLUCOSE BLD MANUAL STRIP-MCNC: 105 MG/DL (ref 74–99)
GLUCOSE BLD MANUAL STRIP-MCNC: 113 MG/DL (ref 74–99)
GLUCOSE BLD MANUAL STRIP-MCNC: 115 MG/DL (ref 74–99)
GLUCOSE BLD MANUAL STRIP-MCNC: 115 MG/DL (ref 74–99)
GLUCOSE BLD MANUAL STRIP-MCNC: 131 MG/DL (ref 74–99)
GLUCOSE BLD MANUAL STRIP-MCNC: 147 MG/DL (ref 74–99)
GLUCOSE BLD MANUAL STRIP-MCNC: 148 MG/DL (ref 74–99)
GLUCOSE BLD MANUAL STRIP-MCNC: 37 MG/DL (ref 74–99)
GLUCOSE BLD MANUAL STRIP-MCNC: 48 MG/DL (ref 74–99)
GLUCOSE BLD MANUAL STRIP-MCNC: 50 MG/DL (ref 74–99)
GLUCOSE BLD MANUAL STRIP-MCNC: 50 MG/DL (ref 74–99)
GLUCOSE BLD MANUAL STRIP-MCNC: 57 MG/DL (ref 74–99)
GLUCOSE BLD MANUAL STRIP-MCNC: 58 MG/DL (ref 74–99)
GLUCOSE BLD MANUAL STRIP-MCNC: 63 MG/DL (ref 74–99)
GLUCOSE BLD MANUAL STRIP-MCNC: 64 MG/DL (ref 74–99)
GLUCOSE BLD MANUAL STRIP-MCNC: 68 MG/DL (ref 74–99)
GLUCOSE BLD MANUAL STRIP-MCNC: 69 MG/DL (ref 74–99)
GLUCOSE BLD MANUAL STRIP-MCNC: 77 MG/DL (ref 74–99)
GLUCOSE BLD MANUAL STRIP-MCNC: 87 MG/DL (ref 74–99)
GLUCOSE BLD MANUAL STRIP-MCNC: 91 MG/DL (ref 74–99)
GLUCOSE BLD MANUAL STRIP-MCNC: 91 MG/DL (ref 74–99)
GLUCOSE BLD MANUAL STRIP-MCNC: 92 MG/DL (ref 74–99)
GLUCOSE BLD MANUAL STRIP-MCNC: 96 MG/DL (ref 74–99)
GLUCOSE BLD MANUAL STRIP-MCNC: 97 MG/DL (ref 74–99)
GLUCOSE BLD MANUAL STRIP-MCNC: 99 MG/DL (ref 74–99)
GLUCOSE BLDV-MCNC: 38 MG/DL (ref 74–99)
GLUCOSE SERPL-MCNC: 38 MG/DL (ref 74–99)
GLUCOSE SERPL-MCNC: 81 MG/DL (ref 74–99)
HBA1C MFR BLD: 7 % (ref ?–5.7)
HCO3 BLDV-SCNC: 22 MMOL/L (ref 22–26)
HCT VFR BLD AUTO: 31.2 % (ref 41–52)
HCT VFR BLD EST: 32 % (ref 41–52)
HGB BLD-MCNC: 9.9 G/DL (ref 13.5–17.5)
HGB BLDV-MCNC: 10.7 G/DL (ref 13.5–17.5)
HOLD SPECIMEN: NORMAL
INHALED O2 CONCENTRATION: 21 %
LACTATE BLDV-SCNC: 0.7 MMOL/L (ref 0.4–2)
MAGNESIUM SERPL-MCNC: 1.94 MG/DL (ref 1.6–2.4)
MCH RBC QN AUTO: 27.4 PG (ref 26–34)
MCHC RBC AUTO-ENTMCNC: 31.7 G/DL (ref 32–36)
MCV RBC AUTO: 86 FL (ref 80–100)
NRBC BLD-RTO: 0 /100 WBCS (ref 0–0)
OXYHGB MFR BLDV: 44 % (ref 45–75)
P AXIS: 54 DEGREES
P OFFSET: 188 MS
P ONSET: 124 MS
PCO2 BLDV: 39 MM HG (ref 41–51)
PH BLDV: 7.36 PH (ref 7.33–7.43)
PHOSPHATE SERPL-MCNC: 5.4 MG/DL (ref 2.5–4.9)
PHOSPHATE SERPL-MCNC: 6 MG/DL (ref 2.5–4.9)
PLATELET # BLD AUTO: 198 X10*3/UL (ref 150–450)
PO2 BLDV: 29 MM HG (ref 35–45)
POTASSIUM BLDV-SCNC: 4.9 MMOL/L (ref 3.5–5.3)
POTASSIUM SERPL-SCNC: 4.7 MMOL/L (ref 3.5–5.3)
POTASSIUM SERPL-SCNC: 5.2 MMOL/L (ref 3.5–5.3)
POTASSIUM UR-SCNC: 25 MMOL/L
POTASSIUM/CREAT UR-RTO: 26 MMOL/G CREAT
PR INTERVAL: 166 MS
PROT SERPL-MCNC: 6.4 G/DL (ref 6.4–8.2)
Q ONSET: 207 MS
QRS COUNT: 13 BEATS
QRS DURATION: 108 MS
QT INTERVAL: 380 MS
QTC CALCULATION(BAZETT): 435 MS
QTC FREDERICIA: 416 MS
R AXIS: -28 DEGREES
RBC # BLD AUTO: 3.61 X10*6/UL (ref 4.5–5.9)
SAO2 % BLDV: 45 % (ref 45–75)
SODIUM BLDV-SCNC: 130 MMOL/L (ref 136–145)
SODIUM SERPL-SCNC: 130 MMOL/L (ref 136–145)
SODIUM SERPL-SCNC: 132 MMOL/L (ref 136–145)
SODIUM UR-SCNC: 33 MMOL/L
SODIUM/CREAT UR-RTO: 34 MMOL/G CREAT
T AXIS: 58 DEGREES
T OFFSET: 397 MS
T4 FREE SERPL-MCNC: 1.06 NG/DL (ref 0.61–1.12)
TSH SERPL-ACNC: 0.01 MIU/L (ref 0.44–3.98)
UREA/CREAT UR-SRTO: 4.2 G/G CREAT
UUN UR-MCNC: 404 MG/DL
VENTRICULAR RATE: 79 BPM
WBC # BLD AUTO: 12 X10*3/UL (ref 4.4–11.3)

## 2025-07-08 PROCEDURE — 99222 1ST HOSP IP/OBS MODERATE 55: CPT | Performed by: NURSE PRACTITIONER

## 2025-07-08 PROCEDURE — 2500000005 HC RX 250 GENERAL PHARMACY W/O HCPCS

## 2025-07-08 PROCEDURE — 82010 KETONE BODYS QUAN: CPT | Mod: GEALAB

## 2025-07-08 PROCEDURE — 84132 ASSAY OF SERUM POTASSIUM: CPT

## 2025-07-08 PROCEDURE — 84100 ASSAY OF PHOSPHORUS: CPT

## 2025-07-08 PROCEDURE — 2500000001 HC RX 250 WO HCPCS SELF ADMINISTERED DRUGS (ALT 637 FOR MEDICARE OP)

## 2025-07-08 PROCEDURE — 82533 TOTAL CORTISOL: CPT | Mod: GEALAB

## 2025-07-08 PROCEDURE — 84443 ASSAY THYROID STIM HORMONE: CPT

## 2025-07-08 PROCEDURE — 82947 ASSAY GLUCOSE BLOOD QUANT: CPT

## 2025-07-08 PROCEDURE — 76770 US EXAM ABDO BACK WALL COMP: CPT

## 2025-07-08 PROCEDURE — 36415 COLL VENOUS BLD VENIPUNCTURE: CPT

## 2025-07-08 PROCEDURE — 84681 ASSAY OF C-PEPTIDE: CPT | Mod: GEALAB

## 2025-07-08 PROCEDURE — 97165 OT EVAL LOW COMPLEX 30 MIN: CPT | Mod: GO

## 2025-07-08 PROCEDURE — 85027 COMPLETE CBC AUTOMATED: CPT

## 2025-07-08 PROCEDURE — 2500000004 HC RX 250 GENERAL PHARMACY W/ HCPCS (ALT 636 FOR OP/ED)

## 2025-07-08 PROCEDURE — 83036 HEMOGLOBIN GLYCOSYLATED A1C: CPT | Mod: GEALAB

## 2025-07-08 PROCEDURE — 99222 1ST HOSP IP/OBS MODERATE 55: CPT | Performed by: INTERNAL MEDICINE

## 2025-07-08 PROCEDURE — 80377 DRUG/SUBSTANCE NOS 7/MORE: CPT

## 2025-07-08 PROCEDURE — 83525 ASSAY OF INSULIN: CPT

## 2025-07-08 PROCEDURE — 99291 CRITICAL CARE FIRST HOUR: CPT | Performed by: STUDENT IN AN ORGANIZED HEALTH CARE EDUCATION/TRAINING PROGRAM

## 2025-07-08 PROCEDURE — 76770 US EXAM ABDO BACK WALL COMP: CPT | Performed by: STUDENT IN AN ORGANIZED HEALTH CARE EDUCATION/TRAINING PROGRAM

## 2025-07-08 PROCEDURE — 99291 CRITICAL CARE FIRST HOUR: CPT

## 2025-07-08 PROCEDURE — 84439 ASSAY OF FREE THYROXINE: CPT

## 2025-07-08 PROCEDURE — 83735 ASSAY OF MAGNESIUM: CPT

## 2025-07-08 PROCEDURE — 84540 ASSAY OF URINE/UREA-N: CPT | Mod: GEALAB

## 2025-07-08 PROCEDURE — 2020000001 HC ICU ROOM DAILY

## 2025-07-08 PROCEDURE — 96376 TX/PRO/DX INJ SAME DRUG ADON: CPT

## 2025-07-08 PROCEDURE — 84206 ASSAY OF PROINSULIN: CPT

## 2025-07-08 PROCEDURE — 84133 ASSAY OF URINE POTASSIUM: CPT | Mod: GEALAB

## 2025-07-08 PROCEDURE — 94760 N-INVAS EAR/PLS OXIMETRY 1: CPT

## 2025-07-08 PROCEDURE — 82550 ASSAY OF CK (CPK): CPT | Performed by: NURSE PRACTITIONER

## 2025-07-08 RX ORDER — CEFTRIAXONE 1 G/50ML
1 INJECTION, SOLUTION INTRAVENOUS EVERY 24 HOURS
Status: DISCONTINUED | OUTPATIENT
Start: 2025-07-08 | End: 2025-07-09 | Stop reason: HOSPADM

## 2025-07-08 RX ORDER — DEXTROSE 50 % IN WATER (D50W) INTRAVENOUS SYRINGE
Status: COMPLETED
Start: 2025-07-08 | End: 2025-07-08

## 2025-07-08 RX ORDER — DEXTROSE 50 % IN WATER (D50W) INTRAVENOUS SYRINGE
12.5
Status: DISCONTINUED | OUTPATIENT
Start: 2025-07-08 | End: 2025-07-09 | Stop reason: HOSPADM

## 2025-07-08 RX ORDER — ONDANSETRON HYDROCHLORIDE 2 MG/ML
INJECTION, SOLUTION INTRAVENOUS
Status: COMPLETED
Start: 2025-07-08 | End: 2025-07-08

## 2025-07-08 RX ORDER — OCTREOTIDE ACETATE 100 UG/ML
100 INJECTION, SOLUTION INTRAVENOUS; SUBCUTANEOUS ONCE
Status: COMPLETED | OUTPATIENT
Start: 2025-07-08 | End: 2025-07-08

## 2025-07-08 RX ORDER — DEXTROSE 50 % IN WATER (D50W) INTRAVENOUS SYRINGE
25
Status: DISCONTINUED | OUTPATIENT
Start: 2025-07-08 | End: 2025-07-09 | Stop reason: HOSPADM

## 2025-07-08 RX ORDER — PANTOPRAZOLE SODIUM 40 MG/1
40 TABLET, DELAYED RELEASE ORAL DAILY
Status: DISCONTINUED | OUTPATIENT
Start: 2025-07-08 | End: 2025-07-09

## 2025-07-08 RX ORDER — ONDANSETRON 4 MG/1
4 TABLET, FILM COATED ORAL EVERY 8 HOURS PRN
Status: DISCONTINUED | OUTPATIENT
Start: 2025-07-08 | End: 2025-07-09 | Stop reason: HOSPADM

## 2025-07-08 RX ORDER — DEXTROSE MONOHYDRATE AND SODIUM CHLORIDE 5; .9 G/100ML; G/100ML
75 INJECTION, SOLUTION INTRAVENOUS CONTINUOUS
Status: DISCONTINUED | OUTPATIENT
Start: 2025-07-08 | End: 2025-07-08

## 2025-07-08 RX ORDER — ATORVASTATIN CALCIUM 40 MG/1
40 TABLET, FILM COATED ORAL DAILY
Status: DISCONTINUED | OUTPATIENT
Start: 2025-07-08 | End: 2025-07-09 | Stop reason: HOSPADM

## 2025-07-08 RX ORDER — ONDANSETRON HYDROCHLORIDE 2 MG/ML
4 INJECTION, SOLUTION INTRAVENOUS EVERY 8 HOURS PRN
Status: DISCONTINUED | OUTPATIENT
Start: 2025-07-08 | End: 2025-07-09 | Stop reason: HOSPADM

## 2025-07-08 RX ORDER — TRAZODONE HYDROCHLORIDE 50 MG/1
50 TABLET ORAL DAILY
Status: DISCONTINUED | OUTPATIENT
Start: 2025-07-08 | End: 2025-07-09 | Stop reason: HOSPADM

## 2025-07-08 RX ORDER — DEXTROSE 50 % IN WATER (D50W) INTRAVENOUS SYRINGE
25 ONCE
Status: COMPLETED | OUTPATIENT
Start: 2025-07-08 | End: 2025-07-08

## 2025-07-08 RX ORDER — ACETAMINOPHEN 325 MG/1
500 TABLET ORAL EVERY 6 HOURS PRN
Status: DISCONTINUED | OUTPATIENT
Start: 2025-07-08 | End: 2025-07-08

## 2025-07-08 RX ORDER — LISINOPRIL 20 MG/1
20 TABLET ORAL EVERY 12 HOURS
Status: DISCONTINUED | OUTPATIENT
Start: 2025-07-08 | End: 2025-07-09 | Stop reason: HOSPADM

## 2025-07-08 RX ORDER — POLYETHYLENE GLYCOL 3350 17 G/17G
17 POWDER, FOR SOLUTION ORAL DAILY
Status: DISCONTINUED | OUTPATIENT
Start: 2025-07-08 | End: 2025-07-09 | Stop reason: HOSPADM

## 2025-07-08 RX ORDER — DEXTROSE MONOHYDRATE 100 MG/ML
100 INJECTION, SOLUTION INTRAVENOUS CONTINUOUS
Status: ACTIVE | OUTPATIENT
Start: 2025-07-08 | End: 2025-07-09

## 2025-07-08 RX ORDER — HEPARIN SODIUM 5000 [USP'U]/ML
5000 INJECTION, SOLUTION INTRAVENOUS; SUBCUTANEOUS EVERY 8 HOURS
Status: DISCONTINUED | OUTPATIENT
Start: 2025-07-08 | End: 2025-07-09 | Stop reason: HOSPADM

## 2025-07-08 RX ADMIN — ONDANSETRON 4 MG: 2 INJECTION, SOLUTION INTRAMUSCULAR; INTRAVENOUS at 08:16

## 2025-07-08 RX ADMIN — DEXTROSE MONOHYDRATE 12.5 G: 25 INJECTION, SOLUTION INTRAVENOUS at 20:50

## 2025-07-08 RX ADMIN — HEPARIN SODIUM 5000 UNITS: 5000 INJECTION, SOLUTION INTRAVENOUS; SUBCUTANEOUS at 20:56

## 2025-07-08 RX ADMIN — CEFTRIAXONE 1 G: 1 INJECTION, SOLUTION INTRAVENOUS at 21:11

## 2025-07-08 RX ADMIN — DEXTROSE 75 ML/HR: 10 SOLUTION INTRAVENOUS at 03:33

## 2025-07-08 RX ADMIN — OCTREOTIDE ACETATE 100 MCG: 100 INJECTION, SOLUTION INTRAVENOUS; SUBCUTANEOUS at 10:03

## 2025-07-08 RX ADMIN — HEPARIN SODIUM 5000 UNITS: 5000 INJECTION, SOLUTION INTRAVENOUS; SUBCUTANEOUS at 09:57

## 2025-07-08 RX ADMIN — DEXTROSE MONOHYDRATE 25 G: 25 INJECTION, SOLUTION INTRAVENOUS at 06:41

## 2025-07-08 RX ADMIN — DEXTROSE MONOHYDRATE 25 G: 25 INJECTION, SOLUTION INTRAVENOUS at 02:15

## 2025-07-08 RX ADMIN — DEXTROSE MONOHYDRATE 25 G: 25 INJECTION, SOLUTION INTRAVENOUS at 00:30

## 2025-07-08 RX ADMIN — ONDANSETRON HYDROCHLORIDE 4 MG: 2 INJECTION, SOLUTION INTRAVENOUS at 08:16

## 2025-07-08 RX ADMIN — DEXTROSE MONOHYDRATE 25 G: 25 INJECTION, SOLUTION INTRAVENOUS at 05:17

## 2025-07-08 RX ADMIN — DEXTROSE MONOHYDRATE 25 G: 25 INJECTION, SOLUTION INTRAVENOUS at 08:12

## 2025-07-08 RX ADMIN — OCTREOTIDE ACETATE 100 MCG: 100 INJECTION, SOLUTION INTRAVENOUS; SUBCUTANEOUS at 15:38

## 2025-07-08 RX ADMIN — DEXTROSE AND SODIUM CHLORIDE 75 ML/HR: 5; .9 INJECTION, SOLUTION INTRAVENOUS at 03:28

## 2025-07-08 RX ADMIN — DEXTROSE 50 % IN WATER (D50W) INTRAVENOUS SYRINGE 25 G: at 00:30

## 2025-07-08 RX ADMIN — DEXTROSE MONOHYDRATE 25 G: 25 INJECTION, SOLUTION INTRAVENOUS at 03:33

## 2025-07-08 RX ADMIN — SODIUM CHLORIDE 1000 ML: 0.9 INJECTION, SOLUTION INTRAVENOUS at 14:57

## 2025-07-08 RX ADMIN — ATORVASTATIN CALCIUM 40 MG: 40 TABLET, FILM COATED ORAL at 07:58

## 2025-07-08 RX ADMIN — DEXTROSE MONOHYDRATE 12.5 G: 25 INJECTION, SOLUTION INTRAVENOUS at 20:17

## 2025-07-08 RX ADMIN — DEXTROSE 150 ML/HR: 10 SOLUTION INTRAVENOUS at 10:04

## 2025-07-08 RX ADMIN — PANTOPRAZOLE SODIUM 40 MG: 40 TABLET, DELAYED RELEASE ORAL at 07:58

## 2025-07-08 RX ADMIN — TRAZODONE HYDROCHLORIDE 50 MG: 50 TABLET ORAL at 07:58

## 2025-07-08 SDOH — HEALTH STABILITY: MENTAL HEALTH: HOW OFTEN DO YOU HAVE SIX OR MORE DRINKS ON ONE OCCASION?: NEVER

## 2025-07-08 SDOH — ECONOMIC STABILITY: TRANSPORTATION INSECURITY: IN THE PAST 12 MONTHS, HAS LACK OF TRANSPORTATION KEPT YOU FROM MEDICAL APPOINTMENTS OR FROM GETTING MEDICATIONS?: NO

## 2025-07-08 SDOH — SOCIAL STABILITY: SOCIAL INSECURITY: DO YOU FEEL UNSAFE GOING BACK TO THE PLACE WHERE YOU ARE LIVING?: NO

## 2025-07-08 SDOH — SOCIAL STABILITY: SOCIAL INSECURITY: ARE YOU OR HAVE YOU BEEN THREATENED OR ABUSED PHYSICALLY, EMOTIONALLY, OR SEXUALLY BY ANYONE?: NO

## 2025-07-08 SDOH — SOCIAL STABILITY: SOCIAL INSECURITY: ABUSE: ADULT

## 2025-07-08 SDOH — SOCIAL STABILITY: SOCIAL INSECURITY: DO YOU FEEL ANYONE HAS EXPLOITED OR TAKEN ADVANTAGE OF YOU FINANCIALLY OR OF YOUR PERSONAL PROPERTY?: NO

## 2025-07-08 SDOH — HEALTH STABILITY: MENTAL HEALTH: HOW OFTEN DO YOU HAVE A DRINK CONTAINING ALCOHOL?: 2-4 TIMES A MONTH

## 2025-07-08 SDOH — HEALTH STABILITY: MENTAL HEALTH: HOW MANY DRINKS CONTAINING ALCOHOL DO YOU HAVE ON A TYPICAL DAY WHEN YOU ARE DRINKING?: 1 OR 2

## 2025-07-08 SDOH — ECONOMIC STABILITY: FOOD INSECURITY: WITHIN THE PAST 12 MONTHS, YOU WORRIED THAT YOUR FOOD WOULD RUN OUT BEFORE YOU GOT THE MONEY TO BUY MORE.: NEVER TRUE

## 2025-07-08 SDOH — SOCIAL STABILITY: SOCIAL INSECURITY: HAS ANYONE EVER THREATENED TO HURT YOUR FAMILY OR YOUR PETS?: NO

## 2025-07-08 SDOH — SOCIAL STABILITY: SOCIAL INSECURITY: WITHIN THE LAST YEAR, HAVE YOU BEEN AFRAID OF YOUR PARTNER OR EX-PARTNER?: NO

## 2025-07-08 SDOH — ECONOMIC STABILITY: FOOD INSECURITY: HOW HARD IS IT FOR YOU TO PAY FOR THE VERY BASICS LIKE FOOD, HOUSING, MEDICAL CARE, AND HEATING?: NOT HARD AT ALL

## 2025-07-08 SDOH — ECONOMIC STABILITY: FOOD INSECURITY: WITHIN THE PAST 12 MONTHS, THE FOOD YOU BOUGHT JUST DIDN'T LAST AND YOU DIDN'T HAVE MONEY TO GET MORE.: NEVER TRUE

## 2025-07-08 SDOH — ECONOMIC STABILITY: HOUSING INSECURITY: AT ANY TIME IN THE PAST 12 MONTHS, WERE YOU HOMELESS OR LIVING IN A SHELTER (INCLUDING NOW)?: NO

## 2025-07-08 SDOH — ECONOMIC STABILITY: HOUSING INSECURITY: IN THE PAST 12 MONTHS, HOW MANY TIMES HAVE YOU MOVED WHERE YOU WERE LIVING?: 0

## 2025-07-08 SDOH — SOCIAL STABILITY: SOCIAL INSECURITY: WITHIN THE LAST YEAR, HAVE YOU BEEN HUMILIATED OR EMOTIONALLY ABUSED IN OTHER WAYS BY YOUR PARTNER OR EX-PARTNER?: NO

## 2025-07-08 SDOH — ECONOMIC STABILITY: INCOME INSECURITY: IN THE PAST 12 MONTHS HAS THE ELECTRIC, GAS, OIL, OR WATER COMPANY THREATENED TO SHUT OFF SERVICES IN YOUR HOME?: NO

## 2025-07-08 SDOH — ECONOMIC STABILITY: HOUSING INSECURITY: IN THE LAST 12 MONTHS, WAS THERE A TIME WHEN YOU WERE NOT ABLE TO PAY THE MORTGAGE OR RENT ON TIME?: NO

## 2025-07-08 SDOH — SOCIAL STABILITY: SOCIAL INSECURITY: HAVE YOU HAD THOUGHTS OF HARMING ANYONE ELSE?: NO

## 2025-07-08 SDOH — SOCIAL STABILITY: SOCIAL INSECURITY: DOES ANYONE TRY TO KEEP YOU FROM HAVING/CONTACTING OTHER FRIENDS OR DOING THINGS OUTSIDE YOUR HOME?: NO

## 2025-07-08 SDOH — SOCIAL STABILITY: SOCIAL INSECURITY: ARE THERE ANY APPARENT SIGNS OF INJURIES/BEHAVIORS THAT COULD BE RELATED TO ABUSE/NEGLECT?: NO

## 2025-07-08 SDOH — SOCIAL STABILITY: SOCIAL INSECURITY: WERE YOU ABLE TO COMPLETE ALL THE BEHAVIORAL HEALTH SCREENINGS?: YES

## 2025-07-08 SDOH — SOCIAL STABILITY: SOCIAL INSECURITY: HAVE YOU HAD ANY THOUGHTS OF HARMING ANYONE ELSE?: NO

## 2025-07-08 ASSESSMENT — ACTIVITIES OF DAILY LIVING (ADL)
JUDGMENT_ADEQUATE_SAFELY_COMPLETE_DAILY_ACTIVITIES: YES
HEARING - RIGHT EAR: FUNCTIONAL
GROOMING: INDEPENDENT
WALKS IN HOME: INDEPENDENT
LACK_OF_TRANSPORTATION: NO
FEEDING YOURSELF: INDEPENDENT
LACK_OF_TRANSPORTATION: NO
HEARING - LEFT EAR: FUNCTIONAL
TOILETING: INDEPENDENT
DRESSING YOURSELF: INDEPENDENT
BATHING_ASSISTANCE: STAND BY
BATHING: INDEPENDENT
PATIENT'S MEMORY ADEQUATE TO SAFELY COMPLETE DAILY ACTIVITIES?: YES
ADL_ASSISTANCE: INDEPENDENT
ADEQUATE_TO_COMPLETE_ADL: YES

## 2025-07-08 ASSESSMENT — PATIENT HEALTH QUESTIONNAIRE - PHQ9
SUM OF ALL RESPONSES TO PHQ9 QUESTIONS 1 & 2: 0
2. FEELING DOWN, DEPRESSED OR HOPELESS: NOT AT ALL
1. LITTLE INTEREST OR PLEASURE IN DOING THINGS: NOT AT ALL

## 2025-07-08 ASSESSMENT — COGNITIVE AND FUNCTIONAL STATUS - GENERAL
TOILETING: A LITTLE
DRESSING REGULAR LOWER BODY CLOTHING: A LITTLE
DAILY ACTIVITIY SCORE: 21
MOBILITY SCORE: 24
HELP NEEDED FOR BATHING: A LITTLE
PATIENT BASELINE BEDBOUND: NO
DAILY ACTIVITIY SCORE: 24

## 2025-07-08 ASSESSMENT — PAIN - FUNCTIONAL ASSESSMENT
PAIN_FUNCTIONAL_ASSESSMENT: 0-10

## 2025-07-08 ASSESSMENT — ENCOUNTER SYMPTOMS
NAUSEA: 1
FATIGUE: 1
ABDOMINAL PAIN: 0
FEVER: 0
DIFFICULTY URINATING: 0
UNEXPECTED WEIGHT CHANGE: 0
ENDOCRINE COMMENTS: AS ABOVE
SHORTNESS OF BREATH: 0

## 2025-07-08 ASSESSMENT — PAIN SCALES - GENERAL
PAINLEVEL_OUTOF10: 0 - NO PAIN

## 2025-07-08 ASSESSMENT — LIFESTYLE VARIABLES
AUDIT-C TOTAL SCORE: 2
SKIP TO QUESTIONS 9-10: 1

## 2025-07-08 NOTE — CONSULTS
Reason For Consult  Assess ileal conduit in setting of worsening renal function    History Of Present Illness  Rodrigue Palacios is a 69 y.o. male with past medical history significant for HTN, HLD, SCOOTER (CPAP), CKD, T2DM, GERD, RA, atrophic R Kidney and bladder cancer s/p cystectomy with ureteroileal conduit creation and right simple nephrectomy on 05/21/2025 with Dr. Borrero. He initially presented to [facility] 7/7 by EMS for confusion and was noted to be hypoglycemic. He was transferred to [facility] for admission for hypoglycemia, UTI, and HARMAN on CKD. He currently denies any abdominal pain. States ileal conduit has leaked a little, but denies any change in urinary output.     In the ED, patient hemodynamically stable, afebrile. Creat 7.74 (baseline closer to 1.5), BUN 88. WBC 12. Urine appears infected, culture pending. No imaging completed. Renal US ordered in the ICU and currently pending.     Of note, patient had a similar episode of hypoglycemia, HARMAN on CKD, and UTI.      Past Medical History  He has a past medical history of Arthritis, Bladder cancer (Multi), CKD (chronic kidney disease), Dorsalgia, unspecified (07/28/2020), GERD (gastroesophageal reflux disease), Hearing aid worn, HL (hearing loss), Hyperlipidemia, Hypertension, Local infection of the skin and subcutaneous tissue, unspecified (10/11/2013), Nephrolithiasis, Other muscle spasm (02/14/2018), Other specified diseases of anus and rectum (03/22/2013), Personal history of other diseases of the digestive system (09/18/2013), Personal history of other diseases of the respiratory system (04/27/2020), Personal history of other diseases of the respiratory system (06/24/2019), Personal history of other infectious and parasitic diseases, Personal history of other specified conditions (03/22/2013), Personal history of other specified conditions (02/24/2014), Personal history of other specified conditions (07/14/2017), Pneumonia, unspecified organism  "(01/25/2016), Rash and other nonspecific skin eruption (04/29/2013), Sleep apnea, and Type 2 diabetes mellitus.    Surgical History  He has a past surgical history that includes Hernia repair (07/07/2016); Cholecystectomy (07/07/2016); Shoulder surgery (Right); and Bladder surgery (2025).     Social History  He reports that he has never smoked. He has never been exposed to tobacco smoke. He has never used smokeless tobacco. He reports current alcohol use. He reports that he does not use drugs.    Family History  Family History[1]     Allergies  Orencia [abatacept]     Physical Exam  Physical Exam  Constitutional:       General: He is not in acute distress.  Cardiovascular:      Rate and Rhythm: Normal rate.   Pulmonary:      Effort: Pulmonary effort is normal.   Abdominal:      General: There is no distension.      Palpations: Abdomen is soft.      Tenderness: There is no abdominal tenderness.   Genitourinary:     Comments: Ileal conduit draining clear yellow urine  Skin:     General: Skin is warm and dry.   Neurological:      Mental Status: He is alert and oriented to person, place, and time.            Last Recorded Vitals  Blood pressure 129/68, pulse 80, temperature 36.5 °C (97.7 °F), temperature source Temporal, resp. rate 16, height 1.905 m (6' 3\"), weight 86.3 kg (190 lb 4.1 oz), SpO2 96%.    Relevant Results  Results for orders placed or performed during the hospital encounter of 07/08/25 (from the past 24 hours)   POCT GLUCOSE   Result Value Ref Range    POCT Glucose 37 (L) 74 - 99 mg/dL   POCT GLUCOSE   Result Value Ref Range    POCT Glucose 87 74 - 99 mg/dL   POCT GLUCOSE   Result Value Ref Range    POCT Glucose 57 (L) 74 - 99 mg/dL   POCT GLUCOSE   Result Value Ref Range    POCT Glucose 131 (H) 74 - 99 mg/dL   POCT GLUCOSE   Result Value Ref Range    POCT Glucose 48 (L) 74 - 99 mg/dL   Comprehensive metabolic panel   Result Value Ref Range    Glucose 38 (LL) 74 - 99 mg/dL    Sodium 132 (L) 136 - 145 " mmol/L    Potassium 4.7 3.5 - 5.3 mmol/L    Chloride 100 98 - 107 mmol/L    Bicarbonate 22 21 - 32 mmol/L    Anion Gap 15 10 - 20 mmol/L    Urea Nitrogen 88 (H) 6 - 23 mg/dL    Creatinine 7.78 (H) 0.50 - 1.30 mg/dL    eGFR 7 (L) >60 mL/min/1.73m*2    Calcium 8.7 8.6 - 10.3 mg/dL    Albumin 3.1 (L) 3.4 - 5.0 g/dL    Alkaline Phosphatase 54 33 - 136 U/L    Total Protein 6.4 6.4 - 8.2 g/dL    AST 23 9 - 39 U/L    Bilirubin, Total 0.4 0.0 - 1.2 mg/dL    ALT 12 10 - 52 U/L   Magnesium   Result Value Ref Range    Magnesium 1.94 1.60 - 2.40 mg/dL   Phosphorus   Result Value Ref Range    Phosphorus 6.0 (H) 2.5 - 4.9 mg/dL   TSH with reflex to Free T4 if abnormal   Result Value Ref Range    Thyroid Stimulating Hormone 0.01 (L) 0.44 - 3.98 mIU/L   Thyroxine, Free   Result Value Ref Range    Thyroxine, Free 1.06 0.61 - 1.12 ng/dL   Creatine Kinase   Result Value Ref Range    Creatine Kinase 49 0 - 325 U/L   CBC   Result Value Ref Range    WBC 12.0 (H) 4.4 - 11.3 x10*3/uL    nRBC 0.0 0.0 - 0.0 /100 WBCs    RBC 3.61 (L) 4.50 - 5.90 x10*6/uL    Hemoglobin 9.9 (L) 13.5 - 17.5 g/dL    Hematocrit 31.2 (L) 41.0 - 52.0 %    MCV 86 80 - 100 fL    MCH 27.4 26.0 - 34.0 pg    MCHC 31.7 (L) 32.0 - 36.0 g/dL    RDW 14.3 11.5 - 14.5 %    Platelets 198 150 - 450 x10*3/uL   Blood Gas Venous Full Panel   Result Value Ref Range    POCT pH, Venous 7.36 7.33 - 7.43 pH    POCT pCO2, Venous 39 (L) 41 - 51 mm Hg    POCT pO2, Venous 29 (L) 35 - 45 mm Hg    POCT SO2, Venous 45 45 - 75 %    POCT Oxy Hemoglobin, Venous 44.0 (L) 45.0 - 75.0 %    POCT Hematocrit Calculated, Venous 32.0 (L) 41.0 - 52.0 %    POCT Sodium, Venous 130 (L) 136 - 145 mmol/L    POCT Potassium, Venous 4.9 3.5 - 5.3 mmol/L    POCT Chloride, Venous 100 98 - 107 mmol/L    POCT Ionized Calicum, Venous 1.21 1.10 - 1.33 mmol/L    POCT Glucose, Venous 38 (LL) 74 - 99 mg/dL    POCT Lactate, Venous 0.7 0.4 - 2.0 mmol/L    POCT Base Excess, Venous -3.2 (L) -2.0 - 3.0 mmol/L    POCT HCO3  Calculated, Venous 22.0 22.0 - 26.0 mmol/L    POCT Hemoglobin, Venous 10.7 (L) 13.5 - 17.5 g/dL    POCT Anion Gap, Venous 13.0 10.0 - 25.0 mmol/L    Patient Temperature      FiO2 21 %   POCT GLUCOSE   Result Value Ref Range    POCT Glucose 77 74 - 99 mg/dL   POCT GLUCOSE   Result Value Ref Range    POCT Glucose 58 (L) 74 - 99 mg/dL   POCT GLUCOSE   Result Value Ref Range    POCT Glucose 105 (H) 74 - 99 mg/dL   POCT GLUCOSE   Result Value Ref Range    POCT Glucose 50 (L) 74 - 99 mg/dL   POCT GLUCOSE   Result Value Ref Range    POCT Glucose 87 74 - 99 mg/dL     No results found.       Assessment/Plan     This is a 69 year old male with history of bladder cancer s/p cystectomy with ureteroileal conduit creation and right simple nephrectomy on 05/21/2025 admitted for hypoglycemia, HARMAN on CKD and UTI.     Recommendations:  - labs reviewed  - renal US pending  - recommend nephrology consult regarding HARMAN  - MAG3 scan with lasix ordered, per NM plan to complete first thing in am  - treatment of UTI per primary team    Patient discussed with Dr. Borrero    I spent 30 minutes in the professional and overall care of this patient.      Gaby Govea, SHERRY-CNP         [1]   Family History  Problem Relation Name Age of Onset    Ovarian cancer Mother      Other (asbestosis) Father

## 2025-07-08 NOTE — H&P
Critical Care Medicine - History and Physical Note     Chief Complaint   Patient: Rodrigue Palacios is a 69 y.o. male  who presented to the hospital for hypoglycemia    HPI   HPI: Rodrigue Palacios is a 69 y.o. year old male w/ PMH of HTN, HLD, SCOOTER (CPAP), CKD, T2DM, GERD, RA, Atrophic R Kidney and Bladder cancer s/p right nephrectomy and Radical cystectomy + Ileal conduit (5/21) presents from [facility] as a transfer for management of hypoglycemia and UTI. He was found to be confused by his son who called EMS. His blood glucose was 52 by EMS. He was given 25 g D5 and blood glucose improved to 150 but glucose went down 111 -> 96. Another bag of D5 was given with brief improvement. Glucose went down to max low of 21 after intervention. He was placed on D10W 50 ml/hr and showed improvement. He was then transferred to [facility] for further management. His UA was also found to be positive while there. He had positive UA's ever since ileal conduit was placed. He was recently hospitalized (6/4/25 - 6/8/25) for hypoglycemia and was treated with Rocephin. He had his insulin dose reduced from 20 units to 10 units in April but he says that his blood sugars were still low despite dose reduction. In the past few days he has not been taking his insulin because of low blood sugars with last reported dose ~4 days ago. He also takes Glimepiride and follows up with his endocrinologist, Dr. Christiansen.  Denies fever, chills, chest pain, abdominal pain, nausea/vomiting, constipation or diarrhea.    ROS  Review of Systems   Constitutional:  Positive for fatigue. Negative for fever.   Respiratory:  Negative for shortness of breath.    Cardiovascular:  Negative for chest pain.   Gastrointestinal:  Negative for abdominal pain.   Genitourinary:  Negative for difficulty urinating.        12 Point ROS negative unless otherwise specified above.     ED COURSE:   VS: Temperature 36.5, /68, heart rate 83, respiration 18, O2 sat 96% on RA    Labs  -  CBC: WBC 14.4, Hgb 10.1, Plts 191   - BMP: Na 133, K 4.7, HCO3 21, BUN 82, Cr 7.74   - LFTs: Alk phos 61, ALT 4, AST 10, T Bili 0.5   - Lactate: 1   - UA positive for: 500 Leukocyte esterase, Blood 1+. Bacteria 4+, Budding yeast present, Pyuria   - Urine cultures: Pending  - Urine drug screen negative    Imaging: None      Interventions: D50W 150 ml, D10W 50 ml/hr, Rocephin     HealthCare Providers:  - PCP: SHERRY Blanco-CNP     Code Status: Full Code     Emergency contact: Extended Emergency Contact Information  Primary Emergency Contact: JODEE BARLOW  Address: [address] [place], [place] [address] [facility]  Home Phone: [phone]  Mobile Phone: [phone]  Relation: Son       Past Medical History   Medical History[1]     Surgical History   Surgical History[2]    Family History   Family History[3]    Social History     Social History     Socioeconomic History    Marital status:      Spouse name: Not on file    Number of children: Not on file    Years of education: Not on file    Highest education level: Not on file   Occupational History    Not on file   Tobacco Use    Smoking status: Never     Passive exposure: Never    Smokeless tobacco: Never   Vaping Use    Vaping status: Never Used   Substance and Sexual Activity    Alcohol use: Yes     Comment: rare-couple drinks a month    Drug use: Never    Sexual activity: Defer   Other Topics Concern    Not on file   Social History Narrative    Not on file     Social Drivers of Health     Financial Resource Strain: Low Risk  (7/8/2025)    Overall Financial Resource Strain (CARDIA)     Difficulty of Paying Living Expenses: Not hard at all   Food Insecurity: No Food Insecurity (7/8/2025)    Hunger Vital Sign     Worried About Running Out of Food in the Last Year: Never true     Ran Out of Food in the Last Year: Never true   Transportation Needs: No Transportation Needs (7/8/2025)    PRAPARE - Transportation     Lack of  Transportation (Medical): No     Lack of Transportation (Non-Medical): No   Physical Activity: Sufficiently Active (4/7/2025)    Exercise Vital Sign     Days of Exercise per Week: 7 days     Minutes of Exercise per Session: 30 min   Stress: Stress Concern Present (6/4/2025)    Citizen of [place] [employer] - Occupational Stress Questionnaire     Feeling of Stress : To some extent   Social Connections: Feeling Socially Integrated (6/24/2025)    [place] : Social Isolation     Frequency of experiencing loneliness or isolation: Never   Recent Concern: Social Connections - Socially Isolated (6/4/2025)    Social Connection and Isolation Panel [NHANES]     Frequency of Communication with Friends and Family: More than three times a week     Frequency of Social Gatherings with Friends and Family: Three times a week     Attends Evangelical Services: Never     Active Member of Clubs or Organizations: No     Attends Club or Organization Meetings: Never     Marital Status:    Intimate Partner Violence: Not At Risk (7/8/2025)    Humiliation, Afraid, Rape, and Kick questionnaire     Fear of Current or Ex-Partner: No     Emotionally Abused: No     Physically Abused: No     Sexually Abused: No   Housing Stability: Low Risk  (7/8/2025)    Housing Stability Vital Sign     Unable to Pay for Housing in the Last Year: No     Number of Times Moved in the Last Year: 0     Homeless in the Last Year: No       Tobacco Use: Low Risk  (7/7/2025)    Patient History     Smoking Tobacco Use: Never     Smokeless Tobacco Use: Never     Passive Exposure: Never        Social History     Substance and Sexual Activity   Alcohol Use Yes    Comment: rare-couple drinks a month        Allergies   RX Allergies[4]       Meds    Scheduled medications  Scheduled Medications[5]  Continuous medications  Continuous Medications[6]  PRN medications  PRN Medications[7]     Objective    Physical Exam  Vitals and nursing note reviewed.   HENT:       "Head: Normocephalic.      Nose: Nose normal.      Mouth/Throat:      Mouth: Mucous membranes are moist.   Eyes:      Pupils: Pupils are equal, round, and reactive to light.   Cardiovascular:      Rate and Rhythm: Normal rate and regular rhythm.      Pulses: Normal pulses.      Heart sounds: Normal heart sounds.   Pulmonary:      Effort: Pulmonary effort is normal.   Abdominal:      General: Bowel sounds are normal.      Palpations: Abdomen is soft.      Comments: Post surgical abdominal scars seen  Urostomy bag seen   Skin:     General: Skin is warm and dry.   Neurological:      General: No focal deficit present.      Mental Status: He is alert.          Visit Vitals  /88 (BP Location: Right arm, Patient Position: Lying)   Pulse 85   Temp 36.9 °C (98.4 °F) (Temporal)   Resp 18   Ht 1.905 m (6' 3\")   Wt 88.9 kg (195 lb 15.8 oz)   SpO2 98%   BMI 24.50 kg/m²   Smoking Status Never   BSA 2.17 m²          Intake/Output Summary (Last 24 hours) at 7/8/2025 0255  Last data filed at 7/8/2025 0224  Gross per 24 hour   Intake --   Output 100 ml   Net -100 ml             Labs:   Results from last 72 hours   Lab Units 07/07/25  2216 07/07/25  1832   SODIUM mmol/L 132* 133*   POTASSIUM mmol/L 4.1 4.7   CHLORIDE mmol/L 99 99   CO2 mmol/L 20* 21   BUN mg/dL 82* 82*   CREATININE mg/dL 7.73* 7.74*   GLUCOSE mg/dL 104* 43*   CALCIUM mg/dL 8.8 9.3   ANION GAP mmol/L 17 18   EGFR mL/min/1.73m*2 7* 7*   PHOSPHORUS mg/dL  --  5.4*      Results from last 72 hours   Lab Units 07/07/25  1832   WBC AUTO x10*3/uL 14.4*   HEMOGLOBIN g/dL 10.1*   HEMATOCRIT % 32.0*   PLATELETS AUTO x10*3/uL 191   NEUTROS PCT AUTO % 90.6   LYMPHS PCT AUTO % 2.8   MONOS PCT AUTO % 5.5   EOS PCT AUTO % 0.4      Lab Results   Component Value Date    CALCIUM 8.8 07/07/2025    PHOS 5.4 (H) 07/07/2025      No results found for: \"CRP\"    [unfilled]     Micro/ID:   Susceptibility data from last 90 days.  Collected Specimen Info Organism Ampicillin Cefazolin " Cefazolin (uncomplicated UTIs only) Ciprofloxacin Gentamicin Levofloxacin Nitrofurantoin Piperacillin/Tazobactam Trimethoprim/Sulfamethoxazole   06/04/25 Urine from Clean Catch/Voided Coagulase negative staphylococcus            05/09/25 Urine from Clean Catch/Voided Escherichia coli  S  S  S  S  S  S  S  S  S     Lab Results   Component Value Date    URINECULTURE >=100,000 CFU/mL Coagulase negative staphylococcus (A) 06/04/2025    BLOODCULT No growth at 4 days -  FINAL REPORT 06/04/2025    BLOODCULT No growth at 4 days -  FINAL REPORT 06/04/2025     Images    ECG 12 lead  Sinus rhythm with frequent Premature ventricular complexes  Left axis deviation  Moderate voltage criteria for LVH, may be normal variant  Prolonged QT  Abnormal ECG  When compared with ECG of 17-MAR-2025 08:09,  Premature ventricular complexes are now Present  See ED provider note for full interpretation and clinical correlation  Confirmed by Beatris Cotter ([id number]) on 6/14/2025 4:29:04 PM       Encounter Date: 06/04/25   ECG 12 lead   Result Value    Ventricular Rate 93    Atrial Rate 93    PA Interval 156    QRS Duration 104    QT Interval 392    QTC Calculation(Bazett) 487    P Axis 41    R Axis -37    T Axis 60    QRS Count 15    Q Onset 210    P Onset 132    P Offset 194    T Offset 406    QTC Fredericia 454    Narrative    Sinus rhythm with frequent Premature ventricular complexes  Left axis deviation  Moderate voltage criteria for LVH, may be normal variant  Prolonged QT  Abnormal ECG  When compared with ECG of 17-MAR-2025 08:09,  Premature ventricular complexes are now Present  See ED provider note for full interpretation and clinical correlation  Confirmed by Beatris Cotter ([id number]) on 6/14/2025 4:29:04 PM      Encounter Date: 06/04/25   ECG 12 lead   Result Value    Ventricular Rate 93    Atrial Rate 93    PA Interval 156    QRS Duration 104    QT Interval 392    QTC Calculation(Bazett) 487    P Axis 41    R Axis -37    T Axis  60    QRS Count 15    Q Onset 210    P Onset 132    P Offset 194    T Offset 406    QTC Fredericia 454    Narrative    Sinus rhythm with frequent Premature ventricular complexes  Left axis deviation  Moderate voltage criteria for LVH, may be normal variant  Prolonged QT  Abnormal ECG  When compared with ECG of 17-MAR-2025 08:09,  Premature ventricular complexes are now Present  See ED provider note for full interpretation and clinical correlation  Confirmed by Beatris Cotter ([id number]) on 6/14/2025 4:29:04 PM        Assessment and Plan    Rodrigue Palacios is a 69 y.o. male w/ PMH of HTN, HLD, SCOOTER (CPAP), CKD, T2DM, GERD, RA, Atrophic R Kidney and Bladder cancer s/p right nephrectomy and Radical cystectomy + Ileal conduit (5/21) admitted to ICU as a transfer from [facility] for Hypoglycemia, UTI and HARMAN on CKD     NEUROLOGY/ PSYCH:  #Altered mental status  - Resolved with correction of hypoglycemia     CARDIOVASCULAR:  #HTN  #HLD  - Hold Lisinopril iso HARMAN  - Continue Atorvastatin  - CTM     PULMONARY:  #SCOOTER  - Continue home CPAP  - No oxygen requirement     RENAL/ GENITOURINARY:  #HARMAN on CKD (Baseline ~1.4-1.5)  #UTI  #H/o bladder cancer s/p cystectomy w/ Ileal conduit and nephrectomy (5/21)  ::Cr 7.74 currently  :: UA shows 500 Leukocyte esterase, Blood 1+. Bacteria 4+, Budding yeast present, Pyuria,  - Strict I/O  - Avoid Nephrotoxic medications  - Urine lytes: Calculate FeNA/FeUREA  - Renal Ultrasound  - Daily RFP  - Continue IV Ceftriaxone  - Urine culture pending  - Blood culture pending     GASTROENTEROLOGY:  #GERD  - Continue PPI     ENDOCRINOLOGY:  #T2DM  #Hypoglycemia  ::Likely due to exogenous insulin iso CKD  :: HbA1c 7.2 (3/17/25)  :: Multiple admissions due to hypoglycemia  - D10 W at 75/hr  - Accuchecks q1h  - Serum insulin  - Serum sulfonylurea  - C-peptide  - Proinsulin  - AM Cortisol  - Beta-Hydroxybutyrate  - TSH w/ Reflex T4  - VBG  - Endocrinology consult     RHEUMATOLOGY:  No active issues      HEMATOLOGY:  #Chronic Anemia  :: Baseline Hgb 10-11  - Daily CBC  - CTM     MUSCULOSKELETAL/ SKIN:  #Rheumatoid Arthritis  - On Cimzia (Certolizumab pegol) 400 mg Injection every 4 weeks     INFECTIOUS DISEASE:  - See renal section      FLUID/ ELECTROLYTE/ NUTRITION:        Fluids: D10W 75ml/hr  Electrolytes: Replete PRN  Nutrition: Diabetic diet  Antimicrobials: Rocephin 1g IV  DVT ppx: SubQ Heparin  GI ppx: Protonix  Bowel care: Miralax  Catheter: None, Urostomy bag  Lines: PIV  Supplemental Oxygen: None  Drips: D10W  Emergency Contact: Extended Emergency Contact Information  Primary Emergency Contact: JODEE BARLOW  Address: [address] [place], [place] [address] [facility]  Home Phone: [phone]  Mobile Phone: [phone]  Relation: Son   Code: Full Code      Disposition: 70 y/o male admitted to ICU for persistent hypoglycemia. ELOS <48 hrs    Norman Jim MD  Internal Medicine, PGY-III  07/08/25 at 2:55 AM                 [1]   Past Medical History:  Diagnosis Date    Arthritis     rhuematoid    Bladder cancer (Multi)     CKD (chronic kidney disease)     Dorsalgia, unspecified 07/28/2020    Back pain without radiation    GERD (gastroesophageal reflux disease)     controlled    Hearing aid worn     HL (hearing loss)     Hyperlipidemia     Hypertension     Local infection of the skin and subcutaneous tissue, unspecified 10/11/2013    Nonvenomous insect bite with infection    Nephrolithiasis     Other muscle spasm 02/14/2018    Muscle spasms of neck    Other specified diseases of anus and rectum 03/22/2013    Anal pain    Personal history of other diseases of the digestive system 09/18/2013    History of gastroenteritis    Personal history of other diseases of the respiratory system 04/27/2020    History of acute bronchitis    Personal history of other diseases of the respiratory system 06/24/2019    History of upper respiratory infection    Personal history of other infectious and  parasitic diseases     History of candidiasis of mouth    Personal history of other specified conditions 03/22/2013    History of abnormal weight loss    Personal history of other specified conditions 02/24/2014    History of numbness    Personal history of other specified conditions 07/14/2017    History of abdominal pain    Pneumonia, unspecified organism 01/25/2016    Pneumonia involving right lung    Rash and other nonspecific skin eruption 04/29/2013    Rash    Sleep apnea     CPAP    Type 2 diabetes mellitus    [2]   Past Surgical History:  Procedure Laterality Date    BLADDER SURGERY  2025    Removal malignant tumor    CHOLECYSTECTOMY  07/07/2016    Cholecystectomy    HERNIA REPAIR  07/07/2016    Inguinal Hernia Repair    SHOULDER SURGERY Right    [3]   Family History  Problem Relation Name Age of Onset    Ovarian cancer Mother      Other (asbestosis) Father     [4]   Allergies  Allergen Reactions    Orencia [Abatacept] Hives   [5] atorvastatin, 40 mg, oral, Daily  cefTRIAXone, 1 g, intravenous, q24h  heparin (porcine), 5,000 Units, subcutaneous, q8h  [Held by provider] lisinopril, 20 mg, oral, q12h  pantoprazole, 40 mg, oral, Daily  traZODone, 50 mg, oral, Daily  [6] D5 % and 0.9 % sodium chloride, 75 mL/hr  [7] PRN medications: acetaminophen, dextrose, dextrose, glucagon, glucagon

## 2025-07-08 NOTE — PROGRESS NOTES
Subjective   Rodrigue Palacios is a 69 y.o. male admitted for hypoglycemia and HARMAN.    No overnight events.    Patient seen and examined at bedside. Patient reports feeling tired and cold. Otherwise denies other concerns/complaints. He reports that the only meds he takes for DM2 are Lantus and glimepiride and states that he have not had a problem w/ hypoglycemia before until recently. States that d/t low BG, he had stopped taking Lantus and glimepiride for the past 4 days. Reports that these past few days, his PO intake is less than before. States that he empties his ileal conduit when it is custodial full, about every 2-3 hrs or about 5-6 times a day. States that his urine output was normal 2 days ago but had minimal urine output yesterday. However, reports that his urine output is improving today.    Objective   Physical Exam  Vitals reviewed.   Constitutional:       General: He is not in acute distress.     Appearance: He is ill-appearing.   HENT:      Head: Normocephalic and atraumatic.      Mouth/Throat:      Mouth: Mucous membranes are moist.   Eyes:      Extraocular Movements: Extraocular movements intact.   Cardiovascular:      Rate and Rhythm: Normal rate and regular rhythm.      Heart sounds: No murmur heard.  Pulmonary:      Effort: Pulmonary effort is normal.      Breath sounds: Normal breath sounds.   Abdominal:      General: Bowel sounds are normal. There is no distension.      Palpations: Abdomen is soft.      Tenderness: There is no abdominal tenderness.   Musculoskeletal:      Right lower leg: No swelling.      Left lower leg: No swelling.   Skin:     General: Skin is warm and dry.   Neurological:      Mental Status: He is alert and oriented to person, place, and time.        Temp:  [36.2 °C (97.1 °F)-37.9 °C (100.3 °F)] 36.5 °C (97.7 °F)  Heart Rate:  [68-89] 89  Resp:  [14-21] 14  BP: ()/() 129/82  Vitals:    07/08/25 0800   Weight: 86.3 kg (190 lb 4.1 oz)     I/Os    Intake/Output Summary  (Last 24 hours) at 7/8/2025 1402  Last data filed at 7/8/2025 1207  Gross per 24 hour   Intake 992.5 ml   Output 540 ml   Net 452.5 ml     Labs:   CBC:   Recent Labs     07/08/25  0520 07/07/25 1832 06/08/25  0649   WBC 12.0* 14.4* 8.2   HGB 9.9* 10.1* 10.6*   HCT 31.2* 32.0* 34.3*    191 354   MCV 86 87 91     CMP:   Recent Labs     07/08/25  1249 07/08/25  0519 07/07/25  2216   * 132* 132*   K 5.2 4.7 4.1   CL 97* 100 99   CO2 20* 22 20*   ANIONGAP 18 15 17   BUN 86* 88* 82*   CREATININE 7.30* 7.78* 7.73*   EGFR 7* 7* 7*   GLUCOSE 81 38* 104*     Recent Labs     07/08/25  1249 07/08/25 0519 07/07/25 1832 06/04/25  1248   ALBUMIN 3.4 3.1* 3.4 3.2*   ALKPHOS  --  54 61 60   ALT  --  12 4* 13   AST  --  23 10 17   BILITOT  --  0.4 0.5 0.2   LIPASE  --   --  12  --      Calcium/Phos:   Lab Results   Component Value Date    CALCIUM 9.0 07/08/2025    PHOS 5.4 (H) 07/08/2025      COAG:   Recent Labs     04/04/25  1217   INR 1.1     ENDO:   Recent Labs     07/08/25  0519 04/24/25  1345 03/17/25  0932 06/03/24  1307 10/27/22  1305 07/24/20  1527   TSH 0.01*  --   --   --   --  0.49   HGBA1C 7.0* 6.9* 7.2* 5.9   < >  --     < > = values in this interval not displayed.      CARDIAC:   Recent Labs     07/07/25  1938 07/07/25  1832 06/04/25  1351 06/04/25  1248   TROPHS 19 20 13 13   BNP  --  133*  --  142*     Recent Labs     03/08/22  1200 07/24/20  1527   CHOL 174 206*   LDLF  --  95   LDLCALC 80  --    HDL 38* 41.9   TRIG 279* 347*     No data recorded  BLOOD GAS:   Results from last 72 hours   Lab Units 07/08/25  0522   POCT PH, VENOUS pH 7.36   POCT PCO2, VENOUS mm Hg 39*   POCT SO2, VENOUS % 45   POCT OXY HEMOGLOBIN, VENOUS % 44.0*   POCT HEMATOCRIT CALCULATED, VENOUS % 32.0*   POCT SODIUM, VENOUS mmol/L 130*   POCT POTASSIUM, VENOUS mmol/L 4.9   POCT CHLORIDE, VENOUS mmol/L 100   POCT IONIZED CALCIUM, VENOUS mmol/L 1.21   POCT GLUCOSE, VENOUS mg/dL 38*   POCT LACTATE, VENOUS mmol/L 0.7   POCT BASE  EXCESS, VENOUS mmol/L -3.2*   POCT HCO3 CALCULATED, VENOUS mmol/L 22.0   POCT HEMOGLOBIN, VENOUS g/dL 10.7*   FIO2 % 21      Results from last 72 hours   Lab Units 07/08/25  0522   FIO2 % 21      URINE:   Recent Labs     07/08/25  0530   SODIUMURR 33   NACREATUR 34      Micro/ID:   Susceptibility data from last 90 days.  Collected Specimen Info Organism Ampicillin Cefazolin Cefazolin (uncomplicated UTIs only) Ciprofloxacin Gentamicin Levofloxacin Nitrofurantoin Piperacillin/Tazobactam Trimethoprim/Sulfamethoxazole   06/04/25 Urine from Clean Catch/Voided Coagulase negative staphylococcus            05/09/25 Urine from Clean Catch/Voided Escherichia coli  S  S  S  S  S  S  S  S  S      Lab Results   Component Value Date    URINECULTURE >=100,000 CFU/mL Coagulase negative staphylococcus (A) 06/04/2025    BLOODCULT No growth at 4 days -  FINAL REPORT 06/04/2025    BLOODCULT No growth at 4 days -  FINAL REPORT 06/04/2025     Imaging:  No results found.    Meds:  Scheduled medications  Scheduled Medications[1]  Continuous medications  Continuous Medications[2]  PRN medications  PRN Medications[3]     Assessment   Rodrigue Palacios is a 69 y.o. male w/ Hx of DM2, HTN, dyslipidemia, SCOOTER on CPAP, RA, bladder CA (dx Jan 2025) s/p cystectomy w/ ileal conduit creation and R nephro-ureterectomy (May 2025), recent hospitalization (6/5/25 - 6/8/25) for hypoglycemia and acute on chronic renal failure admitted to ICU for hypoglycemia and worsening HARMAN.    Neuro  Altered Mental Status  :: Resolved w/ correction of hypoglycemia  :: Patient AOx4    Pulm  SCOOTER  Plan:   CPAP at bedtime    Cardiovascular  Hypertension  Plan:  Hold lisinopril d/t HARMAN    Dyslipidemia  Plan:  Continue atorvastatin 40 mg daily    GI  GERD:  Plan:  Continue pantoprazole 40 mg daily    Renal/  Hx of bladder CA s/p cystectomy w/ ileal conduit creation and R nephro-ureterectomy   Worsening HARMAN  :: Cr 7.78 (baseline 1.4-1.5)  :: Pending urine electrolytes  ::  Renal US pending official read  Plan:  Monitor urine output  Avoid nephrotoxic meds  Daily RFP  NM kidney flow function w/ diuretic tomorrow (7/9/25)  Urology following    UTI  :: UA positive for 500 leuk esterase, blood 1+, bacteria 4+, budding yeast  :: Urine cx pending  Plan:  Continue ceftriaxone 1 g    Endo  DM2  Hypoglycemia   :: Elevated C-peptide, normal AM cortisol and beta-hydroxybutyrate  :: Suspect sulfonylurea-induced hypoglycemia in the setting of renal failure  :: Pending serum insulin, serum sulfonylurea, pro-insulin  :: Appreciate endocrinology recommendations  :: Target glucose 110  Plan:   Octreotide 100 mcg subQ for 1 dose  Increase D10W from 75 mL/hr to 100 mL/hr  POCT ACHS glucose qh  PRN Dextrose 50% or glucagon injection    ID  See Renal/    Heme  Chronic anemia at baseline    MSK/Rheum  Rheumatoid Arthritis  :: On certolizumab pegol 400 mg injection every 4 weeks    F: IV and PO  E: Replete prn  N: Regular diet   GI ppx: On pantoprazole for GERD  DVT ppx: Heparin  Abx: Ceftriaxone  Lines: PIV 7/7/25 - 22 G anterior left wrist, 20 G left antecubital  Drips: D10W 100 mL/hr  Pressors: None  Code status: Full Code   Primary Emergency Contact: JODEE BARLOW, Home Phone: [phone]    Dispo: ICU for further management of hypoglycemia    Adwoa Hernandez,   PGY-1         [1] atorvastatin, 40 mg, oral, Daily  cefTRIAXone, 1 g, intravenous, q24h  heparin (porcine), 5,000 Units, subcutaneous, q8h  [Held by provider] lisinopril, 20 mg, oral, q12h  pantoprazole, 40 mg, oral, Daily  polyethylene glycol, 17 g, oral, Daily  traZODone, 50 mg, oral, Daily     [2] dextrose 10 % in water (D10W), 100 mL/hr, Last Rate: 100 mL/hr (07/08/25 1356)     [3] PRN medications: dextrose, dextrose, glucagon, glucagon, ondansetron **OR** ondansetron

## 2025-07-08 NOTE — PROGRESS NOTES
"   07/08/25 1601   Discharge Planning   Living Arrangements Alone   Support Systems Children   Assistance Needed Patient lives in a 2 story home with his cat. Patient has a tub bathroom on the 2nd floor and a walk in shower on the first floor. When his son returns from college he stays with the patient. Patient is A&O X3, on room air at baseline, uses a CPAP at HS, ambulates without the use of assistive devices, is independent with ADLs, drives and is retired. Patient was active with [facility] for SN due to his urostomy and receives  urostomy supplies via [employer] (account number is [phone]) \"jovon [id number]  4 boxes precut flanges, Jovon [id number] 2 boxes pouches, jovon 8805 2 boxes rings, jovon remover wipes [phone], 2 night bags 2000ml.   Type of Residence Private residence   Number of Stairs to Enter Residence 3   Number of Stairs Within Residence 24  (12 to basement 12 to second floor)   Do you have animals or pets at home? Yes   Type of Animals or Pets cat   Who is requesting discharge planning? Provider   Home or Post Acute Services In home services   Type of Home Care Services Home nursing visits   Expected Discharge Disposition Home H   Does the patient need discharge transport arranged? No   Financial Resource Strain   How hard is it for you to pay for the very basics like food, housing, medical care, and heating? Not hard   Housing Stability   In the last 12 months, was there a time when you were not able to pay the mortgage or rent on time? N   At any time in the past 12 months, were you homeless or living in a shelter (including now)? N   Transportation Needs   In the past 12 months, has lack of transportation kept you from medical appointments or from getting medications? no   In the past 12 months, has lack of transportation kept you from meetings, work, or from getting things needed for daily living? No   Patient Choice   Provider Choice list and CMS website " (https://medicare.gov/care-compare#search) for post-acute Quality and Resource Measure Data were provided and reviewed with: Patient   Patient / Family choosing to utilize agency / facility established prior to hospitalization No   Stroke Family Assessment   Stroke Family Assessment Needed No   Intensity of Service   Intensity of Service 0-30 min

## 2025-07-08 NOTE — PROGRESS NOTES
Occupational Therapy    Evaluation    Patient Name: Rodrigue Palacios  MRN: [id number]  Department: A ICU  Room: 03/03-A  Today's Date: 7/8/2025  Time Calculation  Start Time: 1414  Stop Time: 1432  Time Calculation (min): 18 min    Assessment  IP OT Assessment  OT Assessment: Pt presents at baseline with ADL performance and functional mobility. No further skilled OT needs identified.  Prognosis: Good  Barriers to Discharge Home: No anticipated barriers  Evaluation/Treatment Tolerance: Patient tolerated treatment well  Medical Staff Made Aware: Yes  End of Session Communication: Bedside nurse  End of Session Patient Position: Bed, 3 rail up, Alarm on  Plan:  No Skilled OT: No acute OT goals identified  OT Frequency: OT eval only  OT Discharge Recommendations: No further acute OT  OT Recommended Transfer Status: Stand by assist  OT - OK to Discharge: Yes (per OT POC)    Subjective   Current Problem:  1. Hypoglycemia          OT Visit Info:  OT Received On: 07/08/25  General Visit Info:  General  Reason for Referral: 70 yo male referred to OT for impaired ADLs/mobility  Referred By: CAROLYN Chavez MD  Past Medical History Relevant to Rehab: HTN, HLD, SCOOTER (CPAP), CKD, T2DM, GERD, RA, Atrophic R Kidney and Bladder cancer s/p right nephrectomy and Radical cystectomy + Ileal conduit (5/21)  Prior to Session Communication: Bedside nurse  Patient Position Received: Bed, 3 rail up, Alarm off, not on at start of session  General Comment: Pt pleasant, cooperative with OT evaluation  Precautions:  Medical Precautions: Fall precautions (tele, IV)     Date/Time Vitals Session Patient Position Pulse Resp SpO2 BP MAP (mmHg)    07/08/25 1300 --  --  89  14  --  129/82  95     07/08/25 1400 --  --  81  15  --  148/72  96                Pain:  Pain Assessment  Pain Assessment: 0-10  0-10 (Numeric) Pain Score: 0 - No pain    Objective   Cognition:  Overall Cognitive Status: Within Functional Limits  Arousal/Alertness: Appropriate responses to  stimuli  Orientation Level: Oriented X4           Home Living:  Type of Home: House  Lives With: Adult children (son)  Home Adaptive Equipment: Walker rolling or standard  Home Layout: Multi-level, Able to live on main level with bedroom/bathroom  Home Access: Stairs to enter without rails  Entrance Stairs-Number of Steps: 4-5  Bathroom Shower/Tub: Walk-in shower  Bathroom Toilet: Standard  Bathroom Equipment: None   Prior Function:  Level of [place]: Independent with ADLs and functional transfers, Independent with homemaking with ambulation  Receives Help From: Family  ADL Assistance: Independent  Homemaking Assistance: Independent  Ambulatory Assistance: Independent (no devices)  IADL History:  Mode of Transportation:  (drives self)  ADL:  Eating Assistance: Independent  Grooming Assistance: Independent  Bathing Assistance: Stand by  UE Dressing Assistance: Independent  LE Dressing Assistance: Stand by  Toileting Assistance with Device: Stand by  Functional Assistance: Stand by  ADL Comments: ADL performance anticipated  Activity Tolerance:  Endurance: Tolerates 10 - 20 min exercise with multiple rests  Bed Mobility/Transfers: Bed Mobility  Bed Mobility: Yes  Bed Mobility 1  Bed Mobility 1: Supine to sitting, Sitting to supine  Level of Assistance 1: Modified independent  Bed Mobility Comments 1: HOB elevated    Transfers  Transfer: Yes  Transfer 1  Transfer From 1: Sit to  Transfer to 1: Stand  Technique 1: Sit to stand, Stand to sit  Transfer Level of Assistance 1: Distant supervision      Functional Mobility:  Functional Mobility  Functional Mobility Performed: Yes  Functional Mobility 1  Surface 1: Level tile  Device 1: No device  Assistance 1: Close supervision  Comments 1: Ambulated functional distance around ICU unit ~200' with good balance, good pace, good endurance, no physical assistance needed  Sitting Balance:  Static Sitting Balance  Static Sitting-Balance Support: Feet supported  Static  Sitting-Level of Assistance: Independent  Standing Balance:  Static Standing Balance  Static Standing-Balance Support: No upper extremity supported  Static Standing-Level of Assistance: Close supervision     IADL's:   Mode of Transportation:  (drives self)     Strength:  Strength Comments: BUE WFL    Coordination:  Movements are Fluid and Coordinated: Yes   Hand Function:  Hand Function  Gross Grasp: Functional  Coordination: Functional  Extremities: RUE   RUE : Within Functional Limits and LUE   LUE: Within Functional Limits    Outcome Measures: [facility] Daily Activity  Putting on and taking off regular lower body clothing: A little  Bathing (including washing, rinsing, drying): A little  Putting on and taking off regular upper body clothing: None  Toileting, which includes using toilet, bedpan or urinal: A little  Taking care of personal grooming such as brushing teeth: None  Eating Meals: None  Daily Activity - Total Score: 21      Education Documentation  Body Mechanics, taught by Chang Malagon OT at 7/8/2025  2:44 PM.  Learner: Patient  Readiness: Acceptance  Method: Explanation  Response: Verbalizes Understanding    Precautions, taught by Chang Malagon OT at 7/8/2025  2:44 PM.  Learner: Patient  Readiness: Acceptance  Method: Explanation  Response: Verbalizes Understanding    ADL Training, taught by Chang Malagon OT at 7/8/2025  2:44 PM.  Learner: Patient  Readiness: Acceptance  Method: Explanation  Response: Verbalizes Understanding    Education Comments  No comments found.

## 2025-07-08 NOTE — CONSULTS
Reason For Consult  Renal failure    History Of Present Illness  Rodrigue Palacios is a 69 y.o. male presenting with altered mental status..  Initial evaluation in the emergency room [facility] revealed severe HARMAN with serum creatinine 7.7.  Today creatinine is 7.3.  Review of the EMR shows the patient does have some CKD.  Creatinine in early in the year was running around 1.5.  He had his nephrectomy in May.  In June creatinine is running around 2.5 although creatinine stable 1.5 range following the nephrectomy.  He had a recent admission in June for renal failure.  Now with volume depletion related.  Has a history of hypertension and is continued on his antihypertensives.  Does have some hydronephrosis on most recent imaging.  There is a stent in there.  He has been treated with supportive care.  Renal ultrasounds been ordered.  Is been hypoglycemic close currently on D10.  Having some mild hypoglycemia events getting worse.  Was 132 on admission now is down to 130.  He is awake and alert.  Says he feels okay.  Better than on presentation     Past Medical History  He has a past medical history of Arthritis, Bladder cancer (Multi), CKD (chronic kidney disease), Dorsalgia, unspecified (07/28/2020), GERD (gastroesophageal reflux disease), Hearing aid worn, HL (hearing loss), Hyperlipidemia, Hypertension, Local infection of the skin and subcutaneous tissue, unspecified (10/11/2013), Nephrolithiasis, Other muscle spasm (02/14/2018), Other specified diseases of anus and rectum (03/22/2013), Personal history of other diseases of the digestive system (09/18/2013), Personal history of other diseases of the respiratory system (04/27/2020), Personal history of other diseases of the respiratory system (06/24/2019), Personal history of other infectious and parasitic diseases, Personal history of other specified conditions (03/22/2013), Personal history of other specified conditions (02/24/2014), Personal history of other specified  "conditions (07/14/2017), Pneumonia, unspecified organism (01/25/2016), Rash and other nonspecific skin eruption (04/29/2013), Sleep apnea, and Type 2 diabetes mellitus.    Surgical History  He has a past surgical history that includes Hernia repair (07/07/2016); Cholecystectomy (07/07/2016); Shoulder surgery (Right); and Bladder surgery (2025).     Social History  He reports that he has never smoked. He has never been exposed to tobacco smoke. He has never used smokeless tobacco. He reports current alcohol use. He reports that he does not use drugs.    Family History  Family History[1]     Allergies  Orencia [abatacept]     Physical Exam  NAD.  Does not appear chronically ill.  Not obese.  Does not appear frail.  Awake and alert.  No lower extremity edema I did not expect his surgical wounds         I&O 24HR    Intake/Output Summary (Last 24 hours) at 7/8/2025 1924  Last data filed at 7/8/2025 1854  Gross per 24 hour   Intake 2745.42 ml   Output 1040 ml   Net 1705.42 ml       Vitals 24HR  Heart Rate:  [68-89]   Temp:  [36.2 °C (97.1 °F)-37.9 °C (100.3 °F)]   Resp:  [14-22]   BP: ()/()   Height:  [190.5 cm (6' 3\")]   Weight:  [86.3 kg (190 lb 4.1 oz)-88.9 kg (195 lb 15.8 oz)]   SpO2:  [96 %-100 %]        Assessment & Plan  Hypoglycemia    Assessment plan  1.  HARMAN.  Severe.  Volume deletion versus complication of recent surgery.  Renal ultrasound pending  2.  CKD.  Unclear etiology.  Creatinine was stable following nephrectomy but then was running in the 2.5 range.  This trend does not make any sense to me.  Will follow labs and see where creatinine settles down to  3.  Hypertension.  Blood pressure is low.  Meds on hold.  4.  Hypoglycemia on D10  5.  Hyponatremia worsening    -As above.  Await renal ultrasound.  Continue supportive care.  Suggest avoiding water infusions.  Reviewed with ICU.  Would suggest additional isotonic saline as well continue supportive care.  Follow labs closely  - High risk given " severity of HARMAN      Rigo Romero MD         [1]   Family History  Problem Relation Name Age of Onset    Ovarian cancer Mother      Other (asbestosis) Father

## 2025-07-08 NOTE — CONSULTS
Inpatient consult to Endocrinology  Consult performed by: Alexander Prieto MD  Consult ordered by: Avinash Valadez MD        Reason For Consult  Diabetes    History Of Present Illness  Rodrigue Palacios is a 69 y.o. male admitted with hypoglycemia and loss of consciousness    Repeat hypoglycemia in the past 2 weeks  Hypoglycemic to 50 mg/dl this morning    Bladder cancer  Cystectomy with ureteroilial conduit, performed 6 weeks ago.  He believes that procedure was a year ago.  Decreased appetite, weight loss since that surgery  Renal dysfunction since that surgery    Duration of type 2 diabetes mellitus:  8 years    Lantus 10 units/day stopped 4 days ago  Glimepiride--patient does not recall this medication    A1c 7.2% (3/17/25)    Medications  Current Medications[1]     Past Medical History  He has a past medical history of Arthritis, Bladder cancer (Multi), CKD (chronic kidney disease), Dorsalgia, unspecified (07/28/2020), GERD (gastroesophageal reflux disease), Hearing aid worn, HL (hearing loss), Hyperlipidemia, Hypertension, Local infection of the skin and subcutaneous tissue, unspecified (10/11/2013), Nephrolithiasis, Other muscle spasm (02/14/2018), Other specified diseases of anus and rectum (03/22/2013), Personal history of other diseases of the digestive system (09/18/2013), Personal history of other diseases of the respiratory system (04/27/2020), Personal history of other diseases of the respiratory system (06/24/2019), Personal history of other infectious and parasitic diseases, Personal history of other specified conditions (03/22/2013), Personal history of other specified conditions (02/24/2014), Personal history of other specified conditions (07/14/2017), Pneumonia, unspecified organism (01/25/2016), Rash and other nonspecific skin eruption (04/29/2013), Sleep apnea, and Type 2 diabetes mellitus.    Surgical History  He has a past surgical history that includes Hernia repair (07/07/2016);  "Cholecystectomy (07/07/2016); Shoulder surgery (Right); and Bladder surgery (2025).     Social History  He reports that he has never smoked. He has never been exposed to tobacco smoke. He has never used smokeless tobacco. He reports current alcohol use. He reports that he does not use drugs.    Family History  Family History[2]    Allergies  Orencia [abatacept]    Review of Systems   Constitutional:  Negative for unexpected weight change.   Eyes:  Negative for visual disturbance.   Respiratory:  Negative for shortness of breath.    Cardiovascular:  Negative for chest pain.   Gastrointestinal:  Positive for nausea. Negative for abdominal pain.   Endocrine:        As above   Genitourinary:         Ostomy         Last Recorded Vitals  Blood pressure 128/82, pulse 85, temperature 36.5 °C (97.7 °F), temperature source Temporal, resp. rate 15, height 1.905 m (6' 3\"), weight 86.3 kg (190 lb 4.1 oz), SpO2 100%.    Physical Exam  Constitutional:       General: He is not in acute distress.  HENT:      Head: Normocephalic.      Mouth/Throat:      Mouth: Mucous membranes are moist.   Eyes:      Extraocular Movements: Extraocular movements intact.   Neck:      Thyroid: No thyromegaly.   Cardiovascular:      Pulses:           Radial pulses are 2+ on the right side and 2+ on the left side.   Musculoskeletal:      Right lower leg: No edema.      Left lower leg: No edema.   Neurological:      Mental Status: He is alert.      Motor: No tremor.   Psychiatric:         Mood and Affect: Affect normal.         Cognition and Memory: He exhibits impaired recent memory.          Relevant Results  Lab Results   Component Value Date    POCGLU 87 07/08/2025    POCGLU 50 (L) 07/08/2025    POCGLU 105 (H) 07/08/2025    POCGLU 58 (L) 07/08/2025    POCGLU 77 07/08/2025    GLUCOSE 38 (LL) 07/08/2025    GLUCOSE 104 (H) 07/07/2025    GLUCOSE 43 (LL) 07/07/2025    GLUCOSE 175 (H) 06/08/2025    GLUCOSE 187 (H) 06/07/2025      Latest Reference Range & " Units 07/08/25 05:19   GLUCOSE 74 - 99 mg/dL 38 (LL)   SODIUM 136 - 145 mmol/L 132 (L)   POTASSIUM 3.5 - 5.3 mmol/L 4.7   CHLORIDE 98 - 107 mmol/L 100   Bicarbonate 21 - 32 mmol/L 22   Anion Gap 10 - 20 mmol/L 15   Blood Urea Nitrogen 6 - 23 mg/dL 88 (H)   Creatinine 0.50 - 1.30 mg/dL 7.78 (H)   EGFR >60 mL/min/1.73m*2 7 (L)   Calcium 8.6 - 10.3 mg/dL 8.7   PHOSPHORUS 2.5 - 4.9 mg/dL 6.0 (H)   Albumin 3.4 - 5.0 g/dL 3.1 (L)       IMPRESSION  TYPE 2 DIABETES MELLITUS WITH HYPOGLYCEMIA  LONG TERM CURRENT INSULIN USE  Prolonged hypoglycemia due to sulfonylurea with renal insufficiency  Advised hypoglycemia can last for days under these condition  He has already held the basal insulin      RECOMMENDATIONS  Continue IV dextrose  Octreotide 100 mcg subcutaneous, may repeat q6h until hypoglycemia resolves  Discontinue glimepiride permanently      Alexander Prieto MD           [1]   Current Facility-Administered Medications:     acetaminophen (Tylenol) tablet 487.5 mg, 487.5 mg, oral, q6h PRN, Norman Jim MD    atorvastatin (Lipitor) tablet 40 mg, 40 mg, oral, Daily, Norman Jim MD, 40 mg at 07/08/25 0758    cefTRIAXone (Rocephin) 1 g in dextrose (iso) IV 50 mL, 1 g, intravenous, q24h, Norman Jim MD    dextrose 10 % in water (D10W) infusion, 150 mL/hr, intravenous, Continuous, Adwoa Hernandez DO, Last Rate: 150 mL/hr at 07/08/25 0905, 150 mL/hr at 07/08/25 0905    dextrose 50 % injection 12.5 g, 12.5 g, intravenous, q15 min PRN, Norman Jim MD    dextrose 50 % injection 25 g, 25 g, intravenous, q15 min PRN, Norman Jim MD, 25 g at 07/08/25 0812    glucagon (Glucagen) injection 1 mg, 1 mg, intramuscular, q15 min PRN, Norman Jim MD    glucagon (Glucagen) injection 1 mg, 1 mg, intramuscular, q15 min PRN, Norman Jim MD    heparin (porcine) injection 5,000 Units, 5,000 Units, subcutaneous, q8h, Norman Jim MD    [Held by provider] lisinopril tablet 20 mg, 20 mg, oral,  q12h, Norman Jim MD    ondansetron (Zofran) tablet 4 mg, 4 mg, oral, q8h PRN **OR** ondansetron (Zofran) injection 4 mg, 4 mg, intravenous, q8h PRN, Adwoa Hernandez DO, 4 mg at 07/08/25 0816    pantoprazole (ProtoNix) EC tablet 40 mg, 40 mg, oral, Daily, Norman Jim MD, 40 mg at 07/08/25 0758    polyethylene glycol (Glycolax, Miralax) packet 17 g, 17 g, oral, Daily, Norman Jim MD    traZODone (Desyrel) tablet 50 mg, 50 mg, oral, Daily, Norman Jim MD, 50 mg at 07/08/25 0758  [2]   Family History  Problem Relation Name Age of Onset    Ovarian cancer Mother      Other (asbestosis) Father

## 2025-07-08 NOTE — CARE PLAN
The patient's goals for the shift include none    The clinical goals for the shift include Patient will remain hemodynamically stable with improved blood glucose levels by end of shift    Over the shift, the patient did not make progress toward the following goals. Barriers to progression include poor home medication understanding and management. Recommendations to address these barriers include Diabetes education.

## 2025-07-09 ENCOUNTER — HOSPITAL ENCOUNTER (INPATIENT)
Facility: HOSPITAL | Age: 70
LOS: 4 days | Discharge: HOME HEALTH | End: 2025-07-13
Attending: SURGERY | Admitting: SURGERY
Payer: MEDICARE

## 2025-07-09 ENCOUNTER — APPOINTMENT (OUTPATIENT)
Dept: CARDIOLOGY | Facility: HOSPITAL | Age: 70
End: 2025-07-09
Payer: MEDICARE

## 2025-07-09 ENCOUNTER — APPOINTMENT (OUTPATIENT)
Dept: RADIOLOGY | Facility: HOSPITAL | Age: 70
End: 2025-07-09
Payer: MEDICARE

## 2025-07-09 VITALS
RESPIRATION RATE: 20 BRPM | OXYGEN SATURATION: 94 % | BODY MASS INDEX: 23.79 KG/M2 | WEIGHT: 191.36 LBS | TEMPERATURE: 98.8 F | DIASTOLIC BLOOD PRESSURE: 85 MMHG | HEART RATE: 72 BPM | SYSTOLIC BLOOD PRESSURE: 151 MMHG | HEIGHT: 75 IN

## 2025-07-09 DIAGNOSIS — E16.2 HYPOGLYCEMIA: Primary | ICD-10-CM

## 2025-07-09 DIAGNOSIS — N20.0 KIDNEY CALCULI: ICD-10-CM

## 2025-07-09 DIAGNOSIS — C67.2 MALIGNANT NEOPLASM OF LATERAL WALL OF URINARY BLADDER (MULTI): ICD-10-CM

## 2025-07-09 DIAGNOSIS — N17.9 AKI (ACUTE KIDNEY INJURY): ICD-10-CM

## 2025-07-09 DIAGNOSIS — N20.0 LEFT RENAL STONE: ICD-10-CM

## 2025-07-09 LAB
ALBUMIN SERPL BCP-MCNC: 3 G/DL (ref 3.4–5)
ALBUMIN SERPL BCP-MCNC: 3.1 G/DL (ref 3.4–5)
ALBUMIN SERPL BCP-MCNC: 3.3 G/DL (ref 3.4–5)
ALP SERPL-CCNC: 66 U/L (ref 33–136)
ALP SERPL-CCNC: 86 U/L (ref 33–136)
ALT SERPL W P-5'-P-CCNC: 44 U/L (ref 10–52)
ALT SERPL W P-5'-P-CCNC: 66 U/L (ref 10–52)
ANION GAP BLDV CALCULATED.4IONS-SCNC: 17 MMOL/L (ref 10–25)
ANION GAP SERPL CALC-SCNC: 17 MMOL/L (ref 10–20)
ANION GAP SERPL CALC-SCNC: 18 MMOL/L (ref 10–20)
ANION GAP SERPL CALCULATED.3IONS-SCNC: 17 MMOL/L (ref 10–20)
AST SERPL W P-5'-P-CCNC: 65 U/L (ref 9–39)
AST SERPL W P-5'-P-CCNC: 81 U/L (ref 9–39)
ATRIAL RATE: 78 BPM
BACTERIA UR CULT: NORMAL
BASE EXCESS BLDV CALC-SCNC: -6.9 MMOL/L (ref -2–3)
BASOPHILS # BLD AUTO: 0.02 X10*3/UL (ref 0–0.1)
BASOPHILS NFR BLD AUTO: 0.2 %
BILIRUB SERPL-MCNC: 0.3 MG/DL (ref 0–1.2)
BILIRUB SERPL-MCNC: 0.5 MG/DL (ref 0–1.2)
BODY TEMPERATURE: 37 DEGREES CELSIUS
BUN SERPL-MCNC: 84 MG/DL (ref 6–23)
BUN SERPL-MCNC: 86 MG/DL (ref 6–23)
BUN SERPL-MCNC: 87 MG/DL (ref 6–23)
CA-I BLDV-SCNC: 1.21 MMOL/L (ref 1.1–1.33)
CALCIUM SERPL-MCNC: 8.6 MG/DL (ref 8.6–10.3)
CALCIUM SERPL-MCNC: 8.6 MG/DL (ref 8.6–10.3)
CALCIUM SERPL-MCNC: 8.8 MG/DL (ref 8.6–10.3)
CHLORIDE BLDV-SCNC: 95 MMOL/L (ref 98–107)
CHLORIDE SERPL-SCNC: 100 MMOL/L (ref 98–107)
CHLORIDE SERPL-SCNC: 100 MMOL/L (ref 98–107)
CHLORIDE SERPL-SCNC: 98 MMOL/L (ref 98–107)
CO2 SERPL-SCNC: 18 MMOL/L (ref 21–32)
CO2 SERPL-SCNC: 18 MMOL/L (ref 21–32)
CO2 SERPL-SCNC: 19 MMOL/L (ref 21–32)
CREAT SERPL-MCNC: 7.4 MG/DL (ref 0.5–1.3)
CREAT SERPL-MCNC: 7.48 MG/DL (ref 0.5–1.3)
CREAT SERPL-MCNC: 7.65 MG/DL (ref 0.5–1.3)
EGFRCR SERPLBLD CKD-EPI 2021: 7 ML/MIN/1.73M*2
EOSINOPHIL # BLD AUTO: 0.23 X10*3/UL (ref 0–0.7)
EOSINOPHIL NFR BLD AUTO: 2.2 %
ERYTHROCYTE [DISTWIDTH] IN BLOOD BY AUTOMATED COUNT: 13.6 % (ref 11.5–14.5)
GLUCOSE BLD MANUAL STRIP-MCNC: 106 MG/DL (ref 74–99)
GLUCOSE BLD MANUAL STRIP-MCNC: 124 MG/DL (ref 74–99)
GLUCOSE BLD MANUAL STRIP-MCNC: 126 MG/DL (ref 74–99)
GLUCOSE BLD MANUAL STRIP-MCNC: 149 MG/DL (ref 74–99)
GLUCOSE BLD MANUAL STRIP-MCNC: 154 MG/DL (ref 74–99)
GLUCOSE BLD MANUAL STRIP-MCNC: 157 MG/DL (ref 74–99)
GLUCOSE BLD MANUAL STRIP-MCNC: 176 MG/DL (ref 74–99)
GLUCOSE BLD MANUAL STRIP-MCNC: 204 MG/DL (ref 74–99)
GLUCOSE BLD MANUAL STRIP-MCNC: 37 MG/DL (ref 74–99)
GLUCOSE BLD MANUAL STRIP-MCNC: 76 MG/DL (ref 74–99)
GLUCOSE BLD MANUAL STRIP-MCNC: 80 MG/DL (ref 74–99)
GLUCOSE BLD MANUAL STRIP-MCNC: 82 MG/DL (ref 74–99)
GLUCOSE BLD MANUAL STRIP-MCNC: 97 MG/DL (ref 74–99)
GLUCOSE BLDV-MCNC: 374 MG/DL (ref 74–99)
GLUCOSE SERPL-MCNC: 165 MG/DL (ref 74–99)
GLUCOSE SERPL-MCNC: 53 MG/DL (ref 74–99)
GLUCOSE SERPL-MCNC: 63 MG/DL (ref 74–99)
HCO3 BLDV-SCNC: 18.5 MMOL/L (ref 22–26)
HCT VFR BLD AUTO: 30.3 % (ref 41–52)
HCT VFR BLD EST: 31 % (ref 41–52)
HGB BLD-MCNC: 10 G/DL (ref 13.5–17.5)
HGB BLDV-MCNC: 10.3 G/DL (ref 13.5–17.5)
HOLD SPECIMEN: NORMAL
IMM GRANULOCYTES # BLD AUTO: 0.04 X10*3/UL (ref 0–0.7)
IMM GRANULOCYTES NFR BLD AUTO: 0.4 % (ref 0–0.9)
INHALED O2 CONCENTRATION: 21 %
LACTATE BLDV-SCNC: 0.8 MMOL/L (ref 0.4–2)
LYMPHOCYTES # BLD AUTO: 0.56 X10*3/UL (ref 1.2–4.8)
LYMPHOCYTES NFR BLD AUTO: 5.3 %
MAGNESIUM SERPL-MCNC: 1.7 MG/DL (ref 1.6–2.4)
MCH RBC QN AUTO: 27.6 PG (ref 26–34)
MCHC RBC AUTO-ENTMCNC: 33 G/DL (ref 32–36)
MCV RBC AUTO: 84 FL (ref 80–100)
MONOCYTES # BLD AUTO: 1.1 X10*3/UL (ref 0.1–1)
MONOCYTES NFR BLD AUTO: 10.3 %
NEUTROPHILS # BLD AUTO: 8.7 X10*3/UL (ref 1.2–7.7)
NEUTROPHILS NFR BLD AUTO: 81.6 %
NRBC BLD-RTO: 0 /100 WBCS (ref 0–0)
OXYHGB MFR BLDV: 37.7 % (ref 45–75)
P AXIS: 54 DEGREES
P OFFSET: 192 MS
P ONSET: 128 MS
PCO2 BLDV: 36 MM HG (ref 41–51)
PH BLDV: 7.32 PH (ref 7.33–7.43)
PHOSPHATE SERPL-MCNC: 5.1 MG/DL (ref 2.5–4.9)
PHOSPHATE SERPL-MCNC: 5.3 MG/DL (ref 2.5–4.9)
PHOSPHATE SERPL-MCNC: 5.7 MG/DL (ref 2.5–4.9)
PLATELET # BLD AUTO: 223 X10*3/UL (ref 150–450)
PO2 BLDV: 27 MM HG (ref 35–45)
POTASSIUM BLDV-SCNC: 5.1 MMOL/L (ref 3.5–5.3)
POTASSIUM SERPL-SCNC: 4.9 MMOL/L (ref 3.5–5.3)
POTASSIUM SERPL-SCNC: 5 MMOL/L (ref 3.5–5.3)
POTASSIUM SERPL-SCNC: 5.5 MMOL/L (ref 3.5–5.3)
PR INTERVAL: 168 MS
PROT SERPL-MCNC: 6.4 G/DL (ref 6.4–8.2)
PROT SERPL-MCNC: 6.5 G/DL (ref 6.4–8.2)
Q ONSET: 212 MS
QRS COUNT: 13 BEATS
QRS DURATION: 102 MS
QT INTERVAL: 374 MS
QTC CALCULATION(BAZETT): 426 MS
QTC FREDERICIA: 408 MS
R AXIS: -25 DEGREES
RBC # BLD AUTO: 3.62 X10*6/UL (ref 4.5–5.9)
SAO2 % BLDV: 39 % (ref 45–75)
SODIUM BLDV-SCNC: 125 MMOL/L (ref 136–145)
SODIUM SERPL-SCNC: 128 MMOL/L (ref 136–145)
SODIUM SERPL-SCNC: 130 MMOL/L (ref 136–145)
SODIUM SERPL-SCNC: 131 MMOL/L (ref 136–145)
T AXIS: 10 DEGREES
T OFFSET: 399 MS
VENTRICULAR RATE: 78 BPM
WBC # BLD AUTO: 10.7 X10*3/UL (ref 4.4–11.3)

## 2025-07-09 PROCEDURE — 2500000004 HC RX 250 GENERAL PHARMACY W/ HCPCS (ALT 636 FOR OP/ED): Performed by: STUDENT IN AN ORGANIZED HEALTH CARE EDUCATION/TRAINING PROGRAM

## 2025-07-09 PROCEDURE — 2020000001 HC ICU ROOM DAILY

## 2025-07-09 PROCEDURE — 78707 K FLOW/FUNCT IMAGE W/O DRUG: CPT

## 2025-07-09 PROCEDURE — 2500000004 HC RX 250 GENERAL PHARMACY W/ HCPCS (ALT 636 FOR OP/ED)

## 2025-07-09 PROCEDURE — 2500000005 HC RX 250 GENERAL PHARMACY W/O HCPCS

## 2025-07-09 PROCEDURE — 82947 ASSAY GLUCOSE BLOOD QUANT: CPT

## 2025-07-09 PROCEDURE — 2500000001 HC RX 250 WO HCPCS SELF ADMINISTERED DRUGS (ALT 637 FOR MEDICARE OP)

## 2025-07-09 PROCEDURE — 85025 COMPLETE CBC W/AUTO DIFF WBC: CPT

## 2025-07-09 PROCEDURE — 78707 K FLOW/FUNCT IMAGE W/O DRUG: CPT | Performed by: RADIOLOGY

## 2025-07-09 PROCEDURE — 84100 ASSAY OF PHOSPHORUS: CPT

## 2025-07-09 PROCEDURE — 2500000002 HC RX 250 W HCPCS SELF ADMINISTERED DRUGS (ALT 637 FOR MEDICARE OP, ALT 636 FOR OP/ED)

## 2025-07-09 PROCEDURE — 99291 CRITICAL CARE FIRST HOUR: CPT

## 2025-07-09 PROCEDURE — 36415 COLL VENOUS BLD VENIPUNCTURE: CPT

## 2025-07-09 PROCEDURE — 83735 ASSAY OF MAGNESIUM: CPT

## 2025-07-09 PROCEDURE — 2500000002 HC RX 250 W HCPCS SELF ADMINISTERED DRUGS (ALT 637 FOR MEDICARE OP, ALT 636 FOR OP/ED): Performed by: NURSE PRACTITIONER

## 2025-07-09 PROCEDURE — 84132 ASSAY OF SERUM POTASSIUM: CPT

## 2025-07-09 PROCEDURE — A9562 TC99M MERTIATIDE: HCPCS | Performed by: INTERNAL MEDICINE

## 2025-07-09 PROCEDURE — 84520 ASSAY OF UREA NITROGEN: CPT

## 2025-07-09 PROCEDURE — 93005 ELECTROCARDIOGRAM TRACING: CPT

## 2025-07-09 PROCEDURE — 74176 CT ABD & PELVIS W/O CONTRAST: CPT | Performed by: STUDENT IN AN ORGANIZED HEALTH CARE EDUCATION/TRAINING PROGRAM

## 2025-07-09 PROCEDURE — 94760 N-INVAS EAR/PLS OXIMETRY 1: CPT

## 2025-07-09 PROCEDURE — 99231 SBSQ HOSP IP/OBS SF/LOW 25: CPT | Performed by: INTERNAL MEDICINE

## 2025-07-09 PROCEDURE — 87040 BLOOD CULTURE FOR BACTERIA: CPT | Mod: WESLAB

## 2025-07-09 PROCEDURE — 74176 CT ABD & PELVIS W/O CONTRAST: CPT

## 2025-07-09 PROCEDURE — 80053 COMPREHEN METABOLIC PANEL: CPT

## 2025-07-09 PROCEDURE — 3430000001 HC RX 343 DIAGNOSTIC RADIOPHARMACEUTICALS: Performed by: INTERNAL MEDICINE

## 2025-07-09 PROCEDURE — 99233 SBSQ HOSP IP/OBS HIGH 50: CPT | Performed by: NURSE PRACTITIONER

## 2025-07-09 RX ORDER — DEXTROSE 50 % IN WATER (D50W) INTRAVENOUS SYRINGE
25
Status: DISCONTINUED | OUTPATIENT
Start: 2025-07-09 | End: 2025-07-13 | Stop reason: HOSPADM

## 2025-07-09 RX ORDER — HEPARIN SODIUM 5000 [USP'U]/ML
5000 INJECTION, SOLUTION INTRAVENOUS; SUBCUTANEOUS EVERY 8 HOURS
Status: DISCONTINUED | OUTPATIENT
Start: 2025-07-09 | End: 2025-07-13 | Stop reason: HOSPADM

## 2025-07-09 RX ORDER — OCTREOTIDE ACETATE 100 UG/ML
100 INJECTION, SOLUTION INTRAVENOUS; SUBCUTANEOUS EVERY 6 HOURS
Status: COMPLETED | OUTPATIENT
Start: 2025-07-09 | End: 2025-07-10

## 2025-07-09 RX ORDER — OCTREOTIDE ACETATE 100 UG/ML
100 INJECTION, SOLUTION INTRAVENOUS; SUBCUTANEOUS ONCE
Status: DISCONTINUED | OUTPATIENT
Start: 2025-07-09 | End: 2025-07-09

## 2025-07-09 RX ORDER — DEXTROSE 50 % IN WATER (D50W) INTRAVENOUS SYRINGE
12.5
Status: DISCONTINUED | OUTPATIENT
Start: 2025-07-09 | End: 2025-07-13 | Stop reason: HOSPADM

## 2025-07-09 RX ORDER — DEXTROSE MONOHYDRATE AND SODIUM CHLORIDE 5; .9 G/100ML; G/100ML
50 INJECTION, SOLUTION INTRAVENOUS CONTINUOUS
Status: DISCONTINUED | OUTPATIENT
Start: 2025-07-09 | End: 2025-07-10

## 2025-07-09 RX ORDER — OCTREOTIDE ACETATE 100 UG/ML
100 INJECTION, SOLUTION INTRAVENOUS; SUBCUTANEOUS ONCE
Status: COMPLETED | OUTPATIENT
Start: 2025-07-09 | End: 2025-07-09

## 2025-07-09 RX ORDER — BETIATIDE 1 MG/1
10.9 INJECTION, POWDER, LYOPHILIZED, FOR SOLUTION INTRAVENOUS
Status: COMPLETED | OUTPATIENT
Start: 2025-07-09 | End: 2025-07-09

## 2025-07-09 RX ORDER — OXYCODONE HYDROCHLORIDE 5 MG/1
5 TABLET ORAL ONCE
Refills: 0 | Status: COMPLETED | OUTPATIENT
Start: 2025-07-09 | End: 2025-07-09

## 2025-07-09 RX ORDER — DEXTROSE MONOHYDRATE 100 MG/ML
100 INJECTION, SOLUTION INTRAVENOUS CONTINUOUS
Status: DISCONTINUED | OUTPATIENT
Start: 2025-07-09 | End: 2025-07-09 | Stop reason: HOSPADM

## 2025-07-09 RX ORDER — LISINOPRIL 20 MG/1
20 TABLET ORAL EVERY 12 HOURS
Status: DISCONTINUED | OUTPATIENT
Start: 2025-07-09 | End: 2025-07-13 | Stop reason: HOSPADM

## 2025-07-09 RX ORDER — PANTOPRAZOLE SODIUM 20 MG/1
20 TABLET, DELAYED RELEASE ORAL DAILY
Status: DISCONTINUED | OUTPATIENT
Start: 2025-07-09 | End: 2025-07-09 | Stop reason: HOSPADM

## 2025-07-09 RX ORDER — DEXTROSE MONOHYDRATE 100 MG/ML
100 INJECTION, SOLUTION INTRAVENOUS CONTINUOUS
Status: DISCONTINUED | OUTPATIENT
Start: 2025-07-09 | End: 2025-07-09

## 2025-07-09 RX ORDER — ATORVASTATIN CALCIUM 40 MG/1
40 TABLET, FILM COATED ORAL DAILY
Status: DISCONTINUED | OUTPATIENT
Start: 2025-07-10 | End: 2025-07-13 | Stop reason: HOSPADM

## 2025-07-09 RX ORDER — PANTOPRAZOLE SODIUM 20 MG/1
20 TABLET, DELAYED RELEASE ORAL DAILY
Status: DISCONTINUED | OUTPATIENT
Start: 2025-07-10 | End: 2025-07-13 | Stop reason: HOSPADM

## 2025-07-09 RX ORDER — OCTREOTIDE ACETATE 100 UG/ML
100 INJECTION, SOLUTION INTRAVENOUS; SUBCUTANEOUS EVERY 6 HOURS
Status: DISCONTINUED | OUTPATIENT
Start: 2025-07-09 | End: 2025-07-09

## 2025-07-09 RX ADMIN — DEXTROSE MONOHYDRATE AND SODIUM CHLORIDE 100 ML/HR: 5; .9 INJECTION, SOLUTION INTRAVENOUS at 21:50

## 2025-07-09 RX ADMIN — OXYCODONE HYDROCHLORIDE 5 MG: 5 TABLET ORAL at 16:08

## 2025-07-09 RX ADMIN — OCTREOTIDE ACETATE 100 MCG: 100 INJECTION, SOLUTION INTRAVENOUS; SUBCUTANEOUS at 08:03

## 2025-07-09 RX ADMIN — HEPARIN SODIUM 5000 UNITS: 5000 INJECTION, SOLUTION INTRAVENOUS; SUBCUTANEOUS at 06:07

## 2025-07-09 RX ADMIN — POLYETHYLENE GLYCOL 3350 17 G: 17 POWDER, FOR SOLUTION ORAL at 08:03

## 2025-07-09 RX ADMIN — ATORVASTATIN CALCIUM 40 MG: 40 TABLET, FILM COATED ORAL at 08:03

## 2025-07-09 RX ADMIN — OCTREOTIDE ACETATE 100 MCG: 100 INJECTION, SOLUTION INTRAVENOUS; SUBCUTANEOUS at 20:19

## 2025-07-09 RX ADMIN — HEPARIN SODIUM 5000 UNITS: 5000 INJECTION, SOLUTION INTRAVENOUS; SUBCUTANEOUS at 12:29

## 2025-07-09 RX ADMIN — ONDANSETRON HYDROCHLORIDE 4 MG: 4 TABLET, FILM COATED ORAL at 04:12

## 2025-07-09 RX ADMIN — HEPARIN SODIUM 5000 UNITS: 5000 INJECTION, SOLUTION INTRAVENOUS; SUBCUTANEOUS at 20:19

## 2025-07-09 RX ADMIN — TRAZODONE HYDROCHLORIDE 50 MG: 50 TABLET ORAL at 08:03

## 2025-07-09 RX ADMIN — DEXTROSE 100 ML/HR: 10 SOLUTION INTRAVENOUS at 18:17

## 2025-07-09 RX ADMIN — SODIUM ZIRCONIUM CYCLOSILICATE 10 G: 10 POWDER, FOR SUSPENSION ORAL at 09:54

## 2025-07-09 RX ADMIN — DEXTROSE MONOHYDRATE 25 G: 25 INJECTION, SOLUTION INTRAVENOUS at 06:19

## 2025-07-09 RX ADMIN — DEXTROSE 100 ML/HR: 10 SOLUTION INTRAVENOUS at 08:04

## 2025-07-09 RX ADMIN — TECHNESCAN TC 99M MERTIATIDE 10.9 MILLICURIE: 1 INJECTION, POWDER, LYOPHILIZED, FOR SOLUTION INTRAVENOUS at 08:49

## 2025-07-09 RX ADMIN — DEXTROSE MONOHYDRATE 12.5 G: 25 INJECTION, SOLUTION INTRAVENOUS at 01:47

## 2025-07-09 RX ADMIN — SODIUM ZIRCONIUM CYCLOSILICATE 10 G: 10 POWDER, FOR SUSPENSION ORAL at 02:54

## 2025-07-09 RX ADMIN — PANTOPRAZOLE SODIUM 20 MG: 20 TABLET, DELAYED RELEASE ORAL at 08:03

## 2025-07-09 SDOH — SOCIAL STABILITY: SOCIAL NETWORK: IN A TYPICAL WEEK, HOW MANY TIMES DO YOU TALK ON THE PHONE WITH FAMILY, FRIENDS, OR NEIGHBORS?: THREE TIMES A WEEK

## 2025-07-09 SDOH — SOCIAL STABILITY: SOCIAL INSECURITY: ARE YOU MARRIED, WIDOWED, DIVORCED, SEPARATED, NEVER MARRIED, OR LIVING WITH A PARTNER?: DIVORCED

## 2025-07-09 SDOH — ECONOMIC STABILITY: INCOME INSECURITY: IN THE PAST 12 MONTHS HAS THE ELECTRIC, GAS, OIL, OR WATER COMPANY THREATENED TO SHUT OFF SERVICES IN YOUR HOME?: NO

## 2025-07-09 SDOH — ECONOMIC STABILITY: HOUSING INSECURITY: AT ANY TIME IN THE PAST 12 MONTHS, WERE YOU HOMELESS OR LIVING IN A SHELTER (INCLUDING NOW)?: NO

## 2025-07-09 SDOH — HEALTH STABILITY: PHYSICAL HEALTH: ON AVERAGE, HOW MANY MINUTES DO YOU ENGAGE IN EXERCISE AT THIS LEVEL?: 30 MIN

## 2025-07-09 SDOH — HEALTH STABILITY: MENTAL HEALTH: EXPERIENCED ANY OF THE FOLLOWING LIFE EVENTS: DEATH OF FAMILY/FRIEND

## 2025-07-09 SDOH — SOCIAL STABILITY: SOCIAL INSECURITY: WITHIN THE LAST YEAR, HAVE YOU BEEN HUMILIATED OR EMOTIONALLY ABUSED IN OTHER WAYS BY YOUR PARTNER OR EX-PARTNER?: NO

## 2025-07-09 SDOH — HEALTH STABILITY: PHYSICAL HEALTH: ON AVERAGE, HOW MANY DAYS PER WEEK DO YOU ENGAGE IN MODERATE TO STRENUOUS EXERCISE (LIKE A BRISK WALK)?: 3 DAYS

## 2025-07-09 SDOH — ECONOMIC STABILITY: FOOD INSECURITY: HOW HARD IS IT FOR YOU TO PAY FOR THE VERY BASICS LIKE FOOD, HOUSING, MEDICAL CARE, AND HEATING?: NOT HARD AT ALL

## 2025-07-09 SDOH — HEALTH STABILITY: MENTAL HEALTH
DO YOU FEEL STRESS - TENSE, RESTLESS, NERVOUS, OR ANXIOUS, OR UNABLE TO SLEEP AT NIGHT BECAUSE YOUR MIND IS TROUBLED ALL THE TIME - THESE DAYS?: ONLY A LITTLE

## 2025-07-09 SDOH — HEALTH STABILITY: PHYSICAL HEALTH
HOW OFTEN DO YOU NEED TO HAVE SOMEONE HELP YOU WHEN YOU READ INSTRUCTIONS, PAMPHLETS, OR OTHER WRITTEN MATERIAL FROM YOUR DOCTOR OR PHARMACY?: NEVER

## 2025-07-09 SDOH — ECONOMIC STABILITY: HOUSING INSECURITY: IN THE LAST 12 MONTHS, WAS THERE A TIME WHEN YOU WERE NOT ABLE TO PAY THE MORTGAGE OR RENT ON TIME?: NO

## 2025-07-09 SDOH — SOCIAL STABILITY: SOCIAL NETWORK: HOW OFTEN DO YOU GET TOGETHER WITH FRIENDS OR RELATIVES?: ONCE A WEEK

## 2025-07-09 SDOH — SOCIAL STABILITY: SOCIAL INSECURITY: HAVE YOU HAD THOUGHTS OF HARMING ANYONE ELSE?: NO

## 2025-07-09 SDOH — SOCIAL STABILITY: SOCIAL INSECURITY: ABUSE: ADULT

## 2025-07-09 SDOH — ECONOMIC STABILITY: HOUSING INSECURITY: IN THE PAST 12 MONTHS, HOW MANY TIMES HAVE YOU MOVED WHERE YOU WERE LIVING?: 0

## 2025-07-09 SDOH — ECONOMIC STABILITY: FOOD INSECURITY: WITHIN THE PAST 12 MONTHS, YOU WORRIED THAT YOUR FOOD WOULD RUN OUT BEFORE YOU GOT THE MONEY TO BUY MORE.: NEVER TRUE

## 2025-07-09 SDOH — SOCIAL STABILITY: SOCIAL INSECURITY: HAVE YOU HAD ANY THOUGHTS OF HARMING ANYONE ELSE?: NO

## 2025-07-09 SDOH — SOCIAL STABILITY: SOCIAL INSECURITY: HAS ANYONE EVER THREATENED TO HURT YOUR FAMILY OR YOUR PETS?: NO

## 2025-07-09 SDOH — SOCIAL STABILITY: SOCIAL NETWORK: HOW OFTEN DO YOU ATTEND MEETINGS OF THE CLUBS OR ORGANIZATIONS YOU BELONG TO?: NEVER

## 2025-07-09 SDOH — SOCIAL STABILITY: SOCIAL INSECURITY: WITHIN THE LAST YEAR, HAVE YOU BEEN AFRAID OF YOUR PARTNER OR EX-PARTNER?: NO

## 2025-07-09 SDOH — ECONOMIC STABILITY: FOOD INSECURITY: WITHIN THE PAST 12 MONTHS, THE FOOD YOU BOUGHT JUST DIDN'T LAST AND YOU DIDN'T HAVE MONEY TO GET MORE.: NEVER TRUE

## 2025-07-09 SDOH — SOCIAL STABILITY: SOCIAL INSECURITY: ARE YOU OR HAVE YOU BEEN THREATENED OR ABUSED PHYSICALLY, EMOTIONALLY, OR SEXUALLY BY ANYONE?: NO

## 2025-07-09 SDOH — SOCIAL STABILITY: SOCIAL INSECURITY: ARE THERE ANY APPARENT SIGNS OF INJURIES/BEHAVIORS THAT COULD BE RELATED TO ABUSE/NEGLECT?: NO

## 2025-07-09 SDOH — SOCIAL STABILITY: SOCIAL INSECURITY: DO YOU FEEL ANYONE HAS EXPLOITED OR TAKEN ADVANTAGE OF YOU FINANCIALLY OR OF YOUR PERSONAL PROPERTY?: NO

## 2025-07-09 SDOH — SOCIAL STABILITY: SOCIAL NETWORK: HOW OFTEN DO YOU ATTEND CHURCH OR RELIGIOUS SERVICES?: NEVER

## 2025-07-09 SDOH — SOCIAL STABILITY: SOCIAL INSECURITY: WERE YOU ABLE TO COMPLETE ALL THE BEHAVIORAL HEALTH SCREENINGS?: YES

## 2025-07-09 SDOH — ECONOMIC STABILITY: TRANSPORTATION INSECURITY: IN THE PAST 12 MONTHS, HAS LACK OF TRANSPORTATION KEPT YOU FROM MEDICAL APPOINTMENTS OR FROM GETTING MEDICATIONS?: NO

## 2025-07-09 SDOH — SOCIAL STABILITY: SOCIAL INSECURITY: DO YOU FEEL UNSAFE GOING BACK TO THE PLACE WHERE YOU ARE LIVING?: NO

## 2025-07-09 SDOH — SOCIAL STABILITY: SOCIAL INSECURITY: DOES ANYONE TRY TO KEEP YOU FROM HAVING/CONTACTING OTHER FRIENDS OR DOING THINGS OUTSIDE YOUR HOME?: NO

## 2025-07-09 ASSESSMENT — PAIN - FUNCTIONAL ASSESSMENT
PAIN_FUNCTIONAL_ASSESSMENT: 0-10

## 2025-07-09 ASSESSMENT — LIFESTYLE VARIABLES
AUDIT-C TOTAL SCORE: 2
PRESCIPTION_ABUSE_PAST_12_MONTHS: NO
AUDIT-C TOTAL SCORE: 2
SUBSTANCE_ABUSE_PAST_12_MONTHS: NO
SKIP TO QUESTIONS 9-10: 1
HOW OFTEN DO YOU HAVE 6 OR MORE DRINKS ON ONE OCCASION: NEVER
HOW OFTEN DO YOU HAVE A DRINK CONTAINING ALCOHOL: 2-4 TIMES A MONTH
HOW MANY STANDARD DRINKS CONTAINING ALCOHOL DO YOU HAVE ON A TYPICAL DAY: 1 OR 2

## 2025-07-09 ASSESSMENT — ACTIVITIES OF DAILY LIVING (ADL)
BATHING: INDEPENDENT
HEARING - LEFT EAR: FUNCTIONAL
WALKS IN HOME: INDEPENDENT
PATIENT'S MEMORY ADEQUATE TO SAFELY COMPLETE DAILY ACTIVITIES?: YES
TOILETING: INDEPENDENT
ADEQUATE_TO_COMPLETE_ADL: YES
HEARING - RIGHT EAR: FUNCTIONAL
FEEDING YOURSELF: INDEPENDENT
DRESSING YOURSELF: INDEPENDENT
JUDGMENT_ADEQUATE_SAFELY_COMPLETE_DAILY_ACTIVITIES: YES
GROOMING: INDEPENDENT
LACK_OF_TRANSPORTATION: NO

## 2025-07-09 ASSESSMENT — PAIN SCALES - GENERAL
PAINLEVEL_OUTOF10: 7
PAINLEVEL_OUTOF10: 1
PAINLEVEL_OUTOF10: 0 - NO PAIN
PAINLEVEL_OUTOF10: 3
PAINLEVEL_OUTOF10: 0 - NO PAIN

## 2025-07-09 ASSESSMENT — COGNITIVE AND FUNCTIONAL STATUS - GENERAL
PATIENT BASELINE BEDBOUND: NO
MOBILITY SCORE: 24
DAILY ACTIVITIY SCORE: 24

## 2025-07-09 ASSESSMENT — PAIN DESCRIPTION - DESCRIPTORS
DESCRIPTORS: ACHING
DESCRIPTORS: ACHING

## 2025-07-09 NOTE — PROGRESS NOTES
"   07/09/25 0840   Discharge Planning   Living Arrangements Alone   Support Systems Children   Assistance Needed Patient lives in a 2 story home with his cat. Patient has a tub bathroom on the 2nd floor and a walk in shower on the first floor. When his son returns from college he stays with the patient. Patient is A&O X3, on room air at baseline, uses a CPAP at HS, ambulates without the use of assistive devices, is independent with ADLs, drives and is retired. Patient was active with [facility] for SN due to his urostomy and receives  urostomy supplies via [employer] (account number is [id number]) \"jovon [id number]  4 boxes precut flanges, Jovon [id number] 2 boxes pouches, jovon 8805 2 boxes rings, jovon remover wipes 7760, 2 night bags 2000ml.   Type of Residence Private residence   Number of Stairs to Enter Residence 3   Number of Stairs Within Residence 24  (12 to basement 12 to second floor)   Do you have animals or pets at home? Yes   Type of Animals or Pets cat   Who is requesting discharge planning? Provider   Home or Post Acute Services In home services   Type of Home Care Services Home nursing visits   Expected Discharge Disposition Home H   Does the patient need discharge transport arranged? No   Patient Choice   Provider Choice list and CMS website (https://medicare.gov/care-compare#search) for post-acute Quality and Resource Measure Data were provided and reviewed with: Patient   Patient / Family choosing to utilize agency / facility established prior to hospitalization No   Stroke Family Assessment   Stroke Family Assessment Needed No   Intensity of Service   Intensity of Service 0-30 min       "

## 2025-07-09 NOTE — DISCHARGE SUMMARY
Discharge Diagnosis  Obstructive uropathy w/ hydronephrosis 2/2 nephrolithiasis  Hypoglycemia 2/2 glimepiride use in the setting of acute renal failure    Issues Requiring Follow-Up  Obstructive uropathy  Hypoglycemia    Test Results Pending At Discharge  Pending Labs       Order Current Status    Insulin, free In process    Proinsulin In process    Sulfonylurea Hypoglycemics,Serum In process          Hospital Course  Rodrigue Palacios is a 69 y.o. male w/ Hx of DM2, HTN, dyslipidemia, SCOOTER on CPAP, RA, bladder CA (dx Jan 2025) s/p cystectomy w/ ileal conduit creation and R nephro-ureterectomy (May 2025), recent hospitalization (6/5/25 - 6/8/25) for hypoglycemia and acute on chronic renal failure who presented to [facility] for confusion and then transferred to [facility] for ICU admission for management of hypoglycemia and worsening HARMAN.     Endocrinology was consulted and patient was treated w/ D10 infusion and octreotide for suspected Lantus and sulfonylurea-induced hypoglycemia in the setting of renal failure. Patient was started on ceftriaxone at [facility] for positive UA and ceftriaxone was continued at [facility] ICU pending urine cx. 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 Ceftriaxone was continued d/t obstructive uropathy. Due to worsening HARMAN, urology and nephrology were consulted and patient had imaging done including renal US, NM kidney, and CT abd/pelvis which showed obstructive uropathy w/ moderate left-sided hydronephrosis. Urology recommends urgent percutaneous nephrostomy tube placement for patient in which patient will be transferred to [facility].  Communicated with accepting provider urgency of percutaneous nephrostomy tube.     Visit Vitals  /76   Pulse 57   Temp 37.4 °C (99.3 °F) (Temporal)   Resp 17     Vitals:    07/09/25 0600   Weight: 86.8 kg (191 lb 5.8 oz)       Immunization History   Administered Date(s)  Administered    Flu vaccine, quadrivalent, high-dose, preservative free, age 65y+ (FLUZONE) 10/06/2020, 09/13/2022    Flu vaccine, trivalent, preservative free, HIGH-DOSE, age 65y+ (Fluzone) 12/13/2023, 10/16/2024    Hepatitis B vaccine, adult *Check Product/Dose* 02/17/1994, 03/17/1994, 09/15/1994    Influenza, Seasonal, Quadrivalent, Adjuvanted 12/20/2021    Influenza, Unspecified 09/13/2022    Influenza, injectable, quadrivalent 10/04/2019    Novel influenza-H1N1-09, preservative-free 01/25/2010    Pfizer COVID-19 vaccine, 12 years and older, (30mcg/0.3mL) (Comirnaty) 12/13/2023, 01/07/2025    Pfizer Purple Cap SARS-CoV-2 03/05/2021, 04/02/2021, 12/20/2021    Pneumococcal conjugate vaccine, 20-valent (PREVNAR 20) 03/14/2023    Pneumococcal polysaccharide vaccine, 23-valent, age 2 years and older (PNEUMOVAX 23) 01/25/2016    Td (adult), unspecified 09/10/1999    Tdap vaccine, age 7 year and older (BOOSTRIX, ADACEL) 04/21/2021    Zoster vaccine, recombinant, adult (SHINGRIX) 01/20/2020    Zoster, Unspecified 01/01/2020, 01/20/2020    Zoster, live 10/21/2016       Results      Pertinent Physical Exam At Time of Discharge  Physical Exam  Vitals reviewed.   Constitutional:       General: He is not in acute distress.     Appearance: He is ill-appearing.   HENT:      Head: Normocephalic and atraumatic.      Mouth/Throat:      Mouth: Mucous membranes are moist.   Eyes:      Extraocular Movements: Extraocular movements intact.   Cardiovascular:      Rate and Rhythm: Normal rate and regular rhythm.      Heart sounds: No murmur heard.  Pulmonary:      Effort: Pulmonary effort is normal. No respiratory distress.      Breath sounds: Normal breath sounds.   Abdominal:      General: Bowel sounds are normal. There is no distension.      Palpations: Abdomen is soft.      Tenderness: There is no abdominal tenderness.   Musculoskeletal:      Right lower leg: No swelling.      Left lower leg: No swelling.   Neurological:      Mental  "Status: He is alert and oriented to person, place, and time.       Home Medications     Medication List      CONTINUE taking these medications     atorvastatin 40 mg tablet; Commonly known as: Lipitor; TAKE ONE TABLET   BY MOUTH DAILY   Basaglar KwikPen U-100 Insulin 100 unit/mL (3 mL) pen; Generic drug:   insulin glargine; Use up to 30 units daily   BD Insulin Syringe 1 mL 26 x 1/2\" syringe; Generic drug: insulin   syringe-needle U-100   lisinopril 40 mg tablet; Take 0.5 tablets (20 mg) by mouth every 12   hours.   montelukast 10 mg tablet; Commonly known as: Singulair   pantoprazole 40 mg EC tablet; Commonly known as: ProtoNix; TAKE ONE   TABLET BY MOUTH EVERY DAY   pen needle, diabetic 32 gauge x 5/32\" needle; Commonly known as: BD Luann   2nd Gen Pen Needle; use DAILY   traZODone 50 mg tablet; Commonly known as: Desyrel; Take 1-2 tablets   ( mg) by mouth once daily.     STOP taking these medications     glimepiride 4 mg tablet; Commonly known as: AmaryL       Outpatient Follow-Up  Future Appointments   Date Time Provider Department Center   7/14/2025  1:00 PM James Borrero MD [id number] [facility]   7/31/2025  1:00 PM Kathya Dixon, PharmD [id number] [facility]   8/5/2025  3:00 PM Monie Barron MD [id number] [facility]   8/20/2025  2:30 PM Miles Peters MD [id number] [facility]   10/16/2025  2:20 PM Daxa Farnsworth, APRN-CNP [id number] [facility]     Adwoa Hernandez, DO  PGY-1  "

## 2025-07-09 NOTE — PROGRESS NOTES
Rodrigue Palacios is a 69 y.o. male on day 1 of admission presenting with Hypoglycemia.      Subjective   No new issues. Cr unchanged. UOP good though at 1600. Hypoglycemic again overnight. Na stable in the 130 range. K 5.0 - on Lokelma. On IV Abx. BP not low. Urology planning for MAG3 with Lasix and CT A/P.       Objective   NAD. Awake and alert. No asterixis       Vitals 24HR  Heart Rate:  [66-91]   Temp:  [37 °C (98.6 °F)-37.7 °C (99.9 °F)]   Resp:  [13-23]   BP: (129-168)/()   Weight:  [86.8 kg (191 lb 5.8 oz)]   SpO2:  [89 %-100 %]     Intake/Output last 3 Shifts:    Intake/Output Summary (Last 24 hours) at 7/9/2025 1110  Last data filed at 7/9/2025 0905  Gross per 24 hour   Intake 3233.75 ml   Output 1300 ml   Net 1933.75 ml       Physical Exam    Relevant Results               This patient currently has cardiac telemetry ordered; if you would like to modify or discontinue the telemetry order, click here to go to the orders activity to modify/discontinue the order.          Assessment & Plan  Hypoglycemia    HARMAN severe. No improving  HTN BP elevated - follow off anti-HTN for now  HypONa on D10 w/ hypoglycemia  Urological CA s/p nephrectomy    - No indication for HD despite high solute load  - Follow labs and volume status  - Etiology of HARMAN presumably urological in origin  - Give a dose of urea of sudden drop in Na  - High risk d/t severe unresolving HARMAN w/ high solute load      Rigo Romero MD

## 2025-07-09 NOTE — CARE PLAN
The patient's goals for the shift include none    The clinical goals for the shift include stable blood sugar levels

## 2025-07-09 NOTE — PROGRESS NOTES
Rodrigue Palacios is a 69 y.o. male on day 1 of admission presenting with Hypoglycemia.    Subjective   This is a 69 year old male with history of bladder cancer s/p cystectomy with ureteroileal conduit creation and right simple nephrectomy on 05/21/2025 admitted for hypoglycemia, HARMAN on CKD and UTI.      US done yesterday.  To have mag3 scan with lasix today.    overnight the D10 W IVF was stopped around 3 am but glucose dropped to 37 when checked at 0613. Pt received Dextrose 50% 12.5 g and the D10 W IVF was resumed. Glucose recheck was improved at 106.    Pt denies any sx this am with hypoglycemia. No HA, dizziness. No n/v. No heartburn sx but has hiccups.   No aches or itching.   No CP or SOB.   States urine output from right ureteroileal conduit has been good amount in last couple weeks and in last day.  No blood in urine.     C/o suprapubic discomfort but this is improved from yesterday.   Last BM a couple days ago, states he does not normally go every day but more like every other day.     Objective     Physical Exam  Vitals reviewed.   Constitutional:       Appearance: Normal appearance. He is normal weight. He is not toxic-appearing or diaphoretic.      Comments: A bit restless   HENT:      Head: Normocephalic and atraumatic.      Mouth/Throat:      Mouth: Mucous membranes are dry.   Eyes:      General: No scleral icterus.        Right eye: No discharge.         Left eye: No discharge.      Conjunctiva/sclera: Conjunctivae normal.   Cardiovascular:      Rate and Rhythm: Normal rate and regular rhythm.      Pulses: Normal pulses.      Heart sounds: Normal heart sounds. No murmur heard.     No friction rub. No gallop.   Pulmonary:      Effort: Pulmonary effort is normal. No respiratory distress.      Breath sounds: Normal breath sounds. No stridor. No wheezing, rhonchi or rales.   Chest:      Chest wall: No tenderness.   Abdominal:      Tenderness: There is abdominal tenderness.      Comments: Hypoactive BS x  "4. Tender to suprapubic area.  Right lower abd with ureteroileal conduit, bag with slightly cloudy, yellow urine.   Musculoskeletal:      Right lower leg: No edema.      Left lower leg: No edema.   Skin:     General: Skin is warm and dry.   Neurological:      Mental Status: He is alert and oriented to person, place, and time.   Psychiatric:         Mood and Affect: Mood normal.         Behavior: Behavior normal.         Thought Content: Thought content normal.         Judgment: Judgment normal.         Last Recorded Vitals  Blood pressure (!) 141/125, pulse 66, temperature 37.4 °C (99.3 °F), temperature source Temporal, resp. rate 13, height 1.905 m (6' 3\"), weight 86.8 kg (191 lb 5.8 oz), SpO2 97%.  Intake/Output last 3 Shifts:  I/O last 3 completed shifts:  In: 3473.8 (40 mL/kg) [P.O.:420; I.V.:2003.8 (23.1 mL/kg); IV Piggyback:1050]  Out: 1790 (20.6 mL/kg) [Urine:1790 (0.6 mL/kg/hr)]  Weight: 86.8 kg     Relevant Results    ECG 12 lead  Result Date: 7/8/2025  Normal sinus rhythm Minimal voltage criteria for LVH, may be normal variant Borderline ECG When compared with ECG of 04-JUN-2025 12:32, Premature ventricular complexes are no longer Present QT has shortened      Scheduled medications  Scheduled Medications[1]  Continuous medications  Continuous Medications[2]  PRN medications  PRN Medications[3]  Results for orders placed or performed during the hospital encounter of 07/08/25 (from the past 24 hours)   POCT GLUCOSE   Result Value Ref Range    POCT Glucose 105 (H) 74 - 99 mg/dL   POCT GLUCOSE   Result Value Ref Range    POCT Glucose 50 (L) 74 - 99 mg/dL   POCT GLUCOSE   Result Value Ref Range    POCT Glucose 87 74 - 99 mg/dL   POCT GLUCOSE   Result Value Ref Range    POCT Glucose 87 74 - 99 mg/dL   POCT GLUCOSE   Result Value Ref Range    POCT Glucose 147 (H) 74 - 99 mg/dL   POCT GLUCOSE   Result Value Ref Range    POCT Glucose 97 74 - 99 mg/dL   Renal function panel   Result Value Ref Range    Glucose 81 74 " - 99 mg/dL    Sodium 130 (L) 136 - 145 mmol/L    Potassium 5.2 3.5 - 5.3 mmol/L    Chloride 97 (L) 98 - 107 mmol/L    Bicarbonate 20 (L) 21 - 32 mmol/L    Anion Gap 18 10 - 20 mmol/L    Urea Nitrogen 86 (H) 6 - 23 mg/dL    Creatinine 7.30 (H) 0.50 - 1.30 mg/dL    eGFR 7 (L) >60 mL/min/1.73m*2    Calcium 9.0 8.6 - 10.3 mg/dL    Phosphorus 5.4 (H) 2.5 - 4.9 mg/dL    Albumin 3.4 3.4 - 5.0 g/dL   POCT GLUCOSE   Result Value Ref Range    POCT Glucose 91 74 - 99 mg/dL   POCT GLUCOSE   Result Value Ref Range    POCT Glucose 91 74 - 99 mg/dL   POCT GLUCOSE   Result Value Ref Range    POCT Glucose 113 (H) 74 - 99 mg/dL   POCT GLUCOSE   Result Value Ref Range    POCT Glucose 99 74 - 99 mg/dL   POCT GLUCOSE   Result Value Ref Range    POCT Glucose 68 (L) 74 - 99 mg/dL   POCT GLUCOSE   Result Value Ref Range    POCT Glucose 96 74 - 99 mg/dL   POCT GLUCOSE   Result Value Ref Range    POCT Glucose 92 74 - 99 mg/dL   POCT GLUCOSE   Result Value Ref Range    POCT Glucose 80 74 - 99 mg/dL   Renal function panel   Result Value Ref Range    Glucose 63 (L) 74 - 99 mg/dL    Sodium 131 (L) 136 - 145 mmol/L    Potassium 5.5 (H) 3.5 - 5.3 mmol/L    Chloride 100 98 - 107 mmol/L    Bicarbonate 19 (L) 21 - 32 mmol/L    Anion Gap 18 10 - 20 mmol/L    Urea Nitrogen 87 (H) 6 - 23 mg/dL    Creatinine 7.40 (H) 0.50 - 1.30 mg/dL    eGFR 7 (L) >60 mL/min/1.73m*2    Calcium 8.6 8.6 - 10.3 mg/dL    Phosphorus 5.1 (H) 2.5 - 4.9 mg/dL    Albumin 3.1 (L) 3.4 - 5.0 g/dL   POCT GLUCOSE   Result Value Ref Range    POCT Glucose 126 (H) 74 - 99 mg/dL   POCT GLUCOSE   Result Value Ref Range    POCT Glucose 82 74 - 99 mg/dL   Comprehensive metabolic panel   Result Value Ref Range    Glucose 53 (LL) 74 - 99 mg/dL    Sodium 130 (L) 136 - 145 mmol/L    Potassium 5.0 3.5 - 5.3 mmol/L    Chloride 100 98 - 107 mmol/L    Bicarbonate 18 (L) 21 - 32 mmol/L    Anion Gap 17 10 - 20 mmol/L    Urea Nitrogen 86 (H) 6 - 23 mg/dL    Creatinine 7.65 (H) 0.50 - 1.30 mg/dL     eGFR 7 (L) >60 mL/min/1.73m*2    Calcium 8.6 8.6 - 10.3 mg/dL    Albumin 3.0 (L) 3.4 - 5.0 g/dL    Alkaline Phosphatase 66 33 - 136 U/L    Total Protein 6.4 6.4 - 8.2 g/dL    AST 65 (H) 9 - 39 U/L    Bilirubin, Total 0.3 0.0 - 1.2 mg/dL    ALT 44 10 - 52 U/L   Phosphorus   Result Value Ref Range    Phosphorus 5.3 (H) 2.5 - 4.9 mg/dL   POCT GLUCOSE   Result Value Ref Range    POCT Glucose 37 (L) 74 - 99 mg/dL   POCT GLUCOSE   Result Value Ref Range    POCT Glucose 106 (H) 74 - 99 mg/dL          This patient currently has cardiac telemetry ordered; if you would like to modify or discontinue the telemetry order, click here to go to the orders activity to modify/discontinue the order.                 Assessment & Plan  Hypoglycemia      This is a 69 year old male with history of bladder cancer s/p cystectomy with ureteroileal conduit creation and right simple nephrectomy on 05/21/2025 admitted for hypoglycemia, HARMAN on CKD and UTI.   - nephrology also following.  - imaging reviewed. US renal yesterday, awaiting radiology reading.   - labs reviewed. HARMAN similar labs to yesterday. UA 7/7 with RBC, WBC, bacteria, yeast, protein. C+S in process. Cont to monitor labs.  - lower PPI to 20 mg a day d/t HARMAN and CKD.  Do not recommend > 20 mg a day.  - MAG 3 with lasix today as planned.  - CT abd pelvis today following MAG 3.  - renal diet.  - strict I and Os.    - remainder of care per primary team.       I spent 35 minutes in the professional and overall care of this patient.    Plan of care discussed with Dr. Borrero who is in agreement with plan of care.     Alexia Reddy, SHERRY-CNP    I agree with the above.  CT reviewed revealing an obstructive stone in left UPJ.  Considering the significant rise in creatinine, not improving despite hydration, and the obstructive stone in a solitary kidney, then patient needs urgent left nephrostomy.         [1] atorvastatin, 40 mg, oral, Daily  cefTRIAXone, 1 g, intravenous,  q24h  heparin (porcine), 5,000 Units, subcutaneous, q8h  [Held by provider] lisinopril, 20 mg, oral, q12h  octreotide, 100 mcg, subcutaneous, Once  pantoprazole, 20 mg, oral, Daily  polyethylene glycol, 17 g, oral, Daily  sodium zirconium cyclosilicate, 10 g, oral, q8h  traZODone, 50 mg, oral, Daily    [2]    [3] PRN medications: dextrose, dextrose, glucagon, glucagon, ondansetron **OR** ondansetron

## 2025-07-09 NOTE — PROGRESS NOTES
Subjective   Rodrigue Palacios is a 69 y.o. male admitted for hypoglycemia and worsening HARMAN.    Overnight event: K elevated at 5.5, Lokelma given.    Patient seen and examined in wheelchair prior to going down for imaging procedure this morning. He reports feeling tired but otherwise reports no other concerns/complaints. Reports that Dr. Borrero removed his stent.    Objective   Physical Exam  Vitals reviewed.   Constitutional:       General: He is not in acute distress.     Appearance: He is ill-appearing.   HENT:      Head: Normocephalic and atraumatic.   Eyes:      Extraocular Movements: Extraocular movements intact.   Cardiovascular:      Rate and Rhythm: Normal rate and regular rhythm.      Heart sounds: No murmur heard.  Pulmonary:      Effort: Pulmonary effort is normal. No respiratory distress.      Breath sounds: Normal breath sounds.   Skin:     General: Skin is warm and dry.   Neurological:      Mental Status: He is alert and oriented to person, place, and time.        Temp:  [37 °C (98.6 °F)-37.4 °C (99.3 °F)] 37.4 °C (99.3 °F)  Heart Rate:  [57-91] 57  Resp:  [13-23] 17  BP: (133-168)/() 154/76  Vitals:    07/09/25 0600   Weight: 86.8 kg (191 lb 5.8 oz)     I/Os    Intake/Output Summary (Last 24 hours) at 7/9/2025 1506  Last data filed at 7/9/2025 1300  Gross per 24 hour   Intake 3087.5 ml   Output 1220 ml   Net 1867.5 ml     Labs:   CBC:   Recent Labs     07/08/25  0520 07/07/25  1832 06/08/25  0649   WBC 12.0* 14.4* 8.2   HGB 9.9* 10.1* 10.6*   HCT 31.2* 32.0* 34.3*    191 354   MCV 86 87 91     CMP:   Recent Labs     07/09/25  0452 07/09/25  0052 07/08/25  1249   * 131* 130*   K 5.0 5.5* 5.2    100 97*   CO2 18* 19* 20*   ANIONGAP 17 18 18   BUN 86* 87* 86*   CREATININE 7.65* 7.40* 7.30*   EGFR 7* 7* 7*   GLUCOSE 53* 63* 81     Recent Labs     07/09/25  0452 07/09/25  0052 07/08/25  1249 07/08/25  0519 07/07/25  1832   ALBUMIN 3.0* 3.1* 3.4 3.1* 3.4   ALKPHOS 66  --   --  54  "61   ALT 44  --   --  12 4*   AST 65*  --   --  23 10   BILITOT 0.3  --   --  0.4 0.5   LIPASE  --   --   --   --  12     Calcium/Phos:   Lab Results   Component Value Date    CALCIUM 8.6 07/09/2025    PHOS 5.3 (H) 07/09/2025      COAG:   Recent Labs     04/04/25  1217   INR 1.1     CRP: No results found for: \"CRP\"     ENDO:   Recent Labs     07/08/25  0519 04/24/25  1345 03/17/25  0932 06/03/24  1307 10/27/22  1305 07/24/20  1527   TSH 0.01*  --   --   --   --  0.49   HGBA1C 7.0* 6.9* 7.2* 5.9   < >  --     < > = values in this interval not displayed.      CARDIAC:   Recent Labs     07/07/25  1938 07/07/25  1832 06/04/25  1351 06/04/25  1248   TROPHS 19 20 13 13   BNP  --  133*  --  142*     Recent Labs     03/08/22  1200 07/24/20  1527   CHOL 174 206*   LDLF  --  95   LDLCALC 80  --    HDL 38* 41.9   TRIG 279* 347*     No data recorded  BLOOD GAS:   Results from last 72 hours   Lab Units 07/08/25  0522   POCT PH, VENOUS pH 7.36   POCT PCO2, VENOUS mm Hg 39*   POCT SO2, VENOUS % 45   POCT OXY HEMOGLOBIN, VENOUS % 44.0*   POCT HEMATOCRIT CALCULATED, VENOUS % 32.0*   POCT SODIUM, VENOUS mmol/L 130*   POCT POTASSIUM, VENOUS mmol/L 4.9   POCT CHLORIDE, VENOUS mmol/L 100   POCT IONIZED CALCIUM, VENOUS mmol/L 1.21   POCT GLUCOSE, VENOUS mg/dL 38*   POCT LACTATE, VENOUS mmol/L 0.7   POCT BASE EXCESS, VENOUS mmol/L -3.2*   POCT HCO3 CALCULATED, VENOUS mmol/L 22.0   POCT HEMOGLOBIN, VENOUS g/dL 10.7*   FIO2 % 21      Results from last 72 hours   Lab Units 07/08/25  0522   FIO2 % 21      URINE:   Recent Labs     07/08/25  0530   SODIUMURR 33   NACREATUR 34      Micro/ID:   Susceptibility data from last 90 days.  Collected Specimen Info Organism Ampicillin Cefazolin Cefazolin (uncomplicated UTIs only) Ciprofloxacin Gentamicin Levofloxacin Nitrofurantoin Piperacillin/Tazobactam Trimethoprim/Sulfamethoxazole   06/04/25 Urine from Clean Catch/Voided Coagulase negative staphylococcus            05/09/25 Urine from Clean " Catch/Voided Escherichia coli  S  S  S  S  S  S  S  S  S     Lab Results   Component Value Date    URINECULTURE  07/07/2025     Growth indicates contamination with mixed bacterial ariadna. Repeat culture if clinically indicated.    BLOODCULT No growth at 4 days -  FINAL REPORT 06/04/2025    BLOODCULT No growth at 4 days -  FINAL REPORT 06/04/2025     Imaging:  No results found.    Meds:  Scheduled medications  Scheduled Medications[1]  Continuous medications  Continuous Medications[2]  PRN medications  PRN Medications[3]     Assessment   Rodrigue Palacios is a 69 y.o. male w/ Hx of DM2, HTN, dyslipidemia, SCOOTER on CPAP, RA, bladder CA (dx Jan 2025) s/p cystectomy w/ ileal conduit creation and R nephro-ureterectomy (May 2025), recent hospitalization (6/5/25 - 6/8/25) for hypoglycemia and acute on chronic renal failure admitted to ICU for hypoglycemia and worsening HARMAN.     Neuro  Altered Mental Status - Resolved w/ correction of hypoglycemia    Pulm  SCOOTER  Plan:   CPAP at bedtime     Cardiovascular  Hypertension  Plan:  Hold lisinopril d/t HARMAN     Dyslipidemia  Plan:  Continue atorvastatin 40 mg daily     GI  GERD:  Plan:  Decrease pantoprazole from 40 mg to 20 mg daily per urology recommendation     Renal/  Hx of bladder CA s/p cystectomy w/ ileal conduit creation and R nephro-ureterectomy   Worsening HARMAN  Obstructive uropathy  :: Morning Cr 7.65 (baseline 1.4-1.5)  :: FENa 2% indeterminate (serum Na 132, serum Cr 7.78, urine Na 33, urine Cr 97)  :: Renal US show moderate left-sided hydronephrosis w/ 1.4 cm non-obstructing stone in inferior pole  :: NM kidney show borderline perfusion, borderline maximal cortical radiotracer accumulation, and retention of radiotracer w/in kidney  :: CT show evidence of obstructing calculus at left pelviureteral junction (1.1 x 1 x 0.7 cm) w/ moderate  left-sided hydronephrosis  :: Appreciate urology/nephrology recommendations  Plan:  Transfer today 7/9/25 for urgent percutaneous  nephrostomy tube placement   Avoid nephrotoxic meds  Monitor urine output  Daily RFP  Dose of urea if sudden drop in Na  Renal diet     UTI  :: UA positive for 500 leuk esterase, blood 1+, bacteria 4+, budding yeast  :: Urine cx showed contamination w/ mixed bacterial ariadna   Plan:  Continue ceftriaxone 1 g d/t obstructing stone found on CT     Endo  DM2  Hypoglycemia  :: Suspect Lantus and sulfonylurea-induced hypoglycemia in the setting of renal failure   :: Elevated C-peptide, normal AM cortisol and beta-hydroxybutyrate  :: Pending serum insulin, serum sulfonylurea, pro-insulin  :: Appreciate endocrinology recommendations  :: Target glucose 110  Plan:   Titrate D10W infusion as necessary  Octreotide 100 mcg subQ q6h if needed (order dependent on need/glucose level)  POCT ACHS glucose qh  PRN Dextrose 50% or glucagon injection     ID  See Renal/     Heme  Chronic anemia at baseline     MSK/Rheum  Rheumatoid Arthritis  :: On certolizumab pegol 400 mg injection every 4 weeks     F: IV and PO  E: Replete prn  N: Renal diet   GI ppx: On pantoprazole for GERD  DVT ppx: Heparin  Abx: Ceftriaxone  Lines: PIV 7/7/25 - 20 G left antecubital, 7/9/25 - 20 G anterior left forearm  Drips: D10W 125 mL/hr  Pressors: None  Code status: Full Code  Primary Emergency Contact: JODEE BARLOW, Home Phone: [phone]     Dispo: ICU for further management of hypoglycemia     Adwoa Hernandez,   PGY-1         [1] atorvastatin, 40 mg, oral, Daily  cefTRIAXone, 1 g, intravenous, q24h  heparin (porcine), 5,000 Units, subcutaneous, q8h  [Held by provider] lisinopril, 20 mg, oral, q12h  pantoprazole, 20 mg, oral, Daily  polyethylene glycol, 17 g, oral, Daily  sodium zirconium cyclosilicate, 10 g, oral, q8h  traZODone, 50 mg, oral, Daily     [2] dextrose 10 % in water (D10W), 100 mL/hr, Last Rate: 100 mL/hr (07/09/25 1210)     [3] PRN medications: dextrose, dextrose, glucagon, glucagon, ondansetron **OR** ondansetron     Adequate: hears normal conversation without difficulty

## 2025-07-09 NOTE — CARE PLAN
The patient's goals for the shift include none    The clinical goals for the shift include BG will normalize    Over the shift, the patient did not make progress toward the following goals. Barriers to progression include . Recommendations to address these barriers include .

## 2025-07-09 NOTE — HOSPITAL COURSE
Rodrigue Palacios is a 69 y.o. male w/ Hx of DM2, HTN, dyslipidemia, SCOOTER on CPAP, RA, bladder CA (dx Jan 2025) s/p cystectomy w/ ileal conduit creation and R nephro-ureterectomy (May 2025), recent hospitalization (6/5/25 - 6/8/25) for hypoglycemia and acute on chronic renal failure who presented to [facility] for confusion and then transferred to [facility] for ICU admission for management of hypoglycemia and worsening HARMAN.     Endocrinology was consulted and patient was treated w/ D10 infusion and octreotide for suspected Lantus and sulfonylurea-induced hypoglycemia in the setting of renal failure. Patient was started on ceftriaxone at [facility] for positive UA and ceftriaxone was continued at [facility] ICU pending urine cx. 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 Ceftriaxone was continued d/t obstructive uropathy. Due to worsening HARMAN, urology and nephrology were consulted and patient had imaging done including renal US, NM kidney, and CT abd/pelvis which showed obstructive uropathy w/ moderate left-sided hydronephrosis. Urology recommends urgent percutaneous nephrostomy tube placement for patient in which patient will be transferred to [facility].

## 2025-07-09 NOTE — PROGRESS NOTES
"Rodrigue Palacios is a 69 y.o. male on day 1 of admission presenting with Hypoglycemia.    Subjective   Hypoglycemia recurred this morning  D10W IV at 100 ml/h   Total 3 doses octreotide 100 mg subcutaneous    Scheduled medications  Scheduled Medications[1]  Continuous medications  Continuous Medications[2]  PRN medications  PRN Medications[3]      Objective   Physical Exam  Constitutional:       General: He is not in acute distress.  Neurological:      Mental Status: He is alert.         Last Recorded Vitals  Blood pressure 145/79, pulse 73, temperature 37.4 °C (99.3 °F), temperature source Temporal, resp. rate 22, height 1.905 m (6' 3\"), weight 86.8 kg (191 lb 5.8 oz), SpO2 100%.  Intake/Output last 3 Shifts:  I/O last 3 completed shifts:  In: 3473.8 (40 mL/kg) [P.O.:420; I.V.:2003.8 (23.1 mL/kg); IV Piggyback:1050]  Out: 1840 (21.2 mL/kg) [Urine:1840 (0.6 mL/kg/hr)]  Weight: 86.8 kg     Relevant Results  Results from last 7 days   Lab Units 07/09/25  1204 07/09/25  1114 07/09/25  0943 07/09/25  0806 07/09/25  0650 07/09/25  0613 07/09/25  0452 07/09/25  0205 07/09/25  0052 07/08/25  1319 07/08/25  1249 07/08/25  0613 07/08/25  0519 07/07/25  2234 07/07/25  2216   POCT GLUCOSE mg/dL 154* 124* 97 76 106*   < >  --    < >  --    < >  --    < >  --    < >  --    GLUCOSE mg/dL  --   --   --   --   --   --  53*  --  63*  --  81  --  38*  --  104*    < > = values in this interval not displayed.       Assessment & Plan  Hypoglycemia    IMPRESSION  TYPE 2 DIABETES MELLITUS WITH HYPOGLYCEMIA  LONG TERM CURRENT INSULIN USE  Prolonged hypoglycemia due to sulfonylurea with renal insufficiency  Advised hypoglycemia can last for days under these condition  Recurrence of hypoglycemia this morning, now improved        RECOMMENDATIONS  Continue IV dextrose, decrease as feasible  May repeat Octreotide 100 mcg subcutaneous q6h until hypoglycemia resolves  Discontinue glimepiride permanently      Alexander Prieto MD         [1] " atorvastatin, 40 mg, oral, Daily  cefTRIAXone, 1 g, intravenous, q24h  heparin (porcine), 5,000 Units, subcutaneous, q8h  [Held by provider] lisinopril, 20 mg, oral, q12h  pantoprazole, 20 mg, oral, Daily  polyethylene glycol, 17 g, oral, Daily  sodium zirconium cyclosilicate, 10 g, oral, q8h  traZODone, 50 mg, oral, Daily  [2] dextrose 10 % in water (D10W), 100 mL/hr, Last Rate: 100 mL/hr (07/09/25 1210)  [3] PRN medications: dextrose, dextrose, glucagon, glucagon, ondansetron **OR** ondansetron

## 2025-07-10 ENCOUNTER — APPOINTMENT (OUTPATIENT)
Dept: CARDIOLOGY | Facility: HOSPITAL | Age: 70
End: 2025-07-10
Payer: MEDICARE

## 2025-07-10 ENCOUNTER — APPOINTMENT (OUTPATIENT)
Dept: RADIOLOGY | Facility: HOSPITAL | Age: 70
DRG: 637 | End: 2025-07-10
Payer: MEDICARE

## 2025-07-10 ENCOUNTER — APPOINTMENT (OUTPATIENT)
Dept: CARDIOLOGY | Facility: HOSPITAL | Age: 70
DRG: 637 | End: 2025-07-10
Payer: MEDICARE

## 2025-07-10 LAB
ALBUMIN SERPL BCP-MCNC: 3 G/DL (ref 3.4–5)
ALBUMIN SERPL BCP-MCNC: 3 G/DL (ref 3.4–5)
ALBUMIN SERPL BCP-MCNC: 3.3 G/DL (ref 3.4–5)
ALBUMIN SERPL BCP-MCNC: 3.4 G/DL (ref 3.4–5)
ANION GAP BLDA CALCULATED.4IONS-SCNC: 13 MMO/L (ref 10–25)
ANION GAP BLDV CALCULATED.4IONS-SCNC: 14 MMOL/L (ref 10–25)
ANION GAP SERPL CALCULATED.3IONS-SCNC: 15 MMOL/L (ref 10–20)
ANION GAP SERPL CALCULATED.3IONS-SCNC: 17 MMOL/L (ref 10–20)
ANION GAP SERPL CALCULATED.3IONS-SCNC: 19 MMOL/L (ref 10–20)
ANION GAP SERPL CALCULATED.3IONS-SCNC: 19 MMOL/L (ref 10–20)
APPEARANCE UR: ABNORMAL
ARTERIAL PATENCY WRIST A: POSITIVE
BASE EXCESS BLDA CALC-SCNC: -1.9 MMOL/L (ref -2–3)
BASE EXCESS BLDV CALC-SCNC: -5.6 MMOL/L (ref -2–3)
BASOPHILS # BLD AUTO: 0.02 X10*3/UL (ref 0–0.1)
BASOPHILS NFR BLD AUTO: 0.3 %
BILIRUB UR STRIP.AUTO-MCNC: NEGATIVE MG/DL
BODY TEMPERATURE: 37 DEGREES CELSIUS
BODY TEMPERATURE: 37 DEGREES CELSIUS
BUN SERPL-MCNC: 84 MG/DL (ref 6–23)
BUN SERPL-MCNC: 86 MG/DL (ref 6–23)
BUN SERPL-MCNC: 88 MG/DL (ref 6–23)
BUN SERPL-MCNC: 90 MG/DL (ref 6–23)
CA-I BLDA-SCNC: 1.19 MMOL/L (ref 1.1–1.33)
CA-I BLDV-SCNC: 1.23 MMOL/L (ref 1.1–1.33)
CALCIUM SERPL-MCNC: 8.5 MG/DL (ref 8.6–10.3)
CALCIUM SERPL-MCNC: 8.5 MG/DL (ref 8.6–10.3)
CALCIUM SERPL-MCNC: 9.1 MG/DL (ref 8.6–10.3)
CALCIUM SERPL-MCNC: 9.1 MG/DL (ref 8.6–10.3)
CARDIAC TROPONIN I PNL SERPL HS: 16 NG/L (ref 0–20)
CHLORIDE BLDA-SCNC: 101 MMOL/L (ref 98–107)
CHLORIDE BLDV-SCNC: 104 MMOL/L (ref 98–107)
CHLORIDE SERPL-SCNC: 100 MMOL/L (ref 98–107)
CHLORIDE SERPL-SCNC: 102 MMOL/L (ref 98–107)
CHLORIDE SERPL-SCNC: 102 MMOL/L (ref 98–107)
CHLORIDE SERPL-SCNC: 98 MMOL/L (ref 98–107)
CO2 SERPL-SCNC: 16 MMOL/L (ref 21–32)
CO2 SERPL-SCNC: 18 MMOL/L (ref 21–32)
CO2 SERPL-SCNC: 18 MMOL/L (ref 21–32)
CO2 SERPL-SCNC: 25 MMOL/L (ref 21–32)
COLOR UR: ABNORMAL
CREAT SERPL-MCNC: 7.1 MG/DL (ref 0.5–1.3)
CREAT SERPL-MCNC: 7.5 MG/DL (ref 0.5–1.3)
CREAT SERPL-MCNC: 7.55 MG/DL (ref 0.5–1.3)
CREAT SERPL-MCNC: 7.94 MG/DL (ref 0.5–1.3)
EGFRCR SERPLBLD CKD-EPI 2021: 7 ML/MIN/1.73M*2
EGFRCR SERPLBLD CKD-EPI 2021: 8 ML/MIN/1.73M*2
EOSINOPHIL # BLD AUTO: 0.34 X10*3/UL (ref 0–0.7)
EOSINOPHIL NFR BLD AUTO: 4.5 %
ERYTHROCYTE [DISTWIDTH] IN BLOOD BY AUTOMATED COUNT: 13.8 % (ref 11.5–14.5)
GLUCOSE BLD MANUAL STRIP-MCNC: 158 MG/DL (ref 74–99)
GLUCOSE BLD MANUAL STRIP-MCNC: 169 MG/DL (ref 74–99)
GLUCOSE BLD MANUAL STRIP-MCNC: 171 MG/DL (ref 74–99)
GLUCOSE BLD MANUAL STRIP-MCNC: 174 MG/DL (ref 74–99)
GLUCOSE BLD MANUAL STRIP-MCNC: 174 MG/DL (ref 74–99)
GLUCOSE BLD MANUAL STRIP-MCNC: 183 MG/DL (ref 74–99)
GLUCOSE BLD MANUAL STRIP-MCNC: 198 MG/DL (ref 74–99)
GLUCOSE BLD MANUAL STRIP-MCNC: 205 MG/DL (ref 74–99)
GLUCOSE BLD MANUAL STRIP-MCNC: 219 MG/DL (ref 74–99)
GLUCOSE BLD MANUAL STRIP-MCNC: 240 MG/DL (ref 74–99)
GLUCOSE BLD MANUAL STRIP-MCNC: 291 MG/DL (ref 74–99)
GLUCOSE BLD MANUAL STRIP-MCNC: 298 MG/DL (ref 74–99)
GLUCOSE BLD MANUAL STRIP-MCNC: 313 MG/DL (ref 74–99)
GLUCOSE BLDA-MCNC: 333 MG/DL (ref 74–99)
GLUCOSE BLDV-MCNC: 160 MG/DL (ref 74–99)
GLUCOSE SERPL-MCNC: 154 MG/DL (ref 74–99)
GLUCOSE SERPL-MCNC: 174 MG/DL (ref 74–99)
GLUCOSE SERPL-MCNC: 212 MG/DL (ref 74–99)
GLUCOSE SERPL-MCNC: 256 MG/DL (ref 74–99)
GLUCOSE UR STRIP.AUTO-MCNC: NEGATIVE MG/DL
HCO3 BLDA-SCNC: 21.9 MMOL/L (ref 22–26)
HCO3 BLDV-SCNC: 19.2 MMOL/L (ref 22–26)
HCT VFR BLD AUTO: 29.6 % (ref 41–52)
HCT VFR BLD EST: 31 % (ref 41–52)
HCT VFR BLD EST: 31 % (ref 41–52)
HGB BLD-MCNC: 9.5 G/DL (ref 13.5–17.5)
HGB BLDA-MCNC: 10.3 G/DL (ref 13.5–17.5)
HGB BLDV-MCNC: 10.3 G/DL (ref 13.5–17.5)
HOLD SPECIMEN: NORMAL
IMM GRANULOCYTES # BLD AUTO: 0.03 X10*3/UL (ref 0–0.7)
IMM GRANULOCYTES NFR BLD AUTO: 0.4 % (ref 0–0.9)
INHALED O2 CONCENTRATION: 21 %
INHALED O2 CONCENTRATION: 21 %
INSULIN FREE SERPL-ACNC: 24 UIU/ML (ref 3–25)
INSULIN SERPL-ACNC: 34 UIU/ML (ref 3–25)
KETONES UR STRIP.AUTO-MCNC: NEGATIVE MG/DL
LACTATE BLDA-SCNC: 0.8 MMOL/L (ref 0.4–2)
LACTATE BLDV-SCNC: 1.1 MMOL/L (ref 0.4–2)
LEUKOCYTE ESTERASE UR QL STRIP.AUTO: ABNORMAL
LYMPHOCYTES # BLD AUTO: 0.55 X10*3/UL (ref 1.2–4.8)
LYMPHOCYTES NFR BLD AUTO: 7.2 %
MAGNESIUM SERPL-MCNC: 1.75 MG/DL (ref 1.6–2.4)
MAGNESIUM SERPL-MCNC: 1.77 MG/DL (ref 1.6–2.4)
MAGNESIUM SERPL-MCNC: 1.8 MG/DL (ref 1.6–2.4)
MCH RBC QN AUTO: 27.1 PG (ref 26–34)
MCHC RBC AUTO-ENTMCNC: 32.1 G/DL (ref 32–36)
MCV RBC AUTO: 85 FL (ref 80–100)
MONOCYTES # BLD AUTO: 1.02 X10*3/UL (ref 0.1–1)
MONOCYTES NFR BLD AUTO: 13.4 %
NEUTROPHILS # BLD AUTO: 5.66 X10*3/UL (ref 1.2–7.7)
NEUTROPHILS NFR BLD AUTO: 74.2 %
NITRITE UR QL STRIP.AUTO: NEGATIVE
NRBC BLD-RTO: 0 /100 WBCS (ref 0–0)
OXYHGB MFR BLDA: 96.8 % (ref 94–98)
OXYHGB MFR BLDV: 67 % (ref 45–75)
PCO2 BLDA: 33 MM HG (ref 38–42)
PCO2 BLDV: 34 MM HG (ref 41–51)
PH BLDA: 7.43 PH (ref 7.38–7.42)
PH BLDV: 7.36 PH (ref 7.33–7.43)
PH UR STRIP.AUTO: 6 [PH]
PHOSPHATE SERPL-MCNC: 5.6 MG/DL (ref 2.5–4.9)
PHOSPHATE SERPL-MCNC: 5.8 MG/DL (ref 2.5–4.9)
PHOSPHATE SERPL-MCNC: 5.8 MG/DL (ref 2.5–4.9)
PHOSPHATE SERPL-MCNC: 5.9 MG/DL (ref 2.5–4.9)
PLATELET # BLD AUTO: 154 X10*3/UL (ref 150–450)
PO2 BLDA: 90 MM HG (ref 85–95)
PO2 BLDV: 39 MM HG (ref 35–45)
POTASSIUM BLDA-SCNC: 4.8 MMOL/L (ref 3.5–5.3)
POTASSIUM BLDV-SCNC: 5.7 MMOL/L (ref 3.5–5.3)
POTASSIUM SERPL-SCNC: 4.7 MMOL/L (ref 3.5–5.3)
POTASSIUM SERPL-SCNC: 5 MMOL/L (ref 3.5–5.3)
POTASSIUM SERPL-SCNC: 5 MMOL/L (ref 3.5–5.3)
POTASSIUM SERPL-SCNC: 5.4 MMOL/L (ref 3.5–5.3)
POTASSIUM SERPL-SCNC: 5.7 MMOL/L (ref 3.5–5.3)
PROT UR STRIP.AUTO-MCNC: ABNORMAL MG/DL
RBC # BLD AUTO: 3.5 X10*6/UL (ref 4.5–5.9)
RBC # UR STRIP.AUTO: ABNORMAL MG/DL
RBC #/AREA URNS AUTO: ABNORMAL /HPF
SAO2 % BLDA: 99 % (ref 94–100)
SAO2 % BLDV: 69 % (ref 45–75)
SODIUM BLDA-SCNC: 131 MMOL/L (ref 136–145)
SODIUM BLDV-SCNC: 131 MMOL/L (ref 136–145)
SODIUM SERPL-SCNC: 128 MMOL/L (ref 136–145)
SODIUM SERPL-SCNC: 129 MMOL/L (ref 136–145)
SODIUM SERPL-SCNC: 134 MMOL/L (ref 136–145)
SODIUM SERPL-SCNC: 137 MMOL/L (ref 136–145)
SP GR UR STRIP.AUTO: 1.02
SPECIMEN DRAWN FROM PATIENT: ABNORMAL
T4 FREE SERPL-MCNC: 1.12 NG/DL (ref 0.61–1.12)
TSH SERPL-ACNC: 0.01 MIU/L (ref 0.44–3.98)
UROBILINOGEN UR STRIP.AUTO-MCNC: ABNORMAL MG/DL
WBC # BLD AUTO: 7.6 X10*3/UL (ref 4.4–11.3)
WBC #/AREA URNS AUTO: ABNORMAL /HPF

## 2025-07-10 PROCEDURE — 2500000001 HC RX 250 WO HCPCS SELF ADMINISTERED DRUGS (ALT 637 FOR MEDICARE OP)

## 2025-07-10 PROCEDURE — 99291 CRITICAL CARE FIRST HOUR: CPT

## 2025-07-10 PROCEDURE — 83735 ASSAY OF MAGNESIUM: CPT

## 2025-07-10 PROCEDURE — 2780000003 HC OR 278 NO HCPCS

## 2025-07-10 PROCEDURE — 85025 COMPLETE CBC W/AUTO DIFF WBC: CPT

## 2025-07-10 PROCEDURE — 2500000005 HC RX 250 GENERAL PHARMACY W/O HCPCS

## 2025-07-10 PROCEDURE — C1887 CATHETER, GUIDING: HCPCS

## 2025-07-10 PROCEDURE — 99152 MOD SED SAME PHYS/QHP 5/>YRS: CPT

## 2025-07-10 PROCEDURE — 93005 ELECTROCARDIOGRAM TRACING: CPT

## 2025-07-10 PROCEDURE — 87077 CULTURE AEROBIC IDENTIFY: CPT | Mod: WESLAB | Performed by: SURGERY

## 2025-07-10 PROCEDURE — 36415 COLL VENOUS BLD VENIPUNCTURE: CPT

## 2025-07-10 PROCEDURE — 84132 ASSAY OF SERUM POTASSIUM: CPT

## 2025-07-10 PROCEDURE — 84443 ASSAY THYROID STIM HORMONE: CPT

## 2025-07-10 PROCEDURE — 99222 1ST HOSP IP/OBS MODERATE 55: CPT | Performed by: SURGERY

## 2025-07-10 PROCEDURE — C1729 CATH, DRAINAGE: HCPCS

## 2025-07-10 PROCEDURE — 50432 PLMT NEPHROSTOMY CATHETER: CPT | Mod: LT

## 2025-07-10 PROCEDURE — 2500000005 HC RX 250 GENERAL PHARMACY W/O HCPCS: Performed by: INTERNAL MEDICINE

## 2025-07-10 PROCEDURE — 0T9330Z DRAINAGE OF RIGHT KIDNEY PELVIS WITH DRAINAGE DEVICE, PERCUTANEOUS APPROACH: ICD-10-PCS | Performed by: RADIOLOGY

## 2025-07-10 PROCEDURE — 36600 WITHDRAWAL OF ARTERIAL BLOOD: CPT

## 2025-07-10 PROCEDURE — 2500000002 HC RX 250 W HCPCS SELF ADMINISTERED DRUGS (ALT 637 FOR MEDICARE OP, ALT 636 FOR OP/ED)

## 2025-07-10 PROCEDURE — 2550000001 HC RX 255 CONTRASTS: Performed by: RADIOLOGY

## 2025-07-10 PROCEDURE — 2020000001 HC ICU ROOM DAILY

## 2025-07-10 PROCEDURE — 97166 OT EVAL MOD COMPLEX 45 MIN: CPT | Mod: GO

## 2025-07-10 PROCEDURE — 2500000004 HC RX 250 GENERAL PHARMACY W/ HCPCS (ALT 636 FOR OP/ED): Performed by: INTERNAL MEDICINE

## 2025-07-10 PROCEDURE — 2500000004 HC RX 250 GENERAL PHARMACY W/ HCPCS (ALT 636 FOR OP/ED)

## 2025-07-10 PROCEDURE — 84439 ASSAY OF FREE THYROXINE: CPT

## 2025-07-10 PROCEDURE — 97530 THERAPEUTIC ACTIVITIES: CPT | Mod: GO

## 2025-07-10 PROCEDURE — 2500000004 HC RX 250 GENERAL PHARMACY W/ HCPCS (ALT 636 FOR OP/ED): Performed by: RADIOLOGY

## 2025-07-10 PROCEDURE — 36415 COLL VENOUS BLD VENIPUNCTURE: CPT | Performed by: STUDENT IN AN ORGANIZED HEALTH CARE EDUCATION/TRAINING PROGRAM

## 2025-07-10 PROCEDURE — 84132 ASSAY OF SERUM POTASSIUM: CPT | Performed by: STUDENT IN AN ORGANIZED HEALTH CARE EDUCATION/TRAINING PROGRAM

## 2025-07-10 PROCEDURE — 76942 ECHO GUIDE FOR BIOPSY: CPT

## 2025-07-10 PROCEDURE — C1769 GUIDE WIRE: HCPCS

## 2025-07-10 PROCEDURE — 82947 ASSAY GLUCOSE BLOOD QUANT: CPT

## 2025-07-10 PROCEDURE — 80069 RENAL FUNCTION PANEL: CPT | Performed by: STUDENT IN AN ORGANIZED HEALTH CARE EDUCATION/TRAINING PROGRAM

## 2025-07-10 PROCEDURE — 94660 CPAP INITIATION&MGMT: CPT

## 2025-07-10 PROCEDURE — 84484 ASSAY OF TROPONIN QUANT: CPT

## 2025-07-10 PROCEDURE — 2720000007 HC OR 272 NO HCPCS

## 2025-07-10 PROCEDURE — 81001 URINALYSIS AUTO W/SCOPE: CPT | Performed by: SURGERY

## 2025-07-10 RX ORDER — MIDAZOLAM HYDROCHLORIDE 1 MG/ML
INJECTION, SOLUTION INTRAMUSCULAR; INTRAVENOUS AS NEEDED
Status: DISCONTINUED | OUTPATIENT
Start: 2025-07-10 | End: 2025-07-10 | Stop reason: HOSPADM

## 2025-07-10 RX ORDER — SODIUM BICARBONATE 1 MEQ/ML
100 SYRINGE (ML) INTRAVENOUS ONCE
Status: COMPLETED | OUTPATIENT
Start: 2025-07-10 | End: 2025-07-10

## 2025-07-10 RX ORDER — ACETAMINOPHEN 325 MG/1
650 TABLET ORAL EVERY 6 HOURS PRN
Status: DISCONTINUED | OUTPATIENT
Start: 2025-07-10 | End: 2025-07-13 | Stop reason: HOSPADM

## 2025-07-10 RX ORDER — INSULIN LISPRO 100 [IU]/ML
0-5 INJECTION, SOLUTION INTRAVENOUS; SUBCUTANEOUS
Status: DISCONTINUED | OUTPATIENT
Start: 2025-07-10 | End: 2025-07-13 | Stop reason: HOSPADM

## 2025-07-10 RX ORDER — DEXTROSE 50 % IN WATER (D50W) INTRAVENOUS SYRINGE
25 ONCE
Status: COMPLETED | OUTPATIENT
Start: 2025-07-10 | End: 2025-07-10

## 2025-07-10 RX ORDER — LIDOCAINE HYDROCHLORIDE AND EPINEPHRINE 10; 10 UG/ML; MG/ML
INJECTION, SOLUTION INFILTRATION; PERINEURAL AS NEEDED
Status: DISCONTINUED | OUTPATIENT
Start: 2025-07-10 | End: 2025-07-10 | Stop reason: HOSPADM

## 2025-07-10 RX ORDER — IODIXANOL 320 MG/ML
INJECTION, SOLUTION INTRAVASCULAR AS NEEDED
Status: DISCONTINUED | OUTPATIENT
Start: 2025-07-10 | End: 2025-07-10 | Stop reason: HOSPADM

## 2025-07-10 RX ORDER — FUROSEMIDE 10 MG/ML
80 INJECTION INTRAMUSCULAR; INTRAVENOUS ONCE
Status: COMPLETED | OUTPATIENT
Start: 2025-07-10 | End: 2025-07-10

## 2025-07-10 RX ORDER — FENTANYL CITRATE 50 UG/ML
INJECTION, SOLUTION INTRAMUSCULAR; INTRAVENOUS AS NEEDED
Status: DISCONTINUED | OUTPATIENT
Start: 2025-07-10 | End: 2025-07-10 | Stop reason: HOSPADM

## 2025-07-10 RX ADMIN — FUROSEMIDE 80 MG: 10 INJECTION, SOLUTION INTRAMUSCULAR; INTRAVENOUS at 09:24

## 2025-07-10 RX ADMIN — LIDOCAINE HYDROCHLORIDE AND EPINEPHRINE 10 ML: 10; 10 INJECTION, SOLUTION INFILTRATION; PERINEURAL at 10:41

## 2025-07-10 RX ADMIN — IODIXANOL 15 ML: 320 INJECTION, SOLUTION INTRAVASCULAR at 10:43

## 2025-07-10 RX ADMIN — HEPARIN SODIUM 5000 UNITS: 5000 INJECTION, SOLUTION INTRAVENOUS; SUBCUTANEOUS at 06:18

## 2025-07-10 RX ADMIN — MIDAZOLAM 1 MG: 1 INJECTION INTRAMUSCULAR; INTRAVENOUS at 10:41

## 2025-07-10 RX ADMIN — SODIUM BICARBONATE 100 ML/HR: 84 INJECTION, SOLUTION INTRAVENOUS at 11:33

## 2025-07-10 RX ADMIN — HEPARIN SODIUM 5000 UNITS: 5000 INJECTION, SOLUTION INTRAVENOUS; SUBCUTANEOUS at 22:30

## 2025-07-10 RX ADMIN — FENTANYL CITRATE 50 MCG: 50 INJECTION, SOLUTION INTRAMUSCULAR; INTRAVENOUS at 10:41

## 2025-07-10 RX ADMIN — SODIUM BICARBONATE 100 MEQ: 84 INJECTION INTRAVENOUS at 09:24

## 2025-07-10 RX ADMIN — DEXTROSE MONOHYDRATE 25 G: 25 INJECTION, SOLUTION INTRAVENOUS at 09:05

## 2025-07-10 RX ADMIN — HEPARIN SODIUM 5000 UNITS: 5000 INJECTION, SOLUTION INTRAVENOUS; SUBCUTANEOUS at 14:41

## 2025-07-10 RX ADMIN — OCTREOTIDE ACETATE 100 MCG: 100 INJECTION, SOLUTION INTRAVENOUS; SUBCUTANEOUS at 14:41

## 2025-07-10 RX ADMIN — OCTREOTIDE ACETATE 100 MCG: 100 INJECTION, SOLUTION INTRAVENOUS; SUBCUTANEOUS at 09:11

## 2025-07-10 RX ADMIN — INSULIN LISPRO 3 UNITS: 100 INJECTION, SOLUTION INTRAVENOUS; SUBCUTANEOUS at 17:08

## 2025-07-10 RX ADMIN — ACETAMINOPHEN 650 MG: 325 TABLET ORAL at 11:53

## 2025-07-10 RX ADMIN — OCTREOTIDE ACETATE 100 MCG: 100 INJECTION, SOLUTION INTRAVENOUS; SUBCUTANEOUS at 02:23

## 2025-07-10 RX ADMIN — INSULIN HUMAN 10 UNITS: 100 INJECTION, SOLUTION PARENTERAL at 09:04

## 2025-07-10 ASSESSMENT — COGNITIVE AND FUNCTIONAL STATUS - GENERAL
HELP NEEDED FOR BATHING: A LITTLE
PERSONAL GROOMING: A LITTLE
DRESSING REGULAR LOWER BODY CLOTHING: A LITTLE
TOILETING: A LITTLE
DAILY ACTIVITIY SCORE: 19
DRESSING REGULAR UPPER BODY CLOTHING: A LITTLE

## 2025-07-10 ASSESSMENT — PAIN - FUNCTIONAL ASSESSMENT
PAIN_FUNCTIONAL_ASSESSMENT: 0-10

## 2025-07-10 ASSESSMENT — PAIN SCALES - GENERAL
PAINLEVEL_OUTOF10: 0 - NO PAIN
PAINLEVEL_OUTOF10: 2

## 2025-07-10 ASSESSMENT — PAIN DESCRIPTION - DESCRIPTORS: DESCRIPTORS: ACHING

## 2025-07-10 ASSESSMENT — ENCOUNTER SYMPTOMS
ABDOMINAL PAIN: 1
VOMITING: 0
CARDIOVASCULAR NEGATIVE: 1
FLANK PAIN: 1
HEMATURIA: 0
RESPIRATORY NEGATIVE: 1
DECREASED APPETITE: 1

## 2025-07-10 ASSESSMENT — ACTIVITIES OF DAILY LIVING (ADL)
BATHING_ASSISTANCE: MINIMAL
ADL_ASSISTANCE: INDEPENDENT

## 2025-07-10 NOTE — CONSULTS
"Nutrition Assessement Note    Nutrition Assessment    Reason for Assessment: Admission nursing screening (MST 4)    Reason for Hospital Admission:  Rodrigue Palacios is a 69 y.o. male who is admitted for hypoglycemia, worsening HARMAN. Transferred from [facility] for nephrology tube placement. Recent cystoprostatectomy with ileal conduit and right nephroureterectomy (5/21). Noted to be a complicated post-op course with poor appetite, oral thrush, fluid shifts, and multiple medication changes. Significant weight loss documented. Patient off floor at time of visit for procedure. Will provide Nepro BID for added nutrition and monitor po intake.    Malnutrition Screening Tool (MST)  Have you recently lost weight without trying?: Yes  If yes, how much weight have you lost?: Lost 24 - 33 pounds  Weight Loss Score: 3  Have you been eating poorly because of a decreased appetite?: Yes (thrush)  Malnutrition Score: 4  Nutrition Screen  Stage 3 or 4 Pressure Injury or Multiple Non-Healing Wounds: No  Home Tube Feeding or Total Parenteral Nutrition (TPN): No  Dietitian Consult Needed: No    Medical History[1]   Surgical History[2]    Nutrition History:  Energy Intake: Poor < 50 %     Anthropometrics:  Ht: 190.5 cm (6' 3\"), Wt: 90.4 kg (199 lb 4.7 oz), BMI: 24.91  IBW/kg (Dietitian Calculated): 89.09 kg  Percent of IBW: 102 %     Weight Change:  Daily Weight  07/10/25 : 90.4 kg (199 lb 4.7 oz)  07/09/25 : 86.8 kg (191 lb 5.8 oz)  07/07/25 : 97.5 kg (215 lb)  06/04/25 : 92.5 kg (204 lb)  05/29/25 : 97.5 kg (215 lb)  05/21/25 : 98.4 kg (216 lb 14.9 oz)  05/09/25 : 102 kg (225 lb 4.8 oz)  04/24/25 : 99.4 kg (219 lb 3.2 oz)  04/16/25 : 97.1 kg (214 lb)  04/07/25 : 97.7 kg (215 lb 8 oz)    Weight History / % Weight Change: 26# (11%) weight loss in past 2 months  Significant Weight Loss: Yes  Interpretation of Weight Loss: Other (see comment)     Nutrition Focused Physical Exam Findings: defer: off floor     Nutrition Significant " Labs:  Lab Results   Component Value Date    WBC 7.6 07/10/2025    HGB 9.5 (L) 07/10/2025    HCT 29.6 (L) 07/10/2025     07/10/2025    CHOL 174 03/08/2022    TRIG 279 (H) 03/08/2022    HDL 38 (L) 03/08/2022    ALT 66 (H) 07/09/2025    AST 81 (H) 07/09/2025     (L) 07/10/2025    K 5.0 07/10/2025     07/10/2025    CREATININE 7.94 (H) 07/10/2025    BUN 90 (H) 07/10/2025    CO2 18 (L) 07/10/2025    TSH 0.01 (L) 07/08/2025    INR 1.1 04/04/2025    HGBA1C 7.0 (H) 07/08/2025    ALBUR 12 03/08/2022     Nutrition Specific Medications:  Scheduled Medications[3]  Continuous Medications[4]    Dietary Orders (From admission, onward)       Start     Ordered    07/10/25 1137  Adult diet Renal; Potassium Restricted 2 gm (50mEq); 2 - 3 grams Sodium  Diet effective now        Question Answer Comment   Diet type Renal    Potassium restriction: Potassium Restricted 2 gm (50mEq)    Sodium restriction: 2 - 3 grams Sodium        07/10/25 1137    07/09/25 1837  May Participate in Room Service  ( ROOM SERVICE MAY PARTICIPATE)  Once        Question:  .  Answer:  Yes    07/09/25 1836                   Estimated Needs:   Estimated Energy Needs  Total Energy Estimated Needs in 24 hours (kCal): 2700 kCal  Energy Estimated Needs per kg Body Weight in 24 hours (kCal/kg): 30 kCal/kg  Method for Estimating Needs: Actual Wt    Estimated Protein Needs  Total Protein Estimated Needs in 24 Hours (g):  (72-90)  Protein Estimated Needs per kg Body Weight in 24 Hours (g/kg):  (.8-1.0)  Method for Estimating 24 Hour Protein Needs: Actual Wt    Estimated Fluid Needs  Method for Estimating 24 Hour Fluid Needs: per MD/Nephrology     Nutrition Diagnosis   Nutrition Diagnosis:  Malnutrition Diagnosis  Patient has Malnutrition Diagnosis: Yes  Diagnosis Status: New  Malnutrition Diagnosis: Severe malnutrition related to chronic disease or condition  Related to: illness/surgery  As Evidenced by: 26# (11%) weight loss in past 2 months, PO intake  <75% of estimated energy needs >1 month          Nutrition Interventions/Recommendations   Nutrition Interventions and Recommendations:  Nutrition Prescription: Nutrition prescription for oral nutrition    Nutrition Recommendations:  Individualized Nutrition Prescription Provided for : continue renal diet, add Nepro BID    Nutrition Interventions/Goals:   Food and/or Nutrient Delivery Interventions  Interventions: Meals and snacks, Medical food supplement  Meals and Snacks: Mineral-modified diet  Goal: provide as ordered  Medical Food Supplement: Commercial beverage medical food supplement therapy  Goal: Nepro BID to provide 420 calories, 19 gm protein each    Education Documentation  No documentation found.            Nutrition Monitoring and Evaluation   Monitoring/Evaluation:   Food/Nutrient Related History Monitoring  Monitoring and Evaluation Plan: Estimated Energy Intake  Estimated Energy Intake: Energy intake greater or equal to 75% of estimated energy needs    Anthropometric Measurements  Monitoring and Evaluation Plan: Body weight  Body Weight: Body weight - Maintain stable weight    Goal Status: New goal(s) identified    Follow Up  Time Spent (min): 30 minutes  Last Date of Nutrition Visit: 07/10/25  Nutrition Follow-Up Needed?: 3-5 days  Follow up Comment: 7/14/25          [1]   Past Medical History:  Diagnosis Date    Arthritis     rhuematoid    Bladder cancer (Multi)     CKD (chronic kidney disease)     Dorsalgia, unspecified 07/28/2020    Back pain without radiation    GERD (gastroesophageal reflux disease)     controlled    Hearing aid worn     HL (hearing loss)     Hyperlipidemia     Hypertension     Local infection of the skin and subcutaneous tissue, unspecified 10/11/2013    Nonvenomous insect bite with infection    Nephrolithiasis     Other muscle spasm 02/14/2018    Muscle spasms of neck    Other specified diseases of anus and rectum 03/22/2013    Anal pain    Personal history of other diseases  of the digestive system 09/18/2013    History of gastroenteritis    Personal history of other diseases of the respiratory system 04/27/2020    History of acute bronchitis    Personal history of other diseases of the respiratory system 06/24/2019    History of upper respiratory infection    Personal history of other infectious and parasitic diseases     History of candidiasis of mouth    Personal history of other specified conditions 03/22/2013    History of abnormal weight loss    Personal history of other specified conditions 02/24/2014    History of numbness    Personal history of other specified conditions 07/14/2017    History of abdominal pain    Pneumonia, unspecified organism 01/25/2016    Pneumonia involving right lung    Rash and other nonspecific skin eruption 04/29/2013    Rash    Sleep apnea     CPAP    Type 2 diabetes mellitus    [2]   Past Surgical History:  Procedure Laterality Date    BLADDER SURGERY  2025    Removal malignant tumor    CHOLECYSTECTOMY  07/07/2016    Cholecystectomy    HERNIA REPAIR  07/07/2016    Inguinal Hernia Repair    SHOULDER SURGERY Right    [3] atorvastatin, 40 mg, oral, Daily  heparin (porcine), 5,000 Units, subcutaneous, q8h  [Held by provider] lisinopril, 20 mg, oral, q12h  octreotide, 100 mcg, subcutaneous, q6h  pantoprazole, 20 mg, oral, Daily  [Held by provider] sodium zirconium cyclosilicate, 10 g, oral, TID  sodium zirconium cyclosilicate, 15 g, oral, BID    [4] sodium bicarbonate 1 mEq/mL (8.4 %) 150 mEq in dextrose 5% 1,150 mL infusion, 100 mL/hr, Last Rate: 100 mL/hr (07/10/25 1133)

## 2025-07-10 NOTE — CARE PLAN
The patient's goals for the shift include get ready for procedure    The clinical goals for the shift include Pt BS will remain WNL and he will remain HDS for entire shift. Pt will remain free from injury or fall for entire shit. Pt will continue to have adaquate urinary output via ileal conduit.

## 2025-07-10 NOTE — PRE-PROCEDURE NOTE
Interventional Radiology Preprocedure Note    Indication for procedure: The encounter diagnosis was Hypoglycemia.    Relevant review of systems: no SOB.    Relevant Labs:   Lab Results   Component Value Date    CREATININE 7.55 (H) 07/10/2025    EGFR 7 (L) 07/10/2025    INR 1.1 04/04/2025    PROTIME 13.1 04/04/2025       Planned Sedation/Anesthesia: Moderate    Airway assessment: normal    Directed physical examination:    AAOx3, no labered breathing    Mallampati: II (hard and soft palate, upper portion of tonsils and uvula visible)    ASA Score: ASA 3 - Patient with moderate systemic disease with functional limitations    Benefits, risks and alternatives of procedure and planned sedation have been discussed with the patient and/or their representative. All questions answered and they agree to proceed.

## 2025-07-10 NOTE — CARE PLAN
The patient's goals for the shift include get ready for procedure    The clinical goals for the shift include Decrease potassium, IR for nephrostomy tube placement, adequate urine output    Problem: Pain - Adult  Goal: Verbalizes/displays adequate comfort level or baseline comfort level  Outcome: Progressing     Problem: Safety - Adult  Goal: Free from fall injury  Outcome: Progressing     Problem: Discharge Planning  Goal: Discharge to home or other facility with appropriate resources  Outcome: Progressing     Problem: Chronic Conditions and Co-morbidities  Goal: Patient's chronic conditions and co-morbidity symptoms are monitored and maintained or improved  Outcome: Progressing     Problem: Nutrition  Goal: Nutrient intake appropriate for maintaining nutritional needs  Outcome: Progressing

## 2025-07-10 NOTE — PROGRESS NOTES
Spiritual Care Visit  Spiritual Care Request    Reason for Visit:  Routine Visit: Introduction     Request Received From:       Focus of Care:  Visited With: Patient         Refer to :          Spiritual Care Assessment    Spiritual Assessment:                      Care Provided:       Sense of Community and or Denominational Affiliation:  Nondenominational   Values/Beliefs  Spiritual Requests During Hospitalization: Rodrigue did not want any sacraments     Addressed Needs/Concerns and/or Adis Through:     Sacramental Encounters  Communion: Does not want communion  Communion Given Indicator: No  Sacrament of Sick-Anointing: Patient declined anointing    Outcome:        Advance Directives:         Spiritual Care Annotation    Annotation:  Rodrigue did not want any sacraments today.  Pacheco Draper

## 2025-07-10 NOTE — CONSULTS
CONSULT: Nephrology SERVICE    SERVICE DATE: 7/10/2025   SERVICE TIME:  9:18 AM    REASON FOR CONSULT: Acute renal failure and hyperkalemia  REQUESTING PHYSICIAN: Dr. Alcantara  PRIMARY CARE PHYSICIAN: SHERRY Blanco-CNP    ASSESSMENT AND PLAN  69-year-old man with bladder cancer status post right-sided nephrectomy, cystectomy, ileal conduit coming in with left sided kidney stone, hydronephrosis, obstructive nephropathy, severe acute renal failure.  1.  Acute renal failure  2.  Hyperkalemia  3.  Acute metabolic acidosis  4.  Obstructive uropathy  5.  Hydronephrosis  6.  Kidney stone  7.  Status post nephrectomy    The acute renal failure is from unilateral obstruction with a history of nephrectomy.  I agree with urgent interventional radiology decompression of his left kidney.  He is making urine.  In order to treat the hyperkalemia further, give 2 ampoules of bicarbonate and a bicarbonate drip thereafter.  Will also dose with Lasix 80 mg to help him kaliurese.  His blood pressure is running somewhat elevated.  Monitor blood work after the procedure.  We discussed the possibility of dialysis if the nephrostomy tube placement does not yield a prompt improvement in her renal function or the hyperkalemia.  I am hopeful that this will not be the case.  Postprocedural labs, supportive care otherwise, antibiotics.  I will continue to follow this patient.  Thank you for the consultation.     SUBJECTIVE  Mr. Palacios is a 69 y.o. man with a history of bladder cancer admitted to the hospital from another hospital due to worsening renal dysfunction and the need for percutaneous nephrostomy tube placement.  He had invasive bladder cancer and underwent a right-sided nephrectomy with cystectomy roughly 2 months ago.  He is doing well postoperatively, but he developed some belly pain and malaise.  CT scan showed left-sided hydronephrosis with a stone in place.  He initially presented to [place], transferred to [facility], now  "transferred to [facility] for interventional radiology placement of nephrostomy tube.  He is being evaluated by urology.  His creatinine is 7.  His potassium is in the high fives.  He has received numerous medicines to bring his potassium down.  He is making plenty of urine.  His blood pressure is on the elevated side.  He has no fevers.  He has mild abdominal pain.  There is no diarrhea.    PAST MEDICAL HISTORY: Medical History[1]  PAST SURGICAL HISTORY: Surgical History[2]  FAMILY HISTORY: Family History[3]  SOCIAL HISTORY: Social History[4]  MEDICATIONS:  Scheduled Medications[5]   Continuous Medications[6]   PRN Medications[7]   CURRENT ALLERGIES:   Allergies as of 07/09/2025 - Reviewed 07/09/2025   Allergen Reaction Noted    Orencia [abatacept] Hives 03/14/2023       COMPLETE REVIEW OF SYSTEMS:    Full ROS was negative unless mentioned above    OBJECTIVE  PHYSICAL EXAM  Heart Rate:  [51-91]   Temp:  [36.7 °C (98.1 °F)-37.1 °C (98.8 °F)]   Resp:  [13-25]   BP: (118-183)/()   Height:  [190.5 cm (6' 3\")]   Weight:  [90.4 kg (199 lb 4.7 oz)-94.8 kg (208 lb 15.9 oz)]   SpO2:  [87 %-100 %]    Body mass index is 24.91 kg/m².  This is a mildly toxic appearing white man  Mildly toxic affect  Hearing intact  Phonation intact  Moist mucosa  Systolic murmur  Lungs are clear to auscultation bilaterally  Abdomen is soft, nondistended, nontender, positive bowel sounds  Numerous abdominal scars, urostomy in place with clear urine output  No extremity edema  Moves 4 limbs spontaneously  No obvious joint deformities  No lymphadenopathy    DATA:   Labs:  Results for orders placed or performed during the hospital encounter of 07/09/25 (from the past 96 hours)   Blood Gas Venous Full Panel   Result Value Ref Range    POCT pH, Venous 7.32 (L) 7.33 - 7.43 pH    POCT pCO2, Venous 36 (L) 41 - 51 mm Hg    POCT pO2, Venous 27 (L) 35 - 45 mm Hg    POCT SO2, Venous 39 (L) 45 - 75 %    POCT Oxy Hemoglobin, Venous 37.7 (L) 45.0 - 75.0 % "    POCT Hematocrit Calculated, Venous 31.0 (L) 41.0 - 52.0 %    POCT Sodium, Venous 125 (L) 136 - 145 mmol/L    POCT Potassium, Venous 5.1 3.5 - 5.3 mmol/L    POCT Chloride, Venous 95 (L) 98 - 107 mmol/L    POCT Ionized Calicum, Venous 1.21 1.10 - 1.33 mmol/L    POCT Glucose, Venous 374 (H) 74 - 99 mg/dL    POCT Lactate, Venous 0.8 0.4 - 2.0 mmol/L    POCT Base Excess, Venous -6.9 (L) -2.0 - 3.0 mmol/L    POCT HCO3 Calculated, Venous 18.5 (L) 22.0 - 26.0 mmol/L    POCT Hemoglobin, Venous 10.3 (L) 13.5 - 17.5 g/dL    POCT Anion Gap, Venous 17.0 10.0 - 25.0 mmol/L    Patient Temperature 37.0 degrees Celsius    FiO2 21 %   POCT GLUCOSE   Result Value Ref Range    POCT Glucose 157 (H) 74 - 99 mg/dL   CBC and Auto Differential   Result Value Ref Range    WBC 10.7 4.4 - 11.3 x10*3/uL    nRBC 0.0 0.0 - 0.0 /100 WBCs    RBC 3.62 (L) 4.50 - 5.90 x10*6/uL    Hemoglobin 10.0 (L) 13.5 - 17.5 g/dL    Hematocrit 30.3 (L) 41.0 - 52.0 %    MCV 84 80 - 100 fL    MCH 27.6 26.0 - 34.0 pg    MCHC 33.0 32.0 - 36.0 g/dL    RDW 13.6 11.5 - 14.5 %    Platelets 223 150 - 450 x10*3/uL    Neutrophils % 81.6 40.0 - 80.0 %    Immature Granulocytes %, Automated 0.4 0.0 - 0.9 %    Lymphocytes % 5.3 13.0 - 44.0 %    Monocytes % 10.3 2.0 - 10.0 %    Eosinophils % 2.2 0.0 - 6.0 %    Basophils % 0.2 0.0 - 2.0 %    Neutrophils Absolute 8.70 (H) 1.20 - 7.70 x10*3/uL    Immature Granulocytes Absolute, Automated 0.04 0.00 - 0.70 x10*3/uL    Lymphocytes Absolute 0.56 (L) 1.20 - 4.80 x10*3/uL    Monocytes Absolute 1.10 (H) 0.10 - 1.00 x10*3/uL    Eosinophils Absolute 0.23 0.00 - 0.70 x10*3/uL    Basophils Absolute 0.02 0.00 - 0.10 x10*3/uL   Comprehensive metabolic panel   Result Value Ref Range    Glucose 165 (H) 74 - 99 mg/dL    Sodium 128 (L) 136 - 145 mmol/L    Potassium 4.9 3.5 - 5.3 mmol/L    Chloride 98 98 - 107 mmol/L    Bicarbonate 18 (L) 21 - 32 mmol/L    Anion Gap 17 10 - 20 mmol/L    Urea Nitrogen 84 (H) 6 - 23 mg/dL    Creatinine 7.48 (H) 0.50  - 1.30 mg/dL    eGFR 7 (L) >60 mL/min/1.73m*2    Calcium 8.8 8.6 - 10.3 mg/dL    Albumin 3.3 (L) 3.4 - 5.0 g/dL    Alkaline Phosphatase 86 33 - 136 U/L    Total Protein 6.5 6.4 - 8.2 g/dL    AST 81 (H) 9 - 39 U/L    Bilirubin, Total 0.5 0.0 - 1.2 mg/dL    ALT 66 (H) 10 - 52 U/L   Magnesium   Result Value Ref Range    Magnesium 1.70 1.60 - 2.40 mg/dL   Phosphorus   Result Value Ref Range    Phosphorus 5.7 (H) 2.5 - 4.9 mg/dL   Blood Culture    Specimen: Peripheral Venipuncture; Blood culture   Result Value Ref Range    Blood Culture Loaded on Instrument - Culture in progress    Blood Culture    Specimen: Peripheral Venipuncture; Blood culture   Result Value Ref Range    Blood Culture Loaded on Instrument - Culture in progress    Lavender Top   Result Value Ref Range    Extra Tube Hold for add-ons.    PST Top   Result Value Ref Range    Extra Tube Hold for add-ons.    POCT GLUCOSE   Result Value Ref Range    POCT Glucose 204 (H) 74 - 99 mg/dL   Renal function panel   Result Value Ref Range    Glucose 212 (H) 74 - 99 mg/dL    Sodium 128 (L) 136 - 145 mmol/L    Potassium 5.4 (H) 3.5 - 5.3 mmol/L    Chloride 100 98 - 107 mmol/L    Bicarbonate 18 (L) 21 - 32 mmol/L    Anion Gap 15 10 - 20 mmol/L    Urea Nitrogen 84 (H) 6 - 23 mg/dL    Creatinine 7.50 (H) 0.50 - 1.30 mg/dL    eGFR 7 (L) >60 mL/min/1.73m*2    Calcium 8.5 (L) 8.6 - 10.3 mg/dL    Phosphorus 5.6 (H) 2.5 - 4.9 mg/dL    Albumin 3.0 (L) 3.4 - 5.0 g/dL   POCT GLUCOSE   Result Value Ref Range    POCT Glucose 205 (H) 74 - 99 mg/dL   POCT GLUCOSE   Result Value Ref Range    POCT Glucose 183 (H) 74 - 99 mg/dL   POCT GLUCOSE   Result Value Ref Range    POCT Glucose 174 (H) 74 - 99 mg/dL   CBC and Auto Differential   Result Value Ref Range    WBC 7.6 4.4 - 11.3 x10*3/uL    nRBC 0.0 0.0 - 0.0 /100 WBCs    RBC 3.50 (L) 4.50 - 5.90 x10*6/uL    Hemoglobin 9.5 (L) 13.5 - 17.5 g/dL    Hematocrit 29.6 (L) 41.0 - 52.0 %    MCV 85 80 - 100 fL    MCH 27.1 26.0 - 34.0 pg    MCHC  32.1 32.0 - 36.0 g/dL    RDW 13.8 11.5 - 14.5 %    Platelets 154 150 - 450 x10*3/uL    Neutrophils % 74.2 40.0 - 80.0 %    Immature Granulocytes %, Automated 0.4 0.0 - 0.9 %    Lymphocytes % 7.2 13.0 - 44.0 %    Monocytes % 13.4 2.0 - 10.0 %    Eosinophils % 4.5 0.0 - 6.0 %    Basophils % 0.3 0.0 - 2.0 %    Neutrophils Absolute 5.66 1.20 - 7.70 x10*3/uL    Immature Granulocytes Absolute, Automated 0.03 0.00 - 0.70 x10*3/uL    Lymphocytes Absolute 0.55 (L) 1.20 - 4.80 x10*3/uL    Monocytes Absolute 1.02 (H) 0.10 - 1.00 x10*3/uL    Eosinophils Absolute 0.34 0.00 - 0.70 x10*3/uL    Basophils Absolute 0.02 0.00 - 0.10 x10*3/uL   Magnesium   Result Value Ref Range    Magnesium 1.75 1.60 - 2.40 mg/dL   Renal function panel   Result Value Ref Range    Glucose 154 (H) 74 - 99 mg/dL    Sodium 129 (L) 136 - 145 mmol/L    Potassium 5.7 (H) 3.5 - 5.3 mmol/L    Chloride 102 98 - 107 mmol/L    Bicarbonate 16 (L) 21 - 32 mmol/L    Anion Gap 17 10 - 20 mmol/L    Urea Nitrogen 88 (H) 6 - 23 mg/dL    Creatinine 7.55 (H) 0.50 - 1.30 mg/dL    eGFR 7 (L) >60 mL/min/1.73m*2    Calcium 8.5 (L) 8.6 - 10.3 mg/dL    Phosphorus 5.8 (H) 2.5 - 4.9 mg/dL    Albumin 3.0 (L) 3.4 - 5.0 g/dL   POCT GLUCOSE   Result Value Ref Range    POCT Glucose 171 (H) 74 - 99 mg/dL   POCT GLUCOSE   Result Value Ref Range    POCT Glucose 158 (H) 74 - 99 mg/dL       SIGNATURE: Rasheed White MD PATIENT NAME: Rodrigue Palacios   DATE: July 10, 2025 MRN: [id number]   TIME: 9:18 AM PAGER: [phone]            [1]   Past Medical History:  Diagnosis Date    Arthritis     rhuematoid    Bladder cancer (Multi)     CKD (chronic kidney disease)     Dorsalgia, unspecified 07/28/2020    Back pain without radiation    GERD (gastroesophageal reflux disease)     controlled    Hearing aid worn     HL (hearing loss)     Hyperlipidemia     Hypertension     Local infection of the skin and subcutaneous tissue, unspecified 10/11/2013    Nonvenomous insect bite with infection     Nephrolithiasis     Other muscle spasm 02/14/2018    Muscle spasms of neck    Other specified diseases of anus and rectum 03/22/2013    Anal pain    Personal history of other diseases of the digestive system 09/18/2013    History of gastroenteritis    Personal history of other diseases of the respiratory system 04/27/2020    History of acute bronchitis    Personal history of other diseases of the respiratory system 06/24/2019    History of upper respiratory infection    Personal history of other infectious and parasitic diseases     History of candidiasis of mouth    Personal history of other specified conditions 03/22/2013    History of abnormal weight loss    Personal history of other specified conditions 02/24/2014    History of numbness    Personal history of other specified conditions 07/14/2017    History of abdominal pain    Pneumonia, unspecified organism 01/25/2016    Pneumonia involving right lung    Rash and other nonspecific skin eruption 04/29/2013    Rash    Sleep apnea     CPAP    Type 2 diabetes mellitus    [2]   Past Surgical History:  Procedure Laterality Date    BLADDER SURGERY  2025    Removal malignant tumor    CHOLECYSTECTOMY  07/07/2016    Cholecystectomy    HERNIA REPAIR  07/07/2016    Inguinal Hernia Repair    SHOULDER SURGERY Right    [3]   Family History  Problem Relation Name Age of Onset    Ovarian cancer Mother      Other (asbestosis) Father     [4]   Social History  Tobacco Use    Smoking status: Never     Passive exposure: Never    Smokeless tobacco: Never   Vaping Use    Vaping status: Never Used   Substance Use Topics    Alcohol use: Yes     Comment: rare-couple drinks a month    Drug use: Never   [5] atorvastatin, 40 mg, oral, Daily  furosemide, 80 mg, intravenous, Once  heparin (porcine), 5,000 Units, subcutaneous, q8h  [Held by provider] lisinopril, 20 mg, oral, q12h  octreotide, 100 mcg, subcutaneous, q6h  pantoprazole, 20 mg, oral, Daily  sodium bicarbonate, 100 mEq,  intravenous, Once  [Held by provider] sodium zirconium cyclosilicate, 10 g, oral, TID  sodium zirconium cyclosilicate, 15 g, oral, BID  [6] sodium bicarbonate 1 mEq/mL (8.4 %) 150 mEq in dextrose 5% 1,150 mL infusion, 100 mL/hr  [7] PRN medications: dextrose, dextrose, glucagon, glucagon

## 2025-07-10 NOTE — PROGRESS NOTES
Occupational Therapy    Evaluation/Treatment    Patient Name: Rodrigue Palacios  MRN: [id number]  Department: [facility] 3 E ICU  Room: 19/19-A  Today's Date: 07/10/25  Time Calculation  Start Time: 1402  Stop Time: 1425  Time Calculation (min): 23 min       Assessment:  OT Assessment: Pt would benefit from acute OT services to address deficits in ADLs/IADLs, functional mobility, and transfers  Prognosis: Good  Barriers to Discharge Home: No anticipated barriers  Evaluation/Treatment Tolerance: Patient limited by fatigue  Medical Staff Made Aware: Yes  End of Session Communication: Bedside nurse  End of Session Patient Position: Bed, 3 rail up, Alarm off, not on at start of session (all needs in reach, RN aware, RN cleared for alarm to remain off, pt agreeable to call for assist)  OT Assessment Results: Decreased ADL status, Decreased upper extremity strength, Decreased endurance, Decreased functional mobility, Decreased IADLs  Prognosis: Good  Evaluation/Treatment Tolerance: Patient limited by fatigue  Medical Staff Made Aware: Yes  Strengths: Ability to acquire knowledge, Living arrangement secure, Premorbid level of function, Support of extended family/friends  Barriers to Participation: Comorbidities  Plan:  Treatment Interventions: ADL retraining, Functional transfer training, UE strengthening/ROM, Patient/family training, Endurance training, Equipment evaluation/education  OT Frequency: 4 times per week (during this acute inpatient hospitalization)  OT Discharge Recommendations: Low intensity level of continued care  Equipment Recommended upon Discharge:  (LRAD)  OT Recommended Transfer Status: Assist of 1  OT - OK to Discharge: Yes  Treatment Interventions: ADL retraining, Functional transfer training, UE strengthening/ROM, Patient/family training, Endurance training, Equipment evaluation/education    Subjective   Current Problem:  1. Hypoglycemia  Microscopic Only, Urine    Microscopic Only, Urine    Urine Culture     Urine Culture      2. Kidney calculi          OT Visit Info:  OT Received On: 07/10/25  General Visit Info:   General  Reason for Referral: Impaired ADLs. Pt initially admitted to [facility] 7/7/25 with c/o AMS and hypoglycemia. Pt then transferred to [facility] for management of hypoglycemia and UTI. Pt then found to have left-sided hydronephrosis with a stone in place. Pt was transferred to [facility] for  nephrostomy tube which was placed 7/10/25.  Referred By: SHERRY Hubbard-CNP  Past Medical History Relevant to Rehab: HTN, HLD, SCOOTER (CPAP), CKD, T2DM, GERD, RA, atrophic R Kidney and bladder cancer s/p cystectomy with ureteroileal conduit creation and right simple nephrectomy on 05/21/2025, Hernia repair Cholecystectomy, Right shoulder surgery, Bladder surgery  Family/Caregiver Present: Yes  Caregiver Feedback: Friend present at start of session, left shortly after  Prior to Session Communication: Bedside nurse  Patient Position Received: Bed, 3 rail up, Alarm off, not on at start of session  General Comment: Cleared by nursing. Pt pleasant and agreeable to therapy   Precautions:  Hearing/Visual Limitations: glasses  Medical Precautions: Fall precautions  Precautions Comment: +nephrostomy tube, +urostomy      Vital Signs Comment: HR 65, SpO2 92%, /64(89)     Pain:  Pain Assessment  Pain Assessment: 0-10  0-10 (Numeric) Pain Score: 0 - No pain    Objective   Cognition:  Overall Cognitive Status: Within Functional Limits  Following Commands: Follows one step commands without difficulty         Home Living:  Type of Home: House  Lives With: Adult children (son is staying with pt until return to college in August)  Home Adaptive Equipment: Walker rolling or standard  Home Layout: Multi-level, Laundry in basement, Full bath main level, Bed/bath upstairs, Stairs to alternate level with rails  Alternate Level Stairs-Rails:  (Unilateral)  Alternate Level Stairs-Number of Steps: 13 to bed/bath second floor,  11 to basement  Home Access: Stairs to enter without rails  Entrance Stairs-Rails: None  Entrance Stairs-Number of Steps: 4  Bathroom Shower/Tub: Walk-in shower, Tub/shower unit  Bathroom Toilet: Standard  Bathroom Equipment: None  Bathroom Accessibility: tub/shower on first floor, walk-in shower on second floor  Prior Function:  Level of [place]: Independent with ADLs and functional transfers, Independent with homemaking with ambulation  Receives Help From: Family, Friends (son, friend)  ADL Assistance: Independent  Homemaking Assistance: Independent  Ambulatory Assistance: Independent  Leisure: Hiking  Hand Dominance: Right  Prior Function Comments: Pt reports indep PTA, denies h/o falls, +drives     ADL:  Eating Assistance: Independent  Grooming Assistance: Stand by  Bathing Assistance: Minimal  UE Dressing Assistance: Stand by  LE Dressing Assistance: Minimal  Toileting Assistance with Device: Minimal  Activities of Daily Living:    LE Dressing  LE Dressing:  (pt demonstrated ability to don/doff socks via figure four technique seated EOB)       Activity Tolerance:  Endurance: Decreased tolerance for upright activites  Activity Tolerance Comments: Fatigue     Bed Mobility/Transfers: Bed Mobility  Bed Mobility:  (close supervision for safety for supine>seated EOB with HOB elevated. close supervision for safety for seated EOB>supine  EOB with HOB flat)    Transfers  Transfer:  (CGA for balance and controlled descent sit<>stand bed level, VCs for safe hand and leg placement)    Functional Mobility:  Functional Mobility  Functional Mobility Performed:  (min A for balance/safety for functional mobility short household distance without use of AD, slightly unsteady this date)  Sitting Balance:  Static Sitting Balance  Static Sitting-Balance Support: Feet supported  Static Sitting-Level of Assistance: Distant supervision  Static Sitting-Comment/Number of Minutes: ~2-3 minutes    Sensation:  Sensation Comment: pt  denies numbness/paresthesia BUEs  Strength:  Strength Comments: BUEs WFL during functional activity      Hand Function:  Hand Function  Gross Grasp: Functional  Coordination: Functional  Extremities: RUE   RUE : Within Functional Limits and LUE   LUE: Within Functional Limits    Outcome Measures: [facility] Daily Activity  Putting on and taking off regular lower body clothing: A little  Bathing (including washing, rinsing, drying): A little  Putting on and taking off regular upper body clothing: A little  Toileting, which includes using toilet, bedpan or urinal: A little  Taking care of personal grooming such as brushing teeth: A little  Eating Meals: None  Daily Activity - Total Score: 19    Education Documentation  ADL Training, taught by Ne Patton OT at 7/10/2025  2:59 PM.  Learner: Patient  Readiness: Acceptance  Method: Demonstration, Explanation  Response: Verbalizes Understanding  Comment: Educated on OT POC    Education Comments  No comments found.      Goals:  Encounter Problems       Encounter Problems (Active)       ADLs       Pt will complete ADL tasks at mod I with use of AE prn  (Progressing)       Start:  07/10/25    Expected End:  07/31/25               Functional Mobility       Pt will perform functional mobility household distances at mod I with use of LRAD.    (Progressing)       Start:  07/10/25    Expected End:  07/31/25               Instrumental Activities of Daily Living       Pt will perform simple IADLs/retrieval of items at mod I.   (Not Progressing)       Start:  07/10/25    Expected End:  07/31/25               OT Transfers       Pt will perform functional transfers at mod I. (Progressing)       Start:  07/10/25    Expected End:  07/31/25

## 2025-07-10 NOTE — PROGRESS NOTES
Patient not medically clear. Patient transferred here from [facility]. At this time the discharge plan is for patient to return home with [facility], nursing only. Patient has a urostomy, [facility] provides supplies. Patient will need an internal [facility] referral. Will continue to follow.      07/10/25 2612   Discharge Planning   Home or Post Acute Services In home services   Type of Home Care Services Home nursing visits   Expected Discharge Disposition [facility]  ([facility])   Does the patient need discharge transport arranged? No

## 2025-07-10 NOTE — PROGRESS NOTES
[facility] Critical Care Medicine       Date:  7/10/2025  Patient:  Rodrigue Palacios  YOB: 1955  MRN:  [id number]   Admit Date:  7/9/2025  Hospital Length of Stay: 1   ICU Length of Stay: 21h       No chief complaint on file.        History of Present Illness:  Rodrigue Palacios is a 69 y.o. year old male patient with past medical history of HTN, HLD, SCOOTER, GERD, T2DM, CKD 3B, RA, bladder cancer s/p R nephrectomy and radical cystectomy and ileal conduit (5/21/25) who presented to [place] emergency department 7/7 with complaints of confusion per his son who called EMS.  Upon EMS arrival he was found to be hypoglycemic with BGL 52.  Initial work-up was also significant for HARMAN with initial sCr 7.7.  Transfer request was placed from [place] ED to ICU for persistent hypoglycemia.     Today patient feels tired, but denies HA, chest pain, SOB, n/v/d or difficulty urinating. Reports mild generalized abdominal pain.         OSH Course: Remained hypoglycemia after repetitive dextrose ampules, placed on D10 infusion. Endocrinology consulted and recommended continuing D10 infusion and subcutaneous octreotide until hypoglycemia resolved.  Nephrology consulted and deemed no indications for urgent dialysis, ordered renal US.  Urology consulted and ordered IV lasix, NM kidney flow scan, and CT AP.  Renal US significant for moderate left-sided hydronephrosis with suggestion of a 1.4 cm nonobstructing calculus in the inferior pole.  CT AP confirmed finding on renal US of obstructing calculus at L PUJ w moderate hydronephrosis.  Urology recommended IR consult for urgent perc-neph tube placement, but this was unable to be done at [facility] today, therefore transfer to  ICU was initiated.      Interval ICU Events:  7/9: Arrived to ICU on D10 infusion. HDS. NPO at midnight for presumed perc-neph tube placement in am with IR. Repeat labs, draw blood cultures.     7/10: plan for perc neph tube placement in IR today. Creatinine  "still rising. Blood glucose improving. K still elevated, nephrology starting bicarb gtt.       Objective     Medical History:  Medical History[1]  Surgical History[2]  Prescriptions Prior to Admission[3]  Orencia [abatacept]  Social History[4]  Family History[5]    Hospital Medications:    Continuous Medications[6]    Current Medications[7]    Review of Systems:  14 point review of systems was completed and negative except for those specially mention in my HPI    Physical Exam:    Heart Rate:  [48-79]   Temp:  [36.2 °C (97.2 °F)-37.1 °C (98.8 °F)]   Resp:  [13-25]   BP: (118-183)/()   Height:  [190.5 cm (6' 3\")]   Weight:  [90.4 kg (199 lb 4.7 oz)-94.8 kg (208 lb 15.9 oz)]   SpO2:  [82 %-100 %]     Physical Exam  Constitutional:       General: He is not in acute distress.  HENT:      Mouth/Throat:      Mouth: Mucous membranes are moist.   Eyes:      Extraocular Movements: Extraocular movements intact.      Conjunctiva/sclera: Conjunctivae normal.   Cardiovascular:      Rate and Rhythm: Normal rate and regular rhythm.   Pulmonary:      Effort: No respiratory distress.      Breath sounds: Normal breath sounds. No wheezing, rhonchi or rales.   Abdominal:      Palpations: Abdomen is soft.      Tenderness: There is no abdominal tenderness. There is no guarding.      Comments: Ileal conduit   Musculoskeletal:         General: Normal range of motion.      Cervical back: Normal range of motion.      Right lower leg: No edema.      Left lower leg: No edema.   Skin:     General: Skin is warm.   Neurological:      Mental Status: He is alert.         Objective:    I have reviewed all medications, laboratory results, and imaging pertinent for today's encounter.           Intake/Output Summary (Last 24 hours) at 7/10/2025 1511  Last data filed at 7/10/2025 1400  Gross per 24 hour   Intake 930 ml   Output 3870 ml   Net -2940 ml       Daily Labs:  CBC:   Results from last 7 days   Lab Units 07/10/25  0443 07/09/25 2014   WBC " AUTO x10*3/uL 7.6 10.7   HEMOGLOBIN g/dL 9.5* 10.0*   HEMATOCRIT % 29.6* 30.3*   MCV fL 85 84     BMP:    Results from last 7 days   Lab Units 07/10/25  1148 07/10/25  0443 07/10/25  0008 07/09/25 2014   SODIUM mmol/L 134* 129*   < > 128*   POTASSIUM mmol/L 5.0 5.7*   < > 4.9   CHLORIDE mmol/L 102 102   < > 98   CO2 mmol/L 18* 16*   < > 18*   BUN mg/dL 90* 88*   < > 84*   CREATININE mg/dL 7.94* 7.55*   < > 7.48*   CALCIUM mg/dL 9.1 8.5*   < > 8.8   GLUCOSE mg/dL 174* 154*   < > 165*   MAGNESIUM mg/dL  --  1.75  --  1.70    < > = values in this interval not displayed.     ABG:   Results from last 7 days   Lab Units 07/10/25  1443   POCT PH, ARTERIAL pH 7.43*   POCT PCO2, ARTERIAL mm Hg 33*   POCT PO2, ARTERIAL mm Hg 90   POCT SO2, ARTERIAL % 99   POCT HCO3 CALCULATED, ARTERIAL mmol/L 21.9*   POCT LACTATE, ARTERIAL mmol/L 0.8      VBG:   Results from last 7 days   Lab Units 07/10/25  0857 07/09/25  1822   POCT PH, VENOUS pH 7.36 7.32*   POCT PCO2, VENOUS mm Hg 34* 36*   POCT PO2, VENOUS mm Hg 39 27*   POCT SO2, VENOUS % 69 39*   POCT HCO3 CALCULATED, VENOUS mmol/L 19.2* 18.5*   POCT LACTATE, VENOUS mmol/L 1.1 0.8             Assessment/Plan:    I am currently managing this critically ill patient for the following problems:    Neurology/Psychiatry/Pain Control/Sedation:   Acute metabolic encephalopathy 2/2 hypoglycemia- resolving   - Pain Control: acetaminophen PRN  - CAM ICU qshift, sleep-wake hygiene, delirium precautions      Respiratory/ENT:  No acute issues  - Supplemental O2: none, on room air   - Maintain SpO2 >92%  - Continuous pulse ox monitoring      Cardiovascular:   Bradycardia- 30-40's while asleep- rises to 50'swhen awake  History of hypertension   - Hold home lisinopril   - Maintain MAPs >65  - EKG shows sinus martha- will monitor     Gastrointestinal:  No active concerns   - Diet: renal  - BR: PRN  - GI Prophylaxis: home PPI     Renal/Volume Status/Electrolytes:  Acute renal failure 2/2 obstructive  nephropathy-plan to neph tube today in IR  Moderate hydronephrosis 2/2 obstructive calculus @ L PUJ  Bladder cancer s/p cystectomy w/ ileal conduit creation and R nephro-ureterectomy (5/21/25)  Hyponatremia likely 2/2 free water administration  CKD 3B  :: OSH I/Os: total 1.8L positive, 1.6L UOP over last 24 hours  - Baseline Cr ~1.5  - Consult urology   - Left neph tube placement today  - Consult nephrology   - RFP BID to assess for hyperkalemia until neph tube placed   - Replete electrolytes to maintain K >4.0 and Mg >2.0  - Daily RFP, Mg    Endocrinology:  Type 2 diabetes mellitus, insulin-dependent   Profound hypoglycemia 2/2 sulfonylurea use iso renal failure    - Home diabetic regimen: glimepiride, lantus 10 units hs   - Endocrinology consulted at OSH  - will discontinue D5NS gtt as hypoglycemia is resolved      Infectious Disease:  Leukocytosis- resolved  Pyuria  - Antibiotics: hold off on empiric abx pending repeat labs, - No current SIRS criteria   - Blood cultures: pending   - Urine culture: no significant growth, likely contaminated regardless as drawn from ileal conduit      Hematology/Oncology:  Hx Bladder cancer s/p cystectomy with ileal conduit  - Transfuse if Hgb <7.0 or symptomatic anemia  - Daily CBC     MSK/Skin:  No acute issues   - PT/OT eval   - ICU skin protocol, padded pressure points  - Q2hr turns      Ethics/Code Status:  Full code      :  DVT Prophylaxis: SQH, SCDs  GI Prophylaxis: home PPI  Bowel Regimen: PRN  Diet: renal, NPO at midnight  CVC: none  Jennifer: none  Aquino: none  Restraints: none  Disposition: admit to ICU       Critical Care Time:  45 minutes spent in preparing to see patient (I.e. labs, imaging, etc.), documentation, discussion plan of care with patient/family/caregiver, and/or coordination of care with multidisciplinary team including the attending. Time does not include completion of procedure time.     Plan of care discussed with Dr. Alcantara    Disclaimer:  Documentation completed with the information available at the time of input. Parts of this note may have been scribed or generated using voice dictation software, Dragon.  Homophonic errors may exist.  Please contact me directly if clarification is needed.     Delisa POWELL-CNP  Pulmonary & Critical Care Medicine   [facility]          [1]   Past Medical History:  Diagnosis Date    Arthritis     rhuematoid    Bladder cancer (Multi)     CKD (chronic kidney disease)     Dorsalgia, unspecified 07/28/2020    Back pain without radiation    GERD (gastroesophageal reflux disease)     controlled    Hearing aid worn     HL (hearing loss)     Hyperlipidemia     Hypertension     Local infection of the skin and subcutaneous tissue, unspecified 10/11/2013    Nonvenomous insect bite with infection    Nephrolithiasis     Other muscle spasm 02/14/2018    Muscle spasms of neck    Other specified diseases of anus and rectum 03/22/2013    Anal pain    Personal history of other diseases of the digestive system 09/18/2013    History of gastroenteritis    Personal history of other diseases of the respiratory system 04/27/2020    History of acute bronchitis    Personal history of other diseases of the respiratory system 06/24/2019    History of upper respiratory infection    Personal history of other infectious and parasitic diseases     History of candidiasis of mouth    Personal history of other specified conditions 03/22/2013    History of abnormal weight loss    Personal history of other specified conditions 02/24/2014    History of numbness    Personal history of other specified conditions 07/14/2017    History of abdominal pain    Pneumonia, unspecified organism 01/25/2016    Pneumonia involving right lung    Rash and other nonspecific skin eruption 04/29/2013    Rash    Sleep apnea     CPAP    Type 2 diabetes mellitus    [2]   Past Surgical History:  Procedure Laterality Date    BLADDER SURGERY  2025    Removal  "malignant tumor    CHOLECYSTECTOMY  07/07/2016    Cholecystectomy    HERNIA REPAIR  07/07/2016    Inguinal Hernia Repair    SHOULDER SURGERY Right    [3]   Medications Prior to Admission   Medication Sig Dispense Refill Last Dose/Taking    atorvastatin (Lipitor) 40 mg tablet TAKE ONE TABLET BY MOUTH DAILY 90 tablet 3 Past Week    Basaglar KwikPen U-100 Insulin 100 unit/mL (3 mL) pen Use up to 30 units daily (Patient taking differently: Inject 20 Units under the skin once daily at bedtime.) 30 mL 2 Past Week    insulin syringe-needle U-100 (BD Insulin Syringe) 1 mL 26 x 1/2\" syringe    Past Week    lisinopril 40 mg tablet Take 0.5 tablets (20 mg) by mouth every 12 hours. 30 tablet 2 Past Week    montelukast (Singulair) 10 mg tablet Take 1 tablet (10 mg) by mouth once daily.   Past Week    pantoprazole (ProtoNix) 40 mg EC tablet TAKE ONE TABLET BY MOUTH EVERY DAY 90 tablet 3 Past Week    pen needle, diabetic (BD Luann 2nd Gen Pen Needle) 32 gauge x 5/32\" needle use DAILY 100 each 1 Past Week    traZODone (Desyrel) 50 mg tablet Take 1-2 tablets ( mg) by mouth once daily. 180 tablet 3 Past Week   [4]   Social History  Tobacco Use    Smoking status: Never     Passive exposure: Never    Smokeless tobacco: Never   Vaping Use    Vaping status: Never Used   Substance Use Topics    Alcohol use: Yes     Comment: rare-couple drinks a month    Drug use: Never   [5]   Family History  Problem Relation Name Age of Onset    Ovarian cancer Mother      Other (asbestosis) Father     [6] sodium bicarbonate 1 mEq/mL (8.4 %) 150 mEq in dextrose 5% 1,150 mL infusion, 100 mL/hr, Last Rate: 100 mL/hr (07/10/25 1133)  [7]   Current Facility-Administered Medications:     acetaminophen (Tylenol) tablet 650 mg, 650 mg, oral, q6h PRN, SHERRY Hubbard-CNP, 650 mg at 07/10/25 1153    atorvastatin (Lipitor) tablet 40 mg, 40 mg, oral, Daily, Vivek Laughlin PA-C    dextrose 50 % injection 12.5 g, 12.5 g, intravenous, q15 min PRN, Vivek" CAROLYN Laughlin PA-C    dextrose 50 % injection 25 g, 25 g, intravenous, q15 min PRN, Vivek Laughlin PA-C    glucagon (Glucagen) injection 1 mg, 1 mg, intramuscular, q15 min PRN, Vivek Laughlin PA-C    glucagon (Glucagen) injection 1 mg, 1 mg, intramuscular, q15 min PRN, Vivek Laughlin PA-C    heparin (porcine) injection 5,000 Units, 5,000 Units, subcutaneous, q8h, Vivek Laughlin PA-C, 5,000 Units at 07/10/25 1441    [Held by provider] lisinopril tablet 20 mg, 20 mg, oral, q12h, Vivek Laughlin PA-C    pantoprazole (ProtoNix) EC tablet 20 mg, 20 mg, oral, Daily, Vivek Laughlin PA-C    sodium bicarbonate 1 mEq/mL (8.4 %) 150 mEq in dextrose 5% 1,150 mL infusion, 100 mL/hr, intravenous, Continuous, Rasheed White MD, Last Rate: 100 mL/hr at 07/10/25 1133, 100 mL/hr at 07/10/25 1133    [Held by provider] sodium zirconium cyclosilicate (Lokelma) packet 10 g, 10 g, oral, TID, Jun Russell PA-C    sodium zirconium cyclosilicate (Lokelma) packet 15 g, 15 g, oral, BID, Rasheed White MD

## 2025-07-10 NOTE — CONSULTS
Reason For Consult  Hydronephrosis, stone    History Of Present Illness  Rodrigue Palacios is a 69 y.o. male presenting with acute renal failure.  He presented to [facility] with generalized complaints.  He was found to have a creatinine over 7.  His urologic history is notable for invasive bladder cancer.  He underwent a right simple nephrectomy and radical cystectomy with ileal conduit on May 21.  This was done by Dr. Jose Angel walters.  He did well after surgery.  However over the last few weeks he has had abdominal pain and generalized malaise.  A CT scan revealed an obstructing left UPJ stone with hydronephrosis.  He does have a prior history of stone disease.  He was transferred here to receive a nephrostomy tube.  He continues to make urine from his ileal conduit.     Past Medical History  He has a past medical history of Arthritis, Bladder cancer (Multi), CKD (chronic kidney disease), Dorsalgia, unspecified (07/28/2020), GERD (gastroesophageal reflux disease), Hearing aid worn, HL (hearing loss), Hyperlipidemia, Hypertension, Local infection of the skin and subcutaneous tissue, unspecified (10/11/2013), Nephrolithiasis, Other muscle spasm (02/14/2018), Other specified diseases of anus and rectum (03/22/2013), Personal history of other diseases of the digestive system (09/18/2013), Personal history of other diseases of the respiratory system (04/27/2020), Personal history of other diseases of the respiratory system (06/24/2019), Personal history of other infectious and parasitic diseases, Personal history of other specified conditions (03/22/2013), Personal history of other specified conditions (02/24/2014), Personal history of other specified conditions (07/14/2017), Pneumonia, unspecified organism (01/25/2016), Rash and other nonspecific skin eruption (04/29/2013), Sleep apnea, and Type 2 diabetes mellitus.    Surgical History  He has a past surgical history that includes Hernia repair (07/07/2016);  "Cholecystectomy (07/07/2016); Shoulder surgery (Right); and Bladder surgery (2025).     Social History  He reports that he has never smoked. He has never been exposed to tobacco smoke. He has never used smokeless tobacco. He reports current alcohol use. He reports that he does not use drugs.    Family History  Family History[1]     Allergies  Orencia [abatacept]    Review of Systems  Review of Systems   Constitutional: Positive for decreased appetite and malaise/fatigue.   Cardiovascular: Negative.    Respiratory: Negative.     Gastrointestinal:  Positive for abdominal pain. Negative for vomiting.   Genitourinary:  Positive for flank pain. Negative for hematuria.          Physical Exam  Awake, alert, no distress  Breathing comfortably, respirations unlabored  Abdomen soft, , ND, minimally tender  Stoma pink, viable, clear urine  Ext warm, well perfused       Last Recorded Vitals  Blood pressure 118/58, pulse 64, temperature 37 °C (98.6 °F), temperature source Temporal, resp. rate 15, height 1.905 m (6' 3\"), weight 90.4 kg (199 lb 4.7 oz), SpO2 95%.    Relevant Results      Results for orders placed or performed during the hospital encounter of 07/09/25 (from the past 24 hours)   Blood Gas Venous Full Panel   Result Value Ref Range    POCT pH, Venous 7.32 (L) 7.33 - 7.43 pH    POCT pCO2, Venous 36 (L) 41 - 51 mm Hg    POCT pO2, Venous 27 (L) 35 - 45 mm Hg    POCT SO2, Venous 39 (L) 45 - 75 %    POCT Oxy Hemoglobin, Venous 37.7 (L) 45.0 - 75.0 %    POCT Hematocrit Calculated, Venous 31.0 (L) 41.0 - 52.0 %    POCT Sodium, Venous 125 (L) 136 - 145 mmol/L    POCT Potassium, Venous 5.1 3.5 - 5.3 mmol/L    POCT Chloride, Venous 95 (L) 98 - 107 mmol/L    POCT Ionized Calicum, Venous 1.21 1.10 - 1.33 mmol/L    POCT Glucose, Venous 374 (H) 74 - 99 mg/dL    POCT Lactate, Venous 0.8 0.4 - 2.0 mmol/L    POCT Base Excess, Venous -6.9 (L) -2.0 - 3.0 mmol/L    POCT HCO3 Calculated, Venous 18.5 (L) 22.0 - 26.0 mmol/L    POCT " Hemoglobin, Venous 10.3 (L) 13.5 - 17.5 g/dL    POCT Anion Gap, Venous 17.0 10.0 - 25.0 mmol/L    Patient Temperature 37.0 degrees Celsius    FiO2 21 %   POCT GLUCOSE   Result Value Ref Range    POCT Glucose 157 (H) 74 - 99 mg/dL   CBC and Auto Differential   Result Value Ref Range    WBC 10.7 4.4 - 11.3 x10*3/uL    nRBC 0.0 0.0 - 0.0 /100 WBCs    RBC 3.62 (L) 4.50 - 5.90 x10*6/uL    Hemoglobin 10.0 (L) 13.5 - 17.5 g/dL    Hematocrit 30.3 (L) 41.0 - 52.0 %    MCV 84 80 - 100 fL    MCH 27.6 26.0 - 34.0 pg    MCHC 33.0 32.0 - 36.0 g/dL    RDW 13.6 11.5 - 14.5 %    Platelets 223 150 - 450 x10*3/uL    Neutrophils % 81.6 40.0 - 80.0 %    Immature Granulocytes %, Automated 0.4 0.0 - 0.9 %    Lymphocytes % 5.3 13.0 - 44.0 %    Monocytes % 10.3 2.0 - 10.0 %    Eosinophils % 2.2 0.0 - 6.0 %    Basophils % 0.2 0.0 - 2.0 %    Neutrophils Absolute 8.70 (H) 1.20 - 7.70 x10*3/uL    Immature Granulocytes Absolute, Automated 0.04 0.00 - 0.70 x10*3/uL    Lymphocytes Absolute 0.56 (L) 1.20 - 4.80 x10*3/uL    Monocytes Absolute 1.10 (H) 0.10 - 1.00 x10*3/uL    Eosinophils Absolute 0.23 0.00 - 0.70 x10*3/uL    Basophils Absolute 0.02 0.00 - 0.10 x10*3/uL   Comprehensive metabolic panel   Result Value Ref Range    Glucose 165 (H) 74 - 99 mg/dL    Sodium 128 (L) 136 - 145 mmol/L    Potassium 4.9 3.5 - 5.3 mmol/L    Chloride 98 98 - 107 mmol/L    Bicarbonate 18 (L) 21 - 32 mmol/L    Anion Gap 17 10 - 20 mmol/L    Urea Nitrogen 84 (H) 6 - 23 mg/dL    Creatinine 7.48 (H) 0.50 - 1.30 mg/dL    eGFR 7 (L) >60 mL/min/1.73m*2    Calcium 8.8 8.6 - 10.3 mg/dL    Albumin 3.3 (L) 3.4 - 5.0 g/dL    Alkaline Phosphatase 86 33 - 136 U/L    Total Protein 6.5 6.4 - 8.2 g/dL    AST 81 (H) 9 - 39 U/L    Bilirubin, Total 0.5 0.0 - 1.2 mg/dL    ALT 66 (H) 10 - 52 U/L   Magnesium   Result Value Ref Range    Magnesium 1.70 1.60 - 2.40 mg/dL   Phosphorus   Result Value Ref Range    Phosphorus 5.7 (H) 2.5 - 4.9 mg/dL   Blood Culture    Specimen: Peripheral  Venipuncture; Blood culture   Result Value Ref Range    Blood Culture Loaded on Instrument - Culture in progress    Blood Culture    Specimen: Peripheral Venipuncture; Blood culture   Result Value Ref Range    Blood Culture Loaded on Instrument - Culture in progress    Lavender Top   Result Value Ref Range    Extra Tube Hold for add-ons.    PST Top   Result Value Ref Range    Extra Tube Hold for add-ons.    POCT GLUCOSE   Result Value Ref Range    POCT Glucose 204 (H) 74 - 99 mg/dL   Renal function panel   Result Value Ref Range    Glucose 212 (H) 74 - 99 mg/dL    Sodium 128 (L) 136 - 145 mmol/L    Potassium 5.4 (H) 3.5 - 5.3 mmol/L    Chloride 100 98 - 107 mmol/L    Bicarbonate 18 (L) 21 - 32 mmol/L    Anion Gap 15 10 - 20 mmol/L    Urea Nitrogen 84 (H) 6 - 23 mg/dL    Creatinine 7.50 (H) 0.50 - 1.30 mg/dL    eGFR 7 (L) >60 mL/min/1.73m*2    Calcium 8.5 (L) 8.6 - 10.3 mg/dL    Phosphorus 5.6 (H) 2.5 - 4.9 mg/dL    Albumin 3.0 (L) 3.4 - 5.0 g/dL   POCT GLUCOSE   Result Value Ref Range    POCT Glucose 205 (H) 74 - 99 mg/dL   POCT GLUCOSE   Result Value Ref Range    POCT Glucose 183 (H) 74 - 99 mg/dL   POCT GLUCOSE   Result Value Ref Range    POCT Glucose 174 (H) 74 - 99 mg/dL   CBC and Auto Differential   Result Value Ref Range    WBC 7.6 4.4 - 11.3 x10*3/uL    nRBC 0.0 0.0 - 0.0 /100 WBCs    RBC 3.50 (L) 4.50 - 5.90 x10*6/uL    Hemoglobin 9.5 (L) 13.5 - 17.5 g/dL    Hematocrit 29.6 (L) 41.0 - 52.0 %    MCV 85 80 - 100 fL    MCH 27.1 26.0 - 34.0 pg    MCHC 32.1 32.0 - 36.0 g/dL    RDW 13.8 11.5 - 14.5 %    Platelets 154 150 - 450 x10*3/uL    Neutrophils % 74.2 40.0 - 80.0 %    Immature Granulocytes %, Automated 0.4 0.0 - 0.9 %    Lymphocytes % 7.2 13.0 - 44.0 %    Monocytes % 13.4 2.0 - 10.0 %    Eosinophils % 4.5 0.0 - 6.0 %    Basophils % 0.3 0.0 - 2.0 %    Neutrophils Absolute 5.66 1.20 - 7.70 x10*3/uL    Immature Granulocytes Absolute, Automated 0.03 0.00 - 0.70 x10*3/uL    Lymphocytes Absolute 0.55 (L) 1.20 -  4.80 x10*3/uL    Monocytes Absolute 1.02 (H) 0.10 - 1.00 x10*3/uL    Eosinophils Absolute 0.34 0.00 - 0.70 x10*3/uL    Basophils Absolute 0.02 0.00 - 0.10 x10*3/uL   Magnesium   Result Value Ref Range    Magnesium 1.75 1.60 - 2.40 mg/dL   Renal function panel   Result Value Ref Range    Glucose 154 (H) 74 - 99 mg/dL    Sodium 129 (L) 136 - 145 mmol/L    Potassium 5.7 (H) 3.5 - 5.3 mmol/L    Chloride 102 98 - 107 mmol/L    Bicarbonate 16 (L) 21 - 32 mmol/L    Anion Gap 17 10 - 20 mmol/L    Urea Nitrogen 88 (H) 6 - 23 mg/dL    Creatinine 7.55 (H) 0.50 - 1.30 mg/dL    eGFR 7 (L) >60 mL/min/1.73m*2    Calcium 8.5 (L) 8.6 - 10.3 mg/dL    Phosphorus 5.8 (H) 2.5 - 4.9 mg/dL    Albumin 3.0 (L) 3.4 - 5.0 g/dL   POCT GLUCOSE   Result Value Ref Range    POCT Glucose 171 (H) 74 - 99 mg/dL          Assessment/Plan     ARF, obstructive uropathy secondary to renal stone.     I reviewed his CT abdomen and pelvis.  He now has a solitary kidney and an obstructing stone.  I do agree with consulting interventional radiology.  He will need a nephrostomy tube for drainage.  Ultimately he will need a lithotripsy in the next several weeks once his renal function recovers.  Appreciate ICU care.  Agree with nephrology consult.  Monitor creatinine and electrolytes.        I spent 60 minutes in the professional and overall care of this patient.      Shante Ureña MD         [1]   Family History  Problem Relation Name Age of Onset    Ovarian cancer Mother      Other (asbestosis) Father

## 2025-07-11 LAB
ALBUMIN SERPL BCP-MCNC: 3.2 G/DL (ref 3.4–5)
ALBUMIN SERPL BCP-MCNC: 3.3 G/DL (ref 3.4–5)
ANION GAP BLDV CALCULATED.4IONS-SCNC: 11 MMOL/L (ref 10–25)
ANION GAP SERPL CALCULATED.3IONS-SCNC: 17 MMOL/L (ref 10–20)
ANION GAP SERPL CALCULATED.3IONS-SCNC: 18 MMOL/L (ref 10–20)
ATRIAL RATE: 50 BPM
BASE EXCESS BLDV CALC-SCNC: 5.3 MMOL/L (ref -2–3)
BASOPHILS # BLD AUTO: 0.03 X10*3/UL (ref 0–0.1)
BASOPHILS NFR BLD AUTO: 0.4 %
BODY TEMPERATURE: 37 DEGREES CELSIUS
BUN SERPL-MCNC: 82 MG/DL (ref 6–23)
BUN SERPL-MCNC: 85 MG/DL (ref 6–23)
BURR CELLS BLD QL SMEAR: NORMAL
CA-I BLDV-SCNC: 1.2 MMOL/L (ref 1.1–1.33)
CALCIUM SERPL-MCNC: 9.4 MG/DL (ref 8.6–10.3)
CALCIUM SERPL-MCNC: 9.5 MG/DL (ref 8.6–10.3)
CHLORIDE BLDV-SCNC: 103 MMOL/L (ref 98–107)
CHLORIDE SERPL-SCNC: 100 MMOL/L (ref 98–107)
CHLORIDE SERPL-SCNC: 100 MMOL/L (ref 98–107)
CO2 SERPL-SCNC: 26 MMOL/L (ref 21–32)
CO2 SERPL-SCNC: 26 MMOL/L (ref 21–32)
CREAT SERPL-MCNC: 6.1 MG/DL (ref 0.5–1.3)
CREAT SERPL-MCNC: 6.5 MG/DL (ref 0.5–1.3)
EGFRCR SERPLBLD CKD-EPI 2021: 9 ML/MIN/1.73M*2
EGFRCR SERPLBLD CKD-EPI 2021: 9 ML/MIN/1.73M*2
EOSINOPHIL # BLD AUTO: 0.31 X10*3/UL (ref 0–0.7)
EOSINOPHIL NFR BLD AUTO: 3.7 %
ERYTHROCYTE [DISTWIDTH] IN BLOOD BY AUTOMATED COUNT: 13.7 % (ref 11.5–14.5)
GLUCOSE BLD MANUAL STRIP-MCNC: 165 MG/DL (ref 74–99)
GLUCOSE BLD MANUAL STRIP-MCNC: 169 MG/DL (ref 74–99)
GLUCOSE BLD MANUAL STRIP-MCNC: 228 MG/DL (ref 74–99)
GLUCOSE BLD MANUAL STRIP-MCNC: 299 MG/DL (ref 74–99)
GLUCOSE BLDV-MCNC: 177 MG/DL (ref 74–99)
GLUCOSE SERPL-MCNC: 169 MG/DL (ref 74–99)
GLUCOSE SERPL-MCNC: 174 MG/DL (ref 74–99)
HCO3 BLDV-SCNC: 29 MMOL/L (ref 22–26)
HCT VFR BLD AUTO: 32.2 % (ref 41–52)
HCT VFR BLD EST: 34 % (ref 41–52)
HGB BLD-MCNC: 10.6 G/DL (ref 13.5–17.5)
HGB BLDV-MCNC: 11.4 G/DL (ref 13.5–17.5)
IMM GRANULOCYTES # BLD AUTO: 0.08 X10*3/UL (ref 0–0.7)
IMM GRANULOCYTES NFR BLD AUTO: 0.9 % (ref 0–0.9)
INHALED O2 CONCENTRATION: 21 %
LACTATE BLDV-SCNC: 0.9 MMOL/L (ref 0.4–2)
LYMPHOCYTES # BLD AUTO: 0.77 X10*3/UL (ref 1.2–4.8)
LYMPHOCYTES NFR BLD AUTO: 9.1 %
MAGNESIUM SERPL-MCNC: 1.69 MG/DL (ref 1.6–2.4)
MAGNESIUM SERPL-MCNC: 2.31 MG/DL (ref 1.6–2.4)
MCH RBC QN AUTO: 26.8 PG (ref 26–34)
MCHC RBC AUTO-ENTMCNC: 32.9 G/DL (ref 32–36)
MCV RBC AUTO: 82 FL (ref 80–100)
MONOCYTES # BLD AUTO: 1.07 X10*3/UL (ref 0.1–1)
MONOCYTES NFR BLD AUTO: 12.7 %
NEUTROPHILS # BLD AUTO: 6.19 X10*3/UL (ref 1.2–7.7)
NEUTROPHILS NFR BLD AUTO: 73.2 %
NRBC BLD-RTO: 0 /100 WBCS (ref 0–0)
OVALOCYTES BLD QL SMEAR: NORMAL
OXYHGB MFR BLDV: 81.4 % (ref 45–75)
P AXIS: 50 DEGREES
P OFFSET: 209 MS
P ONSET: 144 MS
PCO2 BLDV: 38 MM HG (ref 41–51)
PH BLDV: 7.49 PH (ref 7.33–7.43)
PHOSPHATE SERPL-MCNC: 5.9 MG/DL (ref 2.5–4.9)
PHOSPHATE SERPL-MCNC: 5.9 MG/DL (ref 2.5–4.9)
PLATELET # BLD AUTO: 250 X10*3/UL (ref 150–450)
PO2 BLDV: 49 MM HG (ref 35–45)
POTASSIUM BLDV-SCNC: 5 MMOL/L (ref 3.5–5.3)
POTASSIUM SERPL-SCNC: 4.6 MMOL/L (ref 3.5–5.3)
POTASSIUM SERPL-SCNC: 5 MMOL/L (ref 3.5–5.3)
PR INTERVAL: 160 MS
Q ONSET: 224 MS
QRS COUNT: 9 BEATS
QRS DURATION: 104 MS
QT INTERVAL: 450 MS
QTC CALCULATION(BAZETT): 410 MS
QTC FREDERICIA: 423 MS
R AXIS: -37 DEGREES
RBC # BLD AUTO: 3.95 X10*6/UL (ref 4.5–5.9)
RBC MORPH BLD: NORMAL
SAO2 % BLDV: 83 % (ref 45–75)
SODIUM BLDV-SCNC: 138 MMOL/L (ref 136–145)
SODIUM SERPL-SCNC: 138 MMOL/L (ref 136–145)
SODIUM SERPL-SCNC: 139 MMOL/L (ref 136–145)
T AXIS: 7 DEGREES
T OFFSET: 449 MS
VENTRICULAR RATE: 50 BPM
WBC # BLD AUTO: 8.5 X10*3/UL (ref 4.4–11.3)

## 2025-07-11 PROCEDURE — 83735 ASSAY OF MAGNESIUM: CPT

## 2025-07-11 PROCEDURE — 99291 CRITICAL CARE FIRST HOUR: CPT

## 2025-07-11 PROCEDURE — 2500000002 HC RX 250 W HCPCS SELF ADMINISTERED DRUGS (ALT 637 FOR MEDICARE OP, ALT 636 FOR OP/ED)

## 2025-07-11 PROCEDURE — 82947 ASSAY GLUCOSE BLOOD QUANT: CPT

## 2025-07-11 PROCEDURE — 36415 COLL VENOUS BLD VENIPUNCTURE: CPT

## 2025-07-11 PROCEDURE — 2500000001 HC RX 250 WO HCPCS SELF ADMINISTERED DRUGS (ALT 637 FOR MEDICARE OP)

## 2025-07-11 PROCEDURE — 80069 RENAL FUNCTION PANEL: CPT

## 2025-07-11 PROCEDURE — 85025 COMPLETE CBC W/AUTO DIFF WBC: CPT

## 2025-07-11 PROCEDURE — 99233 SBSQ HOSP IP/OBS HIGH 50: CPT | Performed by: UROLOGY

## 2025-07-11 PROCEDURE — 84132 ASSAY OF SERUM POTASSIUM: CPT

## 2025-07-11 PROCEDURE — 97161 PT EVAL LOW COMPLEX 20 MIN: CPT | Mod: GP | Performed by: PHYSICAL THERAPIST

## 2025-07-11 PROCEDURE — 2500000004 HC RX 250 GENERAL PHARMACY W/ HCPCS (ALT 636 FOR OP/ED)

## 2025-07-11 PROCEDURE — 2060000001 HC INTERMEDIATE ICU ROOM DAILY

## 2025-07-11 RX ORDER — MAGNESIUM SULFATE HEPTAHYDRATE 40 MG/ML
2 INJECTION, SOLUTION INTRAVENOUS ONCE
Status: COMPLETED | OUTPATIENT
Start: 2025-07-11 | End: 2025-07-11

## 2025-07-11 RX ADMIN — ACETAMINOPHEN 650 MG: 325 TABLET ORAL at 19:09

## 2025-07-11 RX ADMIN — INSULIN LISPRO 3 UNITS: 100 INJECTION, SOLUTION INTRAVENOUS; SUBCUTANEOUS at 12:00

## 2025-07-11 RX ADMIN — HEPARIN SODIUM 5000 UNITS: 5000 INJECTION, SOLUTION INTRAVENOUS; SUBCUTANEOUS at 04:50

## 2025-07-11 RX ADMIN — ATORVASTATIN CALCIUM 40 MG: 40 TABLET, FILM COATED ORAL at 08:20

## 2025-07-11 RX ADMIN — PANTOPRAZOLE SODIUM 20 MG: 20 TABLET, DELAYED RELEASE ORAL at 08:20

## 2025-07-11 RX ADMIN — SODIUM ZIRCONIUM CYCLOSILICATE 10 G: 10 POWDER, FOR SUSPENSION ORAL at 08:20

## 2025-07-11 RX ADMIN — HEPARIN SODIUM 5000 UNITS: 5000 INJECTION, SOLUTION INTRAVENOUS; SUBCUTANEOUS at 12:01

## 2025-07-11 RX ADMIN — MAGNESIUM SULFATE IN WATER 2 G: 40 INJECTION, SOLUTION INTRAVENOUS at 01:58

## 2025-07-11 RX ADMIN — INSULIN LISPRO 1 UNITS: 100 INJECTION, SOLUTION INTRAVENOUS; SUBCUTANEOUS at 08:00

## 2025-07-11 RX ADMIN — HEPARIN SODIUM 5000 UNITS: 5000 INJECTION, SOLUTION INTRAVENOUS; SUBCUTANEOUS at 19:45

## 2025-07-11 RX ADMIN — INSULIN LISPRO 1 UNITS: 100 INJECTION, SOLUTION INTRAVENOUS; SUBCUTANEOUS at 17:00

## 2025-07-11 ASSESSMENT — PAIN DESCRIPTION - ORIENTATION: ORIENTATION: LEFT

## 2025-07-11 ASSESSMENT — COGNITIVE AND FUNCTIONAL STATUS - GENERAL
MOVING TO AND FROM BED TO CHAIR: A LITTLE
CLIMB 3 TO 5 STEPS WITH RAILING: A LITTLE
MOBILITY SCORE: 18
WALKING IN HOSPITAL ROOM: A LITTLE
STANDING UP FROM CHAIR USING ARMS: A LITTLE
TURNING FROM BACK TO SIDE WHILE IN FLAT BAD: A LITTLE
MOVING FROM LYING ON BACK TO SITTING ON SIDE OF FLAT BED WITH BEDRAILS: A LITTLE

## 2025-07-11 ASSESSMENT — PAIN - FUNCTIONAL ASSESSMENT
PAIN_FUNCTIONAL_ASSESSMENT: 0-10

## 2025-07-11 ASSESSMENT — PAIN SCALES - GENERAL
PAINLEVEL_OUTOF10: 0 - NO PAIN
PAINLEVEL_OUTOF10: 0 - NO PAIN
PAINLEVEL_OUTOF10: 2
PAINLEVEL_OUTOF10: 0 - NO PAIN
PAINLEVEL_OUTOF10: 3
PAINLEVEL_OUTOF10: 0 - NO PAIN

## 2025-07-11 ASSESSMENT — ACTIVITIES OF DAILY LIVING (ADL): ADL_ASSISTANCE: INDEPENDENT

## 2025-07-11 ASSESSMENT — PAIN DESCRIPTION - LOCATION: LOCATION: BACK

## 2025-07-11 NOTE — PROGRESS NOTES
CONSULT PROGRESS NOTES    SERVICE DATE: 7/11/2025   SERVICE TIME: 8:41 AM    CONSULTING SERVICE: Nephrology    ASSESSMENT AND PLAN   69-year-old man with bladder cancer status post right-sided nephrectomy, cystectomy, ileal conduit coming in with left sided kidney stone, hydronephrosis, obstructive nephropathy, severe acute renal failure.  1.  Acute renal failure  2.  Hyperkalemia  3.  Acute metabolic acidosis  4.  Obstructive uropathy  5.  Hydronephrosis  6.  Kidney stone  7.  Status post nephrectomy     The acute renal failure is from unilateral obstruction with a history of nephrectomy.  Fortunately, he has enjoyed decompression of his left kidney with left-sided nephrostomy tube placement 7/10.  He has improvement in the serum creatinine overnight with copious urine production.  He has resolution of his hyperkalemia and metabolic acidosis.  Daily blood work, supportive care.  Dr. Goodman is available tomorrow.  I have no objection to this patient moving out of the intensive care unit.  He might need 1-2 more nights to monitor his renal function.  No need for IV fluids.  I can see him in the office after discharge.    SUBJECTIVE  INTERVAL HPI: He underwent nephrostomy tube placement yesterday on his left side and has significant urine output.  His vital signs are stable.  He has reasonable control of his pain.  He does describe dysgeusia.  He has no nausea or vomiting.  He is excited about the prospect of breakfast.    MEDICATIONS:  Scheduled Medications[1]   Continuous Medications[2]   PRN Medications[3]     OBJECTIVE  PHYSICAL EXAM:   Heart Rate:  []   Temp:  [36.2 °C (97.2 °F)-37.8 °C (100 °F)]   Resp:  [12-22]   BP: ()/(59-82)   Weight:  [92 kg (202 lb 13.2 oz)]   SpO2:  [82 %-100 %]   Body mass index is 25.35 kg/m².  nontoxic appearing white man  Appropriate affect  Hearing intact  Phonation intact  Moist mucosa  Harsh systolic murmur  Lungs are clear to auscultation bilaterally  Abdomen is soft,  nondistended, nontender, positive bowel sounds  Numerous abdominal scars, urostomy in place with clear urine output  No extremity edema  Moves 4 limbs spontaneously  No obvious joint deformities  No lymphadenopathy  Left-sided nephrostomy tube with clear urine output    DATA:   Labs:  Results for orders placed or performed during the hospital encounter of 07/09/25 (from the past 96 hours)   POCT GLUCOSE   Result Value Ref Range    POCT Glucose 174 (H) 74 - 99 mg/dL   Blood Gas Venous Full Panel   Result Value Ref Range    POCT pH, Venous 7.32 (L) 7.33 - 7.43 pH    POCT pCO2, Venous 36 (L) 41 - 51 mm Hg    POCT pO2, Venous 27 (L) 35 - 45 mm Hg    POCT SO2, Venous 39 (L) 45 - 75 %    POCT Oxy Hemoglobin, Venous 37.7 (L) 45.0 - 75.0 %    POCT Hematocrit Calculated, Venous 31.0 (L) 41.0 - 52.0 %    POCT Sodium, Venous 125 (L) 136 - 145 mmol/L    POCT Potassium, Venous 5.1 3.5 - 5.3 mmol/L    POCT Chloride, Venous 95 (L) 98 - 107 mmol/L    POCT Ionized Calicum, Venous 1.21 1.10 - 1.33 mmol/L    POCT Glucose, Venous 374 (H) 74 - 99 mg/dL    POCT Lactate, Venous 0.8 0.4 - 2.0 mmol/L    POCT Base Excess, Venous -6.9 (L) -2.0 - 3.0 mmol/L    POCT HCO3 Calculated, Venous 18.5 (L) 22.0 - 26.0 mmol/L    POCT Hemoglobin, Venous 10.3 (L) 13.5 - 17.5 g/dL    POCT Anion Gap, Venous 17.0 10.0 - 25.0 mmol/L    Patient Temperature 37.0 degrees Celsius    FiO2 21 %   POCT GLUCOSE   Result Value Ref Range    POCT Glucose 157 (H) 74 - 99 mg/dL   CBC and Auto Differential   Result Value Ref Range    WBC 10.7 4.4 - 11.3 x10*3/uL    nRBC 0.0 0.0 - 0.0 /100 WBCs    RBC 3.62 (L) 4.50 - 5.90 x10*6/uL    Hemoglobin 10.0 (L) 13.5 - 17.5 g/dL    Hematocrit 30.3 (L) 41.0 - 52.0 %    MCV 84 80 - 100 fL    MCH 27.6 26.0 - 34.0 pg    MCHC 33.0 32.0 - 36.0 g/dL    RDW 13.6 11.5 - 14.5 %    Platelets 223 150 - 450 x10*3/uL    Neutrophils % 81.6 40.0 - 80.0 %    Immature Granulocytes %, Automated 0.4 0.0 - 0.9 %    Lymphocytes % 5.3 13.0 - 44.0 %     Monocytes % 10.3 2.0 - 10.0 %    Eosinophils % 2.2 0.0 - 6.0 %    Basophils % 0.2 0.0 - 2.0 %    Neutrophils Absolute 8.70 (H) 1.20 - 7.70 x10*3/uL    Immature Granulocytes Absolute, Automated 0.04 0.00 - 0.70 x10*3/uL    Lymphocytes Absolute 0.56 (L) 1.20 - 4.80 x10*3/uL    Monocytes Absolute 1.10 (H) 0.10 - 1.00 x10*3/uL    Eosinophils Absolute 0.23 0.00 - 0.70 x10*3/uL    Basophils Absolute 0.02 0.00 - 0.10 x10*3/uL   Comprehensive metabolic panel   Result Value Ref Range    Glucose 165 (H) 74 - 99 mg/dL    Sodium 128 (L) 136 - 145 mmol/L    Potassium 4.9 3.5 - 5.3 mmol/L    Chloride 98 98 - 107 mmol/L    Bicarbonate 18 (L) 21 - 32 mmol/L    Anion Gap 17 10 - 20 mmol/L    Urea Nitrogen 84 (H) 6 - 23 mg/dL    Creatinine 7.48 (H) 0.50 - 1.30 mg/dL    eGFR 7 (L) >60 mL/min/1.73m*2    Calcium 8.8 8.6 - 10.3 mg/dL    Albumin 3.3 (L) 3.4 - 5.0 g/dL    Alkaline Phosphatase 86 33 - 136 U/L    Total Protein 6.5 6.4 - 8.2 g/dL    AST 81 (H) 9 - 39 U/L    Bilirubin, Total 0.5 0.0 - 1.2 mg/dL    ALT 66 (H) 10 - 52 U/L   Magnesium   Result Value Ref Range    Magnesium 1.70 1.60 - 2.40 mg/dL   Phosphorus   Result Value Ref Range    Phosphorus 5.7 (H) 2.5 - 4.9 mg/dL   Blood Culture    Specimen: Peripheral Venipuncture; Blood culture   Result Value Ref Range    Blood Culture No growth at 1 day    Blood Culture    Specimen: Peripheral Venipuncture; Blood culture   Result Value Ref Range    Blood Culture No growth at 1 day    Lavender Top   Result Value Ref Range    Extra Tube Hold for add-ons.    PST Top   Result Value Ref Range    Extra Tube Hold for add-ons.    POCT GLUCOSE   Result Value Ref Range    POCT Glucose 204 (H) 74 - 99 mg/dL   Renal function panel   Result Value Ref Range    Glucose 212 (H) 74 - 99 mg/dL    Sodium 128 (L) 136 - 145 mmol/L    Potassium 5.4 (H) 3.5 - 5.3 mmol/L    Chloride 100 98 - 107 mmol/L    Bicarbonate 18 (L) 21 - 32 mmol/L    Anion Gap 15 10 - 20 mmol/L    Urea Nitrogen 84 (H) 6 - 23 mg/dL     Creatinine 7.50 (H) 0.50 - 1.30 mg/dL    eGFR 7 (L) >60 mL/min/1.73m*2    Calcium 8.5 (L) 8.6 - 10.3 mg/dL    Phosphorus 5.6 (H) 2.5 - 4.9 mg/dL    Albumin 3.0 (L) 3.4 - 5.0 g/dL   POCT GLUCOSE   Result Value Ref Range    POCT Glucose 205 (H) 74 - 99 mg/dL   POCT GLUCOSE   Result Value Ref Range    POCT Glucose 183 (H) 74 - 99 mg/dL   POCT GLUCOSE   Result Value Ref Range    POCT Glucose 174 (H) 74 - 99 mg/dL   CBC and Auto Differential   Result Value Ref Range    WBC 7.6 4.4 - 11.3 x10*3/uL    nRBC 0.0 0.0 - 0.0 /100 WBCs    RBC 3.50 (L) 4.50 - 5.90 x10*6/uL    Hemoglobin 9.5 (L) 13.5 - 17.5 g/dL    Hematocrit 29.6 (L) 41.0 - 52.0 %    MCV 85 80 - 100 fL    MCH 27.1 26.0 - 34.0 pg    MCHC 32.1 32.0 - 36.0 g/dL    RDW 13.8 11.5 - 14.5 %    Platelets 154 150 - 450 x10*3/uL    Neutrophils % 74.2 40.0 - 80.0 %    Immature Granulocytes %, Automated 0.4 0.0 - 0.9 %    Lymphocytes % 7.2 13.0 - 44.0 %    Monocytes % 13.4 2.0 - 10.0 %    Eosinophils % 4.5 0.0 - 6.0 %    Basophils % 0.3 0.0 - 2.0 %    Neutrophils Absolute 5.66 1.20 - 7.70 x10*3/uL    Immature Granulocytes Absolute, Automated 0.03 0.00 - 0.70 x10*3/uL    Lymphocytes Absolute 0.55 (L) 1.20 - 4.80 x10*3/uL    Monocytes Absolute 1.02 (H) 0.10 - 1.00 x10*3/uL    Eosinophils Absolute 0.34 0.00 - 0.70 x10*3/uL    Basophils Absolute 0.02 0.00 - 0.10 x10*3/uL   Magnesium   Result Value Ref Range    Magnesium 1.75 1.60 - 2.40 mg/dL   Renal function panel   Result Value Ref Range    Glucose 154 (H) 74 - 99 mg/dL    Sodium 129 (L) 136 - 145 mmol/L    Potassium 5.7 (H) 3.5 - 5.3 mmol/L    Chloride 102 98 - 107 mmol/L    Bicarbonate 16 (L) 21 - 32 mmol/L    Anion Gap 17 10 - 20 mmol/L    Urea Nitrogen 88 (H) 6 - 23 mg/dL    Creatinine 7.55 (H) 0.50 - 1.30 mg/dL    eGFR 7 (L) >60 mL/min/1.73m*2    Calcium 8.5 (L) 8.6 - 10.3 mg/dL    Phosphorus 5.8 (H) 2.5 - 4.9 mg/dL    Albumin 3.0 (L) 3.4 - 5.0 g/dL   POCT GLUCOSE   Result Value Ref Range    POCT Glucose 171 (H) 74 - 99  mg/dL   POCT GLUCOSE   Result Value Ref Range    POCT Glucose 158 (H) 74 - 99 mg/dL   Blood Gas Venous Full Panel   Result Value Ref Range    POCT pH, Venous 7.36 7.33 - 7.43 pH    POCT pCO2, Venous 34 (L) 41 - 51 mm Hg    POCT pO2, Venous 39 35 - 45 mm Hg    POCT SO2, Venous 69 45 - 75 %    POCT Oxy Hemoglobin, Venous 67.0 45.0 - 75.0 %    POCT Hematocrit Calculated, Venous 31.0 (L) 41.0 - 52.0 %    POCT Sodium, Venous 131 (L) 136 - 145 mmol/L    POCT Potassium, Venous 5.7 (H) 3.5 - 5.3 mmol/L    POCT Chloride, Venous 104 98 - 107 mmol/L    POCT Ionized Calicum, Venous 1.23 1.10 - 1.33 mmol/L    POCT Glucose, Venous 160 (H) 74 - 99 mg/dL    POCT Lactate, Venous 1.1 0.4 - 2.0 mmol/L    POCT Base Excess, Venous -5.6 (L) -2.0 - 3.0 mmol/L    POCT HCO3 Calculated, Venous 19.2 (L) 22.0 - 26.0 mmol/L    POCT Hemoglobin, Venous 10.3 (L) 13.5 - 17.5 g/dL    POCT Anion Gap, Venous 14.0 10.0 - 25.0 mmol/L    Patient Temperature 37.0 degrees Celsius    FiO2 21 %   POCT GLUCOSE   Result Value Ref Range    POCT Glucose 169 (H) 74 - 99 mg/dL   Urinalysis with Reflex Culture and Microscopic   Result Value Ref Range    Color, Urine Straw Straw, Yellow    Appearance, Urine Turbid (N) Clear    Specific Gravity, Urine 1.020 1.005 - 1.035    pH, Urine 6.0 5.0, 5.5, 6.0, 6.5, 7.0, 7.5, 8.0    Protein, Urine 100 (2+) (A) NEGATIVE, 10 (TRACE) mg/dL    Glucose, Urine NEGATIVE NEGATIVE mg/dL    Blood, Urine 1.0 (3+) (A) NEGATIVE mg/dL    Ketones, Urine NEGATIVE NEGATIVE mg/dL    Bilirubin, Urine NEGATIVE NEGATIVE mg/dL    Urobilinogen, Urine NORM NORM mg/dL    Nitrite, Urine NEGATIVE NEGATIVE    Leukocyte Esterase, Urine 500 Tino/uL (A) NEGATIVE   Microscopic Only, Urine   Result Value Ref Range    WBC, Urine 21-50 (A) 1-5, NONE /HPF    RBC, Urine NONE NONE, 1-2, 3-5 /HPF   Renal function panel   Result Value Ref Range    Glucose 174 (H) 74 - 99 mg/dL    Sodium 134 (L) 136 - 145 mmol/L    Potassium 5.0 3.5 - 5.3 mmol/L    Chloride 102 98 -  107 mmol/L    Bicarbonate 18 (L) 21 - 32 mmol/L    Anion Gap 19 10 - 20 mmol/L    Urea Nitrogen 90 (H) 6 - 23 mg/dL    Creatinine 7.94 (H) 0.50 - 1.30 mg/dL    eGFR 7 (L) >60 mL/min/1.73m*2    Calcium 9.1 8.6 - 10.3 mg/dL    Phosphorus 5.9 (H) 2.5 - 4.9 mg/dL    Albumin 3.4 3.4 - 5.0 g/dL   PST Top   Result Value Ref Range    Extra Tube Hold for add-ons.    Magnesium   Result Value Ref Range    Magnesium 1.80 1.60 - 2.40 mg/dL   POCT GLUCOSE   Result Value Ref Range    POCT Glucose 198 (H) 74 - 99 mg/dL   POCT GLUCOSE   Result Value Ref Range    POCT Glucose 240 (H) 74 - 99 mg/dL   POCT GLUCOSE   Result Value Ref Range    POCT Glucose 313 (H) 74 - 99 mg/dL   Blood Gas Arterial Full Panel   Result Value Ref Range    POCT pH, Arterial 7.43 (H) 7.38 - 7.42 pH    POCT pCO2, Arterial 33 (L) 38 - 42 mm Hg    POCT pO2, Arterial 90 85 - 95 mm Hg    POCT SO2, Arterial 99 94 - 100 %    POCT Oxy Hemoglobin, Arterial 96.8 94.0 - 98.0 %    POCT Hematocrit Calculated, Arterial 31.0 (L) 41.0 - 52.0 %    POCT Sodium, Arterial 131 (L) 136 - 145 mmol/L    POCT Potassium, Arterial 4.8 3.5 - 5.3 mmol/L    POCT Chloride, Arterial 101 98 - 107 mmol/L    POCT Ionized Calcium, Arterial 1.19 1.10 - 1.33 mmol/L    POCT Glucose, Arterial 333 (H) 74 - 99 mg/dL    POCT Lactate, Arterial 0.8 0.4 - 2.0 mmol/L    POCT Base Excess, Arterial -1.9 -2.0 - 3.0 mmol/L    POCT HCO3 Calculated, Arterial 21.9 (L) 22.0 - 26.0 mmol/L    POCT Hemoglobin, Arterial 10.3 (L) 13.5 - 17.5 g/dL    POCT Anion Gap, Arterial 13 10 - 25 mmo/L    Patient Temperature 37.0 degrees Celsius    FiO2 21 %    Site of Arterial Puncture Radial Right     Denis's Test Positive    Potassium   Result Value Ref Range    Potassium 4.7 3.5 - 5.3 mmol/L   Light Blue Top   Result Value Ref Range    Extra Tube Hold for add-ons.    Lavender Top   Result Value Ref Range    Extra Tube Hold for add-ons.    Troponin I, High Sensitivity   Result Value Ref Range    Troponin I, High Sensitivity  16 0 - 20 ng/L   POCT GLUCOSE   Result Value Ref Range    POCT Glucose 291 (H) 74 - 99 mg/dL   Electrocardiogram, 12-lead PRN ACS symptoms   Result Value Ref Range    Ventricular Rate 50 BPM    Atrial Rate 50 BPM    MO Interval 160 ms    QRS Duration 104 ms    QT Interval 450 ms    QTC Calculation(Bazett) 410 ms    P Axis 50 degrees    R Axis -37 degrees    T Axis 7 degrees    QRS Count 9 beats    Q Onset 224 ms    P Onset 144 ms    P Offset 209 ms    T Offset 449 ms    QTC Fredericia 423 ms   POCT GLUCOSE   Result Value Ref Range    POCT Glucose 298 (H) 74 - 99 mg/dL   Renal function panel   Result Value Ref Range    Glucose 256 (H) 74 - 99 mg/dL    Sodium 137 136 - 145 mmol/L    Potassium 5.0 3.5 - 5.3 mmol/L    Chloride 98 98 - 107 mmol/L    Bicarbonate 25 21 - 32 mmol/L    Anion Gap 19 10 - 20 mmol/L    Urea Nitrogen 86 (H) 6 - 23 mg/dL    Creatinine 7.10 (H) 0.50 - 1.30 mg/dL    eGFR 8 (L) >60 mL/min/1.73m*2    Calcium 9.1 8.6 - 10.3 mg/dL    Phosphorus 5.8 (H) 2.5 - 4.9 mg/dL    Albumin 3.3 (L) 3.4 - 5.0 g/dL   Magnesium   Result Value Ref Range    Magnesium 1.77 1.60 - 2.40 mg/dL   TSH with reflex to Free T4 if abnormal   Result Value Ref Range    Thyroid Stimulating Hormone 0.01 (L) 0.44 - 3.98 mIU/L   Thyroxine, Free   Result Value Ref Range    Thyroxine, Free 1.12 0.61 - 1.12 ng/dL   POCT GLUCOSE   Result Value Ref Range    POCT Glucose 219 (H) 74 - 99 mg/dL   Renal function panel   Result Value Ref Range    Glucose 174 (H) 74 - 99 mg/dL    Sodium 138 136 - 145 mmol/L    Potassium 5.0 3.5 - 5.3 mmol/L    Chloride 100 98 - 107 mmol/L    Bicarbonate 26 21 - 32 mmol/L    Anion Gap 17 10 - 20 mmol/L    Urea Nitrogen 85 (H) 6 - 23 mg/dL    Creatinine 6.50 (H) 0.50 - 1.30 mg/dL    eGFR 9 (L) >60 mL/min/1.73m*2    Calcium 9.5 8.6 - 10.3 mg/dL    Phosphorus 5.9 (H) 2.5 - 4.9 mg/dL    Albumin 3.3 (L) 3.4 - 5.0 g/dL   Magnesium   Result Value Ref Range    Magnesium 1.69 1.60 - 2.40 mg/dL   CBC and Auto Differential    Result Value Ref Range    WBC 8.5 4.4 - 11.3 x10*3/uL    nRBC 0.0 0.0 - 0.0 /100 WBCs    RBC 3.95 (L) 4.50 - 5.90 x10*6/uL    Hemoglobin 10.6 (L) 13.5 - 17.5 g/dL    Hematocrit 32.2 (L) 41.0 - 52.0 %    MCV 82 80 - 100 fL    MCH 26.8 26.0 - 34.0 pg    MCHC 32.9 32.0 - 36.0 g/dL    RDW 13.7 11.5 - 14.5 %    Platelets 250 150 - 450 x10*3/uL    Neutrophils % 73.2 40.0 - 80.0 %    Immature Granulocytes %, Automated 0.9 0.0 - 0.9 %    Lymphocytes % 9.1 13.0 - 44.0 %    Monocytes % 12.7 2.0 - 10.0 %    Eosinophils % 3.7 0.0 - 6.0 %    Basophils % 0.4 0.0 - 2.0 %    Neutrophils Absolute 6.19 1.20 - 7.70 x10*3/uL    Immature Granulocytes Absolute, Automated 0.08 0.00 - 0.70 x10*3/uL    Lymphocytes Absolute 0.77 (L) 1.20 - 4.80 x10*3/uL    Monocytes Absolute 1.07 (H) 0.10 - 1.00 x10*3/uL    Eosinophils Absolute 0.31 0.00 - 0.70 x10*3/uL    Basophils Absolute 0.03 0.00 - 0.10 x10*3/uL   Blood Gas Venous Full Panel   Result Value Ref Range    POCT pH, Venous 7.49 (H) 7.33 - 7.43 pH    POCT pCO2, Venous 38 (L) 41 - 51 mm Hg    POCT pO2, Venous 49 (H) 35 - 45 mm Hg    POCT SO2, Venous 83 (H) 45 - 75 %    POCT Oxy Hemoglobin, Venous 81.4 (H) 45.0 - 75.0 %    POCT Hematocrit Calculated, Venous 34.0 (L) 41.0 - 52.0 %    POCT Sodium, Venous 138 136 - 145 mmol/L    POCT Potassium, Venous 5.0 3.5 - 5.3 mmol/L    POCT Chloride, Venous 103 98 - 107 mmol/L    POCT Ionized Calicum, Venous 1.20 1.10 - 1.33 mmol/L    POCT Glucose, Venous 177 (H) 74 - 99 mg/dL    POCT Lactate, Venous 0.9 0.4 - 2.0 mmol/L    POCT Base Excess, Venous 5.3 (H) -2.0 - 3.0 mmol/L    POCT HCO3 Calculated, Venous 29.0 (H) 22.0 - 26.0 mmol/L    POCT Hemoglobin, Venous 11.4 (L) 13.5 - 17.5 g/dL    POCT Anion Gap, Venous 11.0 10.0 - 25.0 mmol/L    Patient Temperature 37.0 degrees Celsius    FiO2 21 %   Renal function panel   Result Value Ref Range    Glucose 169 (H) 74 - 99 mg/dL    Sodium 139 136 - 145 mmol/L    Potassium 4.6 3.5 - 5.3 mmol/L    Chloride 100 98 -  107 mmol/L    Bicarbonate 26 21 - 32 mmol/L    Anion Gap 18 10 - 20 mmol/L    Urea Nitrogen 82 (H) 6 - 23 mg/dL    Creatinine 6.10 (H) 0.50 - 1.30 mg/dL    eGFR 9 (L) >60 mL/min/1.73m*2    Calcium 9.4 8.6 - 10.3 mg/dL    Phosphorus 5.9 (H) 2.5 - 4.9 mg/dL    Albumin 3.2 (L) 3.4 - 5.0 g/dL   Magnesium   Result Value Ref Range    Magnesium 2.31 1.60 - 2.40 mg/dL   Morphology   Result Value Ref Range    RBC Morphology See Below     Ovalocytes Many     [place] Cells Many    POCT GLUCOSE   Result Value Ref Range    POCT Glucose 169 (H) 74 - 99 mg/dL         SIGNATURE: Rasheed White MD PATIENT NAME: Rodrigue Palacios   DATE: July 11, 2025 MRN: [id number]   TIME: 8:41 AM PAGER: [phone]            [1] atorvastatin, 40 mg, oral, Daily  heparin (porcine), 5,000 Units, subcutaneous, q8h  insulin lispro, 0-5 Units, subcutaneous, TID AC  [Held by provider] lisinopril, 20 mg, oral, q12h  pantoprazole, 20 mg, oral, Daily  [2]    [3] PRN medications: acetaminophen, dextrose, dextrose, glucagon, glucagon

## 2025-07-11 NOTE — CARE PLAN
The patient's goals for the shift include sleep    The clinical goals for the shift include maintain hemodynamic stability      Problem: Pain - Adult  Goal: Verbalizes/displays adequate comfort level or baseline comfort level  7/10/2025 2314 by Karen Sánchez RN  Outcome: Progressing  7/10/2025 2314 by Karen Sánchez RN  Outcome: Progressing     Problem: Safety - Adult  Goal: Free from fall injury  7/10/2025 2314 by Karen Sánchez RN  Outcome: Progressing  7/10/2025 2314 by Karen Sánchez RN  Outcome: Progressing     Problem: Discharge Planning  Goal: Discharge to home or other facility with appropriate resources  7/10/2025 2314 by Karen Sánchez RN  Outcome: Progressing  7/10/2025 2314 by Karen Sánchez RN  Outcome: Progressing     Problem: Chronic Conditions and Co-morbidities  Goal: Patient's chronic conditions and co-morbidity symptoms are monitored and maintained or improved  7/10/2025 2314 by Karen Sánchez RN  Outcome: Progressing  7/10/2025 2314 by Karen Sánchez RN  Outcome: Progressing     Problem: Nutrition  Goal: Nutrient intake appropriate for maintaining nutritional needs  7/10/2025 2314 by Karen Sánchez RN  Outcome: Progressing  7/10/2025 2314 by Karen Sánchez RN  Outcome: Progressing     Problem: Diabetes  Goal: Achieve decreasing blood glucose levels by end of shift  7/10/2025 2314 by Karen Sánchez RN  Outcome: Progressing  7/10/2025 2314 by Karen Sánchez RN  Outcome: Progressing  Goal: Increase stability of blood glucose readings by end of shift  7/10/2025 2314 by Karen Sánchez RN  Outcome: Progressing  7/10/2025 2314 by Karen Sánchez RN  Outcome: Progressing  Goal: Decrease in ketones present in urine by end of shift  7/10/2025 2314 by Karen Sánchez RN  Outcome: Progressing  7/10/2025 2314 by Karen Sánchez RN  Outcome: Progressing  Goal: Maintain electrolyte levels within acceptable range throughout shift  7/10/2025 2314 by Karen Sánchez RN  Outcome: Progressing  7/10/2025 2314 by Karen  SHARLENE Sánchez  Outcome: Progressing  Goal: Maintain glucose levels >70mg/dl to <250mg/dl throughout shift  7/10/2025 2314 by Karen Sánchez RN  Outcome: Progressing  7/10/2025 2314 by Karen Sánchez RN  Outcome: Progressing  Goal: No changes in neurological exam by end of shift  7/10/2025 2314 by Karen Sánchze RN  Outcome: Progressing  7/10/2025 2314 by Karen Sánchez RN  Outcome: Progressing  Goal: Learn about and adhere to nutrition recommendations by end of shift  7/10/2025 2314 by Karen Sánchez RN  Outcome: Progressing  7/10/2025 2314 by Karen Sánchez RN  Outcome: Progressing  Goal: Vital signs within normal range for age by end of shift  7/10/2025 2314 by Karen Sánchez RN  Outcome: Progressing  7/10/2025 2314 by Karen Sánchez RN  Outcome: Progressing  Goal: Increase self care and/or family involovement by end of shift  7/10/2025 2314 by Karen Sánchez RN  Outcome: Progressing  7/10/2025 2314 by Karen Sánchez RN  Outcome: Progressing  Goal: Receive DSME education by end of shift  7/10/2025 2314 by Karen Sánchez RN  Outcome: Progressing  7/10/2025 2314 by Karen Sánchez RN  Outcome: Progressing     Problem: Fall/Injury  Goal: Not fall by end of shift  7/10/2025 2314 by Karen Sánchez RN  Outcome: Progressing  7/10/2025 2314 by Karen Sánchez RN  Outcome: Progressing  Goal: Be free from injury by end of the shift  7/10/2025 2314 by Karen Sánchez RN  Outcome: Progressing  7/10/2025 2314 by Karen Sánchez RN  Outcome: Progressing  Goal: Verbalize understanding of personal risk factors for fall in the hospital  7/10/2025 2314 by Karen Sánchez RN  Outcome: Progressing  7/10/2025 2314 by Karen Sánchez RN  Outcome: Progressing  Goal: Verbalize understanding of risk factor reduction measures to prevent injury from fall in the home  7/10/2025 2314 by Karen Sánchez RN  Outcome: Progressing  7/10/2025 2314 by Karen Gonzalo, RN  Outcome: Progressing  Goal: Use assistive devices by end of the shift  7/10/2025 2314 by  Karen Sánchez RN  Outcome: Progressing  7/10/2025 2314 by Karen Sánchez RN  Outcome: Progressing  Goal: Pace activities to prevent fatigue by end of the shift  7/10/2025 2314 by Karen Sánchez RN  Outcome: Progressing  7/10/2025 2314 by Karen Sánchez RN  Outcome: Progressing     Problem: Skin  Goal: Decreased wound size/increased tissue granulation at next dressing change  7/10/2025 2314 by Karen Sánchez RN  Outcome: Progressing  Flowsheets (Taken 7/10/2025 2314)  Decreased wound size/increased tissue granulation at next dressing change:   Promote sleep for wound healing   Protective dressings over bony prominences   Utilize specialty bed per algorithm  7/10/2025 2314 by Karen Sánchez RN  Outcome: Progressing  Flowsheets (Taken 7/10/2025 2314)  Decreased wound size/increased tissue granulation at next dressing change:   Promote sleep for wound healing   Protective dressings over bony prominences   Utilize specialty bed per algorithm  Goal: Participates in plan/prevention/treatment measures  7/10/2025 2314 by Karen Sánchez RN  Outcome: Progressing  Flowsheets (Taken 7/10/2025 2314)  Participates in plan/prevention/treatment measures:   Discuss with provider PT/OT consult   Elevate heels   Increase activity/out of bed for meals  7/10/2025 2314 by Karen Sánchez RN  Outcome: Progressing  Flowsheets (Taken 7/10/2025 2314)  Participates in plan/prevention/treatment measures:   Discuss with provider PT/OT consult   Elevate heels   Increase activity/out of bed for meals  Goal: Prevent/manage excess moisture  7/10/2025 2314 by Karen Sánchez RN  Outcome: Progressing  Flowsheets (Taken 7/10/2025 2314)  Prevent/manage excess moisture:   Monitor for/manage infection if present   Follow provider orders for dressing changes   Moisturize dry skin  7/10/2025 2314 by Karen Sánchez RN  Outcome: Progressing  Flowsheets (Taken 7/10/2025 2314)  Prevent/manage excess moisture:   Monitor for/manage infection if present   Follow  provider orders for dressing changes   Moisturize dry skin  Goal: Prevent/minimize sheer/friction injuries  7/10/2025 2314 by Karen Sánchez RN  Outcome: Progressing  Flowsheets (Taken 7/10/2025 2314)  Prevent/minimize sheer/friction injuries:   Complete micro-shifts as needed if patient unable. Adjust patient position to relieve pressure points, not a full turn   Increase activity/out of bed for meals   Use pull sheet   HOB 30 degrees or less   Turn/reposition every 2 hours/use positioning/transfer devices   Utilize specialty bed per algorithm  7/10/2025 2314 by Karen Sánchez RN  Outcome: Progressing  Flowsheets (Taken 7/10/2025 2314)  Prevent/minimize sheer/friction injuries:   Complete micro-shifts as needed if patient unable. Adjust patient position to relieve pressure points, not a full turn   Increase activity/out of bed for meals   Use pull sheet   HOB 30 degrees or less   Turn/reposition every 2 hours/use positioning/transfer devices   Utilize specialty bed per algorithm  Goal: Promote/optimize nutrition  7/10/2025 2314 by Karen Sánchez RN  Outcome: Progressing  Flowsheets (Taken 7/10/2025 2314)  Promote/optimize nutrition:   Monitor/record intake including meals   Offer water/supplements/favorite foods  7/10/2025 2314 by Karen Sánchez RN  Outcome: Progressing  Flowsheets (Taken 7/10/2025 2314)  Promote/optimize nutrition:   Monitor/record intake including meals   Offer water/supplements/favorite foods  Goal: Promote skin healing  7/10/2025 2314 by Karen Sánchez RN  Outcome: Progressing  Flowsheets (Taken 7/10/2025 2314)  Promote skin healing:   Assess skin/pad under line(s)/device(s)   Protective dressings over bony prominences   Turn/reposition every 2 hours/use positioning/transfer devices   Ensure correct size (line/device) and apply per  instructions   Rotate device position/do not position patient on device  7/10/2025 2314 by Karen Sánchez RN  Outcome: Progressing  Flowsheets (Taken  7/10/2025 1948)  Promote skin healing:   Assess skin/pad under line(s)/device(s)   Protective dressings over bony prominences   Turn/reposition every 2 hours/use positioning/transfer devices   Ensure correct size (line/device) and apply per  instructions   Rotate device position/do not position patient on device

## 2025-07-11 NOTE — CARE PLAN
The clinical goals for the shift include Maintain hemodynamically stable.      Problem: Pain - Adult  Goal: Verbalizes/displays adequate comfort level or baseline comfort level  Outcome: Progressing     Problem: Safety - Adult  Goal: Free from fall injury  Outcome: Progressing     Problem: Discharge Planning  Goal: Discharge to home or other facility with appropriate resources  Outcome: Progressing     Problem: Chronic Conditions and Co-morbidities  Goal: Patient's chronic conditions and co-morbidity symptoms are monitored and maintained or improved  Outcome: Progressing     Problem: Nutrition  Goal: Nutrient intake appropriate for maintaining nutritional needs  Outcome: Progressing     Problem: Diabetes  Goal: Achieve decreasing blood glucose levels by end of shift  Outcome: Progressing  Goal: Increase stability of blood glucose readings by end of shift  Outcome: Progressing  Goal: Decrease in ketones present in urine by end of shift  Outcome: Progressing  Goal: Maintain electrolyte levels within acceptable range throughout shift  Outcome: Progressing  Goal: Maintain glucose levels >70mg/dl to <250mg/dl throughout shift  Outcome: Progressing  Goal: No changes in neurological exam by end of shift  Outcome: Progressing  Goal: Learn about and adhere to nutrition recommendations by end of shift  Outcome: Progressing  Goal: Vital signs within normal range for age by end of shift  Outcome: Progressing  Goal: Increase self care and/or family involovement by end of shift  Outcome: Progressing  Goal: Receive DSME education by end of shift  Outcome: Progressing     Problem: Fall/Injury  Goal: Not fall by end of shift  Outcome: Progressing  Goal: Be free from injury by end of the shift  Outcome: Progressing  Goal: Verbalize understanding of personal risk factors for fall in the hospital  Outcome: Progressing  Goal: Verbalize understanding of risk factor reduction measures to prevent injury from fall in the home  Outcome:  Progressing  Goal: Use assistive devices by end of the shift  Outcome: Progressing  Goal: Pace activities to prevent fatigue by end of the shift  Outcome: Progressing     Problem: Skin  Goal: Decreased wound size/increased tissue granulation at next dressing change  Outcome: Progressing  Flowsheets (Taken 7/11/2025 1733)  Decreased wound size/increased tissue granulation at next dressing change:   Promote sleep for wound healing   Protective dressings over bony prominences   Utilize specialty bed per algorithm  Goal: Participates in plan/prevention/treatment measures  Outcome: Progressing  Flowsheets (Taken 7/11/2025 1733)  Participates in plan/prevention/treatment measures:   Elevate heels   Discuss with provider PT/OT consult   Increase activity/out of bed for meals  Goal: Prevent/manage excess moisture  Outcome: Progressing  Flowsheets (Taken 7/11/2025 1733)  Prevent/manage excess moisture:   Cleanse incontinence/protect with barrier cream   Monitor for/manage infection if present   Follow provider orders for dressing changes   Moisturize dry skin  Goal: Prevent/minimize sheer/friction injuries  Outcome: Progressing  Flowsheets (Taken 7/11/2025 1733)  Prevent/minimize sheer/friction injuries:   Complete micro-shifts as needed if patient unable. Adjust patient position to relieve pressure points, not a full turn   Increase activity/out of bed for meals   Use pull sheet   HOB 30 degrees or less   Turn/reposition every 2 hours/use positioning/transfer devices   Utilize specialty bed per algorithm  Goal: Promote/optimize nutrition  Outcome: Progressing  Flowsheets (Taken 7/11/2025 1733)  Promote/optimize nutrition:   Monitor/record intake including meals   Offer water/supplements/favorite foods  Goal: Promote skin healing  Outcome: Progressing  Flowsheets (Taken 7/11/2025 1733)  Promote skin healing:   Assess skin/pad under line(s)/device(s)   Protective dressings over bony prominences   Rotate device position/do not  position patient on device   Turn/reposition every 2 hours/use positioning/transfer devices   Ensure correct size (line/device) and apply per  instructions

## 2025-07-11 NOTE — PROGRESS NOTES
[facility] Critical Care Medicine       Date:  7/11/2025  Patient:  Rodrigue Palacios  YOB: 1955  MRN:  [id number]   Admit Date:  7/9/2025  Hospital Length of Stay: 2   ICU Length of Stay: 1d 23h       No chief complaint on file.        History of Present Illness:  Rodrigue Palacios is a 69 y.o. year old male patient with past medical history of HTN, HLD, SCOOTER, GERD, T2DM, CKD 3B, RA, bladder cancer s/p R nephrectomy and radical cystectomy and ileal conduit (5/21/25) who presented to [place] emergency department 7/7 with complaints of confusion per his son who called EMS.  Upon EMS arrival he was found to be hypoglycemic with BGL 52.  Initial work-up was also significant for HARMAN with initial sCr 7.7.  Transfer request was placed from [place] ED to ICU for persistent hypoglycemia.     Today he reports not sleeping well overnight, but denies HA, chest pain, SOB, n/v/d or difficulty urinating. No other complaints.         OSH Course: Remained hypoglycemia after repetitive dextrose ampules, placed on D10 infusion. Endocrinology consulted and recommended continuing D10 infusion and subcutaneous octreotide until hypoglycemia resolved.  Nephrology consulted and deemed no indications for urgent dialysis, ordered renal US.  Urology consulted and ordered IV lasix, NM kidney flow scan, and CT AP.  Renal US significant for moderate left-sided hydronephrosis with suggestion of a 1.4 cm nonobstructing calculus in the inferior pole.  CT AP confirmed finding on renal US of obstructing calculus at L PUJ w moderate hydronephrosis.  Urology recommended IR consult for urgent perc-neph tube placement, but this was unable to be done at [facility] today, therefore transfer to  ICU was initiated.      Interval ICU Events:  7/9: Arrived to ICU on D10 infusion. HDS. NPO at midnight for presumed perc-neph tube placement in am with IR. Repeat labs, draw blood cultures.     7/10: plan for perc neph tube placement in IR today. Creatinine  still rising. Blood glucose improving. K still elevated, nephrology starting bicarb gtt.     7/11: No acute events overnight, creatinine down trending. Blood glucose stable on mild sliding scale. Off bicarb gtt, nephrology still following.      Objective     Medical History:  Medical History[1]  Surgical History[2]  Prescriptions Prior to Admission[3]  Orencia [abatacept]  Social History[4]  Family History[5]    Hospital Medications:    Continuous Medications[6]    Current Medications[7]    Review of Systems:  14 point review of systems was completed and negative except for those specially mention in my HPI    Physical Exam:    Heart Rate:  []   Temp:  [36.2 °C (97.2 °F)-37.8 °C (100 °F)]   Resp:  [12-20]   BP: ()/(59-87)   Weight:  [92 kg (202 lb 13.2 oz)]   SpO2:  [81 %-98 %]     Physical Exam  Constitutional:       General: He is not in acute distress.  HENT:      Head: Normocephalic.      Mouth/Throat:      Mouth: Mucous membranes are moist.   Eyes:      Extraocular Movements: Extraocular movements intact.      Conjunctiva/sclera: Conjunctivae normal.   Cardiovascular:      Rate and Rhythm: Normal rate and regular rhythm.   Pulmonary:      Effort: No respiratory distress.      Breath sounds: Normal breath sounds. No wheezing, rhonchi or rales.   Abdominal:      Palpations: Abdomen is soft.      Tenderness: There is no abdominal tenderness. There is no guarding.      Comments: Ileal conduit   Musculoskeletal:         General: Normal range of motion.      Cervical back: Normal range of motion.      Right lower leg: No edema.      Left lower leg: No edema.   Skin:     General: Skin is warm.   Neurological:      Mental Status: He is alert and oriented to person, place, and time.   Psychiatric:         Mood and Affect: Mood normal.         Behavior: Behavior normal.         Objective:    I have reviewed all medications, laboratory results, and imaging pertinent for today's encounter.            Intake/Output Summary (Last 24 hours) at 7/11/2025 1724  Last data filed at 7/11/2025 1500  Gross per 24 hour   Intake 916.67 ml   Output 5325 ml   Net -4408.33 ml       Daily Labs:  CBC:   Results from last 7 days   Lab Units 07/11/25  0357 07/10/25  0443   WBC AUTO x10*3/uL 8.5 7.6   HEMOGLOBIN g/dL 10.6* 9.5*   HEMATOCRIT % 32.2* 29.6*   MCV fL 82 85     BMP:    Results from last 7 days   Lab Units 07/11/25  0357 07/11/25  0012   SODIUM mmol/L 139 138   POTASSIUM mmol/L 4.6 5.0   CHLORIDE mmol/L 100 100   CO2 mmol/L 26 26   BUN mg/dL 82* 85*   CREATININE mg/dL 6.10* 6.50*   CALCIUM mg/dL 9.4 9.5   GLUCOSE mg/dL 169* 174*   MAGNESIUM mg/dL 2.31 1.69     ABG:   Results from last 7 days   Lab Units 07/10/25  1443   POCT PH, ARTERIAL pH 7.43*   POCT PCO2, ARTERIAL mm Hg 33*   POCT PO2, ARTERIAL mm Hg 90   POCT SO2, ARTERIAL % 99   POCT HCO3 CALCULATED, ARTERIAL mmol/L 21.9*   POCT LACTATE, ARTERIAL mmol/L 0.8      VBG:   Results from last 7 days   Lab Units 07/11/25  0357 07/10/25  0857   POCT PH, VENOUS pH 7.49* 7.36   POCT PCO2, VENOUS mm Hg 38* 34*   POCT PO2, VENOUS mm Hg 49* 39   POCT SO2, VENOUS % 83* 69   POCT HCO3 CALCULATED, VENOUS mmol/L 29.0* 19.2*   POCT LACTATE, VENOUS mmol/L 0.9 1.1             Assessment/Plan:    I am currently managing this critically ill patient for the following problems:    Neurology/Psychiatry/Pain Control/Sedation:   Acute metabolic encephalopathy 2/2 hypoglycemia- resolved   - Pain Control: acetaminophen PRN  - CAM ICU qshift, sleep-wake hygiene, delirium precautions      Respiratory/ENT:  No acute issues  - Supplemental O2: none, on room air   - Maintain SpO2 >92%  - Continuous pulse ox monitoring      Cardiovascular:   Bradycardia- 30-40's while asleep- rises to 50'swhen awake  History of hypertension   - Hold home lisinopril   - Maintain MAPs >65  - No episodes of bradycardia today; was ambulated with nursing and HR responded appropriately.     Gastrointestinal:  No  active concerns   - Diet: renal  - BR: PRN  - GI Prophylaxis: home PPI     Renal/Volume Status/Electrolytes:  Acute renal failure 2/2 obstructive nephropathy-plan to neph tube today in IR  Moderate hydronephrosis 2/2 obstructive calculus @ L PUJ  Bladder cancer s/p cystectomy w/ ileal conduit creation and R nephro-ureterectomy (5/21/25)  Hyponatremia likely 2/2 free water administration  CKD 3B  :: OSH I/Os: total 1.8L positive, ~5L UOP over last 24 hours  - Baseline Cr ~1.5  - Consult urology, nephrology  - Left neph tube placement 7/10- draining well  - Replete electrolytes to maintain K >4.0 and Mg >2.0  - Daily RFP, Mg    Endocrinology:  Type 2 diabetes mellitus, insulin-dependent   Profound hypoglycemia 2/2 sulfonylurea use iso renal failure    - Home diabetic regimen: glimepiride, lantus 10 units hs   - Endocrinology saw pt at OSH  - POCT AC/HS with SSI#1     Infectious Disease:  Leukocytosis- resolved  Pyuria  - Antibiotics: hold off on empiric abx pending repeat labs, - No current SIRS criteria   - Blood cultures: pending   - Urine culture: no significant growth, likely contaminated regardless as drawn from ileal conduit      Hematology/Oncology:  Hx Bladder cancer s/p cystectomy with ileal conduit  - Transfuse if Hgb <7.0 or symptomatic anemia  - Daily CBC     MSK/Skin:  No acute issues   - PT/OT eval   - ICU skin protocol, padded pressure points  - Q2hr turns      Ethics/Code Status:  Full code      :  DVT Prophylaxis: SQH, SCDs  GI Prophylaxis: home PPI  Bowel Regimen: PRN  Diet: renal  CVC: none  Jennifer: none  Aquino: none  Restraints: none  Disposition: admit to ICU       Critical Care Time:  45 minutes spent in preparing to see patient (I.e. labs, imaging, etc.), documentation, discussion plan of care with patient/family/caregiver, and/or coordination of care with multidisciplinary team including the attending. Time does not include completion of procedure time.     Plan of care discussed  with Dr. Alcantara    Disclaimer: Documentation completed with the information available at the time of input. Parts of this note may have been scribed or generated using voice dictation software, Dragon.  Homophonic errors may exist.  Please contact me directly if clarification is needed.     Delisa Pelayo APRN-CNP  Pulmonary & Critical Care Medicine   [facility]            [1]   Past Medical History:  Diagnosis Date    Arthritis     rhuematoid    Bladder cancer (Multi)     CKD (chronic kidney disease)     Dorsalgia, unspecified 07/28/2020    Back pain without radiation    GERD (gastroesophageal reflux disease)     controlled    Hearing aid worn     HL (hearing loss)     Hyperlipidemia     Hypertension     Local infection of the skin and subcutaneous tissue, unspecified 10/11/2013    Nonvenomous insect bite with infection    Nephrolithiasis     Other muscle spasm 02/14/2018    Muscle spasms of neck    Other specified diseases of anus and rectum 03/22/2013    Anal pain    Personal history of other diseases of the digestive system 09/18/2013    History of gastroenteritis    Personal history of other diseases of the respiratory system 04/27/2020    History of acute bronchitis    Personal history of other diseases of the respiratory system 06/24/2019    History of upper respiratory infection    Personal history of other infectious and parasitic diseases     History of candidiasis of mouth    Personal history of other specified conditions 03/22/2013    History of abnormal weight loss    Personal history of other specified conditions 02/24/2014    History of numbness    Personal history of other specified conditions 07/14/2017    History of abdominal pain    Pneumonia, unspecified organism 01/25/2016    Pneumonia involving right lung    Rash and other nonspecific skin eruption 04/29/2013    Rash    Sleep apnea     CPAP    Type 2 diabetes mellitus    [2]   Past Surgical History:  Procedure Laterality Date     "BLADDER SURGERY  2025    Removal malignant tumor    CHOLECYSTECTOMY  07/07/2016    Cholecystectomy    HERNIA REPAIR  07/07/2016    Inguinal Hernia Repair    SHOULDER SURGERY Right    [3]   Medications Prior to Admission   Medication Sig Dispense Refill Last Dose/Taking    atorvastatin (Lipitor) 40 mg tablet TAKE ONE TABLET BY MOUTH DAILY 90 tablet 3 Past Week    Basaglar KwikPen U-100 Insulin 100 unit/mL (3 mL) pen Use up to 30 units daily (Patient taking differently: Inject 20 Units under the skin once daily at bedtime.) 30 mL 2 Past Week    insulin syringe-needle U-100 (BD Insulin Syringe) 1 mL 26 x 1/2\" syringe    Past Week    lisinopril 40 mg tablet Take 0.5 tablets (20 mg) by mouth every 12 hours. 30 tablet 2 Past Week    montelukast (Singulair) 10 mg tablet Take 1 tablet (10 mg) by mouth once daily.   Past Week    pantoprazole (ProtoNix) 40 mg EC tablet TAKE ONE TABLET BY MOUTH EVERY DAY 90 tablet 3 Past Week    pen needle, diabetic (BD Luann 2nd Gen Pen Needle) 32 gauge x 5/32\" needle use DAILY 100 each 1 Past Week    traZODone (Desyrel) 50 mg tablet Take 1-2 tablets ( mg) by mouth once daily. 180 tablet 3 Past Week   [4]   Social History  Tobacco Use    Smoking status: Never     Passive exposure: Never    Smokeless tobacco: Never   Vaping Use    Vaping status: Never Used   Substance Use Topics    Alcohol use: Yes     Comment: rare-couple drinks a month    Drug use: Never   [5]   Family History  Problem Relation Name Age of Onset    Ovarian cancer Mother      Other (asbestosis) Father     [6]    [7]   Current Facility-Administered Medications:     acetaminophen (Tylenol) tablet 650 mg, 650 mg, oral, q6h PRN, Delisa Pelayo, APRN-CNP, 650 mg at 07/10/25 1153    atorvastatin (Lipitor) tablet 40 mg, 40 mg, oral, Daily, Vivek Laughlin PA-C, 40 mg at 07/11/25 0820    dextrose 50 % injection 12.5 g, 12.5 g, intravenous, q15 min PRN, Vivek Laughlin PA-C    dextrose 50 % injection 25 g, 25 g, intravenous, " q15 min PRN, Vivek Laughlin PA-C    glucagon (Glucagen) injection 1 mg, 1 mg, intramuscular, q15 min PRN, Vivek Laughlin PA-C    glucagon (Glucagen) injection 1 mg, 1 mg, intramuscular, q15 min PRN, Vivek Laughlin PA-C    heparin (porcine) injection 5,000 Units, 5,000 Units, subcutaneous, q8h, Vivek Laughlin PA-C, 5,000 Units at 07/11/25 1201    insulin lispro injection 0-5 Units, 0-5 Units, subcutaneous, TID AC, Delisa Pelayo, APRN-CNP, 1 Units at 07/11/25 1700    [Held by provider] lisinopril tablet 20 mg, 20 mg, oral, q12h, Vivek Laughlin PA-C    pantoprazole (ProtoNix) EC tablet 20 mg, 20 mg, oral, Daily, Vivek Laughlin PA-C, 20 mg at 07/11/25 0820

## 2025-07-11 NOTE — DOCUMENTATION CLARIFICATION NOTE
"    PATIENT:               DANNA BARLOW  ACCT #:                  [id number]  MRN:                       [id number]  :                       1955  ADMIT DATE:       2025 5:36 PM  DISCH DATE:  RESPONDING PROVIDER #:        [id number]          PROVIDER RESPONSE TEXT:    I agree with dietician diagnosis of severe malnutrition on 07/10/2025    CDI QUERY TEXT:    Clarification        Instruction:    Based on your assessment of the patient and the clinical information, please provide the requested documentation by clicking on the appropriate radio button and enter any additional information if prompted.    Question: Please further clarify this patient nutritional status as    When answering this query, please exercise your independent professional judgment. The fact that a question is being asked, does not imply that any particular answer is desired or expected.    The patient's clinical indicators include:  Clinical Information: 69 y.o. male who is admitted for hypoglycemia, worsening HARMAN.    Clinical Indicators:    07/10: Nutrition assessment: \"Malnutrition Diagnosis: Severe malnutrition related to chronic disease or condition\"    BMI: 24.91    \"Weight History / % Weight Change: 26 lbs (11%) weight loss in past 2 months\"    07/10: Nutrition assessment: \"Transferred from [facility] for nephrology tube placement. Recent cystoprostatectomy with ileal conduit and right nephroureterectomy (). Noted to be a complicated post-op course with poor appetite, oral thrush, fluid shifts, and multiple medication changes. Significant weight loss documented\"    Treatment: RD/ LD assessment, Nepro BID to provide 420 calories, 19 gm protein each    Risk Factors: Poor appetite, Recent surgery with post op complications: Oral thrush, Bladder cancer  Options provided:  -- I agree with dietician diagnosis of severe malnutrition on 07/10/2025  -- Other - I will add my own diagnosis  -- Refer to Clinical Documentation " Reviewer    Query created by: Jude Padilla on 7/11/2025 12:34 PM      Electronically signed by:  JANN BARRAGAN 7/11/2025 1:15 PM

## 2025-07-11 NOTE — PROGRESS NOTES
Physical Therapy    Physical Therapy Evaluation    Patient Name: Rodrigue Palacios  MRN: [id number]  Department: [address] ICU  Room: 19/19A  Today's Date: 7/11/2025   Time Calculation  Start Time: 0944  Stop Time: 1000  Time Calculation (min): 16 min    Assessment/Plan   PT Assessment  PT Assessment Results: Decreased strength, Decreased mobility, Decreased endurance, Impaired balance, Decreased safety awareness, Impaired sensation  Rehab Prognosis: Good  Barriers to Discharge Home: No anticipated barriers  Strengths: Support of extended family/friends, Premorbid level of function, Living arrangement secure, Attitude of self, Access to adaptive/assistive products  Barriers to Participation: Comorbidities, Housing layout  End of Session Communication: Bedside nurse  Assessment Comment: He presented with the above listed impairments (see Assessment Results). Pt required minimally increased assist during functional mobility; an increase from their reported baseline of indep. Pt would benefit from continued skilled PT services for maximizing independence and safety prior to & after discharge (LOW intensity).  End of Session Patient Position: Bed, 3 rail up, Alarm on (call light and needs within reach)    IP OR SWING BED PT PLAN  Inpatient or Swing Bed: Inpatient    PT Plan  Treatment/Interventions: Bed mobility, Transfer training, Gait training, Strengthening, Therapeutic exercise, Therapeutic activity, Stair training, Balance training  PT Plan: Ongoing PT  PT Frequency: 5 times per week  PT Discharge Recommendations: Low intensity level of continued care  PT Recommended Transfer Status: Assist x1 (+/- FWW)  PT - OK to Discharge: Yes    Subjective     PT Visit Info:  PT Received On: 07/11/25  General Visit Information:  General  Reason for Referral: Impaired functional mobility due to decreased muscular strength. This 69 year old was initially admitted to [facility] 7/7/25 with c/o AMS and hypoglycemia. Pt then transferred  to [facility] for management of hypoglycemia and UTI. Pt then found to have left-sided hydronephrosis with a stone in place. Pt was transferred to [facility] for nephrostomy tube which was placed 7/10/25.  Past Medical History Relevant to Rehab: HTN, HLD, SCOOTER (CPAP), CKD-3b, DM-II, GERD, RA, atrophic R Kidney, bladder cancer s/p cystectomy with ureteroileal conduit creation and right simple nephrectomy on 05/21/2025, hernia repair cholecystectomy, R shoulder surgery, bladder surgery  Family/Caregiver Present: No  Prior to Session Communication: Bedside nurse  Patient Position Received: Bed, 3 rail up, Alarm off, not on at start of session  General Comment: Cleared by RN for participation. Pt agreed to session and was fully engaged throughout.    Home Living:  Home Living  Type of Home: House  Lives With: Alone (however, son is home from college and staying with pt until return to school in August)  Home Adaptive Equipment: Walker rolling or standard (FWW)  Home Layout: Multi-level, Laundry in basement, Full bath main level, Bed/bath upstairs  Alternate Level Stairs-Rails:  (unilateral)  Alternate Level Stairs-Number of Steps: 13 to bedroom/bathroom and 12 to basement  Entrance Stairs-Rails: None  Entrance Stairs-Number of Steps: 4  Bathroom Shower/Tub: Tub/shower unit, Walk-in shower  Bathroom Equipment: None  Bathroom Accessibility: tub/shower on first floor, walk-in shower on second floor  Home Living Comments: son was able to provide physical assist, if needed    Prior Level of Function:  Prior Function Per Pt/Caregiver Report  ADL Assistance: Independent  Homemaking Assistance: Independent  Ambulatory Assistance: Independent (no device; denied any falls within the past 6 months)  Vocational: Retired  Leisure: Hiking  Hand Dominance: Right  Prior Function Comments: (+) driving    Precautions:  Precautions  Hearing/Visual Limitations: glasses  Medical Precautions: Fall precautions  Precautions Comment: L  nephrostomy tube, urostomy (RUQ), telemetry      Date/Time Vitals Session Patient Position Pulse Resp SpO2 BP MAP (mmHg)    07/11/25 1000 --  --  81  14  81 %  116/87  93     07/11/25 1100 --  --  73  15  94 %  115/62  76      Vital Signs Comment: Pre PT in supine/HOB elevated: HR 81, SpO2 93% on room air, RR 20, /84, MAP 95. No significant change during mobility. At end of session in supine/HOB elevated: HR 75, SpO2 95%, RR 15.     Objective   Pain:  Pain Assessment  0-10 (Numeric) Pain Score: 2  Pain Type: Acute pain  Pain Location: Back  Pain Orientation: Lower  Pain Interventions: Ambulation/increased activity, Repositioned  Response to Interventions: Absence of non-verbal indicators of pain, Resting quietly    Cognition:  Cognition  Orientation Level: Oriented X4    General Assessments:  Activity Tolerance  Endurance: Decreased tolerance for upright activites    Sensation  Sensation Comment: Not tested; pt reported R thigh paresthesia since surgery on 7/9/25    ROM  BLE AROM: grossly WFL via observation    Strength  BLE: grossly >/=3+/5 via observation of mobility    Motor Control  Not formally assessed  Movements are Fluid and Coordinated: Yes    Postural Control  Postural Control: Within Functional Limits  Posture Comment: fwd head posture, protracted scapulae    Static Sitting Balance  Static Sitting-Balance Support: Feet supported, No upper extremity supported  Static Sitting-Level of Assistance: Close supervision  Dynamic Sitting Balance  Dynamic Sitting-Balance Support: Bilateral upper extremity supported (unilateral LE supported)  Dynamic Sitting-Level of Assistance: Close supervision (limited assessment)    Static Standing Balance  Static Standing-Balance Support: No upper extremity supported  Static Standing-Level of Assistance: Contact guard  Dynamic Standing Balance  Dynamic Standing-Balance Support: No upper extremity supported  Dynamic Standing-Level of Assistance: Minimum assistance  (steadying at trunk)    Functional Assessments:  Bed Mobility  Bed Mobility: Yes  Bed Mobility 1  Bed Mobility 1: Supine to sitting, Sitting to supine  Level of Assistance 1: Close supervision (HOB elevated </=40°, no bed rail; toward R side)    Transfers  Transfer: Yes  Transfer 1  Technique 1: Sit to stand  Transfer Level of Assistance 1: Contact guard (used BUE. x2 trials at EOB)  Transfers 2  Technique 2: Stand to sit  Transfer Level of Assistance 2: Contact guard (used BUE)    Ambulation/Gait Training  Ambulation/Gait Training Performed: Yes  Ambulation/Gait Training 1  Surface 1: Level tile  Device 1: No device  Assistance 1: Minimum assistance (for steadying at trunk. Minimal VCs for increased GALA and bilateral step length.)  Quality of Gait 1: Narrow base of support, Diminished heel strike, Decreased step length (Pt ambulated ~20 ft using step through pattern with slowed velocity and continuous movement. Fairly steady gait with no overt threat for LOB.)     Outcome Measures:  [facility] Basic Mobility  Turning from your back to your side while in a flat bed without using bedrails: A little  Moving from lying on your back to sitting on the side of a flat bed without using bedrails: A little  Moving to and from bed to chair (including a wheelchair): A little  Standing up from a chair using your arms (e.g. wheelchair or bedside chair): A little  To walk in hospital room: A little  Climbing 3-5 steps with railing: A little  Basic Mobility - Total Score: 18    FSS-ICU  Ambulation: Walks <50 feet with any assistance x1 or walks any distance with assistance x2 people  Rolling: Minimal assistance (performs 75% or more of task)  Sitting: Supervision or set-up only  Transfer Sit-to-Stand: Minimal assistance (performs 75% or more of task)  Transfer Supine-to-Sit: Supervision or set-up only  Total Score: 19    Encounter Problems       Encounter Problems (Active)       PT Goals       Pt will transition supine<>sit with mod I.         Start:  07/11/25    Expected End:  07/31/25            Pt will transfer sit<>stand with mod I.       Start:  07/11/25    Expected End:  07/31/25            Pt will ambulate >/=100 ft independently.        Start:  07/11/25    Expected End:  07/31/25            Pt will negotiate >/=4 stairs without hand rail and close S.       Start:  07/11/25    Expected End:  07/31/25                   Education Documentation  Mobility Training, taught by Shantell Herman PT at 7/11/2025 11:07 AM.  Learner: Patient  Readiness: Acceptance  Method: Explanation  Response: Needs Reinforcement, Verbalizes Understanding  Comment: Fall precautions/use of call light; PT role; POC; disposition    Education Comments  No comments found.

## 2025-07-12 ENCOUNTER — APPOINTMENT (OUTPATIENT)
Dept: RADIOLOGY | Facility: HOSPITAL | Age: 70
DRG: 637 | End: 2025-07-12
Payer: MEDICARE

## 2025-07-12 DIAGNOSIS — N20.0 LEFT RENAL STONE: Primary | ICD-10-CM

## 2025-07-12 LAB
ALBUMIN SERPL BCP-MCNC: 3.2 G/DL (ref 3.4–5)
ANION GAP SERPL CALCULATED.3IONS-SCNC: 17 MMOL/L (ref 10–20)
BACTERIA UR CULT: ABNORMAL
BASOPHILS # BLD AUTO: 0.02 X10*3/UL (ref 0–0.1)
BASOPHILS NFR BLD AUTO: 0.2 %
BUN SERPL-MCNC: 69 MG/DL (ref 6–23)
CALCIUM SERPL-MCNC: 9.4 MG/DL (ref 8.6–10.3)
CHLORIDE SERPL-SCNC: 102 MMOL/L (ref 98–107)
CO2 SERPL-SCNC: 27 MMOL/L (ref 21–32)
CREAT SERPL-MCNC: 4.11 MG/DL (ref 0.5–1.3)
EGFRCR SERPLBLD CKD-EPI 2021: 15 ML/MIN/1.73M*2
EOSINOPHIL # BLD AUTO: 0.83 X10*3/UL (ref 0–0.7)
EOSINOPHIL NFR BLD AUTO: 9.1 %
ERYTHROCYTE [DISTWIDTH] IN BLOOD BY AUTOMATED COUNT: 13.8 % (ref 11.5–14.5)
GLUCOSE BLD MANUAL STRIP-MCNC: 119 MG/DL (ref 74–99)
GLUCOSE BLD MANUAL STRIP-MCNC: 164 MG/DL (ref 74–99)
GLUCOSE BLD MANUAL STRIP-MCNC: 215 MG/DL (ref 74–99)
GLUCOSE BLD MANUAL STRIP-MCNC: 239 MG/DL (ref 74–99)
GLUCOSE SERPL-MCNC: 171 MG/DL (ref 74–99)
HCT VFR BLD AUTO: 32.8 % (ref 41–52)
HGB BLD-MCNC: 11 G/DL (ref 13.5–17.5)
IMM GRANULOCYTES # BLD AUTO: 0.1 X10*3/UL (ref 0–0.7)
IMM GRANULOCYTES NFR BLD AUTO: 1.1 % (ref 0–0.9)
LYMPHOCYTES # BLD AUTO: 0.86 X10*3/UL (ref 1.2–4.8)
LYMPHOCYTES NFR BLD AUTO: 9.5 %
MAGNESIUM SERPL-MCNC: 1.98 MG/DL (ref 1.6–2.4)
MCH RBC QN AUTO: 27.3 PG (ref 26–34)
MCHC RBC AUTO-ENTMCNC: 33.5 G/DL (ref 32–36)
MCV RBC AUTO: 81 FL (ref 80–100)
MONOCYTES # BLD AUTO: 0.88 X10*3/UL (ref 0.1–1)
MONOCYTES NFR BLD AUTO: 9.7 %
NEUTROPHILS # BLD AUTO: 6.41 X10*3/UL (ref 1.2–7.7)
NEUTROPHILS NFR BLD AUTO: 70.4 %
NRBC BLD-RTO: 0 /100 WBCS (ref 0–0)
PHOSPHATE SERPL-MCNC: 4.5 MG/DL (ref 2.5–4.9)
PLATELET # BLD AUTO: 295 X10*3/UL (ref 150–450)
POTASSIUM SERPL-SCNC: 4.1 MMOL/L (ref 3.5–5.3)
PROINSULIN P 12H FAST SERPL-SCNC: 83.2 PMOL/L
RBC # BLD AUTO: 4.03 X10*6/UL (ref 4.5–5.9)
SODIUM SERPL-SCNC: 142 MMOL/L (ref 136–145)
WBC # BLD AUTO: 9.1 X10*3/UL (ref 4.4–11.3)

## 2025-07-12 PROCEDURE — 2500000004 HC RX 250 GENERAL PHARMACY W/ HCPCS (ALT 636 FOR OP/ED): Performed by: INTERNAL MEDICINE

## 2025-07-12 PROCEDURE — 2500000001 HC RX 250 WO HCPCS SELF ADMINISTERED DRUGS (ALT 637 FOR MEDICARE OP): Performed by: INTERNAL MEDICINE

## 2025-07-12 PROCEDURE — 2500000002 HC RX 250 W HCPCS SELF ADMINISTERED DRUGS (ALT 637 FOR MEDICARE OP, ALT 636 FOR OP/ED): Performed by: INTERNAL MEDICINE

## 2025-07-12 PROCEDURE — 74018 RADEX ABDOMEN 1 VIEW: CPT

## 2025-07-12 PROCEDURE — 80069 RENAL FUNCTION PANEL: CPT | Performed by: INTERNAL MEDICINE

## 2025-07-12 PROCEDURE — 2060000001 HC INTERMEDIATE ICU ROOM DAILY

## 2025-07-12 PROCEDURE — 99233 SBSQ HOSP IP/OBS HIGH 50: CPT | Performed by: UROLOGY

## 2025-07-12 PROCEDURE — 74018 RADEX ABDOMEN 1 VIEW: CPT | Performed by: RADIOLOGY

## 2025-07-12 PROCEDURE — 82947 ASSAY GLUCOSE BLOOD QUANT: CPT

## 2025-07-12 PROCEDURE — 36415 COLL VENOUS BLD VENIPUNCTURE: CPT | Performed by: INTERNAL MEDICINE

## 2025-07-12 PROCEDURE — 99233 SBSQ HOSP IP/OBS HIGH 50: CPT | Performed by: INTERNAL MEDICINE

## 2025-07-12 PROCEDURE — 83735 ASSAY OF MAGNESIUM: CPT | Performed by: INTERNAL MEDICINE

## 2025-07-12 PROCEDURE — 85025 COMPLETE CBC W/AUTO DIFF WBC: CPT | Performed by: INTERNAL MEDICINE

## 2025-07-12 RX ORDER — LEVOCETIRIZINE DIHYDROCHLORIDE 5 MG/1
5 TABLET, FILM COATED ORAL EVERY EVENING
COMMUNITY

## 2025-07-12 RX ORDER — TRAZODONE HYDROCHLORIDE 50 MG/1
50 TABLET ORAL NIGHTLY PRN
Status: DISCONTINUED | OUTPATIENT
Start: 2025-07-12 | End: 2025-07-13 | Stop reason: HOSPADM

## 2025-07-12 RX ADMIN — HEPARIN SODIUM 5000 UNITS: 5000 INJECTION, SOLUTION INTRAVENOUS; SUBCUTANEOUS at 20:30

## 2025-07-12 RX ADMIN — INSULIN LISPRO 2 UNITS: 100 INJECTION, SOLUTION INTRAVENOUS; SUBCUTANEOUS at 12:32

## 2025-07-12 RX ADMIN — INSULIN LISPRO 2 UNITS: 100 INJECTION, SOLUTION INTRAVENOUS; SUBCUTANEOUS at 17:13

## 2025-07-12 RX ADMIN — PANTOPRAZOLE SODIUM 20 MG: 20 TABLET, DELAYED RELEASE ORAL at 10:22

## 2025-07-12 RX ADMIN — HEPARIN SODIUM 5000 UNITS: 5000 INJECTION, SOLUTION INTRAVENOUS; SUBCUTANEOUS at 12:33

## 2025-07-12 RX ADMIN — HEPARIN SODIUM 5000 UNITS: 5000 INJECTION, SOLUTION INTRAVENOUS; SUBCUTANEOUS at 04:37

## 2025-07-12 RX ADMIN — TRAZODONE HYDROCHLORIDE 50 MG: 50 TABLET ORAL at 20:31

## 2025-07-12 RX ADMIN — ATORVASTATIN CALCIUM 40 MG: 40 TABLET, FILM COATED ORAL at 10:22

## 2025-07-12 ASSESSMENT — COGNITIVE AND FUNCTIONAL STATUS - GENERAL
MOVING FROM LYING ON BACK TO SITTING ON SIDE OF FLAT BED WITH BEDRAILS: A LITTLE
DAILY ACTIVITIY SCORE: 19
PERSONAL GROOMING: A LITTLE
TOILETING: A LITTLE
PERSONAL GROOMING: A LITTLE
TOILETING: A LOT
STANDING UP FROM CHAIR USING ARMS: A LITTLE
STANDING UP FROM CHAIR USING ARMS: A LITTLE
CLIMB 3 TO 5 STEPS WITH RAILING: TOTAL
MOVING TO AND FROM BED TO CHAIR: A LITTLE
MOBILITY SCORE: 16
CLIMB 3 TO 5 STEPS WITH RAILING: TOTAL
DRESSING REGULAR UPPER BODY CLOTHING: A LITTLE
HELP NEEDED FOR BATHING: A LITTLE
DRESSING REGULAR UPPER BODY CLOTHING: A LITTLE
DRESSING REGULAR LOWER BODY CLOTHING: A LITTLE
MOVING TO AND FROM BED TO CHAIR: A LITTLE
MOBILITY SCORE: 16
DAILY ACTIVITIY SCORE: 18
HELP NEEDED FOR BATHING: A LITTLE
WALKING IN HOSPITAL ROOM: A LITTLE
WALKING IN HOSPITAL ROOM: A LOT
TURNING FROM BACK TO SIDE WHILE IN FLAT BAD: A LITTLE
DRESSING REGULAR LOWER BODY CLOTHING: A LITTLE
TURNING FROM BACK TO SIDE WHILE IN FLAT BAD: A LITTLE

## 2025-07-12 ASSESSMENT — PAIN - FUNCTIONAL ASSESSMENT
PAIN_FUNCTIONAL_ASSESSMENT: 0-10

## 2025-07-12 ASSESSMENT — PAIN SCALES - GENERAL
PAINLEVEL_OUTOF10: 0 - NO PAIN

## 2025-07-12 NOTE — PROGRESS NOTES
Rodrigue Palacios is a 69 y.o. male on day 3 of admission presenting with Hypoglycemia.      Subjective     Patient was transferred from ICU to stepdown unit last night.  Patient presented with encephalopathy, HARMAN.  Patient was found to be hypoglycemic.  Initially he was transferred from [place] to [facility].  Patient was found to have left ureter obstructing stone patient has history of right nephrectomy..  Patient was transferred from [facility] to [facility] to have nephrostomy tube placed by IR.  Patient denied chest pain.  He complained of feeling weak.       Objective     Last Recorded Vitals  /74 (BP Location: Right arm, Patient Position: Lying)   Pulse 73   Temp 35.8 °C (96.4 °F) (Temporal)   Resp 18   Wt 92.5 kg (203 lb 14.8 oz)   SpO2 97%   Intake/Output last 3 Shifts:    Intake/Output Summary (Last 24 hours) at 7/12/2025 0935  Last data filed at 7/12/2025 0500  Gross per 24 hour   Intake 720 ml   Output 1975 ml   Net -1255 ml       Admission Weight  Weight: 94.8 kg (208 lb 15.9 oz) (07/09/25 1743)    Daily Weight  07/12/25 : 92.5 kg (203 lb 14.8 oz)    Image Results  IR placement of nephrostomy catheter, US guided percutaneous placement  Narrative: Interpreted By:  Pk Hernandez,   STUDY:  IR  NEPHROSTOMY PLACEMENT; US GUIDED PERCUTANEOUS PLACEMENT;  7/10/2025 11:26 am; 7/10/2025 11:05 am      INDICATION:  Signs/Symptoms:left perc-neph tube placement, obstructive stone w  hydronephrosis; Signs/Symptoms:ULTRASOUND GUIDANCE FOR PLACEMENT OF  NEPH TUBE.      COMPARISON:  None.      ACCESSION NUMBER(S):  [id number]; [id number]      ORDERING CLINICIAN:  HEMANT BLSA      TECHNIQUE:  INTERVENTIONALIST(S):  Pk Hernandez MD      CONSENT:  The patient/patient's POA/next of kin was informed of the nature of  the proposed procedure. The purposes, alternatives, risks, and  benefits were explained and discussed. All questions were answered  and consent was obtained.           SEDATION:  Moderate conscious IV sedation services (supervision of  administration, induction, and maintenance) were provided by the  physician performing the procedure with intravenous fentanyl and  versed for  minutes. The physician was assisted by an independent  trained observer, an interventional radiology nurse, in the  continuous monitoring of patient level of consciousness and  physiologic status.      MEDICATION/CONTRAST:  No additional      TIME OUT:  A time out was performed immediately prior to procedure start with  the interventional team, correctly identifying the patient name, date  of birth, MRN, procedure, anatomy (including marking of site and  side), patient position, procedure consent form, relevant laboratory  and imaging test results, antibiotic administration, safety  precautions, and procedure-specific equipment needs.      COMPLICATIONS:  No immediate adverse events identified.      FINDINGS:  In the prone position, the patient was positioned on the angiography  table. The cutaneous tissues of the  left flank were prepared and  draped in usual sterile manner. Screening evaluation of the  left  kidney was performed for direct ultrasound guidance.  A posterior  calyx in the inferior pole of the kidney was identified and targeted.  The optimal access site and tract were locally anesthetized with  subcutaneous Lidocaine 1% instillation. Utilizing direct ultrasound  guidance, the targeted calyx was accessed with a 22-gauge Chiba  needle. After confirmation of location, a digital spot ultrasound  image was acquired.      There was return of  purulent urine through the access needle. The  collecting system was decompressed with aspiration.  A urine sample  was collected and sent for laboratory analysis. The calyceal system  and central renal pelvis were minimally distended with dilute  contrast to facilitate antegrade access and fluoroscopic guidance. An  antegrade pyelogram was performed.       ANTEGRADE PYELOGRAM FINDINGS:  Half-strength contrast was injected into the renal collecting system  and demonstrated patency without leak. Opacification of the central  renal pelvis was observed demonstrating hydronephrosis.      PERCUTANEOUS NEPHROSTOMY DRAINAGE CATHETER PLACEMENT:  A 018 [place] guidewire was inserted through the access needle.  Utilizing guidewire manipulation, access into the Field  proximal  ureter was obtained. With the guidewire left in place to secure  access, a 7-[place] coaxial dilator sheath system was placed. The  sheath was advanced into the central renal pelvis. A 035  stiff [place]  guidewire was inserted through the access sheath into the  proximal  ureter to secure access.      There was subsequent placement of  an 8-[place] nephrostomy drainage  catheter over the 035 guidewire. The pigtail was formed in the  central renal pelvis.  Diluted contrast injection was performed to  confirm optimal location. The external portions of the catheter were  secured with sterile suture and dressing.      The patient tolerated the procedure without complication.      Impression: 1.  Uncomplicated and technically successful  8-[place] percutaneous  nephrostomy tube placement -  right kidney.  The catheter was  connected to external gravity drainage.  2. Return and identification of  purulent urine.          Performed and dictated at [facility].      MACRO:  None      Signed by: Pk Hernandez 7/11/2025 2:30 PM  Dictation workstation:   [id number]  Electrocardiogram, 12-lead PRN ACS symptoms  Sinus bradycardia with sinus arrhythmia  Left axis deviation  Abnormal ECG  No previous ECGs available  Confirmed by Doyle Russell ([id number]) on 7/11/2025 11:32:19 AM      Physical Exam    General:  cooperating during physical exam, pleasant  HEENT: Pupils are equal and reactive to light and commendation , oral mucosa moist, no JVD .  Cardiovascular: Normal sinus rhythm,  no MRG.  Lungs: Clear to auscultation bilaterally, no wheezing, no crackles, no dullness to percussion.  Abdomen: No hepatosplenomegaly appreciated, soft , not tender, positive bowel sounds, positive bowel movement.  Nephrostomy tube in place on the left side.  Neuro: Alert and oriented x3, strength in upper and lower extremities , sensation intact.  Psych: Patient had great insight was going on  Musculoskeletal: No swelling in lower extremities, no limitation in range of motion.  Vascular: Pulses are intact in upper and lower extremities  Skin: No petechiae, ecchymosis or other stigmata for dermatology disease.     Assessment plan    Acute kidney injury  Most likely multifactorial.  Patient has obstructive uropathy.  Dr. White on the case.  Kidney function improving   Creatinine 4.11, monitor close.    Moderate left-sided hydronephrosis.  1.4 cm nonobstructing calculus in the inferior pole.  Patient was evaluated by Dr. Eden.  Patient will need shockwave lithotripsy as outpatient.  Regarding obstructing stone patient had left nephrostomy tube placed by IR.  Kidney function improved.    Metabolic encephalopathy  Clinically improved, patient is in his baseline  Initially patient was hypoglycemic.  He has been on sulfonylurea.  Patient has been monitored in ICU for few days.  Her  He was treated with D10 and octreotide.  Monitor close.    Diabetes mellitus type 2.  Cover with insulin sliding  Patient see Dr. Christiansen in a regularly basis    DVT prophylax.    Episode of bradycardia in ICU  Asymptomatic  Monitor close    History of right kidney carcinoma  Status post nephrectomy  Check CBC and RFP in AM.    Waiting for consult to see him today.    Malnutrition Diagnosis Status: New  Malnutrition Diagnosis: Severe malnutrition related to chronic disease or condition  Related to: illness/surgery  As Evidenced by: 26# (11%) weight loss in past 2 months, PO intake <75% of estimated energy needs >1 month  I agree with  the dietitian's malnutrition diagnosis.    Time spent with  the patient 51-minutes    Kaylynn Villarreal MD

## 2025-07-12 NOTE — PROGRESS NOTES
"Rodrigue Palacios is a 69 y.o. male on day 3 of admission presenting with Hypoglycemia.      Subjective   No new overnight events.  Still reports abnormal taste.  No nausea or vomiting.  No dyspnea at rest.       Scheduled medications  Scheduled Medications[1]  Continuous medications  Continuous Medications[2]  PRN medications  PRN Medications[3]      Objective     Vitals 24HR  Heart Rate:  [51-81]   Temp:  [35.8 °C (96.4 °F)-37.1 °C (98.7 °F)]   Resp:  [12-26]   BP: (103-155)/(66-84)   Height:  [190.5 cm (6' 3\")]   Weight:  [92.5 kg (203 lb 14.8 oz)]   SpO2:  [93 %-99 %]     General: Pleasant elderly man, not in distress  Eyes: Not pale, anicteric  Lungs: Clear bilaterally  Heart: S1 and S2, regular  Murmur: Soft, nontender  Extremities: No pedal edema  Neuro: Awake and interactive      Intake/Output last 3 Shifts:    Intake/Output Summary (Last 24 hours) at 7/12/2025 1238  Last data filed at 7/12/2025 0500  Gross per 24 hour   Intake 360 ml   Output 1725 ml   Net -1365 ml       Relevant Results    Results for orders placed or performed during the hospital encounter of 07/09/25 (from the past 24 hours)   POCT GLUCOSE   Result Value Ref Range    POCT Glucose 165 (H) 74 - 99 mg/dL   POCT GLUCOSE   Result Value Ref Range    POCT Glucose 228 (H) 74 - 99 mg/dL   CBC and Auto Differential   Result Value Ref Range    WBC 9.1 4.4 - 11.3 x10*3/uL    nRBC 0.0 0.0 - 0.0 /100 WBCs    RBC 4.03 (L) 4.50 - 5.90 x10*6/uL    Hemoglobin 11.0 (L) 13.5 - 17.5 g/dL    Hematocrit 32.8 (L) 41.0 - 52.0 %    MCV 81 80 - 100 fL    MCH 27.3 26.0 - 34.0 pg    MCHC 33.5 32.0 - 36.0 g/dL    RDW 13.8 11.5 - 14.5 %    Platelets 295 150 - 450 x10*3/uL    Neutrophils % 70.4 40.0 - 80.0 %    Immature Granulocytes %, Automated 1.1 (H) 0.0 - 0.9 %    Lymphocytes % 9.5 13.0 - 44.0 %    Monocytes % 9.7 2.0 - 10.0 %    Eosinophils % 9.1 0.0 - 6.0 %    Basophils % 0.2 0.0 - 2.0 %    Neutrophils Absolute 6.41 1.20 - 7.70 x10*3/uL    Immature Granulocytes " Absolute, Automated 0.10 0.00 - 0.70 x10*3/uL    Lymphocytes Absolute 0.86 (L) 1.20 - 4.80 x10*3/uL    Monocytes Absolute 0.88 0.10 - 1.00 x10*3/uL    Eosinophils Absolute 0.83 (H) 0.00 - 0.70 x10*3/uL    Basophils Absolute 0.02 0.00 - 0.10 x10*3/uL   Renal function panel   Result Value Ref Range    Glucose 171 (H) 74 - 99 mg/dL    Sodium 142 136 - 145 mmol/L    Potassium 4.1 3.5 - 5.3 mmol/L    Chloride 102 98 - 107 mmol/L    Bicarbonate 27 21 - 32 mmol/L    Anion Gap 17 10 - 20 mmol/L    Urea Nitrogen 69 (H) 6 - 23 mg/dL    Creatinine 4.11 (H) 0.50 - 1.30 mg/dL    eGFR 15 (L) >60 mL/min/1.73m*2    Calcium 9.4 8.6 - 10.3 mg/dL    Phosphorus 4.5 2.5 - 4.9 mg/dL    Albumin 3.2 (L) 3.4 - 5.0 g/dL   Magnesium   Result Value Ref Range    Magnesium 1.98 1.60 - 2.40 mg/dL   POCT GLUCOSE   Result Value Ref Range    POCT Glucose 164 (H) 74 - 99 mg/dL   POCT GLUCOSE   Result Value Ref Range    POCT Glucose 239 (H) 74 - 99 mg/dL         Assessment/Plan      HARMAN on stage IV CKD, resolving  Obstructive uropathy  Hyperkalemia, resolved  Metabolic acidosis, resolved  Anemia, at goal    Solids trending down but not back at baseline.  Would expect continued improvement in the absence of new renal insult.    Continue other care.    Okay to discharge tomorrow from renal point of view if renal function continues to improve as expected.      Jimmie Goodman MD           [1] atorvastatin, 40 mg, oral, Daily  heparin (porcine), 5,000 Units, subcutaneous, q8h  insulin lispro, 0-5 Units, subcutaneous, TID AC  [Held by provider] lisinopril, 20 mg, oral, q12h  pantoprazole, 20 mg, oral, Daily  [2]    [3] PRN medications: acetaminophen, dextrose, dextrose, glucagon, glucagon, traZODone

## 2025-07-12 NOTE — PROGRESS NOTES
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

## 2025-07-12 NOTE — PROGRESS NOTES
"Rodrigue Palacios is a 69 y.o. male on day 3 of admission presenting with Hypoglycemia.    Subjective   Patient feels well today.  Nephrostomy placed on left, draining.       Objective     Physical Exam  Alert, oriented  Normal respiratory effort  Abdomen soft nontender  Ileal conduit with clear output  Nephrostomy draining clear on left.    Last Recorded Vitals  Blood pressure 103/74, pulse 73, temperature 35.8 °C (96.4 °F), temperature source Temporal, resp. rate 18, height 1.905 m (6' 3\"), weight 92.5 kg (203 lb 14.8 oz), SpO2 97%.  Intake/Output last 3 Shifts:  I/O last 3 completed shifts:  In: 950 (10.3 mL/kg) [P.O.:720; I.V.:230 (2.5 mL/kg)]  Out: 5800 (62.7 mL/kg) [Urine:5800 (1.7 mL/kg/hr)]  Weight: 92.5 kg     Relevant Results  Scheduled medications  Scheduled Medications[1]  Continuous medications  Continuous Medications[2]  PRN medications  PRN Medications[3]    Results for orders placed or performed during the hospital encounter of 07/09/25 (from the past 24 hours)   POCT GLUCOSE   Result Value Ref Range    POCT Glucose 299 (H) 74 - 99 mg/dL   POCT GLUCOSE   Result Value Ref Range    POCT Glucose 165 (H) 74 - 99 mg/dL   POCT GLUCOSE   Result Value Ref Range    POCT Glucose 228 (H) 74 - 99 mg/dL   CBC and Auto Differential   Result Value Ref Range    WBC 9.1 4.4 - 11.3 x10*3/uL    nRBC 0.0 0.0 - 0.0 /100 WBCs    RBC 4.03 (L) 4.50 - 5.90 x10*6/uL    Hemoglobin 11.0 (L) 13.5 - 17.5 g/dL    Hematocrit 32.8 (L) 41.0 - 52.0 %    MCV 81 80 - 100 fL    MCH 27.3 26.0 - 34.0 pg    MCHC 33.5 32.0 - 36.0 g/dL    RDW 13.8 11.5 - 14.5 %    Platelets 295 150 - 450 x10*3/uL    Neutrophils % 70.4 40.0 - 80.0 %    Immature Granulocytes %, Automated 1.1 (H) 0.0 - 0.9 %    Lymphocytes % 9.5 13.0 - 44.0 %    Monocytes % 9.7 2.0 - 10.0 %    Eosinophils % 9.1 0.0 - 6.0 %    Basophils % 0.2 0.0 - 2.0 %    Neutrophils Absolute 6.41 1.20 - 7.70 x10*3/uL    Immature Granulocytes Absolute, Automated 0.10 0.00 - 0.70 x10*3/uL    " Lymphocytes Absolute 0.86 (L) 1.20 - 4.80 x10*3/uL    Monocytes Absolute 0.88 0.10 - 1.00 x10*3/uL    Eosinophils Absolute 0.83 (H) 0.00 - 0.70 x10*3/uL    Basophils Absolute 0.02 0.00 - 0.10 x10*3/uL   Renal function panel   Result Value Ref Range    Glucose 171 (H) 74 - 99 mg/dL    Sodium 142 136 - 145 mmol/L    Potassium 4.1 3.5 - 5.3 mmol/L    Chloride 102 98 - 107 mmol/L    Bicarbonate 27 21 - 32 mmol/L    Anion Gap 17 10 - 20 mmol/L    Urea Nitrogen 69 (H) 6 - 23 mg/dL    Creatinine 4.11 (H) 0.50 - 1.30 mg/dL    eGFR 15 (L) >60 mL/min/1.73m*2    Calcium 9.4 8.6 - 10.3 mg/dL    Phosphorus 4.5 2.5 - 4.9 mg/dL    Albumin 3.2 (L) 3.4 - 5.0 g/dL   Magnesium   Result Value Ref Range    Magnesium 1.98 1.60 - 2.40 mg/dL   POCT GLUCOSE   Result Value Ref Range    POCT Glucose 164 (H) 74 - 99 mg/dL       Imaging  IR placement of nephrostomy catheter  Result Date: 7/11/2025  1.  Uncomplicated and technically successful  8-[place] percutaneous nephrostomy tube placement -  right kidney.  The catheter was connected to external gravity drainage. 2. Return and identification of  purulent urine.     Performed and dictated at [facility].   MACRO: None   Signed by: Pk Hernandez 7/11/2025 2:30 PM Dictation workstation:   [id number]    US guided percutaneous placement  Result Date: 7/11/2025  1.  Uncomplicated and technically successful  8-[place]aroese percutaneous nephrostomy tube placement -  right kidney.  The catheter was connected to external gravity drainage. 2. Return and identification of  purulent urine.     Performed and dictated at [facility].   MACRO: None   Signed by: Pk Hernandez 7/11/2025 2:30 PM Dictation workstation:   [id number]    CT abdomen pelvis wo IV contrast  Result Date: 7/9/2025  1.  Interval evidence of obstructing calculus at the left pelviureteral junction measuring up to 1.1 x 1 x 0.7 cm in CC, transverse and AP dimensions  respectively with associated moderate left-sided hydronephrosis. There is interval increase in the degree of left-sided hydronephrosis compared to prior CT examination dated 06/04/2025. 2. Postsurgical changes of right nephrectomy, cystectomy and ileal conduit in the right lower quadrant are noted. There is mild dilatation of the majority of the left ureter distal to the obstructing calculus throughout its course extending to the level of right lower quadrant with associated periureteral fat stranding. This could be related to a degree of stricture/stenosis in the distal left ureter at the level of ileal conduit. Recommend clinical correlation and further evaluation as clinically warranted. 3. Moderate atherosclerotic vascular calcifications of the abdominal aorta with mild fusiform aneurysmal dilatation in the infrarenal segment measuring up to 3.1 cm in AP dimension. 4. Suggestion of tiny hiatal hernia. 5. Sigmoid colonic diverticulosis without acute diverticulitis. 6. Other stable findings as described above.   MACRO: Dr. Latisha Green discussed the significance and urgency of this critical finding by EPIC secure chat with Ms CED ROD on 7/9/2025 at 10:51 am.  (**-RCF-**) Findings:  See findings.       Signed by: Latisha Green 7/9/2025 10:52 AM Dictation workstation:   [id number]    NM kidney flow function wo pharmacological intervention  Result Date: 7/9/2025  Borderline perfusion Borderline maximal cortical radiotracer accumulation. Retention of radiotracer within the kidney without evidence of excretion. Findings are suggestive of significant kidney injury. Differentials include significant chronic renal insufficiency versus acute tubular necrosis.   MACRO: None   Signed by: Chinmay Paul 7/9/2025 10:28 AM Dictation workstation:   [id number]    US renal complete  Result Date: 7/9/2025  1. Moderate left-sided hydronephrosis with suggestion of a 1.4 cm nonobstructing calculus in the inferior pole.  2. Status post right nephrectomy. 3. Status post cystectomy.   MACRO: None   Signed by: Latisha Green 7/9/2025 9:44 AM Dictation workstation:   [id number]      Cardiology, Vascular, and Other Imaging  Electrocardiogram, 12-lead PRN ACS symptoms  Result Date: 7/11/2025  Sinus bradycardia with sinus arrhythmia Left axis deviation Abnormal ECG No previous ECGs available Confirmed by Doyle Russell ([id number]) on 7/11/2025 11:32:19 AM    ECG 12 lead  Result Date: 7/9/2025  Normal sinus rhythm Minimal voltage criteria for LVH, may be normal variant ( R in aVL ) Borderline ECG When compared with ECG of 07-JUL-2025 19:32, (unconfirmed) T wave inversion now evident in Inferior leads    ECG 12 lead  Result Date: 7/8/2025  Normal sinus rhythm Minimal voltage criteria for LVH, may be normal variant Borderline ECG When compared with ECG of 04-JUN-2025 12:32, Premature ventricular complexes are no longer Present QT has shortened                Assessment & Plan  Hypoglycemia    69-year-old has an obstructing left ureteral stone, status post left nephrostomy placement.  He has a history of bladder cancer, recently treated with cystectomy and ileal conduit.  He has acute kidney injury, with a creatinine of 7.74 on 7/7/2025, decreased to 6.1 today.  His creatinine should continue to improve with drainage from the nephrostomy tube  We discussed treatment options including retrograde and antegrade ureteroscopy for treatment of the stone.  He may be a candidate for ESWL also.  KUB ordered.    Sachin Eden MD           [1] atorvastatin, 40 mg, oral, Daily  heparin (porcine), 5,000 Units, subcutaneous, q8h  insulin lispro, 0-5 Units, subcutaneous, TID AC  [Held by provider] lisinopril, 20 mg, oral, q12h  pantoprazole, 20 mg, oral, Daily    [2]    [3] PRN medications: acetaminophen, dextrose, dextrose, glucagon, glucagon

## 2025-07-12 NOTE — CARE PLAN
The patient's goals for the shift include get ready for procedure    The clinical goals for the shift include Pt will remain HDS and free from episodes of hypoglycemia. Pt will remain free from injury or fall for entire shift.

## 2025-07-12 NOTE — PROGRESS NOTES
Speech-Language Pathology                 Therapy Communication Note    Patient Name: Rodrigue Palacios  MRN: [id number]  Department: [facility] 3 E  Room: 20/20-A  Today's Date: 7/12/2025     Discipline: Speech Language Pathology    Missed Time: Cancel    Comment: Swallow eval order received and chart reviewed. RN states no report of concern for dysphagia.  Spoke with pt and wife, who is present at bedside. Spouse endorses some intermittent coughing with PO intake and a h./o GERD/Reflux. No change in presentation since admission, no notable progression of severity of sx within recent 6 months. Denies any recent pneuomia and weight loss. Defer formal swallow evaluation at this time.

## 2025-07-13 ENCOUNTER — DOCUMENTATION (OUTPATIENT)
Dept: HOME HEALTH SERVICES | Facility: HOME HEALTH | Age: 70
End: 2025-07-13
Payer: MEDICARE

## 2025-07-13 ENCOUNTER — HOME HEALTH ADMISSION (OUTPATIENT)
Dept: HOME HEALTH SERVICES | Facility: HOME HEALTH | Age: 70
End: 2025-07-13
Payer: MEDICARE

## 2025-07-13 VITALS
RESPIRATION RATE: 16 BRPM | SYSTOLIC BLOOD PRESSURE: 125 MMHG | WEIGHT: 186.73 LBS | BODY MASS INDEX: 23.22 KG/M2 | HEIGHT: 75 IN | HEART RATE: 77 BPM | OXYGEN SATURATION: 96 % | DIASTOLIC BLOOD PRESSURE: 89 MMHG | TEMPERATURE: 97.5 F

## 2025-07-13 LAB
ALBUMIN SERPL BCP-MCNC: 3.3 G/DL (ref 3.4–5)
ANION GAP SERPL CALCULATED.3IONS-SCNC: 14 MMOL/L (ref 10–20)
BACTERIA BLD CULT: NORMAL
BACTERIA BLD CULT: NORMAL
BASOPHILS # BLD AUTO: 0.03 X10*3/UL (ref 0–0.1)
BASOPHILS NFR BLD AUTO: 0.4 %
BUN SERPL-MCNC: 57 MG/DL (ref 6–23)
CALCIUM SERPL-MCNC: 9.4 MG/DL (ref 8.6–10.3)
CHLORIDE SERPL-SCNC: 104 MMOL/L (ref 98–107)
CO2 SERPL-SCNC: 26 MMOL/L (ref 21–32)
CREAT SERPL-MCNC: 2.9 MG/DL (ref 0.5–1.3)
EGFRCR SERPLBLD CKD-EPI 2021: 23 ML/MIN/1.73M*2
EOSINOPHIL # BLD AUTO: 0.67 X10*3/UL (ref 0–0.7)
EOSINOPHIL NFR BLD AUTO: 8 %
ERYTHROCYTE [DISTWIDTH] IN BLOOD BY AUTOMATED COUNT: 13.7 % (ref 11.5–14.5)
GLUCOSE BLD MANUAL STRIP-MCNC: 165 MG/DL (ref 74–99)
GLUCOSE SERPL-MCNC: 173 MG/DL (ref 74–99)
HCT VFR BLD AUTO: 33.8 % (ref 41–52)
HGB BLD-MCNC: 10.9 G/DL (ref 13.5–17.5)
IMM GRANULOCYTES # BLD AUTO: 0.09 X10*3/UL (ref 0–0.7)
IMM GRANULOCYTES NFR BLD AUTO: 1.1 % (ref 0–0.9)
LYMPHOCYTES # BLD AUTO: 1.4 X10*3/UL (ref 1.2–4.8)
LYMPHOCYTES NFR BLD AUTO: 16.7 %
MCH RBC QN AUTO: 27 PG (ref 26–34)
MCHC RBC AUTO-ENTMCNC: 32.2 G/DL (ref 32–36)
MCV RBC AUTO: 84 FL (ref 80–100)
MONOCYTES # BLD AUTO: 0.6 X10*3/UL (ref 0.1–1)
MONOCYTES NFR BLD AUTO: 7.2 %
NEUTROPHILS # BLD AUTO: 5.58 X10*3/UL (ref 1.2–7.7)
NEUTROPHILS NFR BLD AUTO: 66.6 %
NRBC BLD-RTO: 0 /100 WBCS (ref 0–0)
PHOSPHATE SERPL-MCNC: 3.9 MG/DL (ref 2.5–4.9)
PLATELET # BLD AUTO: 290 X10*3/UL (ref 150–450)
POTASSIUM SERPL-SCNC: 3.9 MMOL/L (ref 3.5–5.3)
RBC # BLD AUTO: 4.03 X10*6/UL (ref 4.5–5.9)
SODIUM SERPL-SCNC: 140 MMOL/L (ref 136–145)
WBC # BLD AUTO: 8.4 X10*3/UL (ref 4.4–11.3)

## 2025-07-13 PROCEDURE — 36415 COLL VENOUS BLD VENIPUNCTURE: CPT | Performed by: INTERNAL MEDICINE

## 2025-07-13 PROCEDURE — 85025 COMPLETE CBC W/AUTO DIFF WBC: CPT | Performed by: INTERNAL MEDICINE

## 2025-07-13 PROCEDURE — 2500000004 HC RX 250 GENERAL PHARMACY W/ HCPCS (ALT 636 FOR OP/ED): Performed by: INTERNAL MEDICINE

## 2025-07-13 PROCEDURE — 2500000001 HC RX 250 WO HCPCS SELF ADMINISTERED DRUGS (ALT 637 FOR MEDICARE OP): Performed by: INTERNAL MEDICINE

## 2025-07-13 PROCEDURE — 2500000002 HC RX 250 W HCPCS SELF ADMINISTERED DRUGS (ALT 637 FOR MEDICARE OP, ALT 636 FOR OP/ED): Performed by: INTERNAL MEDICINE

## 2025-07-13 PROCEDURE — 99239 HOSP IP/OBS DSCHRG MGMT >30: CPT | Performed by: INTERNAL MEDICINE

## 2025-07-13 PROCEDURE — 82947 ASSAY GLUCOSE BLOOD QUANT: CPT

## 2025-07-13 PROCEDURE — 80069 RENAL FUNCTION PANEL: CPT | Performed by: INTERNAL MEDICINE

## 2025-07-13 RX ORDER — BISACODYL 5 MG
5 TABLET, DELAYED RELEASE (ENTERIC COATED) ORAL DAILY PRN
Status: DISCONTINUED | OUTPATIENT
Start: 2025-07-13 | End: 2025-07-13 | Stop reason: HOSPADM

## 2025-07-13 RX ADMIN — INSULIN LISPRO 1 UNITS: 100 INJECTION, SOLUTION INTRAVENOUS; SUBCUTANEOUS at 08:44

## 2025-07-13 RX ADMIN — HEPARIN SODIUM 5000 UNITS: 5000 INJECTION, SOLUTION INTRAVENOUS; SUBCUTANEOUS at 03:39

## 2025-07-13 RX ADMIN — ATORVASTATIN CALCIUM 40 MG: 40 TABLET, FILM COATED ORAL at 08:44

## 2025-07-13 RX ADMIN — PANTOPRAZOLE SODIUM 20 MG: 20 TABLET, DELAYED RELEASE ORAL at 08:45

## 2025-07-13 ASSESSMENT — COGNITIVE AND FUNCTIONAL STATUS - GENERAL
DAILY ACTIVITIY SCORE: 24
MOBILITY SCORE: 24

## 2025-07-13 ASSESSMENT — PAIN SCALES - GENERAL: PAINLEVEL_OUTOF10: 0 - NO PAIN

## 2025-07-13 NOTE — CARE PLAN
The patient's goals for the shift include get ready for procedure    The clinical goals for the shift include patient safety, monitor urinary output      Problem: Pain - Adult  Goal: Verbalizes/displays adequate comfort level or baseline comfort level  Outcome: Progressing     Problem: Safety - Adult  Goal: Free from fall injury  Outcome: Progressing     Problem: Discharge Planning  Goal: Discharge to home or other facility with appropriate resources  Outcome: Progressing     Problem: Chronic Conditions and Co-morbidities  Goal: Patient's chronic conditions and co-morbidity symptoms are monitored and maintained or improved  Outcome: Progressing     Problem: Nutrition  Goal: Nutrient intake appropriate for maintaining nutritional needs  Outcome: Progressing     Problem: Diabetes  Goal: Achieve decreasing blood glucose levels by end of shift  Outcome: Progressing  Goal: Increase stability of blood glucose readings by end of shift  Outcome: Progressing  Goal: Decrease in ketones present in urine by end of shift  Outcome: Progressing  Goal: Maintain electrolyte levels within acceptable range throughout shift  Outcome: Progressing  Goal: Maintain glucose levels >70mg/dl to <250mg/dl throughout shift  Outcome: Progressing  Goal: No changes in neurological exam by end of shift  Outcome: Progressing  Goal: Learn about and adhere to nutrition recommendations by end of shift  Outcome: Progressing  Goal: Vital signs within normal range for age by end of shift  Outcome: Progressing  Goal: Increase self care and/or family involovement by end of shift  Outcome: Progressing  Goal: Receive DSME education by end of shift  Outcome: Progressing     Problem: Fall/Injury  Goal: Not fall by end of shift  Outcome: Progressing  Goal: Be free from injury by end of the shift  Outcome: Progressing  Goal: Verbalize understanding of personal risk factors for fall in the hospital  Outcome: Progressing  Goal: Verbalize understanding of risk  factor reduction measures to prevent injury from fall in the home  Outcome: Progressing  Goal: Use assistive devices by end of the shift  Outcome: Progressing  Goal: Pace activities to prevent fatigue by end of the shift  Outcome: Progressing     Problem: Skin  Goal: Decreased wound size/increased tissue granulation at next dressing change  Outcome: Progressing  Flowsheets (Taken 7/12/2025 2248)  Decreased wound size/increased tissue granulation at next dressing change:   Promote sleep for wound healing   Protective dressings over bony prominences   Utilize specialty bed per algorithm  Goal: Participates in plan/prevention/treatment measures  Outcome: Progressing  Flowsheets (Taken 7/12/2025 2248)  Participates in plan/prevention/treatment measures:   Discuss with provider PT/OT consult   Elevate heels   Increase activity/out of bed for meals  Goal: Prevent/manage excess moisture  Outcome: Progressing  Flowsheets (Taken 7/12/2025 2248)  Prevent/manage excess moisture:   Cleanse incontinence/protect with barrier cream   Moisturize dry skin   Monitor for/manage infection if present   Follow provider orders for dressing changes  Goal: Prevent/minimize sheer/friction injuries  Outcome: Progressing  Flowsheets (Taken 7/12/2025 2248)  Prevent/minimize sheer/friction injuries:   Complete micro-shifts as needed if patient unable. Adjust patient position to relieve pressure points, not a full turn   Increase activity/out of bed for meals   Use pull sheet   HOB 30 degrees or less   Turn/reposition every 2 hours/use positioning/transfer devices   Utilize specialty bed per algorithm  Goal: Promote/optimize nutrition  Outcome: Progressing  Flowsheets (Taken 7/12/2025 2248)  Promote/optimize nutrition:   Consume > 50% meals/supplements   Monitor/record intake including meals   Offer water/supplements/favorite foods   Discuss with provider if NPO > 2 days   Reassess MST if dietician not consulted  Goal: Promote skin  healing  Outcome: Progressing  Flowsheets (Taken 7/12/2025 7784)  Promote skin healing:   Assess skin/pad under line(s)/device(s)   Turn/reposition every 2 hours/use positioning/transfer devices   Ensure correct size (line/device) and apply per  instructions   Rotate device position/do not position patient on device   Protective dressings over bony prominences

## 2025-07-13 NOTE — DISCHARGE SUMMARY
"Discharge Diagnosis  Hypoglycemia           Issues Requiring Follow-Up  Left obstructive uropathy.  Status post left nephrostomy tube placement  Acute kidney injury  Diabetes mellitus type 2  History of right nephrectomy  History of ileal conduit  Discharge Meds     Medication List      CHANGE how you take these medications     Basaglar KwikPen U-100 Insulin 100 unit/mL (3 mL) pen; Generic drug:   insulin glargine; Use up to 30 units daily; What changed: how much to   take, how to take this, when to take this, additional instructions     CONTINUE taking these medications     atorvastatin 40 mg tablet; Commonly known as: Lipitor; TAKE ONE TABLET   BY MOUTH DAILY   BD Insulin Syringe 1 mL 26 x 1/2\" syringe; Generic drug: insulin   syringe-needle U-100   levocetirizine 5 mg tablet; Commonly known as: Xyzal   montelukast 10 mg tablet; Commonly known as: Singulair   pantoprazole 40 mg EC tablet; Commonly known as: ProtoNix; TAKE ONE   TABLET BY MOUTH EVERY DAY   pen needle, diabetic 32 gauge x 5/32\" needle; Commonly known as: BD Luann   2nd Gen Pen Needle; use DAILY   traZODone 50 mg tablet; Commonly known as: Desyrel; Take 1-2 tablets   ( mg) by mouth once daily.     STOP taking these medications     lisinopril 40 mg tablet       Test Results Pending At Discharge  Pending Labs       Order Current Status    Urinalysis with Reflex Culture and Microscopic In process    Blood Culture Preliminary result    Blood Culture Preliminary result    Extra Tubes Preliminary result    Extra Tubes Preliminary result    Extra Tubes Preliminary result    Lavender Top Preliminary result    Lavender Top Preliminary result    Light Blue Top Preliminary result    PST Top Preliminary result    PST Top Preliminary result            Hospital Course     Patient is a 69 years old male with past medical history of hypertension obstructive sleep apnea history of bladder cancer status post right nephrectomy and radical cystectomy and ileal " conduit.  He presented to [place] ER with confusion, encephalopathy.  Patient was found to be profoundly hypoglycemic.  Initial workup revealed acute kidney injury.  Patient remained to be hypoglycemic.  He was transferred to [facility] to be evaluated by endocrinology Dr. Prieto.  CT abdomen pelvis abdomen pelvis revealed interval evidence of obstructing calculus at the left pelvic ureteral junction.  Nephrology urology was consulted.  Patient was advised to get transferred to [facility] for nephrostomy tube placement by interventional radiology.  Patient was transferred to [facility], [place].  He had left nephrostomy tube placed.  Patient was treated with D10 and octreotide regarding hypoglycemia.  Patient has been on Basaglar and sulfonylurea according to him.  Regarding acute kidney injury Dr. Whtie was consulted from nephrology services.  Kidney function improved.  After few days in hospital blood glucose level improved.  He was started on insulin sliding scale.  Patient stated he is on Basaglar 10 units in the morning and 20 units at night.  I advised him to continue with the same regimen until he sees Dr. Gonzalo Christiansen his regular endocrinologist.  I advised him to see Dr. Christiansen in 1 week.  Patient was advised to follow-up with nephrology in 1 week.  I order RFP in 1 week.  Patient was advised to follow-up with PCP and Dr. Eden nephrology as outpatient.  Patient was advised to be compliant with medication.  He was advised to quit indefinitely sulfonylurea medication.  That was recommendation from endocrinology at [facility].  Patient will be discharged home with home health care.  Patient stated he knows how to use insulin.  He stated he is taking insulin for a long time.  He stated he has medication at home.  Patient has been on lisinopril.  Lisinopril discontinued.  Discussed with nephrology on the case.  Okay to discharge home with home health care today.    Pertinent Physical Exam At Time  of Discharge  Physical Exam  General: In non acute distress, cooperating during physical exam.  HEENT: Pupils are equal and reactive to light and commendation , oral mucosa moist, no JVD oral mucosa is moist.  Cardiovascular: Normal sinus rhythm, no MRG.  Lungs: Clear to auscultation bilaterally, no wheezing, no crackles, no dullness to percussion.  Abdomen: No hepatosplenomegaly appreciated, soft , not tender, positive bowel sounds, positive bowel movement.  Left nephrostomy tube in place  Neuro: Alert and oriented x3, strength in upper and lower extremities , sensation intact.  Psych: Patient had great insight was going on  Musculoskeletal: No swelling in lower extremities, no limitation in range of motion.  Vascular: Pulses are intact in upper and lower extremities  Skin: No petechiae, ecchymosis or other stigmata for dermatology disease.     Outpatient Follow-Up  Future Appointments   Date Time Provider Department Center   7/14/2025  1:00 PM James Borrero MD [id number] [facility]   7/31/2025  1:00 PM Kathya Dixon, PharmD [id number] [facility]   8/5/2025  3:00 PM Monie Barron MD [id number] [facility]   8/20/2025  2:30 PM Miles Peters MD [id number] [facility]   10/16/2025  2:20 PM Daxa Farnsworth APRN-CNP [id number] [place]     Follow-up with PCP in 1 week  Follow-up with regular endocrinologist in 1 week  Follow-up with Dr. Eden in 2 weeks  Follow-up with nephrology in 1 week    Time spent discharging patient 40 minutes    Kaylynn Villarreal MD

## 2025-07-13 NOTE — CARE PLAN
Problem: Pain - Adult  Goal: Verbalizes/displays adequate comfort level or baseline comfort level  Outcome: Met     Problem: Safety - Adult  Goal: Free from fall injury  Outcome: Met     Problem: Discharge Planning  Goal: Discharge to home or other facility with appropriate resources  Outcome: Met     Problem: Chronic Conditions and Co-morbidities  Goal: Patient's chronic conditions and co-morbidity symptoms are monitored and maintained or improved  Outcome: Met     Problem: Nutrition  Goal: Nutrient intake appropriate for maintaining nutritional needs  Outcome: Met     Problem: Diabetes  Goal: Achieve decreasing blood glucose levels by end of shift  Outcome: Met  Goal: Increase stability of blood glucose readings by end of shift  Outcome: Met  Goal: Decrease in ketones present in urine by end of shift  Outcome: Met  Goal: Maintain electrolyte levels within acceptable range throughout shift  Outcome: Met  Goal: Maintain glucose levels >70mg/dl to <250mg/dl throughout shift  Outcome: Met  Goal: No changes in neurological exam by end of shift  Outcome: Met  Goal: Learn about and adhere to nutrition recommendations by end of shift  Outcome: Met  Goal: Vital signs within normal range for age by end of shift  Outcome: Met  Goal: Increase self care and/or family involovement by end of shift  Outcome: Met  Goal: Receive DSME education by end of shift  Outcome: Met     Problem: Fall/Injury  Goal: Not fall by end of shift  Outcome: Met  Goal: Be free from injury by end of the shift  Outcome: Met  Goal: Verbalize understanding of personal risk factors for fall in the hospital  Outcome: Met  Goal: Verbalize understanding of risk factor reduction measures to prevent injury from fall in the home  Outcome: Met  Goal: Use assistive devices by end of the shift  Outcome: Met  Goal: Pace activities to prevent fatigue by end of the shift  Outcome: Met     Problem: Respiratory  Goal: No signs of respiratory distress (eg. Use of  accessory muscles. Peds grunting)  Outcome: Met   The patient's goals for the shift include get ready for procedure    The clinical goals for the shift include remain hds safe

## 2025-07-13 NOTE — PROGRESS NOTES
"Rodrigue Palacios is a 69 y.o. male on day 3 of admission presenting with Hypoglycemia.    Subjective   Patient feels well today.  Nephrostomy placed on left, draining.  KUB today identifies the left renal stone in the kidney.       Objective     Physical Exam  Alert, oriented  Normal respiratory effort  Abdomen soft nontender  Ileal conduit with clear output  Nephrostomy draining clear on left.    Last Recorded Vitals  Blood pressure 150/76, pulse 72, temperature 35.4 °C (95.7 °F), temperature source Temporal, resp. rate 18, height 1.905 m (6' 3\"), weight 92.5 kg (203 lb 14.8 oz), SpO2 97%.  Intake/Output last 3 Shifts:  I/O last 3 completed shifts:  In: 720 (7.8 mL/kg) [P.O.:720]  Out: 2625 (28.4 mL/kg) [Urine:2625 (0.8 mL/kg/hr)]  Weight: 92.5 kg     Relevant Results  Scheduled medications  Scheduled Medications[1]  Continuous medications  Continuous Medications[2]  PRN medications  PRN Medications[3]    Results for orders placed or performed during the hospital encounter of 07/09/25 (from the past 24 hours)   CBC and Auto Differential   Result Value Ref Range    WBC 9.1 4.4 - 11.3 x10*3/uL    nRBC 0.0 0.0 - 0.0 /100 WBCs    RBC 4.03 (L) 4.50 - 5.90 x10*6/uL    Hemoglobin 11.0 (L) 13.5 - 17.5 g/dL    Hematocrit 32.8 (L) 41.0 - 52.0 %    MCV 81 80 - 100 fL    MCH 27.3 26.0 - 34.0 pg    MCHC 33.5 32.0 - 36.0 g/dL    RDW 13.8 11.5 - 14.5 %    Platelets 295 150 - 450 x10*3/uL    Neutrophils % 70.4 40.0 - 80.0 %    Immature Granulocytes %, Automated 1.1 (H) 0.0 - 0.9 %    Lymphocytes % 9.5 13.0 - 44.0 %    Monocytes % 9.7 2.0 - 10.0 %    Eosinophils % 9.1 0.0 - 6.0 %    Basophils % 0.2 0.0 - 2.0 %    Neutrophils Absolute 6.41 1.20 - 7.70 x10*3/uL    Immature Granulocytes Absolute, Automated 0.10 0.00 - 0.70 x10*3/uL    Lymphocytes Absolute 0.86 (L) 1.20 - 4.80 x10*3/uL    Monocytes Absolute 0.88 0.10 - 1.00 x10*3/uL    Eosinophils Absolute 0.83 (H) 0.00 - 0.70 x10*3/uL    Basophils Absolute 0.02 0.00 - 0.10 x10*3/uL "   Renal function panel   Result Value Ref Range    Glucose 171 (H) 74 - 99 mg/dL    Sodium 142 136 - 145 mmol/L    Potassium 4.1 3.5 - 5.3 mmol/L    Chloride 102 98 - 107 mmol/L    Bicarbonate 27 21 - 32 mmol/L    Anion Gap 17 10 - 20 mmol/L    Urea Nitrogen 69 (H) 6 - 23 mg/dL    Creatinine 4.11 (H) 0.50 - 1.30 mg/dL    eGFR 15 (L) >60 mL/min/1.73m*2    Calcium 9.4 8.6 - 10.3 mg/dL    Phosphorus 4.5 2.5 - 4.9 mg/dL    Albumin 3.2 (L) 3.4 - 5.0 g/dL   Magnesium   Result Value Ref Range    Magnesium 1.98 1.60 - 2.40 mg/dL   POCT GLUCOSE   Result Value Ref Range    POCT Glucose 164 (H) 74 - 99 mg/dL   POCT GLUCOSE   Result Value Ref Range    POCT Glucose 239 (H) 74 - 99 mg/dL   POCT GLUCOSE   Result Value Ref Range    POCT Glucose 215 (H) 74 - 99 mg/dL   POCT GLUCOSE   Result Value Ref Range    POCT Glucose 119 (H) 74 - 99 mg/dL       Imaging  IR placement of nephrostomy catheter  Result Date: 7/11/2025  1.  Uncomplicated and technically successful  8-[place]banian percutaneous nephrostomy tube placement -  right kidney.  The catheter was connected to external gravity drainage. 2. Return and identification of  purulent urine.     Performed and dictated at [facility].   MACRO: None   Signed by: Pk Hernandez 7/11/2025 2:30 PM Dictation workstation:   [id number]    US guided percutaneous placement  Result Date: 7/11/2025  1.  Uncomplicated and technically successful  8-Albanian percutaneous nephrostomy tube placement -  right kidney.  The catheter was connected to external gravity drainage. 2. Return and identification of  purulent urine.     Performed and dictated at [facility].   MACRO: None   Signed by: Pk Hernandez 7/11/2025 2:30 PM Dictation workstation:   [id number]    CT abdomen pelvis wo IV contrast  Result Date: 7/9/2025  1.  Interval evidence of obstructing calculus at the left pelviureteral junction measuring up to 1.1 x 1 x 0.7 cm in CC,  transverse and AP dimensions respectively with associated moderate left-sided hydronephrosis. There is interval increase in the degree of left-sided hydronephrosis compared to prior CT examination dated 06/04/2025. 2. Postsurgical changes of right nephrectomy, cystectomy and ileal conduit in the right lower quadrant are noted. There is mild dilatation of the majority of the left ureter distal to the obstructing calculus throughout its course extending to the level of right lower quadrant with associated periureteral fat stranding. This could be related to a degree of stricture/stenosis in the distal left ureter at the level of ileal conduit. Recommend clinical correlation and further evaluation as clinically warranted. 3. Moderate atherosclerotic vascular calcifications of the abdominal aorta with mild fusiform aneurysmal dilatation in the infrarenal segment measuring up to 3.1 cm in AP dimension. 4. Suggestion of tiny hiatal hernia. 5. Sigmoid colonic diverticulosis without acute diverticulitis. 6. Other stable findings as described above.   MACRO: Dr. Latisha Green discussed the significance and urgency of this critical finding by EPIC secure chat with Ms CED ROD on 7/9/2025 at 10:51 am.  (**-RCF-**) Findings:  See findings.       Signed by: Latisha Green 7/9/2025 10:52 AM Dictation workstation:   [id number]    NM kidney flow function wo pharmacological intervention  Result Date: 7/9/2025  Borderline perfusion Borderline maximal cortical radiotracer accumulation. Retention of radiotracer within the kidney without evidence of excretion. Findings are suggestive of significant kidney injury. Differentials include significant chronic renal insufficiency versus acute tubular necrosis.   MACRO: None   Signed by: Chinmay Paul 7/9/2025 10:28 AM Dictation workstation:   [id number]    US renal complete  Result Date: 7/9/2025  1. Moderate left-sided hydronephrosis with suggestion of a 1.4 cm nonobstructing  calculus in the inferior pole. 2. Status post right nephrectomy. 3. Status post cystectomy.   MACRO: None   Signed by: Latisha Green 7/9/2025 9:44 AM Dictation workstation:   [id number]      Cardiology, Vascular, and Other Imaging  Electrocardiogram, 12-lead PRN ACS symptoms  Result Date: 7/11/2025  Sinus bradycardia with sinus arrhythmia Left axis deviation Abnormal ECG No previous ECGs available Confirmed by Doyle Russell ([id number]) on 7/11/2025 11:32:19 AM    ECG 12 lead  Result Date: 7/9/2025  Normal sinus rhythm Minimal voltage criteria for LVH, may be normal variant ( R in aVL ) Borderline ECG When compared with ECG of 07-JUL-2025 19:32, (unconfirmed) T wave inversion now evident in Inferior leads    ECG 12 lead  Result Date: 7/8/2025  Normal sinus rhythm Minimal voltage criteria for LVH, may be normal variant Borderline ECG When compared with ECG of 04-JUN-2025 12:32, Premature ventricular complexes are no longer Present QT has shortened                Assessment & Plan  Hypoglycemia    69-year-old has an obstructing left ureteral stone, status post left nephrostomy placement.  He has a history of bladder cancer, recently treated with cystectomy and ileal conduit.  He has acute kidney injury, with a creatinine of 7.74 on 7/7/2025, decreased to 4.11 today.  His creatinine should continue to improve with drainage from the nephrostomy tube  KUB reviewed with him.  He will be scheduled for left ESWL as an outpatient.    Sachin Eden MD             [1] atorvastatin, 40 mg, oral, Daily  heparin (porcine), 5,000 Units, subcutaneous, q8h  insulin lispro, 0-5 Units, subcutaneous, TID AC  [Held by provider] lisinopril, 20 mg, oral, q12h  pantoprazole, 20 mg, oral, Daily     [2]    [3] PRN medications: acetaminophen, dextrose, dextrose, glucagon, glucagon, traZODone

## 2025-07-13 NOTE — PROGRESS NOTES
Subjective   Patient ID: Rodrigue Palacios is a 69 y.o. male.      HPI  69 y.o. male presents for a follow up visit. He is status post cystectomy with ureteroileal conduit creation and right simple nephrectomy on 05/21/2025. He returns to review surgical pathology from his procedure.     The patient presented to the [place] ER with confusion and encephalopathy due to hypoglycemia on 07/09/2025. Initial workup detailed acute kidney injury. CT abdomen and pelvis detailed a interval evidence of obstructing calculus at the left pelvic ureteral junction. He was transferred to [facility] for nephrostomy tube placement. He underwent left nephrostomy tube placement. After a few days in the hospital, his kidney function improved along with his blood glucose levels. He was started on an insulin sliding scale. He was discharged 07/13/2025 with home health care.     Component      Latest Ref Rng 7/11/2025 7/12/2025 7/13/2025   GLUCOSE      74 - 99 mg/dL 169 (H)  171 (H)  173 (H)    GLUCOSE       174 (H)      SODIUM      136 - 145 mmol/L 139  142  140    SODIUM       138      POTASSIUM      3.5 - 5.3 mmol/L 4.6  4.1  3.9    POTASSIUM       5.0      CHLORIDE      98 - 107 mmol/L 100  102  104    CHLORIDE       100      Bicarbonate      21 - 32 mmol/L 26  27  26    Bicarbonate       26      Anion Gap      10 - 20 mmol/L 18  17  14    Anion Gap       17      Blood Urea Nitrogen      6 - 23 mg/dL 82 (H)  69 (H)  57 (H)    Blood Urea Nitrogen       85 (H)      Creatinine      0.50 - 1.30 mg/dL 6.10 (H)  4.11 (H)  2.90 (H)    Creatinine       6.50 (H)      Calcium      8.6 - 10.3 mg/dL 9.4  9.4  9.4    Calcium       9.5      Albumin      3.4 - 5.0 g/dL 3.2 (L)  3.2 (L)  3.3 (L)    Albumin       3.3 (L)      EGFR      >60 mL/min/1.73m*2 9 (L)  15 (L)  23 (L)    EGFR       9 (L)      PHOSPHORUS      2.5 - 4.9 mg/dL 5.9 (H)  4.5  3.9    PHOSPHORUS       5.9 (H)         Legend:  (H) High  (L) Low    CT ABDOMEN PELVIS WO IV CONTRAST;   7/9/2025  IMPRESSION:  1.  Interval evidence of obstructing calculus at the left  pelviureteral junction measuring up to 1.1 x 1 x 0.7 cm in CC,  transverse and AP dimensions respectively with associated moderate  left-sided hydronephrosis. There is interval increase in the degree  of left-sided hydronephrosis compared to prior CT examination dated  06/04/2025.  2. Postsurgical changes of right nephrectomy, cystectomy and ileal  conduit in the right lower quadrant are noted. There is mild  dilatation of the majority of the left ureter distal to the  obstructing calculus throughout its course extending to the level of  right lower quadrant with associated periureteral fat stranding. This  could be related to a degree of stricture/stenosis in the distal left  ureter at the level of ileal conduit. Recommend clinical correlation  and further evaluation as clinically warranted.  3. Moderate atherosclerotic vascular calcifications of the abdominal  aorta with mild fusiform aneurysmal dilatation in the infrarenal  segment measuring up to 3.1 cm in AP dimension.  4. Suggestion of tiny hiatal hernia.  5. Sigmoid colonic diverticulosis without acute diverticulitis.  6. Other stable findings as described above.    Surgical Pathology Exam collected on 05/21/2025:   A. Lymph node, bilateral pelvic, lymph node dissection:  -- Thirteen lymph nodes, negative for carcinoma (0/13)     B. Urinary bladder and prostate, cystoprostatectomy:  Urinary bladder:  -- Extensive urothelial carcinoma in situ involving left lateral wall, dome, right lateral wall and trigone  -- Changes consistent with previous procedure  -- See cancer summary report     Prostate:  -- Urothelial carcinoma in situ involving prostatic urethra  -- Prostate with mild benign prostatic hyperplasia and chronic inflammation  -- See cancer summary report     C. Kidney, right, nephrectomy:  -- Nephrolithiasis with hydroureter and hydronephrosis  -- Renal parenchyma with  marked, predominantly subcapsular glomerulosclerosis and chronic inflammation and marked arteriosclerosis  -- Perinephric hematoma     D. Ureter, left, segmental resection:  -- Ureter, negative for carcinoma      Review of Systems  A complete review of systems was performed. All systems are noted to be negative unless indicated in the history of present illness, impression, active problem list, or past histories.     Objective   Physical Exam    Assessment/Plan   There are no diagnoses linked to this encounter.    69 y.o. male presents for a follow up visit. He is status post cystectomy with ureteroileal conduit creation and right simple nephrectomy on 05/21/2025. He returns to review surgical pathology from his procedure. He was recently discharged from the hospital due to an HARMAN and hypoglycemia.     I personally reviewed the medical records of the patient including the lab values, the note of the referring physician and I reviewed the report and visualized the images performed focusing on ***    Plan:        Scribe Attestation   By signing my name below, I, Jayshree Stout, Scribe attestation that this documentation has been prepared under the direction and in the presence of James Borrero MD.

## 2025-07-13 NOTE — PROGRESS NOTES
07/13/25 0925   Discharge Planning   Expected Discharge Disposition Home Health   Does the patient need discharge transport arranged? No     Patient to return home and resume services with [facility]. Order placed and HC notified of the same. Requested SOC.     11:43 AM  SOC processed for 7/15/25

## 2025-07-13 NOTE — HH CARE COORDINATION
Home Care received a Referral for Nursing and Physical Therapy. We have processed the referral for a Start of Care on 7/15/2025 .     If you have any questions or concerns, please feel free to contact us at [phone]. Follow the prompts, enter your five digit zip code, and you will be directed to your care team on E[facility] 1.

## 2025-07-14 ENCOUNTER — APPOINTMENT (OUTPATIENT)
Dept: UROLOGY | Facility: CLINIC | Age: 70
End: 2025-07-14
Payer: MEDICARE

## 2025-07-14 ENCOUNTER — PATIENT OUTREACH (OUTPATIENT)
Dept: PRIMARY CARE | Facility: CLINIC | Age: 70
End: 2025-07-14

## 2025-07-14 LAB
BACTERIA BLD CULT: NORMAL
BACTERIA BLD CULT: NORMAL

## 2025-07-14 NOTE — PROGRESS NOTES
Discharge Facility: [facility]  Discharge Diagnosis: Hypoglycemia  Admission Date: 7/9/2025  Procedure Date: 7/10/2025 Nephrostomy tube placement  Discharge Date: 7/13/2025    Issues Requiring Follow-Up   Left obstructive uropathy.  Status post left nephrostomy tube placement  Acute kidney injury  Diabetes mellitus type 2     PCP Appointment Date; Declined to schedule  Specialist Appointment Date: Nephrology and Endocrinology recommended,   Appointment with James Borrero MD (07/28/2025) Urology,  Surgery with Sachin Eden MD (07/30/2025) Lithotripsy, ESWL  Hospital Encounter and Summary Linked: Yes  Admission (Discharged) with Kaylynn Villarreal MD (07/09/2025)     See discharge assessment below for further details    Wrap Up  Wrap Up Additional Comments: 69yoM with PMHx s/f bladder cancer, recently treated with cystectomy and ileal conduit, presented to ED via EMS with confusion. Upon EMS arrival he was found to be hypoglycemic with BGL 52.  Initial work-up was also significant for HARMAN with initial sCr 7.7. Patient found to have left obstructive uropathy and is now s/p nephrostomy placement. Patient discharged to home once stabilized with [facility] to follow. Nephrology and Endocrinology follow up recommended. Urology follow up and lithotripsy scheduled. Lisinopril stopped. At time of outreach, the patient feels his condition is improving. He stated he has mild discomfort from his kidney stone and procedure. Advised he could take Tylenol as needed for pain. Patient stated nephrostomy tube is draining well. He is eating and drinking. Advised rest and increasing fluid intake. Patient verbalized understanding of instructions. Patient declined to schedule a primary care hospital follow up. He will be following up with his specialists. Advised the patient to call the office directly with future needs. (7/14/2025  3:45 PM)    Medications  Medications reviewed with patient/caregiver?: Yes  (7/14/2025  3:45 PM)  Is the patient having any side effects they believe may be caused by any medication additions or changes?: No (7/14/2025  3:45 PM)  Does the patient have all medications ordered at discharge?: Yes (7/14/2025  3:45 PM)  Care Management Interventions: No intervention needed (7/14/2025  3:45 PM)  Prescription Comments: STOPPED: lisinopril (7/14/2025  3:45 PM)  Is the patient taking all medications as directed (includes completed medication regime)?: Yes (7/14/2025  3:45 PM)  Care Management Interventions: Provided patient education (7/14/2025  3:45 PM)  Medication Comments: Verbalized understanding of medication changes. (7/14/2025  3:45 PM)    Appointments  Does the patient have a primary care provider?: Yes (7/14/2025  3:45 PM)  Care Management Interventions: -- (Declined to schedule hospital follow up.) (7/14/2025  3:45 PM)  Has the patient kept scheduled appointments due by today?: Not applicable (7/14/2025  3:45 PM)  Care Management Interventions: Advised patient to keep appointment (Nephrology, Endocrinology, and Urology recommended.) (7/14/2025  3:45 PM)    Self Management  What is the home health agency?: [facility] (7/14/2025  3:45 PM)  Has home health visited the patient within 72 hours of discharge?: Call prior to 72 hours (7/14/2025  3:45 PM)  What Durable Medical Equipment (DME) was ordered?: N/A (7/14/2025  3:45 PM)    Patient Teaching  Does the patient have access to their discharge instructions?: Yes (7/14/2025  3:45 PM)  Care Management Interventions: Reviewed instructions with patient (7/14/2025  3:45 PM)  What is the patient's perception of their health status since discharge?: Improving (7/14/2025  3:45 PM)  Is the patient/caregiver able to teach back the hierarchy of who to call/visit for symptoms/problems? PCP, Specialist, Home Health nurse, Urgent Care, ED, 911: Yes (7/14/2025  3:45 PM)  Patient/Caregiver Education Comments: Patient verbalized understanding of discharge  instructions. Provided contact information for nonurgent questions or concerns. (7/14/2025  3:45 PM)

## 2025-07-16 ENCOUNTER — HOSPITAL ENCOUNTER (INPATIENT)
Facility: HOSPITAL | Age: 70
LOS: 1 days | Discharge: HOME | DRG: 640 | End: 2025-07-17
Attending: STUDENT IN AN ORGANIZED HEALTH CARE EDUCATION/TRAINING PROGRAM | Admitting: INTERNAL MEDICINE
Payer: MEDICARE

## 2025-07-16 ENCOUNTER — APPOINTMENT (OUTPATIENT)
Dept: RADIOLOGY | Facility: HOSPITAL | Age: 70
DRG: 640 | End: 2025-07-16
Payer: MEDICARE

## 2025-07-16 ENCOUNTER — TELEPHONE (OUTPATIENT)
Facility: CLINIC | Age: 70
End: 2025-07-16

## 2025-07-16 ENCOUNTER — PRE-ADMISSION TESTING (OUTPATIENT)
Dept: PREADMISSION TESTING | Facility: HOSPITAL | Age: 70
DRG: 640 | End: 2025-07-16
Payer: MEDICARE

## 2025-07-16 VITALS
RESPIRATION RATE: 16 BRPM | WEIGHT: 178 LBS | OXYGEN SATURATION: 96 % | HEIGHT: 75 IN | HEART RATE: 115 BPM | BODY MASS INDEX: 22.13 KG/M2 | TEMPERATURE: 96.8 F | DIASTOLIC BLOOD PRESSURE: 68 MMHG | SYSTOLIC BLOOD PRESSURE: 86 MMHG

## 2025-07-16 DIAGNOSIS — Z01.818 PREOPERATIVE TESTING: Primary | ICD-10-CM

## 2025-07-16 DIAGNOSIS — I95.89 HYPOTENSION DUE TO HYPOVOLEMIA: Primary | ICD-10-CM

## 2025-07-16 DIAGNOSIS — E86.1 HYPOTENSION DUE TO HYPOVOLEMIA: Primary | ICD-10-CM

## 2025-07-16 DIAGNOSIS — G47.09 OTHER INSOMNIA: ICD-10-CM

## 2025-07-16 DIAGNOSIS — N17.9 ACUTE KIDNEY INJURY: ICD-10-CM

## 2025-07-16 DIAGNOSIS — N30.00 ACUTE CYSTITIS WITHOUT HEMATURIA: ICD-10-CM

## 2025-07-16 DIAGNOSIS — Z98.890 H/O INSERTION OF NEPHROSTOMY TUBE: ICD-10-CM

## 2025-07-16 LAB
ALBUMIN SERPL BCP-MCNC: 3.5 G/DL (ref 3.4–5)
ALP SERPL-CCNC: 88 U/L (ref 33–136)
ALT SERPL W P-5'-P-CCNC: 26 U/L (ref 10–52)
ANION GAP SERPL CALCULATED.3IONS-SCNC: 13 MMOL/L (ref 10–20)
APPEARANCE UR: ABNORMAL
AST SERPL W P-5'-P-CCNC: 14 U/L (ref 9–39)
BACTERIA #/AREA URNS AUTO: ABNORMAL /HPF
BASOPHILS # BLD AUTO: 0.04 X10*3/UL (ref 0–0.1)
BASOPHILS NFR BLD AUTO: 0.4 %
BILIRUB SERPL-MCNC: 0.5 MG/DL (ref 0–1.2)
BILIRUB UR STRIP.AUTO-MCNC: NEGATIVE MG/DL
BUN SERPL-MCNC: 53 MG/DL (ref 6–23)
CALCIUM SERPL-MCNC: 9.5 MG/DL (ref 8.6–10.3)
CHLORIDE SERPL-SCNC: 101 MMOL/L (ref 98–107)
CO2 SERPL-SCNC: 27 MMOL/L (ref 21–32)
COLOR UR: ABNORMAL
CREAT SERPL-MCNC: 2.68 MG/DL (ref 0.5–1.3)
EGFRCR SERPLBLD CKD-EPI 2021: 25 ML/MIN/1.73M*2
EOSINOPHIL # BLD AUTO: 0.22 X10*3/UL (ref 0–0.7)
EOSINOPHIL NFR BLD AUTO: 2 %
ERYTHROCYTE [DISTWIDTH] IN BLOOD BY AUTOMATED COUNT: 13.5 % (ref 11.5–14.5)
GLUCOSE BLD MANUAL STRIP-MCNC: 159 MG/DL (ref 74–99)
GLUCOSE SERPL-MCNC: 291 MG/DL (ref 74–99)
GLUCOSE UR STRIP.AUTO-MCNC: NORMAL MG/DL
HCT VFR BLD AUTO: 35.3 % (ref 41–52)
HGB BLD-MCNC: 11.2 G/DL (ref 13.5–17.5)
IMM GRANULOCYTES # BLD AUTO: 0.19 X10*3/UL (ref 0–0.7)
IMM GRANULOCYTES NFR BLD AUTO: 1.7 % (ref 0–0.9)
KETONES UR STRIP.AUTO-MCNC: NEGATIVE MG/DL
LACTATE SERPL-SCNC: 1.3 MMOL/L (ref 0.4–2)
LEUKOCYTE ESTERASE UR QL STRIP.AUTO: ABNORMAL
LYMPHOCYTES # BLD AUTO: 1.36 X10*3/UL (ref 1.2–4.8)
LYMPHOCYTES NFR BLD AUTO: 12.2 %
MCH RBC QN AUTO: 27.1 PG (ref 26–34)
MCHC RBC AUTO-ENTMCNC: 31.7 G/DL (ref 32–36)
MCV RBC AUTO: 85 FL (ref 80–100)
MONOCYTES # BLD AUTO: 0.53 X10*3/UL (ref 0.1–1)
MONOCYTES NFR BLD AUTO: 4.7 %
NEUTROPHILS # BLD AUTO: 8.85 X10*3/UL (ref 1.2–7.7)
NEUTROPHILS NFR BLD AUTO: 79 %
NITRITE UR QL STRIP.AUTO: NEGATIVE
NRBC BLD-RTO: 0 /100 WBCS (ref 0–0)
PH UR STRIP.AUTO: 7 [PH]
PLATELET # BLD AUTO: 337 X10*3/UL (ref 150–450)
POTASSIUM SERPL-SCNC: 4.3 MMOL/L (ref 3.5–5.3)
PROT SERPL-MCNC: 7.2 G/DL (ref 6.4–8.2)
PROT UR STRIP.AUTO-MCNC: ABNORMAL MG/DL
RBC # BLD AUTO: 4.14 X10*6/UL (ref 4.5–5.9)
RBC # UR STRIP.AUTO: ABNORMAL MG/DL
RBC #/AREA URNS AUTO: >20 /HPF
SODIUM SERPL-SCNC: 137 MMOL/L (ref 136–145)
SP GR UR STRIP.AUTO: 1.01
SQUAMOUS #/AREA URNS AUTO: ABNORMAL /HPF
UROBILINOGEN UR STRIP.AUTO-MCNC: NORMAL MG/DL
WBC # BLD AUTO: 11.2 X10*3/UL (ref 4.4–11.3)
WBC #/AREA URNS AUTO: >50 /HPF
WBC CLUMPS #/AREA URNS AUTO: ABNORMAL /HPF
YEAST BUDDING #/AREA UR COMP ASSIST: PRESENT /HPF

## 2025-07-16 PROCEDURE — 81001 URINALYSIS AUTO W/SCOPE: CPT | Performed by: STUDENT IN AN ORGANIZED HEALTH CARE EDUCATION/TRAINING PROGRAM

## 2025-07-16 PROCEDURE — 2500000001 HC RX 250 WO HCPCS SELF ADMINISTERED DRUGS (ALT 637 FOR MEDICARE OP): Performed by: INTERNAL MEDICINE

## 2025-07-16 PROCEDURE — 2500000004 HC RX 250 GENERAL PHARMACY W/ HCPCS (ALT 636 FOR OP/ED): Performed by: STUDENT IN AN ORGANIZED HEALTH CARE EDUCATION/TRAINING PROGRAM

## 2025-07-16 PROCEDURE — 96365 THER/PROPH/DIAG IV INF INIT: CPT

## 2025-07-16 PROCEDURE — 2500000004 HC RX 250 GENERAL PHARMACY W/ HCPCS (ALT 636 FOR OP/ED): Performed by: INTERNAL MEDICINE

## 2025-07-16 PROCEDURE — 36415 COLL VENOUS BLD VENIPUNCTURE: CPT | Performed by: STUDENT IN AN ORGANIZED HEALTH CARE EDUCATION/TRAINING PROGRAM

## 2025-07-16 PROCEDURE — 2060000001 HC INTERMEDIATE ICU ROOM DAILY

## 2025-07-16 PROCEDURE — 94660 CPAP INITIATION&MGMT: CPT

## 2025-07-16 PROCEDURE — 74176 CT ABD & PELVIS W/O CONTRAST: CPT | Performed by: RADIOLOGY

## 2025-07-16 PROCEDURE — 96361 HYDRATE IV INFUSION ADD-ON: CPT

## 2025-07-16 PROCEDURE — 74176 CT ABD & PELVIS W/O CONTRAST: CPT

## 2025-07-16 PROCEDURE — 80053 COMPREHEN METABOLIC PANEL: CPT | Performed by: STUDENT IN AN ORGANIZED HEALTH CARE EDUCATION/TRAINING PROGRAM

## 2025-07-16 PROCEDURE — 2500000001 HC RX 250 WO HCPCS SELF ADMINISTERED DRUGS (ALT 637 FOR MEDICARE OP): Performed by: REGISTERED NURSE

## 2025-07-16 PROCEDURE — 87086 URINE CULTURE/COLONY COUNT: CPT | Mod: TRILAB | Performed by: STUDENT IN AN ORGANIZED HEALTH CARE EDUCATION/TRAINING PROGRAM

## 2025-07-16 PROCEDURE — 99223 1ST HOSP IP/OBS HIGH 75: CPT | Performed by: INTERNAL MEDICINE

## 2025-07-16 PROCEDURE — 85025 COMPLETE CBC W/AUTO DIFF WBC: CPT | Performed by: STUDENT IN AN ORGANIZED HEALTH CARE EDUCATION/TRAINING PROGRAM

## 2025-07-16 PROCEDURE — 83605 ASSAY OF LACTIC ACID: CPT | Performed by: STUDENT IN AN ORGANIZED HEALTH CARE EDUCATION/TRAINING PROGRAM

## 2025-07-16 PROCEDURE — 82947 ASSAY GLUCOSE BLOOD QUANT: CPT

## 2025-07-16 PROCEDURE — 99223 1ST HOSP IP/OBS HIGH 75: CPT | Performed by: UROLOGY

## 2025-07-16 PROCEDURE — 99285 EMERGENCY DEPT VISIT HI MDM: CPT | Mod: 25 | Performed by: STUDENT IN AN ORGANIZED HEALTH CARE EDUCATION/TRAINING PROGRAM

## 2025-07-16 PROCEDURE — 93010 ELECTROCARDIOGRAM REPORT: CPT | Performed by: INTERNAL MEDICINE

## 2025-07-16 PROCEDURE — 93005 ELECTROCARDIOGRAM TRACING: CPT

## 2025-07-16 PROCEDURE — 87040 BLOOD CULTURE FOR BACTERIA: CPT | Mod: TRILAB | Performed by: STUDENT IN AN ORGANIZED HEALTH CARE EDUCATION/TRAINING PROGRAM

## 2025-07-16 PROCEDURE — 96375 TX/PRO/DX INJ NEW DRUG ADDON: CPT

## 2025-07-16 RX ORDER — ACETAMINOPHEN 325 MG/1
650 TABLET ORAL EVERY 6 HOURS PRN
Status: DISCONTINUED | OUTPATIENT
Start: 2025-07-16 | End: 2025-07-17 | Stop reason: HOSPADM

## 2025-07-16 RX ORDER — VANCOMYCIN 1.5 G/300ML
1500 INJECTION, SOLUTION INTRAVENOUS ONCE
Status: DISCONTINUED | OUTPATIENT
Start: 2025-07-16 | End: 2025-07-16

## 2025-07-16 RX ORDER — INSULIN LISPRO 100 [IU]/ML
0-5 INJECTION, SOLUTION INTRAVENOUS; SUBCUTANEOUS
Status: DISCONTINUED | OUTPATIENT
Start: 2025-07-16 | End: 2025-07-17 | Stop reason: HOSPADM

## 2025-07-16 RX ORDER — CETIRIZINE HYDROCHLORIDE 10 MG/1
10 TABLET ORAL DAILY
Status: DISCONTINUED | OUTPATIENT
Start: 2025-07-16 | End: 2025-07-17 | Stop reason: HOSPADM

## 2025-07-16 RX ORDER — TRAZODONE HYDROCHLORIDE 50 MG/1
50 TABLET ORAL NIGHTLY
Status: DISCONTINUED | OUTPATIENT
Start: 2025-07-16 | End: 2025-07-17 | Stop reason: HOSPADM

## 2025-07-16 RX ORDER — DEXTROSE 50 % IN WATER (D50W) INTRAVENOUS SYRINGE
25
Status: DISCONTINUED | OUTPATIENT
Start: 2025-07-16 | End: 2025-07-17 | Stop reason: HOSPADM

## 2025-07-16 RX ORDER — CEFEPIME HYDROCHLORIDE 1 G/50ML
1 INJECTION, SOLUTION INTRAVENOUS ONCE
Status: COMPLETED | OUTPATIENT
Start: 2025-07-16 | End: 2025-07-16

## 2025-07-16 RX ORDER — SODIUM CHLORIDE 9 MG/ML
125 INJECTION, SOLUTION INTRAVENOUS CONTINUOUS
Status: DISCONTINUED | OUTPATIENT
Start: 2025-07-16 | End: 2025-07-17 | Stop reason: HOSPADM

## 2025-07-16 RX ORDER — ATORVASTATIN CALCIUM 40 MG/1
40 TABLET, FILM COATED ORAL DAILY
Status: DISCONTINUED | OUTPATIENT
Start: 2025-07-17 | End: 2025-07-17 | Stop reason: HOSPADM

## 2025-07-16 RX ORDER — PANTOPRAZOLE SODIUM 20 MG/1
20 TABLET, DELAYED RELEASE ORAL DAILY
Status: DISCONTINUED | OUTPATIENT
Start: 2025-07-17 | End: 2025-07-17 | Stop reason: HOSPADM

## 2025-07-16 RX ORDER — NOREPINEPHRINE BITARTRATE/D5W 8 MG/250ML
0-.2 PLASTIC BAG, INJECTION (ML) INTRAVENOUS CONTINUOUS
Status: DISCONTINUED | OUTPATIENT
Start: 2025-07-16 | End: 2025-07-16

## 2025-07-16 RX ORDER — FLUCONAZOLE 2 MG/ML
200 INJECTION, SOLUTION INTRAVENOUS ONCE
Status: COMPLETED | OUTPATIENT
Start: 2025-07-16 | End: 2025-07-16

## 2025-07-16 RX ORDER — HEPARIN SODIUM 5000 [USP'U]/ML
5000 INJECTION, SOLUTION INTRAVENOUS; SUBCUTANEOUS EVERY 12 HOURS
Status: DISCONTINUED | OUTPATIENT
Start: 2025-07-16 | End: 2025-07-17 | Stop reason: HOSPADM

## 2025-07-16 RX ORDER — DEXTROSE 50 % IN WATER (D50W) INTRAVENOUS SYRINGE
12.5
Status: DISCONTINUED | OUTPATIENT
Start: 2025-07-16 | End: 2025-07-17 | Stop reason: HOSPADM

## 2025-07-16 RX ORDER — CEFEPIME HYDROCHLORIDE 1 G/50ML
1 INJECTION, SOLUTION INTRAVENOUS EVERY 12 HOURS
Status: DISCONTINUED | OUTPATIENT
Start: 2025-07-16 | End: 2025-07-16

## 2025-07-16 RX ORDER — SODIUM CHLORIDE 9 MG/ML
125 INJECTION, SOLUTION INTRAVENOUS CONTINUOUS
Status: DISCONTINUED | OUTPATIENT
Start: 2025-07-16 | End: 2025-07-16 | Stop reason: ALTCHOICE

## 2025-07-16 RX ADMIN — SODIUM CHLORIDE 1000 ML: 900 INJECTION, SOLUTION INTRAVENOUS at 15:29

## 2025-07-16 RX ADMIN — ACETAMINOPHEN 650 MG: 325 TABLET ORAL at 20:49

## 2025-07-16 RX ADMIN — CEFEPIME HYDROCHLORIDE 1 G: 1 INJECTION, SOLUTION INTRAVENOUS at 15:26

## 2025-07-16 RX ADMIN — HEPARIN SODIUM 5000 UNITS: 5000 INJECTION, SOLUTION INTRAVENOUS; SUBCUTANEOUS at 20:29

## 2025-07-16 RX ADMIN — CETIRIZINE HYDROCHLORIDE 10 MG: 10 TABLET, FILM COATED ORAL at 18:26

## 2025-07-16 RX ADMIN — FLUCONAZOLE 200 MG: 2 INJECTION, SOLUTION INTRAVENOUS at 15:47

## 2025-07-16 RX ADMIN — SODIUM CHLORIDE 125 ML/HR: 900 INJECTION, SOLUTION INTRAVENOUS at 11:53

## 2025-07-16 RX ADMIN — TRAZODONE HYDROCHLORIDE 50 MG: 50 TABLET ORAL at 20:29

## 2025-07-16 RX ADMIN — SODIUM CHLORIDE 125 ML/HR: 900 INJECTION, SOLUTION INTRAVENOUS at 18:26

## 2025-07-16 SDOH — ECONOMIC STABILITY: FOOD INSECURITY: WITHIN THE PAST 12 MONTHS, YOU WORRIED THAT YOUR FOOD WOULD RUN OUT BEFORE YOU GOT THE MONEY TO BUY MORE.: NEVER TRUE

## 2025-07-16 SDOH — SOCIAL STABILITY: SOCIAL INSECURITY: WITHIN THE LAST YEAR, HAVE YOU BEEN AFRAID OF YOUR PARTNER OR EX-PARTNER?: NO

## 2025-07-16 SDOH — ECONOMIC STABILITY: HOUSING INSECURITY: AT ANY TIME IN THE PAST 12 MONTHS, WERE YOU HOMELESS OR LIVING IN A SHELTER (INCLUDING NOW)?: NO

## 2025-07-16 SDOH — SOCIAL STABILITY: SOCIAL INSECURITY: HAS ANYONE EVER THREATENED TO HURT YOUR FAMILY OR YOUR PETS?: NO

## 2025-07-16 SDOH — SOCIAL STABILITY: SOCIAL INSECURITY: HAVE YOU HAD THOUGHTS OF HARMING ANYONE ELSE?: NO

## 2025-07-16 SDOH — SOCIAL STABILITY: SOCIAL INSECURITY: WITHIN THE LAST YEAR, HAVE YOU BEEN HUMILIATED OR EMOTIONALLY ABUSED IN OTHER WAYS BY YOUR PARTNER OR EX-PARTNER?: NO

## 2025-07-16 SDOH — ECONOMIC STABILITY: TRANSPORTATION INSECURITY: IN THE PAST 12 MONTHS, HAS LACK OF TRANSPORTATION KEPT YOU FROM MEDICAL APPOINTMENTS OR FROM GETTING MEDICATIONS?: NO

## 2025-07-16 SDOH — ECONOMIC STABILITY: FOOD INSECURITY: HOW HARD IS IT FOR YOU TO PAY FOR THE VERY BASICS LIKE FOOD, HOUSING, MEDICAL CARE, AND HEATING?: NOT HARD AT ALL

## 2025-07-16 SDOH — ECONOMIC STABILITY: HOUSING INSECURITY: IN THE PAST 12 MONTHS, HOW MANY TIMES HAVE YOU MOVED WHERE YOU WERE LIVING?: 0

## 2025-07-16 SDOH — HEALTH STABILITY: PHYSICAL HEALTH: ON AVERAGE, HOW MANY DAYS PER WEEK DO YOU ENGAGE IN MODERATE TO STRENUOUS EXERCISE (LIKE A BRISK WALK)?: 0 DAYS

## 2025-07-16 SDOH — SOCIAL STABILITY: SOCIAL INSECURITY: ARE YOU OR HAVE YOU BEEN THREATENED OR ABUSED PHYSICALLY, EMOTIONALLY, OR SEXUALLY BY ANYONE?: NO

## 2025-07-16 SDOH — ECONOMIC STABILITY: INCOME INSECURITY: IN THE PAST 12 MONTHS HAS THE ELECTRIC, GAS, OIL, OR WATER COMPANY THREATENED TO SHUT OFF SERVICES IN YOUR HOME?: NO

## 2025-07-16 SDOH — ECONOMIC STABILITY: FOOD INSECURITY: WITHIN THE PAST 12 MONTHS, THE FOOD YOU BOUGHT JUST DIDN'T LAST AND YOU DIDN'T HAVE MONEY TO GET MORE.: NEVER TRUE

## 2025-07-16 SDOH — ECONOMIC STABILITY: HOUSING INSECURITY: IN THE LAST 12 MONTHS, WAS THERE A TIME WHEN YOU WERE NOT ABLE TO PAY THE MORTGAGE OR RENT ON TIME?: NO

## 2025-07-16 SDOH — SOCIAL STABILITY: SOCIAL INSECURITY: WERE YOU ABLE TO COMPLETE ALL THE BEHAVIORAL HEALTH SCREENINGS?: YES

## 2025-07-16 SDOH — SOCIAL STABILITY: SOCIAL INSECURITY: ARE THERE ANY APPARENT SIGNS OF INJURIES/BEHAVIORS THAT COULD BE RELATED TO ABUSE/NEGLECT?: NO

## 2025-07-16 SDOH — SOCIAL STABILITY: SOCIAL INSECURITY: DOES ANYONE TRY TO KEEP YOU FROM HAVING/CONTACTING OTHER FRIENDS OR DOING THINGS OUTSIDE YOUR HOME?: NO

## 2025-07-16 SDOH — SOCIAL STABILITY: SOCIAL INSECURITY: HAVE YOU HAD ANY THOUGHTS OF HARMING ANYONE ELSE?: NO

## 2025-07-16 SDOH — SOCIAL STABILITY: SOCIAL INSECURITY: ABUSE: ADULT

## 2025-07-16 SDOH — SOCIAL STABILITY: SOCIAL INSECURITY: DO YOU FEEL UNSAFE GOING BACK TO THE PLACE WHERE YOU ARE LIVING?: NO

## 2025-07-16 SDOH — SOCIAL STABILITY: SOCIAL INSECURITY: DO YOU FEEL ANYONE HAS EXPLOITED OR TAKEN ADVANTAGE OF YOU FINANCIALLY OR OF YOUR PERSONAL PROPERTY?: NO

## 2025-07-16 ASSESSMENT — ENCOUNTER SYMPTOMS
FATIGUE: 1
GASTROINTESTINAL NEGATIVE: 1
CARDIOVASCULAR NEGATIVE: 1
FLANK PAIN: 1
EYES NEGATIVE: 1
RESPIRATORY NEGATIVE: 1
WEAKNESS: 1
ARTHRALGIAS: 1
PSYCHIATRIC NEGATIVE: 1

## 2025-07-16 ASSESSMENT — ACTIVITIES OF DAILY LIVING (ADL)
PATIENT'S MEMORY ADEQUATE TO SAFELY COMPLETE DAILY ACTIVITIES?: YES
ADEQUATE_TO_COMPLETE_ADL: YES
LACK_OF_TRANSPORTATION: NO
BATHING: INDEPENDENT
FEEDING YOURSELF: INDEPENDENT
HEARING - RIGHT EAR: FUNCTIONAL
LACK_OF_TRANSPORTATION: NO
WALKS IN HOME: INDEPENDENT
TOILETING: INDEPENDENT
LACK_OF_TRANSPORTATION: NO
JUDGMENT_ADEQUATE_SAFELY_COMPLETE_DAILY_ACTIVITIES: YES
GROOMING: INDEPENDENT
DRESSING YOURSELF: INDEPENDENT
HEARING - LEFT EAR: FUNCTIONAL

## 2025-07-16 ASSESSMENT — COGNITIVE AND FUNCTIONAL STATUS - GENERAL
MOBILITY SCORE: 24
PATIENT BASELINE BEDBOUND: NO
DAILY ACTIVITIY SCORE: 24
DAILY ACTIVITIY SCORE: 24
MOBILITY SCORE: 24

## 2025-07-16 ASSESSMENT — PATIENT HEALTH QUESTIONNAIRE - PHQ9
SUM OF ALL RESPONSES TO PHQ9 QUESTIONS 1 & 2: 0
1. LITTLE INTEREST OR PLEASURE IN DOING THINGS: NOT AT ALL
2. FEELING DOWN, DEPRESSED OR HOPELESS: NOT AT ALL

## 2025-07-16 ASSESSMENT — LIFESTYLE VARIABLES
HOW MANY STANDARD DRINKS CONTAINING ALCOHOL DO YOU HAVE ON A TYPICAL DAY: PATIENT DOES NOT DRINK
SKIP TO QUESTIONS 9-10: 1
AUDIT-C TOTAL SCORE: 0
HOW OFTEN DO YOU HAVE A DRINK CONTAINING ALCOHOL: NEVER
AUDIT-C TOTAL SCORE: 0
HOW OFTEN DO YOU HAVE 6 OR MORE DRINKS ON ONE OCCASION: NEVER

## 2025-07-16 ASSESSMENT — PAIN SCALES - GENERAL
PAINLEVEL_OUTOF10: 2
PAINLEVEL_OUTOF10: 0 - NO PAIN
PAINLEVEL_OUTOF10: 0 - NO PAIN

## 2025-07-16 ASSESSMENT — DUKE ACTIVITY SCORE INDEX (DASI)
CAN YOU DO LIGHT WORK AROUND THE HOUSE LIKE DUSTING OR WASHING DISHES: YES
CAN YOU DO MODERATE WORK AROUND THE HOUSE LIKE VACUUMING, SWEEPING FLOORS OR CARRYING GROCERIES: YES
CAN YOU WALK A BLOCK OR TWO ON LEVEL GROUND: NO
CAN YOU WALK INDOORS, SUCH AS AROUND YOUR HOUSE: YES
CAN YOU RUN A SHORT DISTANCE: NO
CAN YOU PARTICIPATE IN STRENOUS SPORTS LIKE SWIMMING, SINGLES TENNIS, FOOTBALL, BASKETBALL, OR SKIING: NO
CAN YOU HAVE SEXUAL RELATIONS: YES
CAN YOU PARTICIPATE IN MODERATE RECREATIONAL ACTIVITIES LIKE GOLF, BOWLING, DANCING, DOUBLES TENNIS OR THROWING A BASEBALL OR FOOTBALL: NO
CAN YOU CLIMB A FLIGHT OF STAIRS OR WALK UP A HILL: YES
CAN YOU DO YARD WORK LIKE RAKING LEAVES, WEEDING OR PUSHING A MOWER: YES
DASI METS SCORE: 6.9
TOTAL_SCORE: 33.95
CAN YOU DO HEAVY WORK AROUND THE HOUSE LIKE SCRUBBING FLOORS OR LIFTING AND MOVING HEAVY FURNITURE: YES
CAN YOU TAKE CARE OF YOURSELF (EAT, DRESS, BATHE, OR USE TOILET): YES

## 2025-07-16 ASSESSMENT — PAIN - FUNCTIONAL ASSESSMENT
PAIN_FUNCTIONAL_ASSESSMENT: 0-10

## 2025-07-16 ASSESSMENT — PAIN DESCRIPTION - ORIENTATION: ORIENTATION: LOWER

## 2025-07-16 ASSESSMENT — PAIN DESCRIPTION - LOCATION: LOCATION: BACK

## 2025-07-16 NOTE — ED PROCEDURE NOTE
Procedure  Critical Care    Performed by: Fatemeh Eddy DO  Authorized by: Fatemeh Eddy DO    Critical care provider statement:     Critical care time (minutes):  60    Critical care time was exclusive of:  Separately billable procedures and treating other patients    Critical care was necessary to treat or prevent imminent or life-threatening deterioration of the following conditions:  Metabolic crisis    Critical care was time spent personally by me on the following activities:  Ordering and performing treatments and interventions, ordering and review of laboratory studies, ordering and review of radiographic studies, pulse oximetry, re-evaluation of patient's condition and evaluation of patient's response to treatment    Care discussed with: admitting provider                 Fatemeh Eddy DO  07/16/25 0655

## 2025-07-16 NOTE — PREPROCEDURE INSTRUCTIONS
Why must I stop eating and drinking near surgery time?  With sedation, food or liquid in your stomach can enter your lungs causing serious complications  Increases nausea and vomiting    When do I need to stop eating and drinking before my surgery?   Do not eat or drink after midnight the night before your surgery/procedure.  You may have small sips of water to take your medication.    PAT DISCHARGE INSTRUCTIONS    The Same Day Surgery (SDS) Department of the hospital where your procedure will be performed will contact you after 2:00 PM the day before your surgery. If you are scheduled on a Monday, or a Tuesday following a Monday holiday, they will call on the last business day prior to your surgery.  Please check your voicemail for any missed messages.      [facility]  [id number] [place] [address]  [phone]    Please let your surgeon know if:      You develop any open sores, shingles, burning or painful urination as these may increase your risk of an infection.   You no longer wish to have the surgery.   Any other personal circumstances change that may lead to the need to cancel or defer this surgery-such as being sick or getting admitted to any hospital within one week of your planned procedure.    Your contact details change, such as a change of address or phone number.    Starting now:     Please DO NOT drink alcohol or smoke for 24 hours before surgery. It is well known that quitting smoking can make a huge difference to your health and recovery from surgery. The longer you abstain from smoking, the better your chances of a healthy recovery. If you need help with quitting, call 6-800QUIT-NOW to be connected to a trained counselor who will discuss the best methods to help you quit.     Before your surgery:    Please stop all supplements 7 days prior to surgery. Or as directed by your surgeon.   Please stop taking NSAID pain medicine such as Advil and  Motrin 7 days before surgery.    If you develop any fever, cough, cold, rashes, cuts, scratches, scrapes, urinary symptoms or infection anywhere on your body (including teeth and gums) prior to surgery, please call your surgeon’s office as soon as possible. This may require treatment to reduce the chance of cancellation on the day of surgery.    The day before your surgery:   DIET- Please follow the diet instructions at the top of your packet.   Get a good night’s rest.  Use the special soap for bathing if you have been instructed to use one.    Scheduled surgery times may change and you will be notified if this occurs - please check your personal voicemail for any updates.     On the morning of surgery:   Wear comfortable, loose fitting clothes which open in the front. Please do not wear moisturizers, creams, lotions, makeup or perfume.    Please bring with you to surgery:   Photo ID and insurance card   Current list of medicines and allergies   Pacemaker/ Defibrillator/Heart stent cards   CPAP machine and mask    Slings/ splints/ crutches   A copy of your complete advanced directive/DHPOA.    Please do NOT bring with you to surgery:   All jewelry and valuables should be left at home.   Prosthetic devices such as contact lenses, hearing aids, dentures, eyelash extensions, hairpins and body piercings must be removed prior to going in to the surgical suite.    After outpatient surgery:   A responsible adult MUST accompany you at the time of discharge and stay with you for 24 hours after your surgery. You may NOT drive yourself home after surgery.    Do not drive, operate machinery, make critical decisions or do activities that require co-ordination or balance until after a night’s sleep.   Do not drink alcoholic beverages for 24 hours.   Instructions for resuming your medications will be provided by your surgeon.    CALL YOUR DOCTOR AFTER SURGERY IF YOU HAVE:     Chills and/or a fever of 101° F or higher.    Redness,  "swelling, pus or drainage from your surgical wound or a bad smell from the wound.    Lightheadedness, fainting or confusion.    Persistent vomiting (throwing up) and are not able to eat or drink for 12 hours.    Three or more loose, watery bowel movements in 24 hours (diarrhea).   Difficulty or pain while urinating( after non-urological surgery)    Pain and swelling in your legs, especially if it is only on one side.    Difficulty breathing or are breathing faster than normal.    Any new concerning symptoms.           Medication List            Accurate as of July 16, 2025 10:41 AM. Always use your most recent med list.                atorvastatin 40 mg tablet  Commonly known as: Lipitor  TAKE ONE TABLET BY MOUTH DAILY  Medication Adjustments for Surgery: Take/Use as prescribed     Basaglar KwikPen U-100 Insulin 100 unit/mL (3 mL) pen  Generic drug: insulin glargine  Use up to 30 units daily  Notes to patient: TAKE 75% OF USUAL DOSE     BD Insulin Syringe 1 mL 26 x 1/2\" syringe  Generic drug: insulin syringe-needle U-100     levocetirizine 5 mg tablet  Commonly known as: Xyzal  Medication Adjustments for Surgery: Take/Use as prescribed     montelukast 10 mg tablet  Commonly known as: Singulair  Medication Adjustments for Surgery: Take/Use as prescribed     pantoprazole 40 mg EC tablet  Commonly known as: ProtoNix  TAKE ONE TABLET BY MOUTH EVERY DAY  Medication Adjustments for Surgery: Take/Use as prescribed     pen needle, diabetic 32 gauge x 5/32\" needle  Commonly known as: BD Luann 2nd Gen Pen Needle  use DAILY     traZODone 50 mg tablet  Commonly known as: Desyrel  Take 1-2 tablets ( mg) by mouth once daily.  Medication Adjustments for Surgery: Take/Use as prescribed                                                                        "

## 2025-07-16 NOTE — NURSING NOTE
Patient seen in PAT today. Pt tachycardic and hypotensive. EKG done. Recently released from hospital. Evaluated by provider and decision made to send to ER for evaluation. Report called by provider. Taken over by wheelchair.

## 2025-07-16 NOTE — CPM/PAT H&P
[facility]/PAT Evaluation       Name: Rodrigue Palacios (Rodrigue Palacios)  /Age: 1955/69 y.o.     In-Person       Chief Complaint: Left renal stone     HPI  A 69-year-old male with complex medical history recently discharged after being hospitalized 2025 to 2025 for hypoglycemia and UTI/kidney stone significant HARMAN with initial sCr 7.7 and found to have obstructive uropathy status post left nephrostomy tube placement. Past medical history significant for HTN, HLD, SCOOTER, GERD, T2DM, CKD 3B, RA, bladder cancer s/p R nephrectomy and radical cystectomy and ileal conduit (25).  Patient has left nephrostomy tube in place.  Endorses fatigue and left flank pain.  Denies fever, chills, dysuria, or hematuria.  He is scheduled for left lithotripsy.    *Patient arrived to [facility] pale appearance with left flank and pain feeling fatigued.  Patient with hypotension BP 82/68 and tachycardia heart rate 116.  Patient advised to be evaluated in the ED.  Patient escorted to ED via wheelchair.      Medical History[1]    Surgical History[2]      Allergies[3]    Current Medications[4]    Review of Systems   Constitutional:  Positive for fatigue.   HENT: Negative.     Eyes: Negative.    Respiratory: Negative.          Some ESPOSITO   Cardiovascular: Negative.    Gastrointestinal: Negative.    Genitourinary:  Positive for flank pain (Left-sided).   Musculoskeletal:  Positive for arthralgias.   Skin:  Positive for pallor.   Neurological:  Positive for weakness.   Psychiatric/Behavioral: Negative.          Physical Exam  Vitals (Hypotensive BP 86/68) reviewed.   Constitutional:       Appearance: He is ill-appearing.   HENT:      Head: Normocephalic and atraumatic.      Mouth/Throat:      Mouth: Mucous membranes are moist.     Eyes:      Pupils: Pupils are equal, round, and reactive to light.       Cardiovascular:      Rate and Rhythm: Regular rhythm. Tachycardia present.   Pulmonary:      Effort: Pulmonary effort is normal.      Breath  "sounds: Normal breath sounds.   Abdominal:      Palpations: Abdomen is soft.   Genitourinary:     Comments: Ileal conduit present  Left nephrostomy tube present    Musculoskeletal:         General: Normal range of motion.      Cervical back: Normal range of motion.      Comments: Fatigue, weakness     Skin:     General: Skin is warm and dry.      Coloration: Skin is pale.     Neurological:      Mental Status: He is alert and oriented to person, place, and time.     Psychiatric:         Mood and Affect: Mood normal.          PAT AIRWAY:   Airway:     Mallampati::  II    Neck ROM::  Full  normal        BP 86/68 Comment: Right arm, different machine. Provider with patient to evaluate  Pulse (!) 115   Temp 36 °C (96.8 °F) (Temporal)   Resp 16   Ht 1.905 m (6' 3\")   Wt 80.7 kg (178 lb)   SpO2 96%   BMI 22.25 kg/m²         Stop Bang Score 6     Flowsheet Row Most Recent Value   Do you snore loudly? 1   Do you often feel tired or fatigued after your sleep? 1   Has anyone ever observed you stop breathing in your sleep? 0   Do you have or are you being treated for high blood pressure? 1   Recent BMI (Calculated) 25.2   Is BMI greater than 35 kg/m2? 0=No   Age older than 50 years old? 1=Yes   Is your neck circumference greater than 17 inches (Male) or 16 inches (Female)? 0        CHADS: 4.0%  DASI risk score: 33.95  METS: 6.9  YUAN: 0.43  RCRI: 6.6%  ASA: III  ARISCAT:1.6% score 3  Labs done 7/13/2020 by CBC, CMP  PAT orders EKG-sinus tachycardia heart rate 108 with frequent and consecutive premature ventricular complexes, left ventricular hypertrophy with repolarization abnormality (preliminary)      Assessment and Plan:     Left renal stone Plan: Left lithotripsy  History of bladder cancer status post right nephrectomy and radical cystectomy and ileal conduit (5/21/25).  Recent hospitalization for hypoglycemia UTI/sepsis status post left nephrostomy tube placement  History of hypertension no meds " currently  Hyperlipidemia takes atorvastatin  GERD managed with pantoprazole  History of heart murmur-JOSELYN done 5/20/2025 LVEF 55 to 60%  BMI 22.25             [1]   Past Medical History:  Diagnosis Date    Allergic     Arthritis     rhuematoid    Asthma     Bladder cancer (Multi)     CKD (chronic kidney disease)     Dorsalgia, unspecified 07/28/2020    Back pain without radiation    GERD (gastroesophageal reflux disease)     controlled    Hearing aid worn     Heart murmur     HL (hearing loss)     Hyperlipidemia     Hypertension     Local infection of the skin and subcutaneous tissue, unspecified 10/11/2013    Nonvenomous insect bite with infection    Nephrolithiasis     Other muscle spasm 02/14/2018    Muscle spasms of neck    Other specified diseases of anus and rectum 03/22/2013    Anal pain    Personal history of other diseases of the digestive system 09/18/2013    History of gastroenteritis    Personal history of other diseases of the respiratory system 04/27/2020    History of acute bronchitis    Personal history of other diseases of the respiratory system 06/24/2019    History of upper respiratory infection    Personal history of other infectious and parasitic diseases     History of candidiasis of mouth    Personal history of other specified conditions 03/22/2013    History of abnormal weight loss    Personal history of other specified conditions 02/24/2014    History of numbness    Personal history of other specified conditions 07/14/2017    History of abdominal pain    Pneumonia, unspecified organism 01/25/2016    Pneumonia involving right lung    Rash and other nonspecific skin eruption 04/29/2013    Rash    Sleep apnea     CPAP    Type 2 diabetes mellitus     Urinary tract infection     Visual impairment    [2]   Past Surgical History:  Procedure Laterality Date    BLADDER SURGERY  2025    Removal malignant tumor    CHOLECYSTECTOMY  07/07/2016    Cholecystectomy    HERNIA REPAIR  07/07/2016    Inguinal  "Hernia Repair    SHOULDER SURGERY Right    [3]   Allergies  Allergen Reactions    Orencia [Abatacept] Hives   [4]   Current Outpatient Medications   Medication Sig Dispense Refill    atorvastatin (Lipitor) 40 mg tablet TAKE ONE TABLET BY MOUTH DAILY 90 tablet 3    Basaglar KwikPen U-100 Insulin 100 unit/mL (3 mL) pen Use up to 30 units daily (Patient taking differently: Inject 20 Units under the skin once daily at bedtime.) 30 mL 2    insulin syringe-needle U-100 (BD Insulin Syringe) 1 mL 26 x 1/2\" syringe       levocetirizine (Xyzal) 5 mg tablet Take 1 tablet (5 mg) by mouth once daily in the evening.      montelukast (Singulair) 10 mg tablet Take 1 tablet (10 mg) by mouth once daily.      pantoprazole (ProtoNix) 40 mg EC tablet TAKE ONE TABLET BY MOUTH EVERY DAY 90 tablet 3    pen needle, diabetic (BD Luann 2nd Gen Pen Needle) 32 gauge x 5/32\" needle use DAILY 100 each 1    traZODone (Desyrel) 50 mg tablet Take 1-2 tablets ( mg) by mouth once daily. (Patient taking differently: Take 1-2 tablets ( mg) by mouth once daily at bedtime.) 180 tablet 3     No current facility-administered medications for this visit.     "

## 2025-07-16 NOTE — CONSULTS
Reason For Consult  Nephrostomy    History Of Present Illness  Rodrigue Palacios is a 69 y.o. male presenting with hypotension and tachycardia.  He was seen in preadmission testing today for evaluation prior to left ESWL.  He has an ileal conduit following cystectomy and he also has a history of a right nephrectomy.  He had an obstructing stone in the proximal left ureter and subsequently had a left nephrostomy placed..  CT scan was performed this admission.     Past Medical History  He has a past medical history of Allergic, Arthritis, Asthma, Bladder cancer (Multi), CKD (chronic kidney disease), Dorsalgia, unspecified (07/28/2020), GERD (gastroesophageal reflux disease), Hearing aid worn, Heart murmur, HL (hearing loss), Hyperlipidemia, Hypertension, Local infection of the skin and subcutaneous tissue, unspecified (10/11/2013), Nephrolithiasis, Other muscle spasm (02/14/2018), Other specified diseases of anus and rectum (03/22/2013), Personal history of other diseases of the digestive system (09/18/2013), Personal history of other diseases of the respiratory system (04/27/2020), Personal history of other diseases of the respiratory system (06/24/2019), Personal history of other infectious and parasitic diseases, Personal history of other specified conditions (03/22/2013), Personal history of other specified conditions (02/24/2014), Personal history of other specified conditions (07/14/2017), Pneumonia, unspecified organism (01/25/2016), Rash and other nonspecific skin eruption (04/29/2013), Sleep apnea, Type 2 diabetes mellitus, Urinary tract infection, and Visual impairment.    Surgical History  He has a past surgical history that includes Hernia repair (07/07/2016); Cholecystectomy (07/07/2016); Shoulder surgery (Right); and Bladder surgery (2025).     Social History  He reports that he has never smoked. He has never been exposed to tobacco smoke. He has never used smokeless tobacco. He reports that he does not  "currently use alcohol. He reports that he does not use drugs.    Family History  Family History[1]     Allergies  Orencia [abatacept]    Review of Systems  He denies fever or chills     Physical Exam  Alert, oriented  Normal respiratory effort  Abdomen soft nontender nondistended  Left nephrostomy tube with man urine  Little urine output in his ileal conduit bag     Last Recorded Vitals  Blood pressure (!) 128/97, pulse 76, temperature 36.1 °C (97 °F), temperature source Temporal, resp. rate 18, height 1.905 m (6' 3\"), weight 80.7 kg (178 lb), SpO2 99%.    Relevant Results      CT scan viewed, left nephrostomy in position, stone in left kidney  Lactate 1.3  Urinalysis with greater than 50 WBCs, greater than 20 RBCs, 3+ bacteria  Urine culture pending  Creatinine 2.68, decreased from 7.1 on 7/10/2025  CBC 11.2     Assessment/Plan   69-year-old presents with tachycardia and hypertension, underwent recent nephrostomy placement for an obstructing left proximal ureteral stone.  He has a history of bladder cancer treated with radical cystectomy, and a right nephrectomy.    He has chronic kidney disease and HARMAN.  His creatinine has improved to 2.6 today.    He has a possible UTI.  Urine culture is currently pending.  On cefepime and  Fluconazole.  Monitor vital signs.    His CT was reviewed in the nephrostomy tube appears to be in good position.  He is scheduled for shockwave lithotripsy on 7/30/2025.  The nephrostomy will remain in place until after the procedure.      Sachin Eden MD         [1]   Family History  Problem Relation Name Age of Onset    Ovarian cancer Mother Yin Palacios     Cancer Mother Yin Palacios     Other (asbestosis) Father Katdayanara Suzanne     Cancer Father Katdayanara Suzanne     Diabetes Paternal Grandfather Katdayanara Suzanne      "

## 2025-07-16 NOTE — ED PROVIDER NOTES
Chief Complaint: Hypoglycemia   HPI: This is a 69-year-old male, brought to the emergency department by EMS who were called by the patient's son after the son found him to be confused and hypoglycemic.  Patient is managing his own diabetes medication, and there is concern that he may have taken too much of his medications.  Patient is confused, however does not have any specific complaints at this time.    Medical History[1]   Surgical History[2]    Physical Exam  Vitals and nursing note reviewed.   Constitutional:       Appearance: Normal appearance.   HENT:      Head: Normocephalic and atraumatic.      Mouth/Throat:      Mouth: Mucous membranes are moist.     Eyes:      Extraocular Movements: Extraocular movements intact.       Cardiovascular:      Rate and Rhythm: Normal rate and regular rhythm.   Pulmonary:      Effort: Pulmonary effort is normal.   Abdominal:      General: Abdomen is flat.      Palpations: Abdomen is soft.     Skin:     General: Skin is warm.     Neurological:      Mental Status: He is alert. He is disoriented.     Psychiatric:         Mood and Affect: Mood normal.            ED Course/[facility]  Diagnoses as of 07/16/25 0604   Hypoglycemia       This is a 69 y.o. male presenting to the ED for evaluation of confusion and hypoglycemia per son.  Patient is a known diabetic, and manages his own diabetes medications, and there is some concern that he may be taking too much of his diabetes medication.  On physical exam, patient is confused but pleasant, in no acute distress, nontoxic-appearing.  Patient's blood sugar is persistently low in the emergency department, he required multiple amps of D50, and was placed on D10 half-normal saline, and continued to have hypoglycemia.  Patient was found to have a urinary tract infection, which may be contributing to the hypoglycemia.  He was started on IV antibiotics.  Patient is requiring two 1 to 2-hour glucose checks for his hypoglycemia, as such I do feel that  he will benefit from ICU admission.  There are no current ICU beds at [facility], as such he was transferred to [facility] for further care.    Critical care time: 60    Final Impression  1. Hypoglycemia   2. UTI  Disposition/Plan: transfer   Condition at disposition: Stable.     Fatemeh Eddy DO  Emergency Medicine Physician       [1]   Past Medical History:  Diagnosis Date    Arthritis     rhuematoid    Bladder cancer (Multi)     CKD (chronic kidney disease)     Dorsalgia, unspecified 07/28/2020    Back pain without radiation    GERD (gastroesophageal reflux disease)     controlled    Hearing aid worn     HL (hearing loss)     Hyperlipidemia     Hypertension     Local infection of the skin and subcutaneous tissue, unspecified 10/11/2013    Nonvenomous insect bite with infection    Nephrolithiasis     Other muscle spasm 02/14/2018    Muscle spasms of neck    Other specified diseases of anus and rectum 03/22/2013    Anal pain    Personal history of other diseases of the digestive system 09/18/2013    History of gastroenteritis    Personal history of other diseases of the respiratory system 04/27/2020    History of acute bronchitis    Personal history of other diseases of the respiratory system 06/24/2019    History of upper respiratory infection    Personal history of other infectious and parasitic diseases     History of candidiasis of mouth    Personal history of other specified conditions 03/22/2013    History of abnormal weight loss    Personal history of other specified conditions 02/24/2014    History of numbness    Personal history of other specified conditions 07/14/2017    History of abdominal pain    Pneumonia, unspecified organism 01/25/2016    Pneumonia involving right lung    Rash and other nonspecific skin eruption 04/29/2013    Rash    Sleep apnea     CPAP    Type 2 diabetes mellitus    [2]   Past Surgical History:  Procedure Laterality Date    BLADDER SURGERY  2025    Removal malignant tumor     CHOLECYSTECTOMY  07/07/2016    Cholecystectomy    HERNIA REPAIR  07/07/2016    Inguinal Hernia Repair    SHOULDER SURGERY Right         Fatemeh Eddy,   07/16/25 0644

## 2025-07-16 NOTE — H&P
History Of Present Illness  Rodrigue Palacios is a 69 y.o. male presenting with hypotension and tachycardia from perioperative medicine.  Patient was getting testing done prior to lithotripsy and was noted to be tachycardic and hypotensive.  Patient was transferred immediately to our emergency department.  He was hypotensive and tachycardic in the ED.  He received 2 units of normal saline by the time he reached the floor and blood pressure stabilized.  In asking the patient any symptoms prior to the vitals being taken he says he is just felt weak.  Patient was recently discharged from hospital few days ago after presenting there with obstructing ureteral stone that required stenting.  Patient has a history of bladder cancer status post cystectomy, nephrectomy and has an ureteroileal conduit.  Patient was also hypoglycemic during his last hospitalization.  So patient was not sure if he is just feels weak from the hospitalization or the fact that he really has been eating that much and also states that he has not been drinking probably enough fluids.  In any case patient was admitted to the hospital for further evaluation of tachycardia and hypotension.     Past Medical History  He has a past medical history of Allergic, Arthritis, Asthma, Bladder cancer (Multi), CKD (chronic kidney disease), Dorsalgia, unspecified (07/28/2020), GERD (gastroesophageal reflux disease), Hearing aid worn, Heart murmur, HL (hearing loss), Hyperlipidemia, Hypertension, Local infection of the skin and subcutaneous tissue, unspecified (10/11/2013), Nephrolithiasis, Other muscle spasm (02/14/2018), Other specified diseases of anus and rectum (03/22/2013), Personal history of other diseases of the digestive system (09/18/2013), Personal history of other diseases of the respiratory system (04/27/2020), Personal history of other diseases of the respiratory system (06/24/2019), Personal history of other infectious and parasitic diseases, Personal  history of other specified conditions (03/22/2013), Personal history of other specified conditions (02/24/2014), Personal history of other specified conditions (07/14/2017), Pneumonia, unspecified organism (01/25/2016), Rash and other nonspecific skin eruption (04/29/2013), Sleep apnea, Type 2 diabetes mellitus, Urinary tract infection, and Visual impairment.    Surgical History  He has a past surgical history that includes Hernia repair (07/07/2016); Cholecystectomy (07/07/2016); Shoulder surgery (Right); and Bladder surgery (2025).     Social History  He reports that he has never smoked. He has never been exposed to tobacco smoke. He has never used smokeless tobacco. He reports current alcohol use. He reports that he does not use drugs.    Family History  Family History[1]     Allergies  Orencia [abatacept]    Review of Systems  As per HPI otherwise denies chest pain, fever, chills, nausea, vomiting, diarrhea  Physical Exam  Generally no acute distress alert and oriented  HEENT PERRL EOMI  Cardiovascular S1-S2 regular rate rhythm  Lungs clear to auscultation bilaterally  Abdomen nontender bowel sounds present, ileal conduit in place, urostomy bag draining pinkish urine  Extremities no clubbing cyanosis or edema    Last Recorded Vitals  BP (!) 128/97 (BP Location: Left arm, Patient Position: Lying)   Pulse 76   Temp 36.1 °C (97 °F) (Temporal)   Resp 18   Wt 80.7 kg (178 lb)   SpO2 99%     Relevant Results  Scheduled medications  Scheduled Medications[2]  Continuous medications  Continuous Medications[3]  PRN medications  PRN Medications[4]  Results for orders placed or performed during the hospital encounter of 07/16/25 (from the past 24 hours)   CBC and Auto Differential   Result Value Ref Range    WBC 11.2 4.4 - 11.3 x10*3/uL    nRBC 0.0 0.0 - 0.0 /100 WBCs    RBC 4.14 (L) 4.50 - 5.90 x10*6/uL    Hemoglobin 11.2 (L) 13.5 - 17.5 g/dL    Hematocrit 35.3 (L) 41.0 - 52.0 %    MCV 85 80 - 100 fL    MCH 27.1 26.0  - 34.0 pg    MCHC 31.7 (L) 32.0 - 36.0 g/dL    RDW 13.5 11.5 - 14.5 %    Platelets 337 150 - 450 x10*3/uL    Neutrophils % 79.0 40.0 - 80.0 %    Immature Granulocytes %, Automated 1.7 (H) 0.0 - 0.9 %    Lymphocytes % 12.2 13.0 - 44.0 %    Monocytes % 4.7 2.0 - 10.0 %    Eosinophils % 2.0 0.0 - 6.0 %    Basophils % 0.4 0.0 - 2.0 %    Neutrophils Absolute 8.85 (H) 1.20 - 7.70 x10*3/uL    Immature Granulocytes Absolute, Automated 0.19 0.00 - 0.70 x10*3/uL    Lymphocytes Absolute 1.36 1.20 - 4.80 x10*3/uL    Monocytes Absolute 0.53 0.10 - 1.00 x10*3/uL    Eosinophils Absolute 0.22 0.00 - 0.70 x10*3/uL    Basophils Absolute 0.04 0.00 - 0.10 x10*3/uL   Comprehensive metabolic panel   Result Value Ref Range    Glucose 291 (H) 74 - 99 mg/dL    Sodium 137 136 - 145 mmol/L    Potassium 4.3 3.5 - 5.3 mmol/L    Chloride 101 98 - 107 mmol/L    Bicarbonate 27 21 - 32 mmol/L    Anion Gap 13 10 - 20 mmol/L    Urea Nitrogen 53 (H) 6 - 23 mg/dL    Creatinine 2.68 (H) 0.50 - 1.30 mg/dL    eGFR 25 (L) >60 mL/min/1.73m*2    Calcium 9.5 8.6 - 10.3 mg/dL    Albumin 3.5 3.4 - 5.0 g/dL    Alkaline Phosphatase 88 33 - 136 U/L    Total Protein 7.2 6.4 - 8.2 g/dL    AST 14 9 - 39 U/L    Bilirubin, Total 0.5 0.0 - 1.2 mg/dL    ALT 26 10 - 52 U/L   Lactate   Result Value Ref Range    Lactate 1.3 0.4 - 2.0 mmol/L   Urinalysis with Reflex Culture and Microscopic   Result Value Ref Range    Color, Urine Light-Orange (N) Light-Yellow, Yellow, Dark-Yellow    Appearance, Urine Ex.Turbid (N) Clear    Specific Gravity, Urine 1.013 1.005 - 1.035    pH, Urine 7.0 5.0, 5.5, 6.0, 6.5, 7.0, 7.5, 8.0    Protein, Urine 100 (2+) (A) NEGATIVE, 10 (TRACE), 20 (TRACE) mg/dL    Glucose, Urine Normal Normal mg/dL    Blood, Urine OVER (3+) (A) NEGATIVE mg/dL    Ketones, Urine NEGATIVE NEGATIVE mg/dL    Bilirubin, Urine NEGATIVE NEGATIVE mg/dL    Urobilinogen, Urine Normal Normal mg/dL    Nitrite, Urine NEGATIVE NEGATIVE    Leukocyte Esterase, Urine 500 Tino/uL (A)  NEGATIVE   Microscopic Only, Urine   Result Value Ref Range    WBC, Urine >50 (A) 1-5, NONE /HPF    WBC Clumps, Urine MODERATE Reference range not established. /HPF    RBC, Urine >20 (A) NONE, 1-2, 3-5 /HPF    Squamous Epithelial Cells, Urine 1-9 (SPARSE) Reference range not established. /HPF    Bacteria, Urine 3+ (A) NONE SEEN /HPF    Budding Yeast, Urine PRESENT (A) NONE /HPF   All labs including CBC, BMP, urinalysis and review of the CT abdomen pelvis independently reviewed.    Impression: 69-year-old gentleman with history of bladder cancer presents with hypotension and concerns of sepsis.    Plan:    1.  Tachycardia and hypotension  - Unclear if this is related to infection such as a urinary tract infection versus relative hypotension secondary to volume depletion.  Patient's lactate was normal.  Patient has a normal white count.  Patient had temporary tachycardia and hypotension but improved with fluids.  Patient with no fever.  - Continue with IV fluids at 125 cc an hour  - Will continue with cefepime for now, a dose of vancomycin was given in ED as well as Diflucan.  I consulted ID to help with determining if antibiotics will be continued given the complexity of this ureteroileal conduit.  CT scan of the abdomen showed improving perinephric stranding from previous scan a week ago when he was in hospital from nephrostomy tube.    2.  Recent nephrostomy tube  - Consulted urology to see if there is anything from their standpoint that needs to be adjusted or lithotripsy during hospital stay.    3.  History of bladder cancer  - Will continue to follow with Cait[facility]    4.  Recent acute kidney injury  - Improving from last visit that was secondary to obstructive uropathy.  Continue to monitor.  Consider nephrology consult.    5.  Type 2 diabetes  - Patient with episodes of hypoglycemia was discharged home on long-acting insulin, will continue with sliding scale while here monitor and add in long-acting once have a  better idea of what his blood sugars will do with the knowledge that the acting should not drop his sugars.  - QA [facility] Accu-Cheks    Full code  DVT prophylaxis with heparin     Assessment/Plan            Arturo العلي MD         [1]   Family History  Problem Relation Name Age of Onset    Ovarian cancer Mother Yin Palacios     Cancer Mother Yin Palacios     Other (asbestosis) Father Deniz Palacios     Cancer Father Deniz Palacios     Diabetes Paternal Grandfather Deniz Palacios    [2] [START ON 7/17/2025] atorvastatin, 40 mg, oral, Daily  cefepime, 1 g, intravenous, q12h  cetirizine, 10 mg, oral, Daily  insulin lispro, 0-5 Units, subcutaneous, TID AC  pantoprazole, 20 mg, oral, Daily  traZODone, 50 mg, oral, Nightly  vancomycin, 1,500 mg, intravenous, Once  [3]    [4] PRN medications: dextrose, dextrose, glucagon, glucagon

## 2025-07-16 NOTE — PROGRESS NOTES
"Pharmacy Medication History Review    Rodrigue Palacios is a 69 y.o. male admitted for general weakness.  Pharmacy reviewed the patient's [id number] medications and allergies for accuracy.    Medications ADDED:  N/A  Medications CHANGED:  Trazodone 50 mg 1-2 tablet nightly ---> 1 tablet nightly  Medications REMOVED:   BD syringes     The list below reflects the updated PTA list.   Prior to Admission Medications   Prescriptions Last Dose Informant Patient Reported? Taking?   Basaglar KwikPen U-100 Insulin 100 unit/mL (3 mL) pen Past Week Self No Yes   Sig: Use up to 30 units daily   Patient taking differently: Inject 20 Units under the skin once daily at bedtime.   atorvastatin (Lipitor) 40 mg tablet 7/16/2025 Morning Self No Yes   Sig: TAKE ONE TABLET BY MOUTH DAILY   levocetirizine (Xyzal) 5 mg tablet 7/15/2025 Evening Self Yes Yes   Sig: Take 1 tablet (5 mg) by mouth once daily in the evening.   montelukast (Singulair) 10 mg tablet 7/16/2025 Morning Self Yes Yes   Sig: Take 1 tablet (10 mg) by mouth once daily.   pantoprazole (ProtoNix) 40 mg EC tablet 7/15/2025 Morning Self No Yes   Sig: TAKE ONE TABLET BY MOUTH EVERY DAY   Patient taking differently: Take 1 tablet (40 mg) by mouth once daily.   pen needle, diabetic (BD Luann 2nd Gen Pen Needle) 32 gauge x 5/32\" needle Past Week  No Yes   Sig: use DAILY   traZODone (Desyrel) 50 mg tablet 7/15/2025 Evening  No Yes   Sig: Take 1-2 tablets ( mg) by mouth once daily.   Patient taking differently: Take 1 tablet (50 mg) by mouth once daily at bedtime.      Facility-Administered Medications: None        The list below reflects the updated allergy list. Please review each documented allergy for additional clarification and justification.  Allergies  Reviewed by Rea Zambrano on 7/16/2025        Severity Reactions Comments    Orencia [abatacept] Low Hives             Patient accepts M2B at discharge.     Sources:   Pharmacy dispense history  Patient " Interview Moderate historian  Chart Review     Additional Comments:  N/A      Rea Zambrano  Pharmacy Technician  07/16/25

## 2025-07-16 NOTE — TELEPHONE ENCOUNTER
Called patient in regards to message left on our voice mail --he would like to use brandyn 3 plus so I told him to reach out to his durable med company [employer] and they might be able to send us paperwork otherwise call office back and he also mentioned he has stopped glimepiride 4mg per 2 hospital visits due to low blood glucose

## 2025-07-16 NOTE — ED PROVIDER NOTES
Emergency Department Provider Note              [address]  Emergency Department        Pt Name: Rodrigue Palacios  MRN: [id number]  Birthdate 1955  Date of evaluation: [unfilled]    CHIEF COMPLAINT       Chief Complaint   Patient presents with    Weakness, Gen     I have been weak after my operation since may today I was getting checked out because they were going to remove a kidney stone and my heart was beating fast and my b/p was low        OF PRESENT ILLNESS  (Location/Symptom, Timing/Onset, Context/Setting, Quality, Duration, ModifyingFactors, Severity.)      Rodrigue Palacios is a 69 y.o. male who presents with abnormal vital signs at a preoperative appointment today.  Patient has significant history recently in the setting of having a bladder cancer, cystectomy, nephrectomy ileal conduit patient had an obstructing left UPJ stone that caused him to go into renal failure that required interventional radiology to place a posterior located nephrostomy tube patient does have an appointment on July 30 to have lithotripsy performed under the care of of urology Dr. Eden.  The patient was admitted to [facility] for hypoglycemia secondary to sulfonylurea use while he was admitted he did see endocrinology that subsequently has resolved.  The visit today was for preoperative clearance and he was found to be hypotensive and tachycardic he states that he feels overall decent he does not feel too bad today a little bit tired and weak some pain in the back he does have a nephrostomy tube in place    PAST MEDICAL / SURGICAL / SOCIAL / FAMILY HISTORY      has a past medical history of Allergic, Arthritis, Asthma, Bladder cancer (Multi), CKD (chronic kidney disease), Dorsalgia, unspecified (07/28/2020), GERD (gastroesophageal reflux disease), Hearing aid worn, Heart murmur, HL (hearing loss), Hyperlipidemia, Hypertension, Local infection of the skin and subcutaneous tissue, unspecified  (10/11/2013), Nephrolithiasis, Other muscle spasm (02/14/2018), Other specified diseases of anus and rectum (03/22/2013), Personal history of other diseases of the digestive system (09/18/2013), Personal history of other diseases of the respiratory system (04/27/2020), Personal history of other diseases of the respiratory system (06/24/2019), Personal history of other infectious and parasitic diseases, Personal history of other specified conditions (03/22/2013), Personal history of other specified conditions (02/24/2014), Personal history of other specified conditions (07/14/2017), Pneumonia, unspecified organism (01/25/2016), Rash and other nonspecific skin eruption (04/29/2013), Sleep apnea, Type 2 diabetes mellitus, Urinary tract infection, and Visual impairment.     has a past surgical history that includes Hernia repair (07/07/2016); Cholecystectomy (07/07/2016); Shoulder surgery (Right); and Bladder surgery (2025).    Social History     Socioeconomic History    Marital status:      Spouse name: Not on file    Number of children: Not on file    Years of education: Not on file    Highest education level: Not on file   Occupational History    Not on file   Tobacco Use    Smoking status: Never     Passive exposure: Never    Smokeless tobacco: Never   Vaping Use    Vaping status: Never Used   Substance and Sexual Activity    Alcohol use: Not Currently     Comment: rare-couple drinks a month    Drug use: Never    Sexual activity: Defer   Other Topics Concern    Not on file   Social History Narrative    Not on file     Social Drivers of Health     Financial Resource Strain: Low Risk  (7/16/2025)    Overall Financial Resource Strain (CARDIA)     Difficulty of Paying Living Expenses: Not hard at all   Food Insecurity: No Food Insecurity (7/16/2025)    Hunger Vital Sign     Worried About Running Out of Food in the Last Year: Never true     Ran Out of Food in the Last Year: Never true   Transportation Needs: No  Transportation Needs (7/16/2025)    PRAPARE - Transportation     Lack of Transportation (Medical): No     Lack of Transportation (Non-Medical): No   Physical Activity: Inactive (7/16/2025)    Exercise Vital Sign     Days of Exercise per Week: 0 days     Minutes of Exercise per Session: Not on file   Stress: No Stress Concern Present (7/9/2025)    [employer] - Occupational Stress Questionnaire     Feeling of Stress : Only a little   Recent Concern: Stress - Stress Concern Present (6/4/2025)    [employer] - Occupational Stress Questionnaire     Feeling of Stress : To some extent   Social Connections: Socially Isolated (7/9/2025)    Social Connection and Isolation Panel     Frequency of Communication with Friends and Family: Three times a week     Frequency of Social Gatherings with Friends and Family: Once a week     Attends Buddhist Services: Never     Active Member of Clubs or Organizations: No     Attends Club or Organization Meetings: Never     Marital Status:    Intimate Partner Violence: Not At Risk (7/16/2025)    Humiliation, Afraid, Rape, and Kick questionnaire     Fear of Current or Ex-Partner: No     Emotionally Abused: No     Physically Abused: No     Sexually Abused: No   Housing Stability: Low Risk  (7/16/2025)    Housing Stability Vital Sign     Unable to Pay for Housing in the Last Year: No     Number of Times Moved in the Last Year: 0     Homeless in the Last Year: No       Family History[1]    Allergies:  Orencia [abatacept]    Home Medications:  Prior to Admission medications   Medication Sig Start Date End Date Taking? Authorizing Provider   atorvastatin (Lipitor) 40 mg tablet TAKE ONE TABLET BY MOUTH DAILY 4/16/25   SHERRY Blanco-CNP   Basaglar KwikPen U-100 Insulin 100 unit/mL (3 mL) pen Use up to 30 units daily  Patient taking differently: Inject 20 Units under the skin once daily at bedtime. 3/19/25   Gonzalo Christiansen MD  "  insulin syringe-needle U-100 (BD Insulin Syringe) 1 mL 26 x 1/2\" syringe     Historical Provider, MD   levocetirizine (Xyzal) 5 mg tablet Take 1 tablet (5 mg) by mouth once daily in the evening.    Historical Provider, MD   montelukast (Singulair) 10 mg tablet Take 1 tablet (10 mg) by mouth once daily. 9/27/21   Historical Provider, MD   pantoprazole (ProtoNix) 40 mg EC tablet TAKE ONE TABLET BY MOUTH EVERY DAY 9/4/24   SHERRY Blanco-CNP   pen needle, diabetic (BD Luann 2nd Gen Pen Needle) 32 gauge x 5/32\" needle use DAILY 1/13/25   Gonzalo Christiansen MD   traZODone (Desyrel) 50 mg tablet Take 1-2 tablets ( mg) by mouth once daily.  Patient taking differently: Take 1-2 tablets ( mg) by mouth once daily at bedtime. 8/7/24 8/7/25  SHERRY Platt-CNP   lisinopril 40 mg tablet Take 0.5 tablets (20 mg) by mouth every 12 hours. 5/27/25 7/13/25  SHERRY [facility]       REVIEW OF SYSTEMS    (2-9 systems for level 4, 10 or more for level 5)     Review of Systems    PHYSICAL EXAM   (up to 7 for level 4, 8 or more for level 5)     INITIAL VITALS:   BP (!) 128/97 (BP Location: Left arm, Patient Position: Lying)   Pulse 76   Temp 36.1 °C (97 °F) (Temporal)   Resp 18   Ht 1.905 m (6' 3\")   Wt 80.7 kg (178 lb)   SpO2 99%   BMI 22.25 kg/m²     Physical Exam    PHYSICAL EXAM    General: No distress, sitting upright, cheerful  Skin: dry, no rash  Head: Normocephalic/ atraumatic  Neck: Supple  Eye: EOMI, normal conjunctiva  ENT: Moist oral mucosa, nares patent  CVS: RRR, 2+ pulses radial  Pulm: Non labored  Back: Normal ROM, nephrostomy tube in place, dressing is bunched up and not covering the site  MSK: strength symmetrical upper and lower limbs  GI: non distended, ileal conduit in place and straw colored urine in the bag  Neuro: A&O x 4, no focal neuro deficits  Pysch: Appropriate affect       DIAGNOSIS         (LABS / IMAGING / EKG):  Orders Placed This Encounter   Procedures    Blood " Culture    Blood Culture    Urine Culture    CT abdomen pelvis wo IV contrast    CBC and Auto Differential    Comprehensive metabolic panel    Urinalysis with Reflex Culture and Microscopic    Lactate    Urinalysis with Reflex Culture and Microscopic    Extra Urine Gray Tube    Microscopic Only, Urine    Basic Metabolic Panel    CBC    Adult diet Regular    Notify provider (specify parameters)    Pain Assessment    Weight on admission    Height on admission    No Isolation Required    Activity (specify) No Restrictions    Vital Signs    No telemetry or cardiac monitoring required    Glucose 10-70 mg/dL & CONSCIOUS- Give 15 Grams of Carbohydrates and repeat until blood glucose level reaches 100 mg/dL or greater.    Notify provider (specify parameters)    Notify provider (specify parameters)    Notify provider (specify parameters)    Full code    Inpatient consult to Urology    Inpatient consult to Infectious Diseases    Inpatient consult to Dietitian    May Participate in Room Service    OT eval and treat    PT eval and treat    POCT GLUCOSE    Electrocardiogram, 12-lead PRN ACS symptoms    Admit to inpatient    ED to floor bed request       MEDICATIONS ORDERED:  [unfilled]    RESULTS / EMERGENCY DEPARTMENT COURSE / MDM   :  Results for orders placed or performed during the hospital encounter of 07/16/25   CBC and Auto Differential    Collection Time: 07/16/25 11:46 AM   Result Value Ref Range    WBC 11.2 4.4 - 11.3 x10*3/uL    nRBC 0.0 0.0 - 0.0 /100 WBCs    RBC 4.14 (L) 4.50 - 5.90 x10*6/uL    Hemoglobin 11.2 (L) 13.5 - 17.5 g/dL    Hematocrit 35.3 (L) 41.0 - 52.0 %    MCV 85 80 - 100 fL    MCH 27.1 26.0 - 34.0 pg    MCHC 31.7 (L) 32.0 - 36.0 g/dL    RDW 13.5 11.5 - 14.5 %    Platelets 337 150 - 450 x10*3/uL    Neutrophils % 79.0 40.0 - 80.0 %    Immature Granulocytes %, Automated 1.7 (H) 0.0 - 0.9 %    Lymphocytes % 12.2 13.0 - 44.0 %    Monocytes % 4.7 2.0 - 10.0 %    Eosinophils % 2.0 0.0 - 6.0 %    Basophils %  0.4 0.0 - 2.0 %    Neutrophils Absolute 8.85 (H) 1.20 - 7.70 x10*3/uL    Immature Granulocytes Absolute, Automated 0.19 0.00 - 0.70 x10*3/uL    Lymphocytes Absolute 1.36 1.20 - 4.80 x10*3/uL    Monocytes Absolute 0.53 0.10 - 1.00 x10*3/uL    Eosinophils Absolute 0.22 0.00 - 0.70 x10*3/uL    Basophils Absolute 0.04 0.00 - 0.10 x10*3/uL   Comprehensive metabolic panel    Collection Time: 07/16/25 11:46 AM   Result Value Ref Range    Glucose 291 (H) 74 - 99 mg/dL    Sodium 137 136 - 145 mmol/L    Potassium 4.3 3.5 - 5.3 mmol/L    Chloride 101 98 - 107 mmol/L    Bicarbonate 27 21 - 32 mmol/L    Anion Gap 13 10 - 20 mmol/L    Urea Nitrogen 53 (H) 6 - 23 mg/dL    Creatinine 2.68 (H) 0.50 - 1.30 mg/dL    eGFR 25 (L) >60 mL/min/1.73m*2    Calcium 9.5 8.6 - 10.3 mg/dL    Albumin 3.5 3.4 - 5.0 g/dL    Alkaline Phosphatase 88 33 - 136 U/L    Total Protein 7.2 6.4 - 8.2 g/dL    AST 14 9 - 39 U/L    Bilirubin, Total 0.5 0.0 - 1.2 mg/dL    ALT 26 10 - 52 U/L   Lactate    Collection Time: 07/16/25 11:46 AM   Result Value Ref Range    Lactate 1.3 0.4 - 2.0 mmol/L   Urinalysis with Reflex Culture and Microscopic    Collection Time: 07/16/25  1:59 PM   Result Value Ref Range    Color, Urine Light-Orange (N) Light-Yellow, Yellow, Dark-Yellow    Appearance, Urine Ex.Turbid (N) Clear    Specific Gravity, Urine 1.013 1.005 - 1.035    pH, Urine 7.0 5.0, 5.5, 6.0, 6.5, 7.0, 7.5, 8.0    Protein, Urine 100 (2+) (A) NEGATIVE, 10 (TRACE), 20 (TRACE) mg/dL    Glucose, Urine Normal Normal mg/dL    Blood, Urine OVER (3+) (A) NEGATIVE mg/dL    Ketones, Urine NEGATIVE NEGATIVE mg/dL    Bilirubin, Urine NEGATIVE NEGATIVE mg/dL    Urobilinogen, Urine Normal Normal mg/dL    Nitrite, Urine NEGATIVE NEGATIVE    Leukocyte Esterase, Urine 500 Tino/uL (A) NEGATIVE   Microscopic Only, Urine    Collection Time: 07/16/25  1:59 PM   Result Value Ref Range    WBC, Urine >50 (A) 1-5, NONE /HPF    WBC Clumps, Urine MODERATE Reference range not established. /HPF     RBC, Urine >20 (A) NONE, 1-2, 3-5 /HPF    Squamous Epithelial Cells, Urine 1-9 (SPARSE) Reference range not established. /HPF    Bacteria, Urine 3+ (A) NONE SEEN /HPF    Budding Yeast, Urine PRESENT (A) NONE /HPF   POCT GLUCOSE    Collection Time: 07/16/25  5:23 PM   Result Value Ref Range    POCT Glucose 159 (H) 74 - 99 mg/dL       IMPRESSION:     RADIOLOGY:      EKG:      POC ULTRASOUND:      EMERGENCY DEPARTMENT COURSE:  69-year-old male patient has recent complex history does have nephrectomy cystectomy has an ileal conduit recently had an IR placed drain pigtail into the functioning left kidney  Arrives hypotensive and tachycardic  Patient's tachycardia resolved with fluid bolus but his blood pressure remains low  His kidney function is improving  A specimen obtained from his ileal conduit bag shows WBCs in excess budding yeast present bacteria  Glucose is elevated  White blood cell count is 11,000  Patient's blood pressure did not improve with a fluid bolus  Discussed his case with urology recommended admission  Will do broad-spectrum antibiotics to include coverage for yeast  Spoke with the hospitalist we will try second fluid bolus to see if it responds  He does not appear overtly septic    ED Course as of 07/16/25 1759 Wed Jul 16, 2025   1515 Continuous hypotension w/ normal lactic. Blood cultures were ordered. Broad spectrum abx and antifungal ordered [JW]      ED Course User Index  [JW] Alfredo Nicolas MD         Diagnoses as of 07/16/25 1759   Hypotension due to hypovolemia   H/O insertion of nephrostomy tube   Acute kidney injury       PROCEDURES:      CONSULTS:  IP CONSULT TO UROLOGY  IP CONSULT TO INFECTIOUS DISEASES  IP CONSULT TO NUTRITION SERVICES    CRITICAL CARE:      FINAL IMPRESSION      1. Hypotension due to hypovolemia    2. H/O insertion of nephrostomy tube    3. Acute kidney injury          DISPOSITION / PLAN     [unfilled]    PATIENT REFERRED TO:  No follow-up provider  specified.    DISCHARGE MEDICATIONS:  Current Discharge Medication List          Alfredo Nicolas MD  5:59 PM    Attending Emergency Physician  [facility]    (Please note that portions of this note were completed with a voice recognition program.  Effortswere made to edit the dictations but occasionally words are mis-transcribed.)                                                                 [1]   Family History  Problem Relation Name Age of Onset    Ovarian cancer Mother Yin Suzanne     Cancer Mother Yin Suzanne     Other (asbestosis) Father aKtdayanara Suzanne     Cancer Father Katdayanara Suzanne     Diabetes Paternal Grandfather Katdayanara Suzanne Nicolas MD  07/16/25 [id number]

## 2025-07-17 ENCOUNTER — PHARMACY VISIT (OUTPATIENT)
Dept: PHARMACY | Facility: CLINIC | Age: 70
End: 2025-07-17
Payer: MEDICARE

## 2025-07-17 ENCOUNTER — APPOINTMENT (OUTPATIENT)
Dept: CARDIOLOGY | Facility: HOSPITAL | Age: 70
DRG: 640 | End: 2025-07-17
Payer: MEDICARE

## 2025-07-17 VITALS
BODY MASS INDEX: 22.13 KG/M2 | OXYGEN SATURATION: 97 % | HEIGHT: 75 IN | DIASTOLIC BLOOD PRESSURE: 90 MMHG | SYSTOLIC BLOOD PRESSURE: 160 MMHG | RESPIRATION RATE: 16 BRPM | HEART RATE: 74 BPM | WEIGHT: 178 LBS | TEMPERATURE: 98.8 F

## 2025-07-17 LAB
ANION GAP SERPL CALCULATED.3IONS-SCNC: 13 MMOL/L (ref 10–20)
ATRIAL RATE: 108 BPM
BUN SERPL-MCNC: 36 MG/DL (ref 6–23)
CALCIUM SERPL-MCNC: 8.6 MG/DL (ref 8.6–10.3)
CHLORIDE SERPL-SCNC: 111 MMOL/L (ref 98–107)
CHLORPROPAMIDE SERPL-MCNC: NORMAL MCG/ML
CO2 SERPL-SCNC: 23 MMOL/L (ref 21–32)
CREAT SERPL-MCNC: 1.94 MG/DL (ref 0.5–1.3)
EGFRCR SERPLBLD CKD-EPI 2021: 37 ML/MIN/1.73M*2
ERYTHROCYTE [DISTWIDTH] IN BLOOD BY AUTOMATED COUNT: 13.5 % (ref 11.5–14.5)
GLIMEPIRIDE SERPL-MCNC: NORMAL NG/ML
GLIPIZIDE SERPL-MCNC: NORMAL NG/ML
GLUCOSE BLD MANUAL STRIP-MCNC: 163 MG/DL (ref 74–99)
GLUCOSE BLD MANUAL STRIP-MCNC: 220 MG/DL (ref 74–99)
GLUCOSE SERPL-MCNC: 149 MG/DL (ref 74–99)
GLYBURIDE SERPL-MCNC: NORMAL NG/ML
HCT VFR BLD AUTO: 31.5 % (ref 41–52)
HGB BLD-MCNC: 9.8 G/DL (ref 13.5–17.5)
MCH RBC QN AUTO: 27.1 PG (ref 26–34)
MCHC RBC AUTO-ENTMCNC: 31.1 G/DL (ref 32–36)
MCV RBC AUTO: 87 FL (ref 80–100)
NATEGLINIDE SERPL-MCNC: NORMAL MCG/ML
NRBC BLD-RTO: 0 /100 WBCS (ref 0–0)
P AXIS: 64 DEGREES
P OFFSET: 196 MS
P ONSET: 128 MS
PIOGLITAZONE: NORMAL NG/ML
PLATELET # BLD AUTO: 266 X10*3/UL (ref 150–450)
POTASSIUM SERPL-SCNC: 3.7 MMOL/L (ref 3.5–5.3)
PR INTERVAL: 166 MS
Q ONSET: 211 MS
QRS COUNT: 18 BEATS
QRS DURATION: 102 MS
QT INTERVAL: 372 MS
QTC CALCULATION(BAZETT): 498 MS
QTC FREDERICIA: 452 MS
R AXIS: -41 DEGREES
RBC # BLD AUTO: 3.62 X10*6/UL (ref 4.5–5.9)
REPAGLINIDE SERPL-MCNC: NORMAL NG/ML
ROSIGLITAZONE: NORMAL NG/ML
SODIUM SERPL-SCNC: 143 MMOL/L (ref 136–145)
T AXIS: 98 DEGREES
T OFFSET: 397 MS
TOLAZAMIDE SERPL-MCNC: NORMAL MCG/ML
TOLBUTAMIDE SERPL-MCNC: NORMAL MCG/ML
VENTRICULAR RATE: 108 BPM
WBC # BLD AUTO: 10.5 X10*3/UL (ref 4.4–11.3)

## 2025-07-17 PROCEDURE — 82947 ASSAY GLUCOSE BLOOD QUANT: CPT

## 2025-07-17 PROCEDURE — 80048 BASIC METABOLIC PNL TOTAL CA: CPT | Performed by: INTERNAL MEDICINE

## 2025-07-17 PROCEDURE — 99239 HOSP IP/OBS DSCHRG MGMT >30: CPT | Performed by: INTERNAL MEDICINE

## 2025-07-17 PROCEDURE — 2500000004 HC RX 250 GENERAL PHARMACY W/ HCPCS (ALT 636 FOR OP/ED): Performed by: INTERNAL MEDICINE

## 2025-07-17 PROCEDURE — 2500000001 HC RX 250 WO HCPCS SELF ADMINISTERED DRUGS (ALT 637 FOR MEDICARE OP): Performed by: INTERNAL MEDICINE

## 2025-07-17 PROCEDURE — 85027 COMPLETE CBC AUTOMATED: CPT | Performed by: INTERNAL MEDICINE

## 2025-07-17 PROCEDURE — 97165 OT EVAL LOW COMPLEX 30 MIN: CPT | Mod: GO

## 2025-07-17 PROCEDURE — 97530 THERAPEUTIC ACTIVITIES: CPT | Mod: GP

## 2025-07-17 PROCEDURE — 2500000002 HC RX 250 W HCPCS SELF ADMINISTERED DRUGS (ALT 637 FOR MEDICARE OP, ALT 636 FOR OP/ED): Performed by: INTERNAL MEDICINE

## 2025-07-17 PROCEDURE — RXMED WILLOW AMBULATORY MEDICATION CHARGE

## 2025-07-17 PROCEDURE — 36415 COLL VENOUS BLD VENIPUNCTURE: CPT | Performed by: INTERNAL MEDICINE

## 2025-07-17 PROCEDURE — 93005 ELECTROCARDIOGRAM TRACING: CPT

## 2025-07-17 PROCEDURE — 97161 PT EVAL LOW COMPLEX 20 MIN: CPT | Mod: GP

## 2025-07-17 RX ORDER — FLUCONAZOLE 200 MG/1
200 TABLET ORAL DAILY
Qty: 13 TABLET | Refills: 0 | Status: SHIPPED | OUTPATIENT
Start: 2025-07-17 | End: 2025-07-30

## 2025-07-17 RX ORDER — AMOXICILLIN AND CLAVULANATE POTASSIUM 875; 125 MG/1; MG/1
1 TABLET, FILM COATED ORAL 2 TIMES DAILY
Qty: 26 TABLET | Refills: 0 | Status: SHIPPED | OUTPATIENT
Start: 2025-07-17 | End: 2025-07-30

## 2025-07-17 RX ORDER — TRAZODONE HYDROCHLORIDE 50 MG/1
50 TABLET ORAL NIGHTLY
Start: 2025-07-17

## 2025-07-17 RX ADMIN — INSULIN LISPRO 1 UNITS: 100 INJECTION, SOLUTION INTRAVENOUS; SUBCUTANEOUS at 09:22

## 2025-07-17 RX ADMIN — SODIUM CHLORIDE 125 ML/HR: 900 INJECTION, SOLUTION INTRAVENOUS at 02:08

## 2025-07-17 RX ADMIN — HEPARIN SODIUM 5000 UNITS: 5000 INJECTION, SOLUTION INTRAVENOUS; SUBCUTANEOUS at 09:23

## 2025-07-17 RX ADMIN — INSULIN LISPRO 2 UNITS: 100 INJECTION, SOLUTION INTRAVENOUS; SUBCUTANEOUS at 13:04

## 2025-07-17 RX ADMIN — CETIRIZINE HYDROCHLORIDE 10 MG: 10 TABLET, FILM COATED ORAL at 09:23

## 2025-07-17 RX ADMIN — SODIUM CHLORIDE 125 ML/HR: 900 INJECTION, SOLUTION INTRAVENOUS at 10:50

## 2025-07-17 RX ADMIN — ATORVASTATIN CALCIUM 40 MG: 40 TABLET, FILM COATED ORAL at 09:23

## 2025-07-17 RX ADMIN — PANTOPRAZOLE SODIUM 20 MG: 20 TABLET, DELAYED RELEASE ORAL at 07:07

## 2025-07-17 ASSESSMENT — PAIN SCALES - GENERAL: PAINLEVEL_OUTOF10: 0 - NO PAIN

## 2025-07-17 ASSESSMENT — COGNITIVE AND FUNCTIONAL STATUS - GENERAL
MOVING TO AND FROM BED TO CHAIR: A LITTLE
MOBILITY SCORE: 20
STANDING UP FROM CHAIR USING ARMS: A LITTLE
WALKING IN HOSPITAL ROOM: A LITTLE
DAILY ACTIVITIY SCORE: 24
CLIMB 3 TO 5 STEPS WITH RAILING: A LITTLE

## 2025-07-17 ASSESSMENT — ACTIVITIES OF DAILY LIVING (ADL)
ADL_ASSISTANCE: INDEPENDENT
ADL_ASSISTANCE: INDEPENDENT
BATHING_ASSISTANCE: INDEPENDENT

## 2025-07-17 ASSESSMENT — ENCOUNTER SYMPTOMS
ENDOCRINE NEGATIVE: 1
CONSTITUTIONAL NEGATIVE: 1
RESPIRATORY NEGATIVE: 1
GASTROINTESTINAL NEGATIVE: 1
PSYCHIATRIC NEGATIVE: 1
NEUROLOGICAL NEGATIVE: 1
CARDIOVASCULAR NEGATIVE: 1
EYES NEGATIVE: 1
MUSCULOSKELETAL NEGATIVE: 1

## 2025-07-17 ASSESSMENT — PAIN - FUNCTIONAL ASSESSMENT
PAIN_FUNCTIONAL_ASSESSMENT: 0-10
PAIN_FUNCTIONAL_ASSESSMENT: 0-10

## 2025-07-17 NOTE — CARE PLAN
The patient's goals for the shift include rest, leaving     The clinical goals for the shift include monitor BP

## 2025-07-17 NOTE — DISCHARGE SUMMARY
"Discharge Diagnosis  Hypotension due to hypovolemia    Malnutrition Diagnosis Status: New  Malnutrition Diagnosis: Severe malnutrition related to chronic disease or condition  Related to: decreased ability to consume/tolerate sufficient energy  As Evidenced by: 47# (20.9%) wt loss over ~2 months, po intake </= 75% estimated needs for >/= 1 month  I agree with the dietitian's malnutrition diagnosis.        Issues Requiring Follow-Up  Urology follow-up      Discharge Meds     Medication List      START taking these medications     amoxicillin-clavulanate 875-125 mg tablet; Commonly known as: Augmentin;   Take 1 tablet by mouth 2 times a day for 13 days.   fluconazole 200 mg tablet; Commonly known as: Diflucan; Take 1 tablet   (200 mg) by mouth once daily for 13 days     CHANGE how you take these medications     Basaglar KwikPen U-100 Insulin 100 unit/mL (3 mL) pen; Generic drug:   insulin glargine; Use up to 30 units daily; What changed: how much to   take, how to take this, when to take this, additional instructions     CONTINUE taking these medications     atorvastatin 40 mg tablet; Commonly known as: Lipitor; TAKE ONE TABLET   BY MOUTH DAILY   levocetirizine 5 mg tablet; Commonly known as: Xyzal   montelukast 10 mg tablet; Commonly known as: Singulair   pantoprazole 40 mg EC tablet; Commonly known as: ProtoNix; TAKE ONE   TABLET BY MOUTH EVERY DAY   pen needle, diabetic 32 gauge x 5/32\" needle; Commonly known as: BD Luann   2nd Gen Pen Needle; use DAILY   traZODone 50 mg tablet; Commonly known as: Desyrel; Take 1 tablet (50   mg) by mouth once daily at bedtime.       Test Results Pending At Discharge  Pending Labs       Order Current Status    Extra Urine Gray Tube Collected (07/16/25 6800)    Urinalysis with Reflex Culture and Microscopic In process    Blood Culture Preliminary result    Blood Culture Preliminary result    Urine Culture Preliminary result            Hospital Course   Patient was admitted to " hospital with hypotension and tachycardia.  Patient with recent hospitalization for episodes of hypoglycemia and obstructing ureteral stone.  Had a stent placed and was discharged.  Presented to our ER after an outpatient preoperative appointment for lithotripsy where he was found to be hypotensive and tachycardic.  Patient was given fluids in the emergency department and a total of 2 L on admission.  Blood pressures improved and it did not require any further intervention.  Patient with an ileal conduit secondary to discectomy and nephrectomy due to bladder cancer and recent obstructing ureteral stone concern was for possible urinary tract infection.  Patient did have a positive urine culture from previous admission that ended up growing back Candida.  Patient was admitted for monitoring and evaluation by infectious disease.  Patient was seen by infectious disease and decision was to continue treating empirically with Augmentin and Diflucan.  Infectious ease will continue follow cultures as outpatient.  Patient was hemodynamically stable stable vital signs for over 24 hours.  Patient will be discharged home in stable and improved condition.  He will follow-up with urology who did see him in the hospital for lithotripsy.  Nephrostomy tube was intact per CT scan done on this admission.    Pertinent Physical Exam At Time of Discharge  Physical Exam  Generally no acute distress  HEENT PERRL EOMI  Cardiovascular S1-S2 2 out of 6 systolic ejection murmur  Lungs clear to auscultation bilaterally  Abdomen nontender bowel sounds present, ileal conduit in place and draining urine.  Extremities no clubbing sinus edema  Outpatient Follow-Up  Future Appointments   Date Time Provider Department Center   7/18/2025 To Be Determined Vladimir Peters PT [facility]   7/28/2025  1:20 PM James Borrero MD [id number] [facility]   7/31/2025  1:00 PM Antonia HudsonD [id number] [facility]   8/5/2025  3:00 PM Monie Barron MD  [id number] [facility]   8/20/2025  2:30 PM Miles Peters MD [id number] [facility]   10/16/2025  2:20 PM SHERRY Blanco-CNP [id number] [place]     Total time spent on discharge was 40 minutes    Arturo العلي MD

## 2025-07-17 NOTE — PROGRESS NOTES
Evaluation    Patient Name: Rodrigue Palacios  MRN: [id number]  Department: [address]  Room: [address] [place], [place] [address]  Today's Date: 7/17/2025  Time Calculation  Start Time: 1018  Stop Time: 1027  Time Calculation (min): 9 min    Assessment  IP OT Assessment  OT Assessment: 69 y.o. male admits from [facility] (for upcoming lithotripsy) w/ tachycardia, hypotension, possible UTI, c/o weakness, and decreased p.o.. Pt was recently discharged from hospital after having obstructing ureteral stone and left nephrostomy.  On eval, patient presents at baseline functional status with ADLs.  No skilled OT needs identified at this time.  Will sign off.  Barriers to Discharge Home: No anticipated barriers  Medical Staff Made Aware: Yes  End of Session Communication: Bedside nurse  End of Session Patient Position: Bed, 3 rail up, Alarm off, not on at start of session    Plan:  No Skilled OT: No acute OT goals identified  OT Frequency: OT eval only  OT Recommended Transfer Status: Stand by assist  OT - OK to Discharge: Yes      Subjective   Current Problem:  1. Hypotension due to hypovolemia        2. H/O insertion of nephrostomy tube        3. Acute kidney injury          OT Visit Info:  OT Received On: 07/17/25    General Visit Info:  General  Reason for Referral: Impaired ADLs, hypotension d/t hypovalemia  Referred By: Dr العلي  Past Medical History Relevant to Rehab: bladder cancer, s/p cystectomy, Rt nephrectomy, ureteroileal conduit, Lt nephrostomy catheter 7/10/25, asthma, CKD, RA, hearing loss, HTN, UTI, visual impairment, Rt shoulder sx  Family/Caregiver Present: No  Prior to Session Communication: Bedside nurse  Patient Position Received: Bed, 3 rail up, Alarm off, not on at start of session  Preferred Learning Style: verbal, visual  General Comment: Cleared by nursing for therapy and agreeable to same    Precautions:  Hearing/Visual Limitations: hearing aid, visual impairment per EMR  Medical Precautions: Fall precautions    Pain:  Pain  Assessment  Pain Assessment: 0-10  0-10 (Numeric) Pain Score: 0 - No pain      Objective   Cognition:  Overall Cognitive Status: Within Functional Limits  Orientation Level: Oriented X4  Following Commands: Follows all commands and directions without difficulty    Home Living:  Type of Home: House  Lives With: Adult children (Son (age 29))  Home Adaptive Equipment: Walker rolling or standard, Cane, Reacher  Home Layout: Multi-level, Laundry in basement, Bed/bath upstairs, Stairs to alternate level with rails, Full bath main level  Alternate Level Stairs-Rails:  (one detention for both upstairs and to basement)  Alternate Level Stairs-Number of Steps: ~12  Home Access: Stairs to enter without rails  Entrance Stairs-Rails: None  Entrance Stairs-Number of Steps: 3-4  Bathroom Shower/Tub: Tub/shower unit, Walk-in shower (typically uses stall)  Bathroom Toilet: Standard  Home Living Comments: Lives in a 3 story home with son     Prior Function:  Level of Walsh: Independent with ADLs and functional transfers, Independent with homemaking with ambulation  Receives Help From: Family  ADL Assistance: Independent  Homemaking Assistance: Independent  Ambulatory Assistance: Independent  Vocational: Retired ( for the [employer])  Leisure: enjoys hiking  Prior Function Comments: Pt was fully independent prior to admission.  Drives and completes most of the IADLs.    ADL:  Eating Assistance: Independent  Grooming Assistance: Independent  Bathing Assistance: Independent  UE Dressing Assistance: Independent  LE Dressing Assistance: Independent  LE Dressing Deficit:  (donning right sock seated in chair - remainder of ADLs = per clinical judgement)  Toileting Assistance with Device: Independent    Activity Tolerance:  Endurance: Endurance does not limit participation in activity    Bed Mobility/Transfers: Bed Mobility 1  Bed Mobility 1: Supine to sitting  Level of Assistance 1: Modified independent  Bed Mobility Comments  1: toward the right side of bed with head elevated ~30 degrees  Bed Mobility 2  Bed Mobility  2: Sitting to supine  Level of Assistance 2: Modified independent    Transfer 1  Transfer From 1: Bed to  Transfer to 1: Stand  Technique 1: Sit to stand, Stand to sit  Transfer Level of Assistance 1: Independent  Transfers 2  Transfer From 2: Chair with arms to  Transfer to 2: Stand  Technique 2: Sit to stand, Stand to sit  Transfer Level of Assistance 2: Independent  Transfers 3  Transfer From 3: Toilet to  Transfer to 3: Stand  Technique 3: Sit to stand, Stand to sit  Transfer Level of Assistance 3: Independent    Functional Mobility:  Functional Mobility  Functional Mobility Performed:  (Independent with functional mobility in bedroom and bathroom without an A.D. ~20'x2.)    Sensation:  Light Touch: No apparent deficits  Sensation Comment: Denies tingling/numbness    Strength:  Strength Comments: BUEs=~4/5    Hand Function:  Hand Function  Gross Grasp: Functional  Coordination: Functional    Extremities: RUE   RUE : Within Functional Limits and LUE   LUE: Within Functional Limits    Outcome Measures: [facility] Daily Activity  Putting on and taking off regular lower body clothing: None  Bathing (including washing, rinsing, drying): None  Putting on and taking off regular upper body clothing: None  Toileting, which includes using toilet, bedpan or urinal: None  Taking care of personal grooming such as brushing teeth: None  Eating Meals: None  Daily Activity - Total Score: 24

## 2025-07-17 NOTE — PROGRESS NOTES
Medication Education     Medication education for Rodrigue Palacios was provided to the patient for the following medication(s):  atorvastatin      Medication education provided by a Pharmacist:  Medication interactions Other information about grapefruit juice interaction    Identified potential barriers to education:  None    Method(s) of Education:  Verbal    An opportunity to ask questions and receive answers was provided.     Assessment of understanding the patient :  2= meets goals/outcomes    Additional Notes (if applicable): Patient requested to see the pharmacist- likes grapefruit juice and wanted to better understand the interaction with atrovastatin    Myrna Solomon, AntoniaD

## 2025-07-17 NOTE — PROGRESS NOTES
07/17/25 1102   Discharge Planning   Living Arrangements Children  (Son)   Support Systems Children   Assistance Needed None   Type of Residence Private residence   Number of Stairs to Enter Residence 3   Number of Stairs Within Residence 24  (Multi-level w/basement)   Type of Animals or Pets Cat   Home or Post Acute Services None   Expected Discharge Disposition Home

## 2025-07-17 NOTE — CARE PLAN
Problem: Pain - Adult  Goal: Verbalizes/displays adequate comfort level or baseline comfort level  Outcome: Progressing  Flowsheets (Taken 7/17/2025 1035)  Verbalizes/displays adequate comfort level or baseline comfort level:   Encourage patient to monitor pain and request assistance   Assess pain using appropriate pain scale   Administer analgesics based on type and severity of pain and evaluate response   Implement non-pharmacological measures as appropriate and evaluate response     Problem: Safety - Adult  Goal: Free from fall injury  Outcome: Progressing  Flowsheets (Taken 7/17/2025 1035)  Free from fall injury: Instruct family/caregiver on patient safety     Problem: Discharge Planning  Goal: Discharge to home or other facility with appropriate resources  Outcome: Progressing  Flowsheets (Taken 7/17/2025 1035)  Discharge to home or other facility with appropriate resources:   Identify barriers to discharge with patient and caregiver   Arrange for needed discharge resources and transportation as appropriate   Identify discharge learning needs (meds, wound care, etc)     Problem: Chronic Conditions and Co-morbidities  Goal: Patient's chronic conditions and co-morbidity symptoms are monitored and maintained or improved  Outcome: Progressing  Flowsheets (Taken 7/17/2025 1035)  Care Plan - Patient's Chronic Conditions and Co-Morbidity Symptoms are Monitored and Maintained or Improved:   Monitor and assess patient's chronic conditions and comorbid symptoms for stability, deterioration, or improvement   Collaborate with multidisciplinary team to address chronic and comorbid conditions and prevent exacerbation or deterioration     Problem: Nutrition  Goal: Nutrient intake appropriate for maintaining nutritional needs  Outcome: Progressing     Problem: Skin  Goal: Decreased wound size/increased tissue granulation at next dressing change  Outcome: Progressing  Flowsheets (Taken 7/17/2025 1035)  Decreased wound  size/increased tissue granulation at next dressing change: Promote sleep for wound healing  Goal: Participates in plan/prevention/treatment measures  Outcome: Progressing  Flowsheets (Taken 7/17/2025 1035)  Participates in plan/prevention/treatment measures:   Discuss with provider PT/OT consult   Increase activity/out of bed for meals  Goal: Prevent/manage excess moisture  Outcome: Progressing  Flowsheets (Taken 7/17/2025 1035)  Prevent/manage excess moisture:   Monitor for/manage infection if present   Follow provider orders for dressing changes  Goal: Prevent/minimize sheer/friction injuries  Outcome: Progressing  Flowsheets (Taken 7/17/2025 1035)  Prevent/minimize sheer/friction injuries:   Complete micro-shifts as needed if patient unable. Adjust patient position to relieve pressure points, not a full turn   Increase activity/out of bed for meals  Goal: Promote/optimize nutrition  Outcome: Progressing  Flowsheets (Taken 7/17/2025 1035)  Promote/optimize nutrition:   Consume > 50% meals/supplements   Offer water/supplements/favorite foods  Goal: Promote skin healing  Outcome: Progressing  Flowsheets (Taken 7/17/2025 1035)  Promote skin healing:   Assess skin/pad under line(s)/device(s)   Protective dressings over bony prominences   The patient's goals for the shift include  increase oob activity    The clinical goals for the shift include maintain hemodynamic stability    Over the shift, the patient did make progress toward the following goals. Barriers to progression include pain. Recommendations to address these barriers include ordered analgesics.

## 2025-07-17 NOTE — CONSULTS
"Nutrition Assessement Note    Nutrition Assessment    Reason for Assessment: Admission nursing screening    Reason for Hospital Admission:  Rodrigue Palacios is a 69 y.o. male who is admitted for hypotension. Hx bladder cancer s/p cystectomy and R nephrectomy, ileal conduit.     Malnutrition Screening Tool (MST)  Have you recently lost weight without trying?: Yes  If yes, how much weight have you lost?: Lost 24 - 33 pounds  Weight Loss Score: 3  Have you been eating poorly because of a decreased appetite?: Yes  Malnutrition Score: 4  Nutrition Screen  Stage 3 or 4 Pressure Injury or Multiple Non-Healing Wounds: No  Home Tube Feeding or Total Parenteral Nutrition (TPN): No  Dietitian Consult Needed: Yes (Comment)  Reason for Consult: weightloss of more than 30 pounds since May. Questions about grapefruit juice on renal diet/medication interactions?    Medical History[1]   Surgical History[2]    Nutrition History:  Energy Intake: Poor < 50 %  Food and Nutrient History: pt reports poor po intake d/t change in taste since his surgery    Anthropometrics:  Ht: 190.5 cm (6' 3\"), Wt: 80.7 kg (178 lb), BMI: 22.25    Weight Change:  Daily Weight  07/16/25 : 80.7 kg (178 lb)  07/16/25 : 80.7 kg (178 lb)  07/13/25 : 84.7 kg (186 lb 11.7 oz)  07/09/25 : 86.8 kg (191 lb 5.8 oz)  07/07/25 : 97.5 kg (215 lb)  06/04/25 : 92.5 kg (204 lb)  05/29/25 : 97.5 kg (215 lb)  05/21/25 : 98.4 kg (216 lb 14.9 oz)  05/09/25 : 102 kg (225 lb 4.8 oz)  04/24/25 : 99.4 kg (219 lb 3.2 oz)    Weight History / % Weight Change: 47# (20.9%) wt loss over ~2 months (5/9-7/16)  Significant Weight Loss: Yes    Nutrition Focused Physical Exam Findings:   Subcutaneous Fat Loss  Orbital Fat Pads: Mild-Moderate (slight dark circles and slight hollowing)  Buccal Fat Pads: Well nourished (full, rounded cheeks)    Muscle Wasting  Temporalis: Mild-Moderate (slight depression)  Pectoralis (Clavicular Region): Mild-Moderate (some protrusion of clavicle)    Nutrition " Significant Labs:  Lab Results   Component Value Date    WBC 10.5 07/17/2025    HGB 9.8 (L) 07/17/2025    HCT 31.5 (L) 07/17/2025     07/17/2025    CHOL 174 03/08/2022    TRIG 279 (H) 03/08/2022    HDL 38 (L) 03/08/2022    ALT 26 07/16/2025    AST 14 07/16/2025     07/17/2025    K 3.7 07/17/2025     (H) 07/17/2025    CREATININE 1.94 (H) 07/17/2025    BUN 36 (H) 07/17/2025    CO2 23 07/17/2025    TSH 0.01 (L) 07/10/2025    INR 1.1 04/04/2025    HGBA1C 7.0 (H) 07/08/2025    ALBUR 12 03/08/2022     Nutrition Specific Medications:  Scheduled Medications[3]  Continuous Medications[4]    Dietary Orders (From admission, onward)       Start     Ordered    07/17/25 1451  Oral nutritional supplements  Until discontinued        Comments: Chocolate   Question Answer Comment   Deliver with Breakfast    Deliver with Dinner    Select supplement: Ensure Plus High Protein        07/17/25 1451    07/16/25 1721  May Participate in Room Service  ( ROOM SERVICE MAY PARTICIPATE)  Once        Question:  .  Answer:  Yes    07/16/25 1720    07/16/25 1608  Adult diet Regular  Diet effective now        Question:  Diet type  Answer:  Regular    07/16/25 1607                  Estimated Needs:   Estimated Energy Needs  Total Energy Estimated Needs in 24 hours (kCal): 2427 kCal  Energy Estimated Needs per kg Body Weight in 24 hours (kCal/kg): 30 kCal/kg  Method for Estimating Needs: actual wt    Estimated Protein Needs  Total Protein Estimated Needs in 24 Hours (g): 97 g  Protein Estimated Needs per kg Body Weight in 24 Hours (g/kg): 1.2 g/kg  Method for Estimating 24 Hour Protein Needs: acutal wt    Estimated Fluid Needs  Method for Estimating 24 Hour Fluid Needs: 1 ml/kcal or per MD       Nutrition Diagnosis   Nutrition Diagnosis:  Malnutrition Diagnosis  Patient has Malnutrition Diagnosis: Yes  Diagnosis Status: New  Malnutrition Diagnosis: Severe malnutrition related to chronic disease or condition  Related to: decreased  ability to consume/tolerate sufficient energy  As Evidenced by: 47# (20.9%) wt loss over ~2 months, po intake </= 75% estimated needs for >/= 1 month       Nutrition Interventions/Recommendations   Nutrition Interventions and Recommendations:  Nutrition Prescription: Nutrition prescription for oral nutrition    Nutrition Recommendations:  Individualized Nutrition Prescription Provided for : continue regular diet with ensure plus high protein BID to supplement po intake    Nutrition Interventions/Goals:   Food and/or Nutrient Delivery Interventions  Interventions: Meals and snacks, Medical food supplement  Meals and Snacks: General healthful diet  Goal: provide as ordered  Medical Food Supplement: Commercial beverage medical food supplement therapy  Goal: ensure plus high protein BID to provide 350 kcals and 20g protein each    Education Documentation  No documentation found.              Nutrition Monitoring and Evaluation   Monitoring/Evaluation:   Food/Nutrient Related History Monitoring  Monitoring and Evaluation Plan: Estimated Energy Intake  Estimated Energy Intake: Energy intake greater or equal to 75% of estimated energy needs    Anthropometric Measurements  Monitoring and Evaluation Plan: Body weight  Body Weight: Body weight - Maintain stable weight    Goal Status: New goal(s) identified    Follow Up  Time Spent (min): 30 minutes  Last Date of Nutrition Visit: 07/17/25  Nutrition Follow-Up Needed?: 3-5 days  Follow up Comment: 7/22/25          [1]   Past Medical History:  Diagnosis Date    Allergic     Arthritis     rhuematoid    Asthma     Bladder cancer (Multi)     CKD (chronic kidney disease)     Dorsalgia, unspecified 07/28/2020    Back pain without radiation    GERD (gastroesophageal reflux disease)     controlled    Hearing aid worn     Heart murmur     HL (hearing loss)     Hyperlipidemia     Hypertension     Local infection of the skin and subcutaneous tissue, unspecified 10/11/2013    Nonvenomous  insect bite with infection    Nephrolithiasis     Other muscle spasm 02/14/2018    Muscle spasms of neck    Other specified diseases of anus and rectum 03/22/2013    Anal pain    Personal history of other diseases of the digestive system 09/18/2013    History of gastroenteritis    Personal history of other diseases of the respiratory system 04/27/2020    History of acute bronchitis    Personal history of other diseases of the respiratory system 06/24/2019    History of upper respiratory infection    Personal history of other infectious and parasitic diseases     History of candidiasis of mouth    Personal history of other specified conditions 03/22/2013    History of abnormal weight loss    Personal history of other specified conditions 02/24/2014    History of numbness    Personal history of other specified conditions 07/14/2017    History of abdominal pain    Pneumonia, unspecified organism 01/25/2016    Pneumonia involving right lung    Rash and other nonspecific skin eruption 04/29/2013    Rash    Sleep apnea     CPAP    Type 2 diabetes mellitus     Urinary tract infection     Visual impairment    [2]   Past Surgical History:  Procedure Laterality Date    BLADDER SURGERY  2025    Removal malignant tumor    CHOLECYSTECTOMY  07/07/2016    Cholecystectomy    HERNIA REPAIR  07/07/2016    Inguinal Hernia Repair    SHOULDER SURGERY Right    [3] atorvastatin, 40 mg, oral, Daily  cetirizine, 10 mg, oral, Daily  heparin, 5,000 Units, subcutaneous, q12h  insulin lispro, 0-5 Units, subcutaneous, TID AC  pantoprazole, 20 mg, oral, Daily  piperacillin-tazobactam, 3.375 g, intravenous, q6h  traZODone, 50 mg, oral, Nightly     [4] sodium chloride 0.9%, 125 mL/hr, Last Rate: 125 mL/hr (07/17/25 1050)

## 2025-07-17 NOTE — PROGRESS NOTES
Spiritual Care Visit  Spiritual Care Request    Reason for Visit:  Routine Visit: Introduction     Request Received From:       Focus of Care:  Visited With: Patient and family together         Refer to :          Spiritual Care Assessment    Spiritual Assessment:                      Care Provided:       Sense of Community and or Protestant Affiliation:  Confucianism   Values/Beliefs  Spiritual Requests During Hospitalization: Rodrigue aske for a blessing but no sacraments from the Hinduism.     Addressed Needs/Concerns and/or Adis Through:     Sacramental Encounters  Communion: Does not want communion  Communion Given Indicator: No  Sacrament of Sick-Anointing: Patient declined anointing    Outcome:        Advance Directives:         Spiritual Care Annotation    Annotation:  Rodrigue asked for a blessing but no sacraments from the Hinduism.  Pacheco Draper

## 2025-07-17 NOTE — PROGRESS NOTES
Rodrigue Palacios is a 69 y.o. male on day 1 of admission presenting with Hypotension due to hypovolemia.      Subjective   Is doing well today.  Was up walking in the halls with physical therapy.       Objective     Last Recorded Vitals  /60 (BP Location: Left arm, Patient Position: Lying)   Pulse 91   Temp 37.2 °C (99 °F)   Resp 15   Wt 80.7 kg (178 lb)   SpO2 96%   Intake/Output last 3 Shifts:    Intake/Output Summary (Last 24 hours) at 7/17/2025 1112  Last data filed at 7/17/2025 0856  Gross per 24 hour   Intake 1439.58 ml   Output 1850 ml   Net -410.42 ml       Admission Weight  Weight: 80.7 kg (178 lb) (07/16/25 1057)    Daily Weight  07/16/25 : 80.7 kg (178 lb)    Image Results  CT abdomen pelvis wo IV contrast  Narrative: Interpreted By:  Schoenberger, Joseph,   STUDY:  CT ABDOMEN PELVIS WO IV CONTRAST;  7/16/2025 12:49 pm      INDICATION:  Signs/Symptoms:conduit present, new nephrostomy tube, hypotensive,  tachycardic, evaluate for pathology and placement of tube.          COMPARISON:  07/09/2025      ACCESSION NUMBER(S):  [id number]      ORDERING CLINICIAN:  HAYDEE MADERA      TECHNIQUE:  CT of the abdomen and pelvis was performed. Contiguous axial images  were obtained at 3 mm slice thickness through the abdomen and pelvis.  Coronal and sagittal reconstructions at 3 mm slice thickness were  performed.  No intravenous or oral contrast agents were administered.      FINDINGS:  Please note that the evaluation of vessels, lymph nodes and organs is  limited without intravenous contrast.      LOWER CHEST:  Unremarkable      ABDOMEN:      LIVER:  Within normal limits.      BILE DUCTS:  No dilation      GALLBLADDER:  Surgically absent      PANCREAS:  The pancreas appears unremarkable without evidence of ductal  dilatation or masses.      SPLEEN:  Within normal limits.      ADRENAL GLANDS:  Bilateral adrenal glands appear normal.      KIDNEYS AND URETERS:  The right kidney is surgically absent.  There is compensatory  enlargement of the left kidney. Perinephric infiltration of the left  kidney persists but is significantly improved when compared to the  prior exam. Perirenal fluid has resolved. In the interval since the  prior exam, the patient has undergone a left nephrostomy through the  lower pole. The pigtail loop was formed in the renal pelvis in good  position. This results in satisfactory decompression of the left  renal collecting system. There is no left hydroureter. The proximal  ureter stone apparent on the prior exam is no longer seen.      PELVIS:      BLADDER:  The surgical changes of a cystectomy and ileal conduit creation are  noted. No distension of the conjoined or peristomal herniation.      REPRODUCTIVE ORGANS:  Prostate appears to be surgically absent. Correlate with known history      BOWEL:  The stomach is unremarkable.  Small bowel is normal in caliber  without mural thickening. Colon normal in caliber without mural  thickening. Much of the colon is opacified with contrast possibly  orally ingested prior to the exam. Normal appendix.      VESSELS:  There is tortuosity and some ectasia of the abdominal aorta with a  caliber approximating 2.9 cm unchanged from prior.      PERITONEUM/RETROPERITONEUM/LYMPH NODES:  No ascites or free air, no fluid collection.  No abdominopelvic  lymphadenopathy is present.      ABDOMINAL WALL:  Right lower quadrant stoma creation for ileal conduit otherwise  unremarkable      BONES:  No suspicious osseous lesions are identified.      Impression: 1.  Interval placement of a left percutaneous nephrostomy catheter.  Pigtail loop appears to be formed in satisfactory position. The  hydronephrosis apparent previously has resolved. There is some  minimal residual perinephric infiltration of this is considerably  improved. The stone apparent in the proximal ureter on the left is no  longer seen.  2. Other findings are not substantially different than the prior  exam  and are discussed above.          MACRO:  None      Signed by: Joseph Schoenberger 7/16/2025 1:40 PM  Dictation workstation:   [id number]  ECG 12 Lead  Sinus tachycardia with frequent and consecutive Premature ventricular complexes  Left axis deviation  Left ventricular hypertrophy with repolarization abnormality  Abnormal ECG  When compared with ECG of 10-JUL-2025 15:36,  Significant changes have occurred      Physical Exam  Generally no acute distress  HEENT PERRL EOMI  Cardiovascular S1 S2 2 out of 6 systolic ejection murmur noted  Lungs clear to auscultation bilaterally  Abdomen nontender bowel sounds present ileal conduit in place  Extremities no clubbing cyanosis edema    Relevant Results  Scheduled medications  Scheduled Medications[1]  Continuous medications  Continuous Medications[2]  PRN medications  PRN Medications[3]  Results for orders placed or performed during the hospital encounter of 07/16/25 (from the past 24 hours)   CBC and Auto Differential   Result Value Ref Range    WBC 11.2 4.4 - 11.3 x10*3/uL    nRBC 0.0 0.0 - 0.0 /100 WBCs    RBC 4.14 (L) 4.50 - 5.90 x10*6/uL    Hemoglobin 11.2 (L) 13.5 - 17.5 g/dL    Hematocrit 35.3 (L) 41.0 - 52.0 %    MCV 85 80 - 100 fL    MCH 27.1 26.0 - 34.0 pg    MCHC 31.7 (L) 32.0 - 36.0 g/dL    RDW 13.5 11.5 - 14.5 %    Platelets 337 150 - 450 x10*3/uL    Neutrophils % 79.0 40.0 - 80.0 %    Immature Granulocytes %, Automated 1.7 (H) 0.0 - 0.9 %    Lymphocytes % 12.2 13.0 - 44.0 %    Monocytes % 4.7 2.0 - 10.0 %    Eosinophils % 2.0 0.0 - 6.0 %    Basophils % 0.4 0.0 - 2.0 %    Neutrophils Absolute 8.85 (H) 1.20 - 7.70 x10*3/uL    Immature Granulocytes Absolute, Automated 0.19 0.00 - 0.70 x10*3/uL    Lymphocytes Absolute 1.36 1.20 - 4.80 x10*3/uL    Monocytes Absolute 0.53 0.10 - 1.00 x10*3/uL    Eosinophils Absolute 0.22 0.00 - 0.70 x10*3/uL    Basophils Absolute 0.04 0.00 - 0.10 x10*3/uL   Comprehensive metabolic panel   Result Value Ref Range    Glucose  291 (H) 74 - 99 mg/dL    Sodium 137 136 - 145 mmol/L    Potassium 4.3 3.5 - 5.3 mmol/L    Chloride 101 98 - 107 mmol/L    Bicarbonate 27 21 - 32 mmol/L    Anion Gap 13 10 - 20 mmol/L    Urea Nitrogen 53 (H) 6 - 23 mg/dL    Creatinine 2.68 (H) 0.50 - 1.30 mg/dL    eGFR 25 (L) >60 mL/min/1.73m*2    Calcium 9.5 8.6 - 10.3 mg/dL    Albumin 3.5 3.4 - 5.0 g/dL    Alkaline Phosphatase 88 33 - 136 U/L    Total Protein 7.2 6.4 - 8.2 g/dL    AST 14 9 - 39 U/L    Bilirubin, Total 0.5 0.0 - 1.2 mg/dL    ALT 26 10 - 52 U/L   Lactate   Result Value Ref Range    Lactate 1.3 0.4 - 2.0 mmol/L   Blood Culture    Specimen: Peripheral Venipuncture; Blood culture   Result Value Ref Range    Blood Culture Loaded on Instrument - Culture in progress    Blood Culture    Specimen: Peripheral Venipuncture; Blood culture   Result Value Ref Range    Blood Culture Loaded on Instrument - Culture in progress    Urinalysis with Reflex Culture and Microscopic   Result Value Ref Range    Color, Urine Light-Orange (N) Light-Yellow, Yellow, Dark-Yellow    Appearance, Urine Ex.Turbid (N) Clear    Specific Gravity, Urine 1.013 1.005 - 1.035    pH, Urine 7.0 5.0, 5.5, 6.0, 6.5, 7.0, 7.5, 8.0    Protein, Urine 100 (2+) (A) NEGATIVE, 10 (TRACE), 20 (TRACE) mg/dL    Glucose, Urine Normal Normal mg/dL    Blood, Urine OVER (3+) (A) NEGATIVE mg/dL    Ketones, Urine NEGATIVE NEGATIVE mg/dL    Bilirubin, Urine NEGATIVE NEGATIVE mg/dL    Urobilinogen, Urine Normal Normal mg/dL    Nitrite, Urine NEGATIVE NEGATIVE    Leukocyte Esterase, Urine 500 Tino/uL (A) NEGATIVE   Microscopic Only, Urine   Result Value Ref Range    WBC, Urine >50 (A) 1-5, NONE /HPF    WBC Clumps, Urine MODERATE Reference range not established. /HPF    RBC, Urine >20 (A) NONE, 1-2, 3-5 /HPF    Squamous Epithelial Cells, Urine 1-9 (SPARSE) Reference range not established. /HPF    Bacteria, Urine 3+ (A) NONE SEEN /HPF    Budding Yeast, Urine PRESENT (A) NONE /HPF   Urine Culture    Specimen: Ileal  Conduit; Urine   Result Value Ref Range    Urine Culture Culture in progress    POCT GLUCOSE   Result Value Ref Range    POCT Glucose 159 (H) 74 - 99 mg/dL   Basic Metabolic Panel   Result Value Ref Range    Glucose 149 (H) 74 - 99 mg/dL    Sodium 143 136 - 145 mmol/L    Potassium 3.7 3.5 - 5.3 mmol/L    Chloride 111 (H) 98 - 107 mmol/L    Bicarbonate 23 21 - 32 mmol/L    Anion Gap 13 10 - 20 mmol/L    Urea Nitrogen 36 (H) 6 - 23 mg/dL    Creatinine 1.94 (H) 0.50 - 1.30 mg/dL    eGFR 37 (L) >60 mL/min/1.73m*2    Calcium 8.6 8.6 - 10.3 mg/dL   CBC   Result Value Ref Range    WBC 10.5 4.4 - 11.3 x10*3/uL    nRBC 0.0 0.0 - 0.0 /100 WBCs    RBC 3.62 (L) 4.50 - 5.90 x10*6/uL    Hemoglobin 9.8 (L) 13.5 - 17.5 g/dL    Hematocrit 31.5 (L) 41.0 - 52.0 %    MCV 87 80 - 100 fL    MCH 27.1 26.0 - 34.0 pg    MCHC 31.1 (L) 32.0 - 36.0 g/dL    RDW 13.5 11.5 - 14.5 %    Platelets 266 150 - 450 x10*3/uL   POCT GLUCOSE   Result Value Ref Range    POCT Glucose 163 (H) 74 - 99 mg/dL     Impression: 69-year-old gentleman with history of bladder cancer presents with hypotension and concerns of sepsis.     Plan:     1.  Tachycardia and hypotension  - Resolved, likely secondary to volume depletion  - Awaiting ID recommendation regarding possible UTI     2.  Recent nephrostomy tube  - Appreciate urology input, thing is stable from a nephrostomy tube standpoint and patient will follow-up with urology as an outpatient for potential lithotripsy.     3.  History of bladder cancer  - Will continue to follow with Caitlyn     4.  Recent acute kidney injury  - Continues to improve which indicates probably patient was volume deplete more than he thought.  Pretty significant improvement today.     5.  Type 2 diabetes  - Patient with stable blood sugars in hospital, continue current management  - QA [facility] Accu-Cheks    Dispo: Once ID evaluation is complete patient can be discharged anticipate in the next 24 hours.     Full code  DVT prophylaxis with  heparin                                     Arturo العلي MD           [1] atorvastatin, 40 mg, oral, Daily  cefepime, 500 mg, intravenous, q24h  cetirizine, 10 mg, oral, Daily  heparin, 5,000 Units, subcutaneous, q12h  insulin lispro, 0-5 Units, subcutaneous, TID AC  pantoprazole, 20 mg, oral, Daily  traZODone, 50 mg, oral, Nightly  [2] sodium chloride 0.9%, 125 mL/hr, Last Rate: 125 mL/hr (07/17/25 1050)  [3] PRN medications: acetaminophen, dextrose, dextrose, glucagon, glucagon

## 2025-07-17 NOTE — CONSULTS
Inpatient consult to Infectious Diseases  Consult performed by: SHERRY Venegas-CNP  Consult ordered by: Arturo العلي MD  Reason for consult: Bladder cancer, ileostomy, nephrectomy, recent positive urine culture          Primary MD: YUNG Blanco        History Of Present Illness  Rodrigue Palacios is a 69 y.o. male who presented to the emergency room with hypotension, tachycardia, from preadmission testing-for evaluation for lithotripsy.  He was noted to become tachycardic and hypotensive and he was transferred to the emergency room.  Patient has history of bladder cancer status post cystectomy and ileal conduit and simple right nephrectomy.  Recent hospitalization he was found to have left obstructing stone, s/p left nephrectomy placement, culture  grew candida albicans.  Patient was admitted for further evaluation.  Patient was seen and examined.  He is resting comfortably in bed.  His blood pressure responded well to IV fluids.  He is afebrile, denies chills.  He denies shortness of breath cough or chest pain.  He is on room air saturating 97%.  Labs with no leukocytosis.  Recent acute kidney injury, resolving.  History of pan sensitive E.coli.   CT abdomen/pelvis showed left percutaneous nephrostomy catheter. Pigtail loop appears to be formed in satisfactory position. The hydronephrosis apparent previously has resolved. There is some minimal residual perinephric infiltration of this is considerably   improved. The stone apparent in the proximal ureter on the left is no longer seen.   He was started on IV cefepime and given a dose of IV vancomycin and fluconazole in the emergency room.       Past Medical History  He has a past medical history of Allergic, Arthritis, Asthma, Bladder cancer (Multi), CKD (chronic kidney disease), Dorsalgia, unspecified (07/28/2020), GERD (gastroesophageal reflux disease), Hearing aid worn, Heart murmur, HL (hearing loss), Hyperlipidemia, Hypertension,  Local infection of the skin and subcutaneous tissue, unspecified (10/11/2013), Nephrolithiasis, Other muscle spasm (02/14/2018), Other specified diseases of anus and rectum (03/22/2013), Personal history of other diseases of the digestive system (09/18/2013), Personal history of other diseases of the respiratory system (04/27/2020), Personal history of other diseases of the respiratory system (06/24/2019), Personal history of other infectious and parasitic diseases, Personal history of other specified conditions (03/22/2013), Personal history of other specified conditions (02/24/2014), Personal history of other specified conditions (07/14/2017), Pneumonia, unspecified organism (01/25/2016), Rash and other nonspecific skin eruption (04/29/2013), Sleep apnea, Type 2 diabetes mellitus, Urinary tract infection, and Visual impairment.    Surgical History  He has a past surgical history that includes Hernia repair (07/07/2016); Cholecystectomy (07/07/2016); Shoulder surgery (Right); and Bladder surgery (2025).     Social History     Occupational History    Not on file   Tobacco Use    Smoking status: Never     Passive exposure: Never    Smokeless tobacco: Never   Vaping Use    Vaping status: Never Used   Substance and Sexual Activity    Alcohol use: Not Currently     Comment: rare-couple drinks a month    Drug use: Never    Sexual activity: Defer     Travel History   Travel since 06/17/25    No documented travel since 06/17/25            Family History  Family History[1]  Allergies  Orencia [abatacept]     Immunization History   Administered Date(s) Administered    Flu vaccine, quadrivalent, high-dose, preservative free, age 65y+ (FLUZONE) 10/06/2020, 09/13/2022    Flu vaccine, trivalent, preservative free, HIGH-DOSE, age 65y+ (Fluzone) 12/13/2023, 10/16/2024    Hepatitis B vaccine, adult *Check Product/Dose* 02/17/1994, 03/17/1994, 09/15/1994    Influenza, Seasonal, Quadrivalent, Adjuvanted 12/20/2021    Influenza,  Unspecified 09/13/2022    Influenza, injectable, quadrivalent 10/04/2019    Novel influenza-H1N1-09, preservative-free 01/25/2010    Pfizer COVID-19 vaccine, 12 years and older, (30mcg/0.3mL) (Comirnaty) 12/13/2023, 01/07/2025    Pfizer Purple Cap SARS-CoV-2 03/05/2021, 04/02/2021, 12/20/2021    Pneumococcal conjugate vaccine, 20-valent (PREVNAR 20) 03/14/2023    Pneumococcal polysaccharide vaccine, 23-valent, age 2 years and older (PNEUMOVAX 23) 01/25/2016    Td (adult), unspecified 09/10/1999    Tdap vaccine, age 7 year and older (BOOSTRIX, ADACEL) 04/21/2021    Zoster vaccine, recombinant, adult (SHINGRIX) 01/20/2020    Zoster, Unspecified 01/01/2020, 01/20/2020    Zoster, live 10/21/2016     Medications  Home medications:  Prescriptions Prior to Admission[2]  Current medications:  Scheduled medications  Scheduled Medications[3]  Continuous medications  Continuous Medications[4]  PRN medications  PRN Medications[5]    Review of Systems   Constitutional: Negative.    HENT: Negative.     Eyes: Negative.    Respiratory: Negative.     Cardiovascular: Negative.    Gastrointestinal: Negative.    Endocrine: Negative.    Genitourinary: Negative.         Ileal conduit, left nephrostomy    Musculoskeletal: Negative.    Skin: Negative.    Neurological: Negative.    Psychiatric/Behavioral: Negative.          Objective  Range of Vitals (last 24 hours)  Heart Rate:  [68-91]   Temp:  [36.1 °C (97 °F)-37.2 °C (99 °F)]   Resp:  [13-25]   BP: ()/(60-97)   SpO2:  [95 %-100 %]   Daily Weight  07/16/25 : 80.7 kg (178 lb)    Body mass index is 22.25 kg/m².     Physical Exam  Constitutional:       Appearance: Normal appearance.   HENT:      Head: Normocephalic and atraumatic.      Nose: Nose normal.      Mouth/Throat:      Mouth: Mucous membranes are moist.      Pharynx: Oropharynx is clear.     Eyes:      General: No scleral icterus.      Cardiovascular:      Rate and Rhythm: Normal rate and regular rhythm.   Pulmonary:       "Effort: Pulmonary effort is normal.      Breath sounds: Normal breath sounds.   Abdominal:      General: Bowel sounds are normal.      Palpations: Abdomen is soft.   Genitourinary:     Comments: Ileal conduit  Left nephrostomy     Musculoskeletal:         General: Normal range of motion.      Cervical back: Normal range of motion and neck supple.     Skin:     General: Skin is warm and dry.     Neurological:      Mental Status: He is alert and oriented to person, place, and time.     Psychiatric:         Mood and Affect: Mood normal.         Behavior: Behavior normal.          Relevant Results  Outside Hospital Results  No  Labs  Results from last 72 hours   Lab Units 07/17/25  0415 07/16/25  1146   WBC AUTO x10*3/uL 10.5 11.2   HEMOGLOBIN g/dL 9.8* 11.2*   HEMATOCRIT % 31.5* 35.3*   PLATELETS AUTO x10*3/uL 266 337   NEUTROS PCT AUTO %  --  79.0   LYMPHS PCT AUTO %  --  12.2   MONOS PCT AUTO %  --  4.7   EOS PCT AUTO %  --  2.0     Results from last 72 hours   Lab Units 07/17/25  0415 07/16/25  1146   SODIUM mmol/L 143 137   POTASSIUM mmol/L 3.7 4.3   CHLORIDE mmol/L 111* 101   CO2 mmol/L 23 27   BUN mg/dL 36* 53*   CREATININE mg/dL 1.94* 2.68*   GLUCOSE mg/dL 149* 291*   CALCIUM mg/dL 8.6 9.5   ANION GAP mmol/L 13 13   EGFR mL/min/1.73m*2 37* 25*     Results from last 72 hours   Lab Units 07/16/25  1146   ALK PHOS U/L 88   BILIRUBIN TOTAL mg/dL 0.5   PROTEIN TOTAL g/dL 7.2   ALT U/L 26   AST U/L 14   ALBUMIN g/dL 3.5     Estimated Creatinine Clearance: 41 mL/min (A) (by C-G formula based on SCr of 1.94 mg/dL (H)).  No results found for: \"CRP\", \"SEDRATE\"  No results found for: \"HIV1X2\", \"HIVCONF\", \"HINAWY7IA\"  No results found for: \"HEPCABINIT\", \"HEPCAB\", \"HCVPCRQUANT\"  Microbiology  Susceptibility data from last 90 days.  Collected Specimen Info Organism Ampicillin Cefazolin Cefazolin (uncomplicated UTIs only) Ciprofloxacin Gentamicin Levofloxacin Nitrofurantoin Piperacillin/Tazobactam Trimethoprim/Sulfamethoxazole "   07/10/25 Urine from Kidney Aspirate Candida albicans            06/04/25 Urine from Clean Catch/Voided Coagulase negative staphylococcus            05/09/25 Urine from Clean Catch/Voided Escherichia coli  S  S  S  S  S  S  S  S  S         Imaging  CT abdomen pelvis wo IV contrast  Result Date: 7/16/2025  Interpreted By:  Schoenberger, Joseph, STUDY: CT ABDOMEN PELVIS WO IV CONTRAST;  7/16/2025 12:49 pm   INDICATION: Signs/Symptoms:conduit present, new nephrostomy tube, hypotensive, tachycardic, evaluate for pathology and placement of tube.     COMPARISON: 07/09/2025   ACCESSION NUMBER(S): [id number]   ORDERING CLINICIAN: HAYDEE MADERA   TECHNIQUE: CT of the abdomen and pelvis was performed. Contiguous axial images were obtained at 3 mm slice thickness through the abdomen and pelvis. Coronal and sagittal reconstructions at 3 mm slice thickness were performed.  No intravenous or oral contrast agents were administered.   FINDINGS: Please note that the evaluation of vessels, lymph nodes and organs is limited without intravenous contrast.   LOWER CHEST: Unremarkable   ABDOMEN:   LIVER: Within normal limits.   BILE DUCTS: No dilation   GALLBLADDER: Surgically absent   PANCREAS: The pancreas appears unremarkable without evidence of ductal dilatation or masses.   SPLEEN: Within normal limits.   ADRENAL GLANDS: Bilateral adrenal glands appear normal.   KIDNEYS AND URETERS: The right kidney is surgically absent. There is compensatory enlargement of the left kidney. Perinephric infiltration of the left kidney persists but is significantly improved when compared to the prior exam. Perirenal fluid has resolved. In the interval since the prior exam, the patient has undergone a left nephrostomy through the lower pole. The pigtail loop was formed in the renal pelvis in good position. This results in satisfactory decompression of the left renal collecting system. There is no left hydroureter. The proximal ureter stone  apparent on the prior exam is no longer seen.   PELVIS:   BLADDER: The surgical changes of a cystectomy and ileal conduit creation are noted. No distension of the conjoined or peristomal herniation.   REPRODUCTIVE ORGANS: Prostate appears to be surgically absent. Correlate with known history   BOWEL: The stomach is unremarkable.  Small bowel is normal in caliber without mural thickening. Colon normal in caliber without mural thickening. Much of the colon is opacified with contrast possibly orally ingested prior to the exam. Normal appendix.   VESSELS: There is tortuosity and some ectasia of the abdominal aorta with a caliber approximating 2.9 cm unchanged from prior.   PERITONEUM/RETROPERITONEUM/LYMPH NODES: No ascites or free air, no fluid collection.  No abdominopelvic lymphadenopathy is present.   ABDOMINAL WALL: Right lower quadrant stoma creation for ileal conduit otherwise unremarkable   BONES: No suspicious osseous lesions are identified.       1.  Interval placement of a left percutaneous nephrostomy catheter. Pigtail loop appears to be formed in satisfactory position. The hydronephrosis apparent previously has resolved. There is some minimal residual perinephric infiltration of this is considerably improved. The stone apparent in the proximal ureter on the left is no longer seen. 2. Other findings are not substantially different than the prior exam and are discussed above.     MACRO: None   Signed by: Joseph Schoenberger 7/16/2025 1:40 PM Dictation workstation:   [id number]    ECG 12 Lead  Result Date: 7/16/2025  Sinus tachycardia with frequent and consecutive Premature ventricular complexes Left axis deviation Left ventricular hypertrophy with repolarization abnormality Abnormal ECG When compared with ECG of 10-JUL-2025 15:36, Significant changes have occurred    XR abdomen 1 view  Result Date: 7/13/2025  Interpreted By:  Jodie Lou, STUDY: XR ABDOMEN 1 VIEW 7/12/2025 10:40 am   INDICATION:  Signs/Symptoms:kidney stone   COMPARISON: 05/21/2025   ACCESSION NUMBER(S): [id number]   ORDERING CLINICIAN: ALICIA BENNETT   TECHNIQUE: Recumbent abdominal view   FINDINGS: Recumbent view of the abdomen shows removal of the left ureteral stent which extended from the left kidney into the ileal loop. Currently, a left percutaneous nephrostomy tube is seen. There is a 1.2 cm oval left renal calculus identified.   Bowel staple lines are visible within the right side of the abdomen with ileal loop seen within right side of the abdomen. Bowel gas pattern is nonspecific. There is postoperative change from cholecystectomy.       1.2 cm oval left renal calculus with a left percutaneous nephrostomy tube identified.   Status post prior cystectomy with ileal conduit formation.   Signed by: Jodie Lou 7/13/2025 4:13 PM Dictation workstation:   [id number]    IR placement of nephrostomy catheter  Result Date: 7/11/2025  Interpreted By:  Pk Hernandez, STUDY: IR  NEPHROSTOMY PLACEMENT; US GUIDED PERCUTANEOUS PLACEMENT; 7/10/2025 11:26 am; 7/10/2025 11:05 am   INDICATION: Signs/Symptoms:left perc-neph tube placement, obstructive stone w hydronephrosis; Signs/Symptoms:ULTRASOUND GUIDANCE FOR PLACEMENT OF NEPH TUBE.   COMPARISON: None.   ACCESSION NUMBER(S): [id number]; [id number]   ORDERING CLINICIAN: HEMANT BLAS   TECHNIQUE: INTERVENTIONALIST(S): Pk Hernandez MD   CONSENT: The patient/patient's POA/next of kin was informed of the nature of the proposed procedure. The purposes, alternatives, risks, and benefits were explained and discussed. All questions were answered and consent was obtained.     SEDATION: Moderate conscious IV sedation services (supervision of administration, induction, and maintenance) were provided by the physician performing the procedure with intravenous fentanyl and versed for  minutes. The physician was assisted by an independent trained observer, an interventional radiology nurse, in the  continuous monitoring of patient level of consciousness and physiologic status.   MEDICATION/CONTRAST: No additional   TIME OUT: A time out was performed immediately prior to procedure start with the interventional team, correctly identifying the patient name, date of birth, MRN, procedure, anatomy (including marking of site and side), patient position, procedure consent form, relevant laboratory and imaging test results, antibiotic administration, safety precautions, and procedure-specific equipment needs.   COMPLICATIONS: No immediate adverse events identified.   FINDINGS: In the prone position, the patient was positioned on the angiography table. The cutaneous tissues of the  left flank were prepared and draped in usual sterile manner. Screening evaluation of the  left kidney was performed for direct ultrasound guidance.  A posterior calyx in the inferior pole of the kidney was identified and targeted. The optimal access site and tract were locally anesthetized with subcutaneous Lidocaine 1% instillation. Utilizing direct ultrasound guidance, the targeted calyx was accessed with a 22-gauge Chiba needle. After confirmation of location, a digital spot ultrasound image was acquired.   There was return of  purulent urine through the access needle. The collecting system was decompressed with aspiration.  A urine sample was collected and sent for laboratory analysis. The calyceal system and central renal pelvis were minimally distended with dilute contrast to facilitate antegrade access and fluoroscopic guidance. An antegrade pyelogram was performed.   ANTEGRADE PYELOGRAM FINDINGS: Half-strength contrast was injected into the renal collecting system and demonstrated patency without leak. Opacification of the central renal pelvis was observed demonstrating hydronephrosis.   PERCUTANEOUS NEPHROSTOMY DRAINAGE CATHETER PLACEMENT: A 018 Schofield-Mandrill guidewire was inserted through the access needle. Utilizing guidewire  manipulation, access into the Field  proximal ureter was obtained. With the guidewire left in place to secure access, a 7-Indonesian coaxial dilator sheath system was placed. The sheath was advanced into the central renal pelvis. A 035  stiff Glide guidewire was inserted through the access sheath into the  proximal ureter to secure access.   There was subsequent placement of  an 8-Indonesian nephrostomy drainage catheter over the 035 guidewire. The pigtail was formed in the central renal pelvis.  Diluted contrast injection was performed to confirm optimal location. The external portions of the catheter were secured with sterile suture and dressing.   The patient tolerated the procedure without complication.       1.  Uncomplicated and technically successful  8-Indonesian percutaneous nephrostomy tube placement -  right kidney.  The catheter was connected to external gravity drainage. 2. Return and identification of  purulent urine.     Performed and dictated at [facility].   MACRO: None   Signed by: Pk Hernandez 7/11/2025 2:30 PM Dictation workstation:   [id number]    US guided percutaneous placement  Result Date: 7/11/2025  Interpreted By:  Pk Hernandez, STUDY: IR  NEPHROSTOMY PLACEMENT; US GUIDED PERCUTANEOUS PLACEMENT; 7/10/2025 11:26 am; 7/10/2025 11:05 am   INDICATION: Signs/Symptoms:left perc-neph tube placement, obstructive stone w hydronephrosis; Signs/Symptoms:ULTRASOUND GUIDANCE FOR PLACEMENT OF NEPH TUBE.   COMPARISON: None.   ACCESSION NUMBER(S): [id number]; [id number]   ORDERING CLINICIAN: HEMANT BLAS   TECHNIQUE: INTERVENTIONALIST(S): Pk Hernandez MD   CONSENT: The patient/patient's POA/next of kin was informed of the nature of the proposed procedure. The purposes, alternatives, risks, and benefits were explained and discussed. All questions were answered and consent was obtained.     SEDATION: Moderate conscious IV sedation services (supervision of  administration, induction, and maintenance) were provided by the physician performing the procedure with intravenous fentanyl and versed for  minutes. The physician was assisted by an independent trained observer, an interventional radiology nurse, in the continuous monitoring of patient level of consciousness and physiologic status.   MEDICATION/CONTRAST: No additional   TIME OUT: A time out was performed immediately prior to procedure start with the interventional team, correctly identifying the patient name, date of birth, MRN, procedure, anatomy (including marking of site and side), patient position, procedure consent form, relevant laboratory and imaging test results, antibiotic administration, safety precautions, and procedure-specific equipment needs.   COMPLICATIONS: No immediate adverse events identified.   FINDINGS: In the prone position, the patient was positioned on the angiography table. The cutaneous tissues of the  left flank were prepared and draped in usual sterile manner. Screening evaluation of the  left kidney was performed for direct ultrasound guidance.  A posterior calyx in the inferior pole of the kidney was identified and targeted. The optimal access site and tract were locally anesthetized with subcutaneous Lidocaine 1% instillation. Utilizing direct ultrasound guidance, the targeted calyx was accessed with a 22-gauge Chiba needle. After confirmation of location, a digital spot ultrasound image was acquired.   There was return of  purulent urine through the access needle. The collecting system was decompressed with aspiration.  A urine sample was collected and sent for laboratory analysis. The calyceal system and central renal pelvis were minimally distended with dilute contrast to facilitate antegrade access and fluoroscopic guidance. An antegrade pyelogram was performed.   ANTEGRADE PYELOGRAM FINDINGS: Half-strength contrast was injected into the renal collecting system and demonstrated  patency without leak. Opacification of the central renal pelvis was observed demonstrating hydronephrosis.   PERCUTANEOUS NEPHROSTOMY DRAINAGE CATHETER PLACEMENT: A 018 Thornton-Mandrill guidewire was inserted through the access needle. Utilizing guidewire manipulation, access into the Field  proximal ureter was obtained. With the guidewire left in place to secure access, a 7-Zambian coaxial dilator sheath system was placed. The sheath was advanced into the central renal pelvis. A 035  stiff Glide guidewire was inserted through the access sheath into the  proximal ureter to secure access.   There was subsequent placement of  an 8-Zambian nephrostomy drainage catheter over the 035 guidewire. The pigtail was formed in the central renal pelvis.  Diluted contrast injection was performed to confirm optimal location. The external portions of the catheter were secured with sterile suture and dressing.   The patient tolerated the procedure without complication.       1.  Uncomplicated and technically successful  8-Zambian percutaneous nephrostomy tube placement -  right kidney.  The catheter was connected to external gravity drainage. 2. Return and identification of  purulent urine.     Performed and dictated at [facility].   MACRO: None   Signed by: Pk Hernandez 7/11/2025 2:30 PM Dictation workstation:   [id number]    Electrocardiogram, 12-lead PRN ACS symptoms  Result Date: 7/11/2025  Sinus bradycardia with sinus arrhythmia Left axis deviation Abnormal ECG No previous ECGs available Confirmed by Doyle Russell ([id number]) on 7/11/2025 11:32:19 AM    CT abdomen pelvis wo IV contrast  Result Date: 7/9/2025  Interpreted By:  Latisha Green, STUDY: CT ABDOMEN PELVIS WO IV CONTRAST;  7/9/2025 9:36 am   INDICATION: Signs/Symptoms:roland with hx of bladder cancer s/p cystectomy with ureteroileal conduit creation and right simple nephrectomy.     COMPARISON: CT abdomen pelvis without contrast dated  06/04/2025.   ACCESSION NUMBER(S): [id number]   ORDERING CLINICIAN: CED ROD   TECHNIQUE: CT of the abdomen and pelvis was performed without administration of intravenous contrast. Contiguous axial images were obtained at 3 mm slice thickness through the abdomen and pelvis. Coronal and sagittal reconstructions at 3 mm slice thickness were performed.   FINDINGS: Please note that the evaluation of vessels, lymph nodes and organs is limited without intravenous contrast.   LOWER CHEST: There is trace left pleural effusion, new compared to prior CT examination. Otherwise visualized lung bases are clear within limits of background motion artifacts. Visualized heart is mildly enlarged. Dense atherosclerotic calcifications of the coronary arteries are noted within limits of evaluation. There is suggestion of tiny hiatal hernia.   ABDOMEN:   LIVER: Liver is normal in size and morphology within limits of noncontrast technique. No obvious focal mass lesion is noted.   BILE DUCTS: The intrahepatic and extrahepatic ducts are not dilated.   GALLBLADDER: Gallbladder is surgically absent.   PANCREAS: The pancreas appears unremarkable without evidence of ductal dilatation or masses.   SPLEEN: The spleen is normal in size.   ADRENAL GLANDS: Bilateral adrenal glands appear normal.   KIDNEYS AND URETERS AND BLADDER: There is interval descent of previously noted left-sided renal calculus which is now located in the proximal left ureter at the pelvic ureteral junction measuring up to 1.1 x 1 x 0.7 cm in CC, transverse and AP dimensions respectively. There is moderate left-sided hydronephrosis. There is interval evidence of diffuse perinephric fat stranding, significantly increased compared to immediate prior CT examination dated 06/04/2025 and is most likely favored to be related to pelvic/ureteral obstruction. There is also moderate diffuse dilatation of the left ureter beyond the level of calculus extending to the level of  right lower quadrant demonstrating mild periureteral fat stranding. However no additional calculi are noted in the expected course of the rest of the left ureter. There is interval removal of left-sided ureteral stent compared to prior CT examination. Postsurgical changes of right nephrectomy and cystectomy with a ileal conduit in the right lower quadrant are again noted. The right renal fossa appears grossly unremarkable.   PELVIS:   REPRODUCTIVE ORGANS: No pelvic mass lesion is noted.   BOWEL: There is suggestion of tiny hiatal hernia. Stomach is moderately distended. There is layering intraluminal hyperdensity within the gastric fundus and body, which could be related to ingested medication. Postsurgical changes of ileal conduit are noted in the right lower quadrant. Otherwise small bowel loops normal in caliber without segmental distention to suggest obstruction. Large bowel demonstrates small volume dense stool/contrast. There are few scattered diverticula in the sigmoid colon. Appendix is visualized and appears unremarkable.   VESSELS: Moderate atherosclerotic calcifications of the abdominal aorta are noted with mild aneurysmal dilatation of the infrarenal segment measuring up to 3.1 cm in AP dimension. IVC appears grossly unremarkable.   PERITONEUM/RETROPERITONEUM/LYMPH NODES: There is no free or loculated fluid collection, no free intraperitoneal air. The retroperitoneum appears normal.  No evidence of enlarged lymphadenopathy in the abdomen and pelvis within limits of noncontrast technique.   ABDOMINAL WALL: The abdominal wall soft tissues appear normal.   BONES: No definite CT evidence of suspicious osseous lesions. Moderate discogenic degenerative changes of the lower lumbar spine are again noted with moderate to severe loss of disc space height and prominent posterior disc osteophyte complex at L5-S1 level. There is persistent evidence of mild retrolisthesis of L5 over S1 vertebral body.       1.   Interval evidence of obstructing calculus at the left pelviureteral junction measuring up to 1.1 x 1 x 0.7 cm in CC, transverse and AP dimensions respectively with associated moderate left-sided hydronephrosis. There is interval increase in the degree of left-sided hydronephrosis compared to prior CT examination dated 06/04/2025. 2. Postsurgical changes of right nephrectomy, cystectomy and ileal conduit in the right lower quadrant are noted. There is mild dilatation of the majority of the left ureter distal to the obstructing calculus throughout its course extending to the level of right lower quadrant with associated periureteral fat stranding. This could be related to a degree of stricture/stenosis in the distal left ureter at the level of ileal conduit. Recommend clinical correlation and further evaluation as clinically warranted. 3. Moderate atherosclerotic vascular calcifications of the abdominal aorta with mild fusiform aneurysmal dilatation in the infrarenal segment measuring up to 3.1 cm in AP dimension. 4. Suggestion of tiny hiatal hernia. 5. Sigmoid colonic diverticulosis without acute diverticulitis. 6. Other stable findings as described above.   MACRO: Dr. Latisha Green discussed the significance and urgency of this critical finding by [employer] secure chat with Ms CED ROD on 7/9/2025 at 10:51 am.  (**-RCF-**) Findings:  See findings.       Signed by: Latisha Green 7/9/2025 10:52 AM Dictation workstation:   [id number]    NM kidney flow function wo pharmacological intervention  Result Date: 7/9/2025  Interpreted By:  Chinmay Paul,  Brent Plummer STUDY: NM KIDNEY FLOW/FUNCTION WO PHARMACOLOGICAL INTERVENTION;  7/9/2025 9:34 am   INDICATION: Signs/Symptoms:ileal conduit with HARMAN on CKD.  Chronic renal insufficiency     COMPARISON: None.   ACCESSION NUMBER(S): [id number]   ORDERING CLINICIAN: TOMASZ VICTORIA   TECHNIQUE: DIVISION OF NUCLEAR MEDICINE RENAL SCAN, QUANTITATIVE   Status- Post  right simple nephrectomy The patient received an intravenous dose of  10 mCi of Tc-99m MAG3. Perfusion and sequential static images of the left kidney was then acquired over the next 30 minutes .  Following that, a post-void view was obtained.  Computer quantification of renal function was performed.   FINDINGS:   PERFUSION (0-1min):  Angiographic phase demonstrates borderline perfusion to the left kidney with plateauing of the slope.   CORTICAL ACCUMULATION (1-3mins): There is evidence of borderline maximal cortical accumulation of radiotracer without excretion into the renal collecting system.       Borderline perfusion Borderline maximal cortical radiotracer accumulation. Retention of radiotracer within the kidney without evidence of excretion. Findings are suggestive of significant kidney injury. Differentials include significant chronic renal insufficiency versus acute tubular necrosis.   MACRO: None   Signed by: Chinmay Paul 7/9/2025 10:28 AM Dictation workstation:   [id number]    US renal complete  Result Date: 7/9/2025  Interpreted By:  Latisha Green, STUDY: US RENAL COMPLETE;  7/8/2025 10:46 am   INDICATION: Signs/Symptoms:HARMAN on CKD.     COMPARISON: CT abdomen pelvis without contrast dated 06/04/2025.   ACCESSION NUMBER(S): [id number]   ORDERING CLINICIAN: BERNARD ROSENTHAL   TECHNIQUE: Multiple images of the left kidney were obtained with grayscale and color Doppler technique.   FINDINGS: Evaluation is slightly limited due to patient's clinical status and difficulty positioning.   RIGHT KIDNEY: Surgically absent.   LEFT KIDNEY: The left kidney measures 13.1 cm in length. The renal cortical echogenicity and thickness are within normal limits. There is suggestion of a curvilinear hyperechoic focus along the inferior pole of left kidney measuring up to 1.4 cm in length. The shadowing and twinkle artifact could not be demonstrated due to limitation in the examination. This may correlate to the left renal  calculus as seen on immediate prior CT examination dated 06/04/2025. There is moderate left-sided hydronephrosis with dilatation of calices and pelvis.   BLADDER: Urinary bladder is surgically absent.       1. Moderate left-sided hydronephrosis with suggestion of a 1.4 cm nonobstructing calculus in the inferior pole. 2. Status post right nephrectomy. 3. Status post cystectomy.   MACRO: None   Signed by: Latisha Green 7/9/2025 9:44 AM Dictation workstation:   [id number]      Assessment/Plan   SIRS-triggered with tachycardia, hypotension, resolved  History of bladder cancer, s/p cystectomy, ileal conduit  Right nephrectomy  History of obstructing stone-s/p left nephrostomy    Recent positive culture from left nephrostomy grew candida albicans  History of urine culture with pan sensitive E. Coli   Recent Acute kidney injury, resolving        IV cefepime-transition to IV zosyn  Received dose Po fluconazole   Received IV vancomycin x 1 dose   Follow up urine culture from ileal conduit  Follow up blood culture  Monitor temp and wbc  Urology follow up   Supportive care  Discussed with Dr. Fong     Long term plan po Augmentin and fluconazole for total 14 days -potential interaction with trazodone-fluconazole may increase trazodone effects-monitor therapy-discussed with primary team -they will get EKG, consider alternative dosing/and or alternative medication and  monitor       SHERRY Venegas-CNP       [1]   Family History  Problem Relation Name Age of Onset    Ovarian cancer Mother Yin Podarlynneo     Cancer Mother Yin Palacios     Other (asbestosis) Father Katdayanara Christoneo     Cancer Father Katdayanara Suzanne     Diabetes Paternal Grandfather Deniz Palacios    [2]   Medications Prior to Admission   Medication Sig Dispense Refill Last Dose/Taking    atorvastatin (Lipitor) 40 mg tablet TAKE ONE TABLET BY MOUTH DAILY 90 tablet 3 7/16/2025 Morning    Basaglar KwikPen U-100 Insulin 100 unit/mL (3 mL) pen Use  "up to 30 units daily (Patient taking differently: Inject 20 Units under the skin once daily at bedtime.) 30 mL 2 Past Week    levocetirizine (Xyzal) 5 mg tablet Take 1 tablet (5 mg) by mouth once daily in the evening.   7/15/2025 Evening    montelukast (Singulair) 10 mg tablet Take 1 tablet (10 mg) by mouth once daily.   7/16/2025 Morning    pantoprazole (ProtoNix) 40 mg EC tablet TAKE ONE TABLET BY MOUTH EVERY DAY (Patient taking differently: Take 1 tablet (40 mg) by mouth once daily.) 90 tablet 3 7/15/2025 Morning    pen needle, diabetic (BD Luann 2nd Gen Pen Needle) 32 gauge x 5/32\" needle use DAILY 100 each 1 Past Week    traZODone (Desyrel) 50 mg tablet Take 1-2 tablets ( mg) by mouth once daily. (Patient taking differently: Take 1 tablet (50 mg) by mouth once daily at bedtime.) 180 tablet 3 7/15/2025 Evening   [3] atorvastatin, 40 mg, oral, Daily  cefepime, 500 mg, intravenous, q24h  cetirizine, 10 mg, oral, Daily  heparin, 5,000 Units, subcutaneous, q12h  insulin lispro, 0-5 Units, subcutaneous, TID AC  pantoprazole, 20 mg, oral, Daily  traZODone, 50 mg, oral, Nightly    [4] sodium chloride 0.9%, 125 mL/hr, Last Rate: 125 mL/hr (07/17/25 1050)    [5] PRN medications: acetaminophen, dextrose, dextrose, glucagon, glucagon    "

## 2025-07-17 NOTE — PROGRESS NOTES
Physical Therapy    Physical Therapy Evaluation & Treatment    Patient Name: Rodrigue Palacios  MRN: [id number]  Department: [address]  Room: [address] [place] [address]  Today's Date: 7/17/2025   Time Calculation  Start Time: 0835  Stop Time: 0908  Time Calculation (min): 33 min    Assessment/Plan   PT Assessment  PT Assessment Results: Decreased strength, Decreased endurance, Impaired balance, Decreased mobility, Decreased coordination, Impaired vision, Impaired hearing, Impaired sensation, Decreased skin integrity  Rehab Prognosis: Good  Barriers to Discharge Home: No anticipated barriers  Evaluation/Treatment Tolerance: Patient tolerated treatment well  Medical Staff Made Aware: Yes  Strengths: Ability to acquire knowledge, Attitude of self, Premorbid level of function  Barriers to Participation: Comorbidities  End of Session Communication: Bedside nurse  Assessment Comment: Pt moving at supervised level, likely slightly below baseline w/ recent medical issues but immproving. Pt would benefit from continued PT services while  in house to progress functional activity in preparation for return home to [facility].  End of Session Patient Position: Bed, 2 rail up, Alarm off, not on at start of session (nurse aware)   IP OR SWING BED PT PLAN  Inpatient or Swing Bed: Inpatient  PT Plan  Treatment/Interventions: Bed mobility, Transfer training, Gait training, Balance training, Strengthening, Endurance training, Therapeutic exercise, Therapeutic activity  PT Plan: Ongoing PT  PT Frequency: 2 times per week  PT Discharge Recommendations: Low intensity level of continued care  PT Recommended Transfer Status: Stand by assist  PT - OK to Discharge: Yes      Subjective     PT Visit Info:  PT Received On: 07/17/25  General Visit Information:  General  Reason for Referral: impaired mobility, hypotension due to hypovolemia  Referred By: Dr العلي  Past Medical History Relevant to Rehab: bladder cancer, s/p cystectomy, Rt nephrectomy, ureteroileal  conduit, Lt nephrostomy catheter 7/10/25, asthma, CKD, RA, hearing loss, HTN, UTI, visual impairment, Rt shoulder sx  Family/Caregiver Present: No  Prior to Session Communication: Bedside nurse  Patient Position Received: Bed, 2 rail up, Alarm off, not on at start of session  Preferred Learning Style: verbal, visual  General Comment: Pt is a 69 y.o. male adm from [facility] (for upcoming lithotripsy) w/ tachycardia, hypotension, possible UTI, c/o weakness, decreased p.o.. Pt was recently discharged from hospital after having obstructing ureteral stone, requiring stent placement, and low blood sugar. Pt was cleared for PT eval and agreeable.  Home Living:  Home Living  Type of Home: House  Lives With: Adult children (son)  Home Adaptive Equipment: Walker rolling or standard, Cane, Reacher  Home Layout: Multi-level, Laundry in basement, Bed/bath upstairs, Stairs to alternate level with rails, Full bath main level  Alternate Level Stairs-Rails:  (one half way for both upstairs and basement)  Alternate Level Stairs-Number of Steps: ~12 for both upstairs and basement  Home Access: Stairs to enter without rails  Entrance Stairs-Rails: None  Entrance Stairs-Number of Steps: 3-4  Bathroom Shower/Tub: Tub/shower unit, Walk-in shower (typically uses walk-in)  Bathroom Toilet: Standard  Prior Level of Function:  Prior Function Per Pt/Caregiver Report  Level of [place]: Independent with ADLs and functional transfers, Independent with homemaking with ambulation  Receives Help From: Family  ADL Assistance: Independent  Homemaking Assistance: Independent  Ambulatory Assistance: Independent  Prior Function Comments: Pt was IND w/o AD, drives, denied falls in past 6 months, sleeps in a regular bed  Precautions:  Precautions  Hearing/Visual Limitations: hearing aid, visual impairment per EMR  Medical Precautions: Fall precautions  Precautions Comment: reviewed and encouraged logroll technique for in/out of bed w/ recent procedures.      Date/Time Vitals Session Patient Position Pulse Resp SpO2 BP MAP (mmHg)    07/17/25 0835 During PT  Lying  91  --  96 %  --  --      Vital Signs Comment: back to bed post amb    Objective   Pain:  Pain Assessment  Pain Assessment: 0-10  Cognition:  Cognition  Overall Cognitive Status: Within Functional Limits  Following Commands: Follows all commands and directions without difficulty  Cognition Comments: pleasant, cooperative    General Assessments:     Activity Tolerance  Endurance:  (Fair)  Activity Tolerance Comments: Fair (slight fatigue, preferred back to bed after therapy, encouraged up w/ assist, time in chair. Pt reports cannot sit too long before back starts hurting, chair uncomfortable. Waffle cushion placed in chair w/ brief pt trial)    Sensation  Sensation Comment: pt reports slight numbness Lt thigh since Lt nephrostomy procedure    Strength  Strength Comments: BLEs 3/5 or >  Coordination  Coordination Comment: decreased speed, fair     Dynamic Standing Balance  Dynamic Standing-Balance Support: No upper extremity supported  Dynamic Standing-Level of Assistance: Close supervision  Dynamic Standing-Balance: Turning  Dynamic Standing-Comments: Fair-/Fair (mild unsteadiness, able to self correct w/o LOB)  Functional Assessments:  Bed Mobility  Bed Mobility: Yes  Bed Mobility 1  Bed Mobility 1: Supine to sitting, Sitting to supine  Level of Assistance 1: Independent  Bed Mobility Comments 1: to Lt EOB w/ HOB elevated  Bed Mobility 2  Bed Mobility  2: Supine to sitting, Rolling right, Log roll, Sitting to supine  Level of Assistance 2: Distant supervision, Minimal verbal cues  Bed Mobility Comments 2: to Lt EOB w/ HOB flat, cues for logroll technique w/ partial performance w/ transfer to sitting; did not use w/ return to supine; pt moves easily, denies complaints    Transfers  Transfer: Yes  Transfer 1  Technique 1: Sit to stand, Stand to sit  Transfer Level of Assistance 1: Distant supervision,  Independent  Trials/Comments 1: from/to EOB x 2, to/from chair w/ arms w/o difficulty    Ambulation/Gait Training  Ambulation/Gait Training Performed: Yes  Ambulation/Gait Training 1  Surface 1: Level tile  Device 1: No device  Assistance 1: Close supervision  Comments/Distance (ft) 1: Pt amb ~ 250' x 1, 12' x 2, slow to fair pace, mild unsteadiness, pt able to self correct w/o actual LOB. Pt denied any dizziness. Mild fatigue.    Stairs  Stairs: Yes  Stairs  Rails 1: Left  Device 1: No device, Railing  Assistance 1: Close supervision  Comment/Number of Steps 1: up/down 4 steps w/ step through pattern, steady, no dizziness.  Extremity/Trunk Assessments:  RLE   RLE : Within Functional Limits (grossly through observation w/ mild generalized functional  decrease in strength)  LLE   LLE : Within Functional Limits (grossly through observation w/ mild generalized functional decrease in strength)  Treatments:  Bed Mobility  Bed Mobility: Yes  Bed Mobility 1  Bed Mobility 1: Supine to sitting, Sitting to supine  Level of Assistance 1: Independent  Bed Mobility Comments 1: to Lt EOB w/ HOB elevated  Bed Mobility 2  Bed Mobility  2: Supine to sitting, Rolling right, Log roll, Sitting to supine  Level of Assistance 2: Distant supervision, Minimal verbal cues  Bed Mobility Comments 2: to Lt EOB w/ HOB flat, cues for logroll technique w/ partial performance w/ transfer to sitting; did not use w/ return to supine; pt moves easily, denies complaints    Ambulation/Gait Training  Ambulation/Gait Training Performed: Yes  Ambulation/Gait Training 1  Surface 1: Level tile  Device 1: No device  Assistance 1: Close supervision  Comments/Distance (ft) 1: Pt amb ~ 250' x 1, 12' x 2, slow to fair pace, mild unsteadiness, pt able to self correct w/o actual LOB. Pt denied any dizziness. Mild fatigue.  Stairs  Stairs: Yes  Stairs  Rails 1: Left  Device 1: No device, Railing  Assistance 1: Close supervision  Comment/Number of Steps 1: up/down 4  steps w/ step through pattern, steady, no dizziness.  Outcome Measures:  [facility] Basic Mobility  Turning from your back to your side while in a flat bed without using bedrails: None  Moving from lying on your back to sitting on the side of a flat bed without using bedrails: None  Moving to and from bed to chair (including a wheelchair): A little  Standing up from a chair using your arms (e.g. wheelchair or bedside chair): A little  To walk in hospital room: A little  Climbing 3-5 steps with railing: A little  Basic Mobility - Total Score: 20    Encounter Problems       Encounter Problems (Active)       Balance       Pt will score > 24 on Tinetti balance assessment to indicate low fall risk (Progressing)       Start:  07/17/25    Expected End:  07/25/25               Mobility       STG - Patient will ambulate 400' distant supervision/IND w/o LOB (Progressing)       Start:  07/17/25    Expected End:  07/25/25               PT Transfers       STG - Patient to transfer to and from sit to supine IND w/ understanding of logroll technique (Progressing)       Start:  07/17/25    Expected End:  07/25/25            STG - Patient will transfer sit to and from stand IND w/o LOB (Progressing)       Start:  07/17/25    Expected End:  07/25/25                   Education Documentation  Precautions, taught by Gisela Pinon, PT at 7/17/2025 10:29 AM.  Learner: Patient  Readiness: Acceptance  Method: Explanation, Demonstration  Response: Verbalizes Understanding    Mobility Training, taught by Gisela Pinon, PT at 7/17/2025 10:29 AM.  Learner: Patient  Readiness: Acceptance  Method: Explanation, Demonstration  Response: Verbalizes Understanding    Education Comments  No comments found.

## 2025-07-19 LAB
BACTERIA UR CULT: ABNORMAL
BACTERIA UR CULT: ABNORMAL

## 2025-07-20 LAB
BACTERIA BLD CULT: NORMAL
BACTERIA BLD CULT: NORMAL

## 2025-07-21 ENCOUNTER — HOME CARE VISIT (OUTPATIENT)
Dept: HOME HEALTH SERVICES | Facility: HOME HEALTH | Age: 70
End: 2025-07-21

## 2025-07-21 DIAGNOSIS — N17.9 AKI (ACUTE KIDNEY INJURY): ICD-10-CM

## 2025-07-21 ASSESSMENT — ENCOUNTER SYMPTOMS
PAIN: 1
LOWEST PAIN SEVERITY IN PAST 24 HOURS: 1/10
PERSON REPORTING PAIN: PATIENT
PAIN LOCATION: ABDOMEN
SUBJECTIVE PAIN PROGRESSION: GRADUALLY IMPROVING
PAIN LOCATION - PAIN SEVERITY: 1/10
PAIN SEVERITY GOAL: 0/10
HIGHEST PAIN SEVERITY IN PAST 24 HOURS: 2/10

## 2025-07-21 NOTE — Clinical Note
I was forced to make this visit a Non Billable visit since SN Admit had not taken place.  It took place several days later so this visit and its contents had to be abandoned.

## 2025-07-21 NOTE — HOME HEALTH
Hospital stays for 6 to 7 days, then home x 10 days then back to hospital for another 7 days.  Removed bladder and right kidney.  This past week they said there is another kidney stone in left kidney.  Tube in left kidney, 7 30 25 to get kidney stone blasted.  Right now I have an infection.  BP was low and blood surgar level was 40s.  Blood sugar has been good since then.  Currently has a urinary catheter for drainage.  Ostomy bag on right side.  Used to work at [employer] as a , walked 1,000 miles per year.   2w1, 1w3.    TUG 15 seconds.    Walking within the house, 250 feet with no assistive device.  Up the 14 indoor steps with right rail that only extends up about 7 steps.  There is no rail near the top 7 steps.    Patient provided with written copy of standing exercises of  1.  Hip flexion  2.  Hip abduction  3.  Hip extension  4.  Knee flexion       for up to 20 repetitions, 3 sessions a day.  Alternatively patient can perform each of these for 2 minutes duration.  He performed that at the kitchen counter top while I timed him with my phone.  He tolerated about 1 minute.

## 2025-07-22 DIAGNOSIS — E11.69 TYPE 2 DIABETES MELLITUS WITH OTHER SPECIFIED COMPLICATION, WITH LONG-TERM CURRENT USE OF INSULIN: Primary | ICD-10-CM

## 2025-07-22 DIAGNOSIS — Z79.4 TYPE 2 DIABETES MELLITUS WITH OTHER SPECIFIED COMPLICATION, WITH LONG-TERM CURRENT USE OF INSULIN: Primary | ICD-10-CM

## 2025-07-24 ENCOUNTER — HOME CARE VISIT (OUTPATIENT)
Dept: HOME HEALTH SERVICES | Facility: HOME HEALTH | Age: 70
End: 2025-07-24
Payer: MEDICARE

## 2025-07-24 ASSESSMENT — ENCOUNTER SYMPTOMS
DIZZINESS: 1
SKIN LESIONS: 1
LIMITED RANGE OF MOTION: 1
HIGHEST PAIN SEVERITY IN PAST 24 HOURS: 2/10
FATIGUES EASILY: 1
PAIN LOCATION: ABDOMEN
DYSPNEA ON EXERTION: 1
DEPRESSION: 0
PAIN LOCATION - PAIN QUALITY: ACHY
LOWEST PAIN SEVERITY IN PAST 24 HOURS: 0/10
PAIN LOCATION - PAIN DURATION: SHORT
PAIN LOCATION - PAIN FREQUENCY: INFREQUENT
AGGRESSION WITHIN DEFINED LIMITS: 1
MUSCLE WEAKNESS: 1
PAIN LOCATION - PAIN SEVERITY: 2/10
OCCASIONAL FEELINGS OF UNSTEADINESS: 0
PAIN LOCATION - PAIN QUALITY: ACHY
LOSS OF SENSATION IN FEET: 0
PAIN LOCATION: BACK
APPETITE LEVEL: FAIR
PAIN: 1
PAIN LOCATION - PAIN SEVERITY: 2/10
ANGER WITHIN DEFINED LIMITS: 1
PAIN LOCATION - EXACERBATING FACTORS: NOTHING
PAIN LOCATION - PAIN FREQUENCY: INFREQUENT
SUBJECTIVE PAIN PROGRESSION: GRADUALLY IMPROVING
PERSON REPORTING PAIN: PATIENT
PAIN LOCATION - PAIN DURATION: SHORT
SLEEP QUALITY: FAIR
FATIGUE: 1
PAIN SEVERITY GOAL: 0/10

## 2025-07-24 ASSESSMENT — ACTIVITIES OF DAILY LIVING (ADL)
AMBULATION ASSISTANCE: 1
LIGHT HOUSEKEEPING: DEPENDENT
SHOPPING: NEEDS ASSISTANCE
ORAL_CARE_CURRENT_FUNCTION: INDEPENDENT
LAUNDRY ASSESSED: 1
HOUSEKEEPING ASSESSED: 1
TELEPHONE USE ASSESSED: 1
FEEDING ASSESSED: 1
CURRENT_FUNCTION: INDEPENDENT
DRESSING_LB_CURRENT_FUNCTION: INDEPENDENT
PREPARING MEALS: NEEDS ASSISTANCE
TOILETING: INDEPENDENT
FEEDING: INDEPENDENT
GROOMING_CURRENT_FUNCTION: INDEPENDENT
LAUNDRY: NEEDS ASSISTANCE
ORAL_CARE_ASSESSED: 1
DRESSING_UB_CURRENT_FUNCTION: INDEPENDENT
USING THE TELPHONE: INDEPENDENT
OASIS_M1830: 02
BATHING_CURRENT_FUNCTION: INDEPENDENT
ENTERING_EXITING_HOME: SUPERVISION
BATHING ASSESSED: 1
TOILETING: 1
AMBULATION ASSISTANCE: INDEPENDENT
GROOMING ASSESSED: 1
TRANSPORTATION ASSESSED: 1
PHYSICAL TRANSFERS ASSESSED: 1
SHOPPING ASSESSED: 1
TRANSPORTATION: NEEDS ASSISTANCE

## 2025-07-24 ASSESSMENT — LIFESTYLE VARIABLES: SMOKING_STATUS: NEVER SMOKED

## 2025-07-25 ENCOUNTER — HOME CARE VISIT (OUTPATIENT)
Dept: HOME HEALTH SERVICES | Facility: HOME HEALTH | Age: 70
End: 2025-07-25
Payer: MEDICARE

## 2025-07-25 ENCOUNTER — TELEMEDICINE (OUTPATIENT)
Dept: PHARMACY | Facility: HOSPITAL | Age: 70
End: 2025-07-25
Payer: MEDICARE

## 2025-07-25 DIAGNOSIS — E11.69 TYPE 2 DIABETES MELLITUS WITH OTHER SPECIFIED COMPLICATION, WITH LONG-TERM CURRENT USE OF INSULIN: Primary | ICD-10-CM

## 2025-07-25 DIAGNOSIS — Z79.4 TYPE 2 DIABETES MELLITUS WITH OTHER SPECIFIED COMPLICATION, WITH LONG-TERM CURRENT USE OF INSULIN: Primary | ICD-10-CM

## 2025-07-25 PROCEDURE — G0151 HHCP-SERV OF PT,EA 15 MIN: HCPCS | Mod: HHH

## 2025-07-25 ASSESSMENT — ENCOUNTER SYMPTOMS
HIGHEST PAIN SEVERITY IN PAST 24 HOURS: 1/10
PERSON REPORTING PAIN: PATIENT
LOWEST PAIN SEVERITY IN PAST 24 HOURS: 0/10
PAIN LOCATION - PAIN SEVERITY: 1/10
PAIN SEVERITY GOAL: 0/10
SUBJECTIVE PAIN PROGRESSION: GRADUALLY IMPROVING
PAIN: 1
PAIN LOCATION: BACK

## 2025-07-25 ASSESSMENT — ACTIVITIES OF DAILY LIVING (ADL)
AMBULATION ASSISTANCE: MINIMUM ASSIST
AMBULATION ASSISTANCE: 1

## 2025-07-25 NOTE — HOME HEALTH
No significant news.  No pain.  Nurse came out, rebandaged drainage port.  I have an appointment Monday with Surgeon [phone] 28 25.  Next month he goes over to [facility].  Hospital stays for 6 to 7 days, then home x 10 days then back to hospital for another 7 days. Removed bladder and right kidney. This past week they said there is another kidney stone in left kidney. Tube in left kidney, 7 30 25 to get kidney stone blasted. Right now I have an infection. BP was low and blood surgar level was 40s. Blood sugar has been good since then. Currently has a urinary catheter for drainage. Ostomy bag on right side. Used to work at [employer] as a , walked 1,000 miles per year. 2w1, 1w3.     TUG 13 seconds.    Patient challenged to walk as far as he is able.  He responded by taking no device, walked about 300 feet outdoors, backyard on uneven surfaces.    Walking within the house, 250 feet with no assistive device. Up the 14 indoor steps with right rail that only extends up about 7 steps. There is no rail near the top 7 steps.     Patient provided with written copy of   standing exercises of   1. Hip flexion   2. Hip abduction   3. Hip extension   4. Knee flexion for up to 20 repetitions, 3 sessions a day.         Alternatively patient can perform each of these for 2 minutes duration. He performed that at the kitchen counter top while I timed him with my phone. He tolerated about 1 minute.

## 2025-07-25 NOTE — PROGRESS NOTES
Pharmacy Post-Discharge Visit    Rodrigue Palacios is a 69 y.o. male was referred to Clinical Pharmacy Team to complete a post-discharge medication optimization and monitoring visit.  The patient was referred for their Diabetes and Medication Problem.    Pt is here for First appointment.   Referring Provider: Daxa Farnsworth APRN-*  PCP: SHERRY Blanco-CNP, last visit: 04/16/25, next visit: 10/16/25    Admission Date: 07/16/25  Discharge Date: 07/17/25  Discharge Diagnosis: hypotension due to hypovolemia    Subjective   HPI  PMH significant for T2DM with history of hypoglycemia, bladder cancer (dx 01/2025), CKD, SCOOTER  Special needs/barriers to therapy: None identified    Medication System Management  Patients preferred pharmacy: [id number] [place], [place]  Adherence/Organization: No current concerns  Affordability/Accessibility: No current concerns    Drug Interactions  No relevant drug interactions were noted.    DIABETES MELLITUS TYPE 2:    Diagnosed with diabetes:  8 years ago.   Known diabetic complications: CKD.  Does patient follow with Endocrinology: Yes  Last optometry exam: 10/04/23  Most recent visit in Podiatry: N/A     Current diabetic medications include:  Basaglar U-100 insulin - inject 10 units under the skin once daily at bedtime     Adverse Effects: none reported    Past diabetic medications include:   Metformin - CKD  Glimepiride - hypoglycemia, CKD  Pioglitazone - bladder cancer    Farxiga - cost  Trulicity - cost    Glucose Readings:  Glucometer/CGM Type: Freestyle Mateus 2    Current home BG readings (mg/dL): 100-150s throughout the day, regularly stays below 200s     Any episodes of hypoglycemia? No, has not reported any episodes of hypoglycemia since stopping glimepiride.  Did patient treat episode of hypoglycemia appropriately? N/A  Does the patient have a prescription for ready-to-use Glucagon? N/a    Secondary Prevention:  Statin? Yes  ACE-I/ARB? No  Aspirin? No    Objective  "  Allergies[1]  Social History     Social History Narrative    Not on file      Medication Review  Current Outpatient Medications   Medication Instructions    acetaminophen (TYLENOL) 1,000 mg, 4 times daily PRN    amoxicillin-clavulanate (Augmentin) 875-125 mg tablet 1 tablet, oral, 2 times daily    atorvastatin (LIPITOR) 40 mg, oral, Daily    Basaglar KwikPen U-100 Insulin 100 unit/mL (3 mL) pen Use up to 30 units daily    fluconazole (Diflucan) 200 mg tablet Take 1 tablet (200 mg) by mouth once daily for 13 days    levocetirizine (XYZAL) 5 mg, Every evening    montelukast (SINGULAIR) 10 mg, Daily    ondansetron (ZOFRAN) 4 mg, Every 8 hours PRN    pantoprazole (ProtoNix) 40 mg EC tablet TAKE ONE TABLET BY MOUTH EVERY DAY    pen needle, diabetic (BD Luann 2nd Gen Pen Needle) 32 gauge x 5/32\" needle use DAILY    traZODone (DESYREL) 50 mg, oral, Nightly      Vitals  BP Readings from Last 2 Encounters:   07/17/25 160/90   07/16/25 86/68     Labs  A1C  Lab Results   Component Value Date    HGBA1C 7.0 (H) 07/08/2025    HGBA1C 6.9 (A) 04/24/2025    HGBA1C 7.2 (H) 03/17/2025     BMP  Lab Results   Component Value Date    CALCIUM 8.6 07/17/2025     07/17/2025    K 3.7 07/17/2025    CO2 23 07/17/2025     (H) 07/17/2025    BUN 36 (H) 07/17/2025    CREATININE 1.94 (H) 07/17/2025    EGFR 37 (L) 07/17/2025     LFTs  Lab Results   Component Value Date    ALT 26 07/16/2025    AST 14 07/16/2025    ALKPHOS 88 07/16/2025    BILITOT 0.5 07/16/2025     FLP  Lab Results   Component Value Date    TRIG 279 (H) 03/08/2022    CHOL 174 03/08/2022    LDLF 95 07/24/2020    LDLCALC 80 03/08/2022    HDL 38 (L) 03/08/2022     Urine Microalbumin  Lab Results   Component Value Date    MICROALBCREA 15.5 03/08/2022     Wt Readings from Last 3 Encounters:   07/24/25 80.7 kg (178 lb)   07/16/25 80.7 kg (178 lb)   07/16/25 80.7 kg (178 lb)      BMI Readings from Last 1 Encounters:   07/24/25 22.25 kg/m²       Assessment/Plan   Problem List " Items Addressed This Visit          Endocrine/Metabolic    Type 2 diabetes mellitus - Primary       Medication review completed and outlined below. All patient questions and concerns addressed at this time. Patient had no concerns regarding his medication therapy.     Med Review:  Reports using Basaglar U-100 - inject 10 units under the skin once daily    Prescribed to use up to 30 units daily   Reports using levocetirizine and montelukast more seasonally  Completing Augmentin, fluconazole from hospital discharge     Monitoring and Education:  All questions and concerns addressed. Contact pharmacist with any further questions or concerns prior to next appointment.    Clinical Pharmacist follow-up: as needed  Patient is not followed in CCM.    Thank you,  Soni Casper, PharmD  Clinical Pharmacy Specialist  [phone]    Continue all meds under the continuation of care with the referring provider and clinical pharmacy team.  Verbal consent to manage patient's drug therapy was obtained from the patient. They were informed they may decline to participate or withdraw from participation in pharmacy services at any time.       [1]   Allergies  Allergen Reactions    Orencia [Abatacept] Hives

## 2025-07-25 NOTE — Clinical Note
Physical Therapy Evaluation completed today, 7 25 25.    No significant news.  No pain.  Nurse came out, rebandaged drainage port.  I have an appointment Monday with Surgeon 7 28 25.  Next month he goes over to [facility].  Hospital stays for 6 to 7 days, then home x 10 days then back to hospital for another 7 days. Removed bladder and right kidney. This past week they said there is another kidney stone in left kidney. Tube in left kidney, 7 30 25 to get kidney stone blasted. Right now I have an infection. BP was low and blood surgar level was 40s. Blood sugar has been good since then. Currently has a urinary catheter for drainage. Ostomy bag on right side. Used to work at [employer] as a , walked 1,000 miles per year. 2w1, 1w3.     TUG 13 seconds.    Patient challenged to walk as far as he is able.  He responded by taking no device, walked about 300 feet outdoors, backyard on uneven surfaces.    Walking within the house, 250 feet with no assistive device. Up the 14 indoor steps with right rail that only extends up about 7 steps. There is no rail near the top 7 steps.     Patient provided with written copy of   standing exercises of   1. Hip flexion   2. Hip abduction   3. Hip extension   4. Knee flexion for up to 20 repetitions, 3 sessions a day.         Alternatively patient can perform each of these for 2 minutes duration. He performed that at the kitchen counter top while I timed him with my phone. He tolerated about 1 minute.

## 2025-07-26 NOTE — PROGRESS NOTES
Subjective   Patient ID: Rodrigue Palacios is a 69 y.o. male.      HPI  69 y.o. male presents for a follow up visit. He is status post cystectomy with ureteroileal conduit creation and right simple nephrectomy on 05/21/2025. He returns to review surgical pathology from his procedure.     The patient presented to the [place] ER with confusion and encephalopathy due to hypoglycemia on 07/09/2025. Initial workup detailed acute kidney injury. CT abdomen and pelvis detailed a interval evidence of obstructing calculus at the left pelvic ureteral junction. He was transferred to [facility] for nephrostomy tube placement. He underwent left nephrostomy tube placement. After a few days in the hospital, his kidney function improved along with his blood glucose levels. He was started on an insulin sliding scale. He was discharged 07/13/2025 with home health care.     He is not currently prescribed any Aspirin.     CT ABDOMEN PELVIS WO IV CONTRAST;  7/16/2025  IMPRESSION:  1.  Interval placement of a left percutaneous nephrostomy catheter.  Pigtail loop appears to be formed in satisfactory position. The  hydronephrosis apparent previously has resolved. There is some  minimal residual perinephric infiltration of this is considerably  improved. The stone apparent in the proximal ureter on the left is no  longer seen.  2. Other findings are not substantially different than the prior exam  and are discussed above.    Component      Latest Ref Rng 7/11/2025 7/12/2025 7/13/2025   GLUCOSE      74 - 99 mg/dL 169 (H)  171 (H)  173 (H)    GLUCOSE       174 (H)      SODIUM      136 - 145 mmol/L 139  142  140    SODIUM       138      POTASSIUM      3.5 - 5.3 mmol/L 4.6  4.1  3.9    POTASSIUM       5.0      CHLORIDE      98 - 107 mmol/L 100  102  104    CHLORIDE       100      Bicarbonate      21 - 32 mmol/L 26  27  26    Bicarbonate       26      Anion Gap      10 - 20 mmol/L 18  17  14    Anion Gap       17      Blood Urea Nitrogen       6 - 23 mg/dL 82 (H)  69 (H)  57 (H)    Blood Urea Nitrogen       85 (H)      Creatinine      0.50 - 1.30 mg/dL 6.10 (H)  4.11 (H)  2.90 (H)    Creatinine       6.50 (H)      Calcium      8.6 - 10.3 mg/dL 9.4  9.4  9.4    Calcium       9.5      Albumin      3.4 - 5.0 g/dL 3.2 (L)  3.2 (L)  3.3 (L)    Albumin       3.3 (L)      EGFR      >60 mL/min/1.73m*2 9 (L)  15 (L)  23 (L)    EGFR       9 (L)      PHOSPHORUS      2.5 - 4.9 mg/dL 5.9 (H)  4.5  3.9    PHOSPHORUS       5.9 (H)         Legend:  (H) High  (L) Low    CT ABDOMEN PELVIS WO IV CONTRAST;  7/9/2025  IMPRESSION:  1.  Interval evidence of obstructing calculus at the left  pelviureteral junction measuring up to 1.1 x 1 x 0.7 cm in CC,  transverse and AP dimensions respectively with associated moderate  left-sided hydronephrosis. There is interval increase in the degree  of left-sided hydronephrosis compared to prior CT examination dated  06/04/2025.  2. Postsurgical changes of right nephrectomy, cystectomy and ileal  conduit in the right lower quadrant are noted. There is mild  dilatation of the majority of the left ureter distal to the  obstructing calculus throughout its course extending to the level of  right lower quadrant with associated periureteral fat stranding. This  could be related to a degree of stricture/stenosis in the distal left  ureter at the level of ileal conduit. Recommend clinical correlation  and further evaluation as clinically warranted.  3. Moderate atherosclerotic vascular calcifications of the abdominal  aorta with mild fusiform aneurysmal dilatation in the infrarenal  segment measuring up to 3.1 cm in AP dimension.  4. Suggestion of tiny hiatal hernia.  5. Sigmoid colonic diverticulosis without acute diverticulitis.  6. Other stable findings as described above.    Surgical Pathology Exam collected on 05/21/2025:   A. Lymph node, bilateral pelvic, lymph node dissection:  -- Thirteen lymph nodes, negative for carcinoma (0/13)     B.  Urinary bladder and prostate, cystoprostatectomy:  Urinary bladder:  -- Extensive urothelial carcinoma in situ involving left lateral wall, dome, right lateral wall and trigone  -- Changes consistent with previous procedure  -- See cancer summary report     Prostate:  -- Urothelial carcinoma in situ involving prostatic urethra  -- Prostate with mild benign prostatic hyperplasia and chronic inflammation  -- See cancer summary report     C. Kidney, right, nephrectomy:  -- Nephrolithiasis with hydroureter and hydronephrosis  -- Renal parenchyma with marked, predominantly subcapsular glomerulosclerosis and chronic inflammation and marked arteriosclerosis  -- Perinephric hematoma     D. Ureter, left, segmental resection:  -- Ureter, negative for carcinoma      Review of Systems  A complete review of systems was performed. All systems are noted to be negative unless indicated in the history of present illness, impression, active problem list, or past histories.     Objective   Physical Exam    Assessment/Plan   Diagnoses and all orders for this visit:  Left renal stone  Malignant neoplasm of urinary bladder, unspecified site (Multi)  -     MR urogram; Future  -     CT chest wo IV contrast; Future  -     Basic Metabolic Panel; Future  -     CBC; Future      69 y.o. male presents for a follow up visit. He is status post cystectomy with ureteroileal conduit creation and right simple nephrectomy on 05/21/2025. He returns to review surgical pathology from his procedure. He was recently discharged from the hospital due to an HARMAN and hypoglycemia.     I personally reviewed the renal function panel that detailed a creatinine of 1.94. I have advised the patient that he needs to continue to monitor his renal function. If his kidney fails, he will require dialysis.     I have reviewed with the patient that if at any point the nephrostomy tube is complete removed or no urine is evident within the urostomy bag, he needs to present to  ED immediately.     I have personally reviewed the patient's CT abdomen and pelvis that was obtained on 07/16/2025. The results indicate that the hydronephrosis that was previously evident, has resolved. The stone that was apparent in the proximal ureter on the left is no longer seen. It has returned to the kidney.     I have personally reviewed the patient's surgical pathology examination from 05/21/2025. Due to the pathology report, I recommend that the patient undergo surveillance scans every 6 months. His next surveillance scans will be ordered for 11/2025. I have recommended that the patient have laboratory blood draw in addition to the imaging study. I have ordered a CBC and BMP.     Plan:  CBC, BMP   CT chest 11/2025 for surveillance of bladder cancer  MR urogram 11/2025 for surveillance of bladder cancer  Plan for ESWL soon with Dr. Eden  FUV with Dr. Ureña in 01/2026    Scribe Attestation   By signing my name below, I, Jayshree Stout, Jonelibabhay attestation that this documentation has been prepared under the direction and in the presence of James Borrero MD.

## 2025-07-27 VITALS
RESPIRATION RATE: 18 BRPM | HEART RATE: 86 BPM | TEMPERATURE: 98.3 F | HEIGHT: 75 IN | SYSTOLIC BLOOD PRESSURE: 122 MMHG | BODY MASS INDEX: 22.13 KG/M2 | WEIGHT: 178 LBS | DIASTOLIC BLOOD PRESSURE: 56 MMHG

## 2025-07-28 ENCOUNTER — APPOINTMENT (OUTPATIENT)
Dept: UROLOGY | Facility: CLINIC | Age: 70
End: 2025-07-28
Payer: MEDICARE

## 2025-07-28 DIAGNOSIS — C67.9 MALIGNANT NEOPLASM OF URINARY BLADDER, UNSPECIFIED SITE (MULTI): ICD-10-CM

## 2025-07-28 DIAGNOSIS — N20.0 LEFT RENAL STONE: Primary | ICD-10-CM

## 2025-07-28 PROCEDURE — 3044F HG A1C LEVEL LT 7.0%: CPT | Performed by: STUDENT IN AN ORGANIZED HEALTH CARE EDUCATION/TRAINING PROGRAM

## 2025-07-28 PROCEDURE — 1111F DSCHRG MED/CURRENT MED MERGE: CPT | Performed by: STUDENT IN AN ORGANIZED HEALTH CARE EDUCATION/TRAINING PROGRAM

## 2025-07-28 PROCEDURE — 99214 OFFICE O/P EST MOD 30 MIN: CPT | Performed by: STUDENT IN AN ORGANIZED HEALTH CARE EDUCATION/TRAINING PROGRAM

## 2025-07-30 ENCOUNTER — ANESTHESIA (OUTPATIENT)
Dept: OPERATING ROOM | Facility: HOSPITAL | Age: 70
End: 2025-07-30
Payer: MEDICARE

## 2025-07-30 ENCOUNTER — HOSPITAL ENCOUNTER (OUTPATIENT)
Facility: HOSPITAL | Age: 70
Setting detail: OUTPATIENT SURGERY
Discharge: HOME | End: 2025-07-30
Attending: UROLOGY | Admitting: UROLOGY
Payer: MEDICARE

## 2025-07-30 ENCOUNTER — ANESTHESIA EVENT (OUTPATIENT)
Dept: OPERATING ROOM | Facility: HOSPITAL | Age: 70
End: 2025-07-30
Payer: MEDICARE

## 2025-07-30 VITALS
WEIGHT: 185.41 LBS | OXYGEN SATURATION: 97 % | DIASTOLIC BLOOD PRESSURE: 83 MMHG | RESPIRATION RATE: 16 BRPM | HEIGHT: 75 IN | SYSTOLIC BLOOD PRESSURE: 158 MMHG | BODY MASS INDEX: 23.05 KG/M2 | HEART RATE: 62 BPM | TEMPERATURE: 97.5 F

## 2025-07-30 DIAGNOSIS — N20.0 LEFT RENAL STONE: ICD-10-CM

## 2025-07-30 DIAGNOSIS — N20.0 KIDNEY CALCULI: Primary | ICD-10-CM

## 2025-07-30 PROBLEM — N18.9 CHRONIC RENAL INSUFFICIENCY: Status: ACTIVE | Noted: 2025-07-30

## 2025-07-30 LAB
ATRIAL RATE: 79 BPM
GLUCOSE BLD MANUAL STRIP-MCNC: 172 MG/DL (ref 74–99)
P AXIS: 54 DEGREES
P OFFSET: 188 MS
P ONSET: 124 MS
PR INTERVAL: 166 MS
Q ONSET: 207 MS
QRS COUNT: 13 BEATS
QRS DURATION: 108 MS
QT INTERVAL: 380 MS
QTC CALCULATION(BAZETT): 435 MS
QTC FREDERICIA: 416 MS
R AXIS: -28 DEGREES
T AXIS: 58 DEGREES
T OFFSET: 397 MS
VENTRICULAR RATE: 79 BPM

## 2025-07-30 PROCEDURE — A50590 PR FRAGMENT KIDNEY STONE/ ESWL: Performed by: ANESTHESIOLOGY

## 2025-07-30 PROCEDURE — 3600000003 HC OR TIME - INITIAL BASE CHARGE - PROCEDURE LEVEL THREE: Performed by: UROLOGY

## 2025-07-30 PROCEDURE — 82947 ASSAY GLUCOSE BLOOD QUANT: CPT

## 2025-07-30 PROCEDURE — 3700000001 HC GENERAL ANESTHESIA TIME - INITIAL BASE CHARGE: Performed by: UROLOGY

## 2025-07-30 PROCEDURE — 2500000004 HC RX 250 GENERAL PHARMACY W/ HCPCS (ALT 636 FOR OP/ED): Performed by: NURSE ANESTHETIST, CERTIFIED REGISTERED

## 2025-07-30 PROCEDURE — 7100000009 HC PHASE TWO TIME - INITIAL BASE CHARGE: Performed by: UROLOGY

## 2025-07-30 PROCEDURE — 3600000008 HC OR TIME - EACH INCREMENTAL 1 MINUTE - PROCEDURE LEVEL THREE: Performed by: UROLOGY

## 2025-07-30 PROCEDURE — 7100000010 HC PHASE TWO TIME - EACH INCREMENTAL 1 MINUTE: Performed by: UROLOGY

## 2025-07-30 PROCEDURE — 7100000002 HC RECOVERY ROOM TIME - EACH INCREMENTAL 1 MINUTE: Performed by: UROLOGY

## 2025-07-30 PROCEDURE — 3700000002 HC GENERAL ANESTHESIA TIME - EACH INCREMENTAL 1 MINUTE: Performed by: UROLOGY

## 2025-07-30 PROCEDURE — A50590 PR FRAGMENT KIDNEY STONE/ ESWL: Performed by: NURSE ANESTHETIST, CERTIFIED REGISTERED

## 2025-07-30 PROCEDURE — 50590 FRAGMENTING OF KIDNEY STONE: CPT | Performed by: UROLOGY

## 2025-07-30 PROCEDURE — 7100000001 HC RECOVERY ROOM TIME - INITIAL BASE CHARGE: Performed by: UROLOGY

## 2025-07-30 PROCEDURE — 2500000004 HC RX 250 GENERAL PHARMACY W/ HCPCS (ALT 636 FOR OP/ED): Performed by: UROLOGY

## 2025-07-30 RX ORDER — PROPOFOL 10 MG/ML
INJECTION, EMULSION INTRAVENOUS AS NEEDED
Status: DISCONTINUED | OUTPATIENT
Start: 2025-07-30 | End: 2025-07-30

## 2025-07-30 RX ORDER — ONDANSETRON HYDROCHLORIDE 2 MG/ML
INJECTION, SOLUTION INTRAVENOUS AS NEEDED
Status: DISCONTINUED | OUTPATIENT
Start: 2025-07-30 | End: 2025-07-30

## 2025-07-30 RX ORDER — ALBUTEROL SULFATE 0.83 MG/ML
2.5 SOLUTION RESPIRATORY (INHALATION) ONCE AS NEEDED
Status: DISCONTINUED | OUTPATIENT
Start: 2025-07-30 | End: 2025-07-30 | Stop reason: HOSPADM

## 2025-07-30 RX ORDER — LIDOCAINE HYDROCHLORIDE 10 MG/ML
INJECTION, SOLUTION EPIDURAL; INFILTRATION; INTRACAUDAL; PERINEURAL AS NEEDED
Status: DISCONTINUED | OUTPATIENT
Start: 2025-07-30 | End: 2025-07-30

## 2025-07-30 RX ORDER — CIPROFLOXACIN 2 MG/ML
400 INJECTION, SOLUTION INTRAVENOUS ONCE
Status: COMPLETED | OUTPATIENT
Start: 2025-07-30 | End: 2025-07-30

## 2025-07-30 RX ORDER — FENTANYL CITRATE 50 UG/ML
INJECTION, SOLUTION INTRAMUSCULAR; INTRAVENOUS AS NEEDED
Status: DISCONTINUED | OUTPATIENT
Start: 2025-07-30 | End: 2025-07-30

## 2025-07-30 RX ORDER — SODIUM CHLORIDE, SODIUM LACTATE, POTASSIUM CHLORIDE, CALCIUM CHLORIDE 600; 310; 30; 20 MG/100ML; MG/100ML; MG/100ML; MG/100ML
INJECTION, SOLUTION INTRAVENOUS CONTINUOUS PRN
Status: DISCONTINUED | OUTPATIENT
Start: 2025-07-30 | End: 2025-07-30

## 2025-07-30 RX ORDER — HYDROMORPHONE HYDROCHLORIDE 0.2 MG/ML
0.1 INJECTION INTRAMUSCULAR; INTRAVENOUS; SUBCUTANEOUS EVERY 5 MIN PRN
Status: DISCONTINUED | OUTPATIENT
Start: 2025-07-30 | End: 2025-07-30 | Stop reason: HOSPADM

## 2025-07-30 RX ORDER — TRAMADOL HYDROCHLORIDE 50 MG/1
50 TABLET, FILM COATED ORAL EVERY 8 HOURS PRN
Qty: 10 TABLET | Refills: 0 | Status: SHIPPED | OUTPATIENT
Start: 2025-07-30

## 2025-07-30 RX ORDER — HYDRALAZINE HYDROCHLORIDE 20 MG/ML
5 INJECTION INTRAMUSCULAR; INTRAVENOUS EVERY 30 MIN PRN
Status: DISCONTINUED | OUTPATIENT
Start: 2025-07-30 | End: 2025-07-30 | Stop reason: HOSPADM

## 2025-07-30 RX ORDER — LIDOCAINE HYDROCHLORIDE 10 MG/ML
0.1 INJECTION, SOLUTION EPIDURAL; INFILTRATION; INTRACAUDAL; PERINEURAL ONCE
Status: DISCONTINUED | OUTPATIENT
Start: 2025-07-30 | End: 2025-07-30 | Stop reason: HOSPADM

## 2025-07-30 RX ORDER — HYDROMORPHONE HYDROCHLORIDE 0.2 MG/ML
0.2 INJECTION INTRAMUSCULAR; INTRAVENOUS; SUBCUTANEOUS EVERY 5 MIN PRN
Status: DISCONTINUED | OUTPATIENT
Start: 2025-07-30 | End: 2025-07-30 | Stop reason: HOSPADM

## 2025-07-30 RX ORDER — SODIUM CHLORIDE, SODIUM LACTATE, POTASSIUM CHLORIDE, CALCIUM CHLORIDE 600; 310; 30; 20 MG/100ML; MG/100ML; MG/100ML; MG/100ML
100 INJECTION, SOLUTION INTRAVENOUS CONTINUOUS
Status: DISCONTINUED | OUTPATIENT
Start: 2025-07-30 | End: 2025-07-30 | Stop reason: HOSPADM

## 2025-07-30 RX ORDER — ONDANSETRON HYDROCHLORIDE 2 MG/ML
4 INJECTION, SOLUTION INTRAVENOUS ONCE AS NEEDED
Status: DISCONTINUED | OUTPATIENT
Start: 2025-07-30 | End: 2025-07-30 | Stop reason: HOSPADM

## 2025-07-30 RX ADMIN — LIDOCAINE HYDROCHLORIDE 5 ML: 10 INJECTION, SOLUTION EPIDURAL; INFILTRATION; INTRACAUDAL; PERINEURAL at 07:58

## 2025-07-30 RX ADMIN — CIPROFLOXACIN 400 MG: 2 INJECTION, SOLUTION INTRAVENOUS at 07:54

## 2025-07-30 RX ADMIN — FENTANYL CITRATE 25 MCG: 50 INJECTION, SOLUTION INTRAMUSCULAR; INTRAVENOUS at 08:21

## 2025-07-30 RX ADMIN — SODIUM CHLORIDE, POTASSIUM CHLORIDE, SODIUM LACTATE AND CALCIUM CHLORIDE: 600; 310; 30; 20 INJECTION, SOLUTION INTRAVENOUS at 07:54

## 2025-07-30 RX ADMIN — FENTANYL CITRATE 50 MCG: 50 INJECTION, SOLUTION INTRAMUSCULAR; INTRAVENOUS at 07:57

## 2025-07-30 RX ADMIN — ONDANSETRON 4 MG: 2 INJECTION, SOLUTION INTRAMUSCULAR; INTRAVENOUS at 08:26

## 2025-07-30 RX ADMIN — PROPOFOL 200 MG: 10 INJECTION, EMULSION INTRAVENOUS at 07:58

## 2025-07-30 SDOH — HEALTH STABILITY: MENTAL HEALTH: CURRENT SMOKER: 0

## 2025-07-30 ASSESSMENT — PAIN SCALES - GENERAL
PAINLEVEL_OUTOF10: 0 - NO PAIN

## 2025-07-30 ASSESSMENT — PAIN - FUNCTIONAL ASSESSMENT
PAIN_FUNCTIONAL_ASSESSMENT: 0-10

## 2025-07-30 NOTE — DISCHARGE INSTRUCTIONS
Follow up in 3 weeks.    Call or go to emergency department with a fever.  You may eat a regular diet  You may shower.  Avoid strenuous activity for 24 hours    Contact information:  Urology main number: [phone]  Urology : [phone]  Urology nurse: [phone]  Dr. Eden [place] for emergency: [phone]

## 2025-07-30 NOTE — ANESTHESIA PREPROCEDURE EVALUATION
Patient: Rodrigue Palacios    Procedure Information       Date/Time: 07/30/25 0805    Procedure: LITHOTRIPSY, ESWL (ESWL REP INDER YANEZ) (Left)    Location: [facility] / [facility]    Surgeons: Sachin Eden MD        Medical History[1]     Relevant Problems   Cardiac   (+) Benign essential hypertension   (+) Heart murmur   (+) High cholesterol   (+) Hypercholesterolemia   (+) Murmur      Pulmonary   (+) SCOOTER (obstructive sleep apnea)      GI   (+) GERD (gastroesophageal reflux disease)   (+) Gastroesophageal reflux disease      /Renal   (+) Acute cystitis without hematuria   (+) Calculus, renal   (+) Chronic renal insufficiency   (+) Kidney calculi   (+) Left renal stone      Endocrine   (+) Type 2 diabetes mellitus      Musculoskeletal   (+) RA (rheumatoid arthritis)   (+) Rheumatoid arthritis with rheumatoid factor of multiple sites without organ or systems involvement (Multi)      ID   (+) Oral candidiasis     Surgical History[2]   Clinical information reviewed:   Tobacco   Meds   Med Hx  Surg Hx   Fam Hx          NPO Detail:  No data recorded     Physical Exam    Airway  Mallampati: III  TM distance: >3 FB  Neck ROM: full     Cardiovascular    Dental    Pulmonary    Abdominal            Anesthesia Plan    History of general anesthesia?: yes  History of complications of general anesthesia?: no    ASA 3     general     The patient is not a current smoker.  Patient was not previously instructed to abstain from smoking on day of procedure.  Patient did not smoke on day of procedure.    intravenous induction   Postoperative pain plan includes opioids.  Anesthetic plan and risks discussed with patient.    Plan discussed with attending.           [1]   Past Medical History:  Diagnosis Date    Allergic     Arthritis     rhuematoid    Asthma     Bladder cancer (Multi)     CKD (chronic kidney disease)     Dorsalgia, unspecified 07/28/2020    Back pain without radiation    GERD (gastroesophageal reflux  disease)     controlled    Hearing aid worn     Heart murmur     HL (hearing loss)     Hyperlipidemia     Hypertension     Local infection of the skin and subcutaneous tissue, unspecified 10/11/2013    Nonvenomous insect bite with infection    Nephrolithiasis     Other muscle spasm 02/14/2018    Muscle spasms of neck    Other specified diseases of anus and rectum 03/22/2013    Anal pain    Personal history of other diseases of the digestive system 09/18/2013    History of gastroenteritis    Personal history of other diseases of the respiratory system 04/27/2020    History of acute bronchitis    Personal history of other diseases of the respiratory system 06/24/2019    History of upper respiratory infection    Personal history of other infectious and parasitic diseases     History of candidiasis of mouth    Personal history of other specified conditions 03/22/2013    History of abnormal weight loss    Personal history of other specified conditions 02/24/2014    History of numbness    Personal history of other specified conditions 07/14/2017    History of abdominal pain    Pneumonia, unspecified organism 01/25/2016    Pneumonia involving right lung    Rash and other nonspecific skin eruption 04/29/2013    Rash    Sleep apnea     CPAP    Type 2 diabetes mellitus     Urinary tract infection     Visual impairment    [2]   Past Surgical History:  Procedure Laterality Date    BLADDER SURGERY  2025    Removal malignant tumor    CHOLECYSTECTOMY  07/07/2016    Cholecystectomy    HERNIA REPAIR  07/07/2016    Inguinal Hernia Repair    SHOULDER SURGERY Right

## 2025-07-30 NOTE — ANESTHESIA POSTPROCEDURE EVALUATION
Patient: Rodrigue Palacios    Procedure Summary       Date: 07/30/25 Room / Location: [facility]    Anesthesia Start: 0754 Anesthesia Stop: 0844    Procedure: LITHOTRIPSY, ESWL (ESWL REP INDER YANEZ) (Left) Diagnosis:       Left renal stone      (Left renal stone [N20.0])    Surgeons: Sachin Eden MD Responsible Provider: Matty Rico MD    Anesthesia Type: general ASA Status: 3            Anesthesia Type: general    Vitals Value Taken Time   /79 07/30/25 09:04   Temp 36 °C (96.8 °F) 07/30/25 08:40   Pulse 69 07/30/25 09:04   Resp 16 07/30/25 09:04   SpO2 98 % 07/30/25 09:04       Anesthesia Post Evaluation    Patient location during evaluation: PACU  Patient participation: complete - patient participated  Level of consciousness: awake and alert  Pain management: adequate  Airway patency: patent  Cardiovascular status: acceptable  Respiratory status: acceptable  Hydration status: acceptable  Postoperative Nausea and Vomiting: none        There were no known notable events for this encounter.

## 2025-07-30 NOTE — NURSING NOTE
Patient in Phase 2; dressed and up to chair with RN assist. Tolerating po fluids, no complaint of pain and no complaint of nausea. Pt dressed himself, all discharge instructions done.     Family at bedside; discussed discharge instructions with patient and Family. All questions at this time answered.     Discharge instructions provided using teachback method.  Patient's health-related risk factors discussed with patient.  Patient educated to look for worsening signs and symptoms and educated to seek medical attention if experiencing medical emergency.  Patient aware of needs to follow up with outpatient clinics as scheduled. Home going meds reviewed with patient.  Patient verbalized understanding of disposition and discharge instructions.  All questions answered to patient's satisfaction and within nursing scope of practice.    Patient clinically appropriate for discharge. Vitals stable/baseline.IV removed and patient transported to discharge area via wheelchair.

## 2025-07-30 NOTE — ANESTHESIA PROCEDURE NOTES
Airway  Date/Time: 7/30/2025 8:01 AM  Reason: elective    Airway not difficult    Staffing  Performed: CRNA   Authorized by: Matty Rico MD    Performed by: MARGARITA Barraza  Patient location during procedure: OR    Patient Condition  Indications for airway management: anesthesia  Patient position: sniffing  MILS maintained throughout  Sedation level: deep     Final Airway Details   Preoxygenated: yes  Final airway type: supraglottic airway  Successful airway:   Size: 4  Number of attempts at approach: 1

## 2025-07-30 NOTE — OP NOTE
LITHOTRIPSY, ESWL (ESWL REP INDER YANEZ) (L) Operative Note     Date: 2025  OR Location: [facility]    Name: Rodrigue Palacios, : 1955, Age: 69 y.o., MRN: [id number], Sex: male    Diagnosis  Pre-op Diagnosis      * Left renal stone [N20.0] Post-op Diagnosis     * Left renal stone [N20.0]     Procedures  LITHOTRIPSY, ESWL (ESWL REP INDER YANEZ)  [id number] - [place] LITHOTRIPSY XTRCORP SHOCK WAVE      Surgeons      * Sachin Eden - Primary    Resident/Fellow/Other Assistant:  Surgeons and Role:  * No surgeons found with a matching role *    Staff:   Circulator: Fatemeh    Anesthesia Staff: Anesthesiologist: Matty Rico MD  CRNA: SHERRY Barraza-CRNA    Procedure Summary  Anesthesia: General  ASA: III  Estimated Blood Loss: 0 mL  Intra-op Medications:   Administrations occurring from 08 to 09 on 25:   Medication Name Total Dose   fentaNYL (Sublimaze) injection 50 mcg/mL 25 mcg   ondansetron (Zofran) 2 mg/mL injection 4 mg              Anesthesia Record               Intraprocedure I/O Totals       None           Specimen: No specimens collected                Drains and/or Catheters: None    Findings: Stone fragmented well    Indications: Rodirgue Palacios is an 69 y.o. male who is having surgery for Left renal stone [N20.0].  He has a history of cystectomy and has a left nephrostomy tube with a left renal stone.  He presents today for shockwave lithotripsy of.  Risks including infection, bleeding, injury to the kidney, obstruction from fragments, and need for further procedures were reviewed.  Written consent was obtained.    Procedure Details: He was taken to the operating room and given general anesthesia.  He received preoperative Cipro.  He has been on Augmentin.  Fluoroscopy was used to target the stone which.  Near the curl of the nephrostomy tube.  Total of 3000 shocks were delivered and there appeared to be excellent fragmentation of the stone.  He tolerated the procedure and was  transferred to the recovery room in stable condition.    He will follow-up in the office with a KUB.  We will consider nephrostomy removal at that time.    Complications:  None; patient tolerated the procedure well.              Sachin Eden  Phone Number: [phone]

## 2025-07-31 ENCOUNTER — HOME CARE VISIT (OUTPATIENT)
Dept: HOME HEALTH SERVICES | Facility: HOME HEALTH | Age: 70
End: 2025-07-31
Payer: MEDICARE

## 2025-07-31 ENCOUNTER — APPOINTMENT (OUTPATIENT)
Facility: CLINIC | Age: 70
End: 2025-07-31
Payer: MEDICARE

## 2025-07-31 VITALS
DIASTOLIC BLOOD PRESSURE: 64 MMHG | TEMPERATURE: 99 F | RESPIRATION RATE: 18 BRPM | SYSTOLIC BLOOD PRESSURE: 100 MMHG | HEART RATE: 86 BPM | OXYGEN SATURATION: 96 %

## 2025-07-31 PROCEDURE — G0151 HHCP-SERV OF PT,EA 15 MIN: HCPCS | Mod: HHH

## 2025-07-31 PROCEDURE — G0299 HHS/HOSPICE OF RN EA 15 MIN: HCPCS | Mod: HHH

## 2025-07-31 ASSESSMENT — ACTIVITIES OF DAILY LIVING (ADL)
AMBULATION ASSISTANCE: 1
AMBULATION ASSISTANCE: MINIMUM ASSIST

## 2025-07-31 ASSESSMENT — ENCOUNTER SYMPTOMS
CHANGE IN APPETITE: INCREASED
PAIN LOCATION - RELIEVING FACTORS: HOME MEDICATION
PAIN LOCATION - EXACERBATING FACTORS: NO
FATIGUE: 1
PAIN LOCATION - PAIN FREQUENCY: CONSTANT
PAIN LOCATION - PAIN DURATION: NEW ONSET
LOWEST PAIN SEVERITY IN PAST 24 HOURS: 1/10
PAIN LOCATION - PAIN SEVERITY: 2/10
MUSCLE WEAKNESS: 1
LAST BOWEL MOVEMENT: 67415
LOWEST PAIN SEVERITY IN PAST 24 HOURS: 1/10
PAIN LOCATION: ABDOMEN
PAIN LOCATION - PAIN SEVERITY: 2/10
PAIN: 1
HIGHEST PAIN SEVERITY IN PAST 24 HOURS: 2/10
LIMITED RANGE OF MOTION: 1
APPETITE LEVEL: GOOD
PERSON REPORTING PAIN: PATIENT
HIGHEST PAIN SEVERITY IN PAST 24 HOURS: 2/10
FATIGUES EASILY: 1
PAIN SEVERITY GOAL: 0/10
DIZZINESS: 1
PAIN: 1
PERSON REPORTING PAIN: PATIENT
SUBJECTIVE PAIN PROGRESSION: GRADUALLY IMPROVING
PAIN LOCATION - PAIN QUALITY: DULL
PAIN LOCATION: ABDOMEN

## 2025-07-31 NOTE — HOME HEALTH
Yesterday had the kidney stone treatment, lithotripsy.  I am sore today.    No significant news. No pain. Nurse came out, rebandaged drainage port. I have an appointment Monday with Surgeon 7 28 25. Next month he goes over to [facility]. Hospital stays for 6 to 7 days, then home x 10 days then back to hospital for another 7 days. Removed bladder and right kidney. This past week they said there is another kidney stone in left kidney. Tube in left kidney, 7 30 25 to get kidney stone blasted. Right now I have an infection. BP was low and blood surgar level was 40s. Blood sugar has been good since then. Currently has a urinary catheter for drainage. Ostomy bag on right side. Used to work at [employer] as a , walked 1,000 miles per year. 2w1, 1w3.     TUG 12 seconds.    Patient challenged to walk as far as he is able. He responded by taking no device, walked about 300 feet indoors and that included up and down the 14 indoor steps with right rail that only extends up about 7 steps between main floor and second floor of the house, taking 2 minutes, 30 seconds to complete it.   There is no rail near the top 7 steps.     Patient provided with written copy of standing exercises of   1. Hip flexion   2. Hip abduction   3. Hip extension   4. Knee flexion for up to 20 repetitions, 3 sessions a day.          Alternatively patient can perform each of these for 2 minutes duration. He performed that at the kitchen counter top while I timed him with my phone. He tolerated about 1 minute.

## 2025-07-31 NOTE — Clinical Note
Physical Therapy Discipline Discharge completed today.    Yesterday had the kidney stone treatment, lithotripsy.  I am sore today.    No significant news. No pain. Nurse came out, rebandaged drainage port. I have an appointment Monday with Surgeon [phone] 28 25. Next month he goes over to [facility]. Hospital stays for 6 to 7 days, then home x 10 days then back to hospital for another 7 days. Removed bladder and right kidney. This past week they said there is another kidney stone in left kidney. Tube in left kidney, 7 30 25 to get kidney stone blasted. Right now I have an infection. BP was low and blood surgar level was 40s. Blood sugar has been good since then. Currently has a urinary catheter for drainage. Ostomy bag on right side. Used to work at [employer] as a , walked 1,000 miles per year. 2w1, 1w3.     TUG 12 seconds.    Patient challenged to walk as far as he is able. He responded by taking no device, walked about 300 feet indoors and that included up and down the 14 indoor steps with right rail that only extends up about 7 steps between main floor and second floor of the house, taking 2 minutes, 30 seconds to complete it.   There is no rail near the top 7 steps.     Patient provided with written copy of standing exercises of   1. Hip flexion   2. Hip abduction   3. Hip extension   4. Knee flexion for up to 20 repetitions, 3 sessions a day.          Alternatively patient can perform each of these for 2 minutes duration. He performed that at the kitchen counter top while I timed him with my phone. He tolerated about 1 minute.

## 2025-08-01 DIAGNOSIS — N30.00 ACUTE CYSTITIS WITHOUT HEMATURIA: Primary | ICD-10-CM

## 2025-08-01 RX ORDER — AMOXICILLIN AND CLAVULANATE POTASSIUM 875; 125 MG/1; MG/1
1 TABLET, FILM COATED ORAL 2 TIMES DAILY
Qty: 26 TABLET | Refills: 0 | Status: SHIPPED | OUTPATIENT
Start: 2025-08-01 | End: 2025-08-14

## 2025-08-04 ENCOUNTER — TELEPHONE (OUTPATIENT)
Facility: CLINIC | Age: 70
End: 2025-08-04
Payer: MEDICARE

## 2025-08-05 ENCOUNTER — APPOINTMENT (OUTPATIENT)
Facility: CLINIC | Age: 70
End: 2025-08-05
Payer: MEDICARE

## 2025-08-05 NOTE — PROGRESS NOTES
HPI   68 yo with Diabetes 2 (dx in mid 50's), HTN, Dyslipidemia, angie (cpap), RA, bladder cancer s/p surgery (dx jan 2025), s/p R nephrectomy presents for followup. Last A1c-6.9%, today 7.4%. (wt went from 214 to 186 lbs since last visit)           Pt is testing sugars 4 times per day with libre2. Pt is having low sugars 0 times/week. Pt is following a carb controlled diet and knows reasonable carb allowances. Pt is able to afford their medications. Pt is exercising, walking a bit more, back has improved.           Pt taking, lantus pen 20 qhs.   -metformin held since prior to nephrectomy  Can't afford trulicity or dpp4-i, doesn't qualify for pt assistance.     15 day brandyn 2 data: 46% in range, 0% low, pattern: low 100's overnight into waking, 200's post breakfast, mid/upper 100's throught the day, bedtime often <100, still missing scans frequently.           Home bp 120/70's,   -no longer taking lisinopril 40mg and amlopidine 5 qd           Taking atorvastatin 40 mg for lipids and tolerating.       Thyroid:  June 2025: tsh-0.01/ft4-1.12  July 2025: tsh-0.01/ft4-1.06      Current Outpatient Medications:     acetaminophen (Tylenol) 500 mg tablet, Take 2 tablets (1,000 mg) by mouth 4 times a day as needed for mild pain (1 - 3). 500mg for mild pain   rating 1-4 1000mg for moderate pain rating 4-7 or fever greater than 38C, Disp: , Rfl:     amoxicillin-clavulanate (Augmentin) 875-125 mg tablet, Take 1 tablet by mouth 2 times a day for 13 days. continue for additional 13 days, Disp: 26 tablet, Rfl: 0    atorvastatin (Lipitor) 40 mg tablet, TAKE ONE TABLET BY MOUTH DAILY, Disp: 90 tablet, Rfl: 3    Basaglar KwikPen U-100 Insulin 100 unit/mL (3 mL) pen, Use up to 30 units daily (Patient taking differently: Inject 10 Units under the skin once daily at bedtime. PT HAS HIS OWN SCALE IN HIS BRAIN HE USES), Disp: 30 mL, Rfl: 2    levocetirizine (Xyzal) 5 mg tablet, Take 1 tablet (5 mg) by mouth once daily in the evening., Disp:  ", Rfl:     montelukast (Singulair) 10 mg tablet, Take 1 tablet (10 mg) by mouth once daily., Disp: , Rfl:     ondansetron (Zofran) 4 mg tablet, Take 1 tablet (4 mg) by mouth every 8 hours if needed for nausea or vomiting., Disp: , Rfl:     pantoprazole (ProtoNix) 40 mg EC tablet, TAKE ONE TABLET BY MOUTH EVERY DAY, Disp: 90 tablet, Rfl: 3    pen needle, diabetic (BD Luann 2nd Gen Pen Needle) 32 gauge x 5/32\" needle, use DAILY, Disp: 100 each, Rfl: 1    traMADol (Ultram) 50 mg tablet, Take 1 tablet (50 mg) by mouth every 8 hours if needed for severe pain (7 - 10)., Disp: 10 tablet, Rfl: 0    traZODone (Desyrel) 50 mg tablet, Take 1 tablet (50 mg) by mouth once daily at bedtime., Disp: , Rfl:       Allergies as of 08/06/2025 - Reviewed 08/06/2025   Allergen Reaction Noted    Orencia [abatacept] Hives 03/14/2023         Review of Systems   Cardiology: Lightheadedness-denies.  Chest pain-denies.  Leg edema-denies.  Palpitations-denies.  Respiratory: Cough-denies. Shortness of breath-denies.  Wheezing-denies.  Gastroenterology: Constipation-denies.  Diarrhea-denies.  Heartburn-denies.  Endocrinology: Cold intolerance-denies.  Heat intolerance-denies.  Sweats-denies.  Neurology: Headache-denies.  Tremor-denies.  Neuropathy in extremities-denies.  Psychology: Low energy-denies.  Irritability-denies.  Sleep disturbances-denies.      /70 (BP Location: Right arm, Patient Position: Sitting)   Pulse 102   Ht 1.854 m (6' 1\")   Wt 84.4 kg (186 lb)   BMI 24.54 kg/m²       Labs:  Lab Results   Component Value Date    WBC 10.5 07/17/2025    NRBC 0.0 07/17/2025    RBC 3.62 (L) 07/17/2025    HGB 9.8 (L) 07/17/2025    HCT 31.5 (L) 07/17/2025     07/17/2025     Lab Results   Component Value Date    CALCIUM 8.6 07/17/2025    AST 14 07/16/2025    ALKPHOS 88 07/16/2025    BILITOT 0.5 07/16/2025    PROT 7.2 07/16/2025    ALBUMIN 3.5 07/16/2025    GLOB 2.0 03/08/2022    AGR 2.1 03/08/2022     07/17/2025    K 3.7 " 07/17/2025     (H) 07/17/2025    CO2 23 07/17/2025    ANIONGAP 13 07/17/2025    BUN 36 (H) 07/17/2025    CREATININE 1.94 (H) 07/17/2025    UREACREAUR 4.2 07/08/2025    GLUCOSE 149 (H) 07/17/2025    ALT 26 07/16/2025    EGFR 37 (L) 07/17/2025     Lab Results   Component Value Date    CHOL 174 03/08/2022    TRIG 279 (H) 03/08/2022    HDL 38 (L) 03/08/2022    LDLCALC 80 03/08/2022     Lab Results   Component Value Date    MICROALBCREA 15.5 03/08/2022     Lab Results   Component Value Date    TSH 0.01 (L) 07/10/2025     Lab Results   Component Value Date    [id number] 786 03/08/2022     Lab Results   Component Value Date    HGBA1C 7.4 (A) 08/06/2025         Physical Exam   General Appearance: pleasant, cooperative, no acute distress  HEENT: no chemosis, no proptosis, no lid lag, no lid retraction  Neck: no lymphadenopathy, no thyromegaly, no dominant thyroid nodules  Heart: no murmurs, regular rate and rhythm, S1 and S2  Lungs: no wheezes, no rhonci, no rales  Extremities: no lower extremity swelling      Assessment/Plan   1. Type 2 diabetes mellitus with other specified complication, with long-term current use of insulin (Primary)  -A1c ordered and reviewed  -brandyn data reviewed  -labs reviewed, new labs ordered    -add tradjenta 5mg every day and lower lantus 10 units every day  -titrate lantus 2 units q 3 days for am sugars 100-150 range  -if tradjenta not covered continue to titrate lantus  -call to change brandyn 2 to 3 plus    2. Hypercholesterolemia  -on statin and tolerating,will follow    3. Benign essential hypertension  -at target, off therapy after wt loss, will follow     4. Low tsh  -repeat tsh/ft4/ft3/ab levels  -further management based on this    Follow Up:  pharmD 4 months    Medical Decision Making  Complexity of problem: Chronic illness of diabetes mellitus uncontrolled, progressing  Data analyzed and reviewed: Reviewed prior notes, blood glucose data, labs including HgbA1c, lipids, serum  chemistries.  Ordered tests.   Risk of complications and morbidities: Is definite because of use of insulin and risk of hypoglycemia.  Prescription medications reviewed and modifications made.  Compliance assessed.  Addressed social determinants of health including food insecurity.

## 2025-08-06 ENCOUNTER — HOME CARE VISIT (OUTPATIENT)
Dept: HOME HEALTH SERVICES | Facility: HOME HEALTH | Age: 70
End: 2025-08-06
Payer: MEDICARE

## 2025-08-06 ENCOUNTER — APPOINTMENT (OUTPATIENT)
Facility: CLINIC | Age: 70
End: 2025-08-06
Payer: MEDICARE

## 2025-08-06 VITALS
TEMPERATURE: 98.7 F | RESPIRATION RATE: 16 BRPM | DIASTOLIC BLOOD PRESSURE: 58 MMHG | HEART RATE: 99 BPM | SYSTOLIC BLOOD PRESSURE: 100 MMHG

## 2025-08-06 VITALS
SYSTOLIC BLOOD PRESSURE: 121 MMHG | DIASTOLIC BLOOD PRESSURE: 70 MMHG | HEART RATE: 102 BPM | WEIGHT: 186 LBS | BODY MASS INDEX: 24.65 KG/M2 | HEIGHT: 73 IN

## 2025-08-06 DIAGNOSIS — Z79.4 TYPE 2 DIABETES MELLITUS WITH OTHER SPECIFIED COMPLICATION, WITH LONG-TERM CURRENT USE OF INSULIN: Primary | ICD-10-CM

## 2025-08-06 DIAGNOSIS — E11.69 TYPE 2 DIABETES MELLITUS WITH OTHER SPECIFIED COMPLICATION, WITH LONG-TERM CURRENT USE OF INSULIN: Primary | ICD-10-CM

## 2025-08-06 DIAGNOSIS — E78.00 HYPERCHOLESTEROLEMIA: ICD-10-CM

## 2025-08-06 DIAGNOSIS — E05.90 HYPERTHYROIDISM: ICD-10-CM

## 2025-08-06 DIAGNOSIS — I10 BENIGN ESSENTIAL HYPERTENSION: ICD-10-CM

## 2025-08-06 LAB — POC HEMOGLOBIN A1C: 7.4 % (ref 4.2–6.5)

## 2025-08-06 PROCEDURE — 3074F SYST BP LT 130 MM HG: CPT | Performed by: INTERNAL MEDICINE

## 2025-08-06 PROCEDURE — 83036 HEMOGLOBIN GLYCOSYLATED A1C: CPT | Performed by: INTERNAL MEDICINE

## 2025-08-06 PROCEDURE — 3051F HG A1C>EQUAL 7.0%<8.0%: CPT | Performed by: INTERNAL MEDICINE

## 2025-08-06 PROCEDURE — 1036F TOBACCO NON-USER: CPT | Performed by: INTERNAL MEDICINE

## 2025-08-06 PROCEDURE — 99214 OFFICE O/P EST MOD 30 MIN: CPT | Performed by: INTERNAL MEDICINE

## 2025-08-06 PROCEDURE — 1159F MED LIST DOCD IN RCRD: CPT | Performed by: INTERNAL MEDICINE

## 2025-08-06 PROCEDURE — 3008F BODY MASS INDEX DOCD: CPT | Performed by: INTERNAL MEDICINE

## 2025-08-06 PROCEDURE — 1111F DSCHRG MED/CURRENT MED MERGE: CPT | Performed by: INTERNAL MEDICINE

## 2025-08-06 PROCEDURE — G0299 HHS/HOSPICE OF RN EA 15 MIN: HCPCS | Mod: HHH

## 2025-08-06 PROCEDURE — 1126F AMNT PAIN NOTED NONE PRSNT: CPT | Performed by: INTERNAL MEDICINE

## 2025-08-06 PROCEDURE — 3078F DIAST BP <80 MM HG: CPT | Performed by: INTERNAL MEDICINE

## 2025-08-06 ASSESSMENT — ENCOUNTER SYMPTOMS
PERSON REPORTING PAIN: PATIENT
OCCASIONAL FEELINGS OF UNSTEADINESS: 0
DIZZINESS: 1
LAST BOWEL MOVEMENT: 67422
FATIGUES EASILY: 1
LOSS OF SENSATION IN FEET: 0
DEPRESSION: 0
LIMITED RANGE OF MOTION: 1
DENIES PAIN: 1
APPETITE LEVEL: GOOD
FATIGUE: 1
MUSCLE WEAKNESS: 1
CHANGE IN APPETITE: INCREASED

## 2025-08-06 ASSESSMENT — PAIN SCALES - GENERAL: PAINLEVEL_OUTOF10: 0-NO PAIN

## 2025-08-07 ENCOUNTER — APPOINTMENT (OUTPATIENT)
Dept: SLEEP MEDICINE | Facility: CLINIC | Age: 70
End: 2025-08-07
Payer: MEDICARE

## 2025-08-14 ENCOUNTER — HOSPITAL ENCOUNTER (OUTPATIENT)
Dept: RADIOLOGY | Facility: HOSPITAL | Age: 70
Discharge: HOME | End: 2025-08-14
Payer: MEDICARE

## 2025-08-14 DIAGNOSIS — N20.0 LEFT RENAL STONE: ICD-10-CM

## 2025-08-14 PROCEDURE — 74018 RADEX ABDOMEN 1 VIEW: CPT

## 2025-08-15 ENCOUNTER — HOME CARE VISIT (OUTPATIENT)
Dept: HOME HEALTH SERVICES | Facility: HOME HEALTH | Age: 70
End: 2025-08-15
Payer: MEDICARE

## 2025-08-15 VITALS
DIASTOLIC BLOOD PRESSURE: 68 MMHG | RESPIRATION RATE: 18 BRPM | HEART RATE: 82 BPM | SYSTOLIC BLOOD PRESSURE: 122 MMHG | TEMPERATURE: 98.6 F

## 2025-08-15 PROCEDURE — G0299 HHS/HOSPICE OF RN EA 15 MIN: HCPCS | Mod: HHH

## 2025-08-15 ASSESSMENT — ENCOUNTER SYMPTOMS
MUSCLE WEAKNESS: 1
DENIES PAIN: 1
LIMITED RANGE OF MOTION: 1
LAST BOWEL MOVEMENT: 67422
APPETITE LEVEL: GOOD
PERSON REPORTING PAIN: PATIENT
CHANGE IN APPETITE: INCREASED

## 2025-08-17 LAB
ALBUMIN/CREAT UR: 358 MG/G CREAT
CHOLEST SERPL-MCNC: 186 MG/DL
CHOLEST/HDLC SERPL: 4.2 (CALC)
CREAT UR-MCNC: 112 MG/DL (ref 20–320)
HDLC SERPL-MCNC: 44 MG/DL
LDLC SERPL CALC-MCNC: 106 MG/DL (CALC)
MICROALBUMIN UR-MCNC: 40.1 MG/DL
NONHDLC SERPL-MCNC: 142 MG/DL (CALC)
T3FREE SERPL-MCNC: 3.4 PG/ML (ref 2.3–4.2)
T4 FREE SERPL-MCNC: 1.1 NG/DL (ref 0.8–1.8)
THYROPEROXIDASE AB SERPL-ACNC: 1 IU/ML
TRIGL SERPL-MCNC: 235 MG/DL
TSH BII SER-ACNC: <1 IU/L
TSH SERPL-ACNC: 0.49 MIU/L (ref 0.4–4.5)
TSI SER-ACNC: NORMAL

## 2025-08-18 ENCOUNTER — APPOINTMENT (OUTPATIENT)
Dept: UROLOGY | Facility: CLINIC | Age: 70
End: 2025-08-18
Payer: MEDICARE

## 2025-08-18 DIAGNOSIS — N20.0 LEFT RENAL STONE: ICD-10-CM

## 2025-08-18 LAB
ALBUMIN/CREAT UR: 358 MG/G CREAT
CHOLEST SERPL-MCNC: 186 MG/DL
CHOLEST/HDLC SERPL: 4.2 (CALC)
CREAT UR-MCNC: 112 MG/DL (ref 20–320)
HDLC SERPL-MCNC: 44 MG/DL
LDLC SERPL CALC-MCNC: 106 MG/DL (CALC)
MICROALBUMIN UR-MCNC: 40.1 MG/DL
NONHDLC SERPL-MCNC: 142 MG/DL (CALC)
T3FREE SERPL-MCNC: 3.4 PG/ML (ref 2.3–4.2)
T4 FREE SERPL-MCNC: 1.1 NG/DL (ref 0.8–1.8)
THYROPEROXIDASE AB SERPL-ACNC: 1 IU/ML
TRIGL SERPL-MCNC: 235 MG/DL
TSH BII SER-ACNC: <1 IU/L
TSH SERPL-ACNC: 0.49 MIU/L (ref 0.4–4.5)
TSI SER-ACNC: <89 % BASELINE

## 2025-08-18 PROCEDURE — 1125F AMNT PAIN NOTED PAIN PRSNT: CPT | Performed by: UROLOGY

## 2025-08-18 PROCEDURE — 1160F RVW MEDS BY RX/DR IN RCRD: CPT | Performed by: UROLOGY

## 2025-08-18 PROCEDURE — 3051F HG A1C>EQUAL 7.0%<8.0%: CPT | Performed by: UROLOGY

## 2025-08-18 PROCEDURE — 1159F MED LIST DOCD IN RCRD: CPT | Performed by: UROLOGY

## 2025-08-18 PROCEDURE — 1036F TOBACCO NON-USER: CPT | Performed by: UROLOGY

## 2025-08-18 PROCEDURE — 99024 POSTOP FOLLOW-UP VISIT: CPT | Performed by: UROLOGY

## 2025-08-18 ASSESSMENT — PAIN SCALES - GENERAL: PAINLEVEL_OUTOF10: 1

## 2025-08-20 ENCOUNTER — APPOINTMENT (OUTPATIENT)
Dept: SLEEP MEDICINE | Facility: CLINIC | Age: 70
End: 2025-08-20
Payer: MEDICARE

## 2025-08-20 VITALS
HEART RATE: 89 BPM | WEIGHT: 199.6 LBS | BODY MASS INDEX: 25.62 KG/M2 | OXYGEN SATURATION: 96 % | DIASTOLIC BLOOD PRESSURE: 75 MMHG | SYSTOLIC BLOOD PRESSURE: 148 MMHG | HEIGHT: 74 IN

## 2025-08-20 DIAGNOSIS — F51.04 CHRONIC INSOMNIA: ICD-10-CM

## 2025-08-20 DIAGNOSIS — G47.33 OSA (OBSTRUCTIVE SLEEP APNEA): Primary | ICD-10-CM

## 2025-08-20 PROCEDURE — 1160F RVW MEDS BY RX/DR IN RCRD: CPT | Performed by: PSYCHIATRY & NEUROLOGY

## 2025-08-20 PROCEDURE — 1159F MED LIST DOCD IN RCRD: CPT | Performed by: PSYCHIATRY & NEUROLOGY

## 2025-08-20 PROCEDURE — 3078F DIAST BP <80 MM HG: CPT | Performed by: PSYCHIATRY & NEUROLOGY

## 2025-08-20 PROCEDURE — 3051F HG A1C>EQUAL 7.0%<8.0%: CPT | Performed by: PSYCHIATRY & NEUROLOGY

## 2025-08-20 PROCEDURE — 3008F BODY MASS INDEX DOCD: CPT | Performed by: PSYCHIATRY & NEUROLOGY

## 2025-08-20 PROCEDURE — G2211 COMPLEX E/M VISIT ADD ON: HCPCS | Performed by: PSYCHIATRY & NEUROLOGY

## 2025-08-20 PROCEDURE — 3077F SYST BP >= 140 MM HG: CPT | Performed by: PSYCHIATRY & NEUROLOGY

## 2025-08-20 PROCEDURE — 99214 OFFICE O/P EST MOD 30 MIN: CPT | Performed by: PSYCHIATRY & NEUROLOGY

## 2025-08-20 RX ORDER — AZELASTINE 1 MG/ML
SPRAY, METERED NASAL EVERY 12 HOURS
COMMUNITY

## 2025-08-20 RX ORDER — TRAZODONE HYDROCHLORIDE 50 MG/1
25-50 TABLET ORAL NIGHTLY
Qty: 30 TABLET | Refills: 2 | Status: SHIPPED | OUTPATIENT
Start: 2025-08-20

## 2025-08-20 ASSESSMENT — PATIENT HEALTH QUESTIONNAIRE - PHQ9: 2. FEELING DOWN, DEPRESSED OR HOPELESS: NOT AT ALL

## 2025-08-22 ENCOUNTER — HOME CARE VISIT (OUTPATIENT)
Dept: HOME HEALTH SERVICES | Facility: HOME HEALTH | Age: 70
End: 2025-08-22
Payer: MEDICARE

## 2025-08-22 VITALS
HEART RATE: 81 BPM | TEMPERATURE: 98.3 F | DIASTOLIC BLOOD PRESSURE: 62 MMHG | SYSTOLIC BLOOD PRESSURE: 114 MMHG | RESPIRATION RATE: 18 BRPM

## 2025-08-22 PROCEDURE — G0299 HHS/HOSPICE OF RN EA 15 MIN: HCPCS | Mod: HHH

## 2025-08-22 ASSESSMENT — ENCOUNTER SYMPTOMS
LAST BOWEL MOVEMENT: 67439
DENIES PAIN: 1
PERSON REPORTING PAIN: PATIENT
CHANGE IN APPETITE: INCREASED
APPETITE LEVEL: GOOD

## 2025-08-25 ENCOUNTER — APPOINTMENT (OUTPATIENT)
Dept: UROLOGY | Facility: CLINIC | Age: 70
End: 2025-08-25
Payer: MEDICARE

## 2025-08-25 DIAGNOSIS — N20.0 LEFT RENAL STONE: Primary | ICD-10-CM

## 2025-08-25 PROCEDURE — 1160F RVW MEDS BY RX/DR IN RCRD: CPT | Performed by: UROLOGY

## 2025-08-25 PROCEDURE — 1126F AMNT PAIN NOTED NONE PRSNT: CPT | Performed by: UROLOGY

## 2025-08-25 PROCEDURE — 99024 POSTOP FOLLOW-UP VISIT: CPT | Performed by: UROLOGY

## 2025-08-25 PROCEDURE — 3051F HG A1C>EQUAL 7.0%<8.0%: CPT | Performed by: UROLOGY

## 2025-08-25 PROCEDURE — 1159F MED LIST DOCD IN RCRD: CPT | Performed by: UROLOGY

## 2025-08-25 PROCEDURE — 1036F TOBACCO NON-USER: CPT | Performed by: UROLOGY

## 2025-08-25 ASSESSMENT — PAIN SCALES - GENERAL: PAINLEVEL_OUTOF10: 0-NO PAIN

## 2025-08-27 ENCOUNTER — HOME CARE VISIT (OUTPATIENT)
Dept: HOME HEALTH SERVICES | Facility: HOME HEALTH | Age: 70
End: 2025-08-27
Payer: MEDICARE

## 2025-08-27 VITALS
TEMPERATURE: 98.5 F | SYSTOLIC BLOOD PRESSURE: 118 MMHG | DIASTOLIC BLOOD PRESSURE: 64 MMHG | HEART RATE: 81 BPM | RESPIRATION RATE: 16 BRPM | OXYGEN SATURATION: 96 %

## 2025-08-27 DIAGNOSIS — Z79.4 TYPE 2 DIABETES MELLITUS WITH OTHER SPECIFIED COMPLICATION, WITH LONG-TERM CURRENT USE OF INSULIN: Primary | ICD-10-CM

## 2025-08-27 DIAGNOSIS — E11.69 TYPE 2 DIABETES MELLITUS WITH OTHER SPECIFIED COMPLICATION, WITH LONG-TERM CURRENT USE OF INSULIN: Primary | ICD-10-CM

## 2025-08-27 PROCEDURE — G0299 HHS/HOSPICE OF RN EA 15 MIN: HCPCS | Mod: HHH

## 2025-08-27 RX ORDER — INSULIN GLARGINE 100 [IU]/ML
25 INJECTION, SOLUTION SUBCUTANEOUS EVERY MORNING
Qty: 30 ML | Refills: 3 | Status: SHIPPED | OUTPATIENT
Start: 2025-08-27

## 2025-08-27 ASSESSMENT — ACTIVITIES OF DAILY LIVING (ADL)
HOME_HEALTH_OASIS: 00
OASIS_M1830: 00

## 2025-08-27 ASSESSMENT — ENCOUNTER SYMPTOMS
DENIES PAIN: 1
PERSON REPORTING PAIN: PATIENT

## 2025-08-30 LAB
ANION GAP SERPL CALCULATED.4IONS-SCNC: 11 MMOL/L (CALC) (ref 7–17)
BUN SERPL-MCNC: 31 MG/DL (ref 7–25)
BUN/CREAT SERPL: 18 (CALC) (ref 6–22)
CALCIUM SERPL-MCNC: 9.2 MG/DL (ref 8.6–10.3)
CHLORIDE SERPL-SCNC: 107 MMOL/L (ref 98–110)
CO2 SERPL-SCNC: 22 MMOL/L (ref 20–32)
CREAT SERPL-MCNC: 1.74 MG/DL (ref 0.7–1.35)
EGFRCR SERPLBLD CKD-EPI 2021: 42 ML/MIN/1.73M2
GLUCOSE SERPL-MCNC: 214 MG/DL (ref 65–99)
POTASSIUM SERPL-SCNC: 4.9 MMOL/L (ref 3.5–5.3)
PTH-INTACT SERPL-MCNC: 52 PG/ML (ref 16–77)
SODIUM SERPL-SCNC: 140 MMOL/L (ref 135–146)
URATE SERPL-MCNC: 6.2 MG/DL (ref 4–8)

## 2025-09-02 ENCOUNTER — APPOINTMENT (OUTPATIENT)
Facility: CLINIC | Age: 70
End: 2025-09-02
Payer: MEDICARE

## 2025-09-23 ENCOUNTER — APPOINTMENT (OUTPATIENT)
Facility: CLINIC | Age: 70
End: 2025-09-23
Payer: MEDICARE

## 2025-10-16 ENCOUNTER — APPOINTMENT (OUTPATIENT)
Dept: PRIMARY CARE | Facility: CLINIC | Age: 70
End: 2025-10-16
Payer: MEDICARE

## 2025-10-27 ENCOUNTER — APPOINTMENT (OUTPATIENT)
Dept: UROLOGY | Facility: CLINIC | Age: 70
End: 2025-10-27
Payer: MEDICARE

## 2025-12-08 ENCOUNTER — APPOINTMENT (OUTPATIENT)
Facility: CLINIC | Age: 70
End: 2025-12-08
Payer: MEDICARE

## 2026-02-20 ENCOUNTER — APPOINTMENT (OUTPATIENT)
Dept: SLEEP MEDICINE | Facility: CLINIC | Age: 71
End: 2026-02-20
Payer: MEDICARE

## (undated) DEVICE — DRAPE, TIBURON W/ADHESIVE, 19 X 30

## (undated) DEVICE — DRESSING, ADHESIVE, ISLAND, TELFA, 4 X 10 IN

## (undated) DEVICE — SUTURE, PDS II, 4-0, 27 IN, RB-1 VIL MONO, LF

## (undated) DEVICE — CUTTER, PROX LINEAR, 75MM, REG TISSUE, W/ SAFETY LOCK OUT

## (undated) DEVICE — CARE KIT, LAPAROSCOPIC, ADVANCED

## (undated) DEVICE — BLANKET, LOWER BODY, VHA PLUS, ADULT

## (undated) DEVICE — GOWN, ASTOUND, XL

## (undated) DEVICE — DRAPE, PAD, PREP, W/ 9 IN CUFF, 24 X 41, LF, NS

## (undated) DEVICE — PUMP, STRYKERFLOW 2 & HANDPIECE W/10FT. IRRIGATION TUBING

## (undated) DEVICE — CATHETER, URETERAL, POLLACK, OPEN END, 5.5 FR, 70 CM

## (undated) DEVICE — CLIP, LIGATING, HEM-O-LOCK, MLX, LARGE, LF, PURPLE

## (undated) DEVICE — Device

## (undated) DEVICE — BAG, TISSUE RETRIEVAL, 15MM, ANCHOR

## (undated) DEVICE — CATHETER TRAY, SURESTEP, 16FR, URINE METER W/STATLOCK

## (undated) DEVICE — IRRIGATION SET, CYSTOSCOPY, F/CONSTANT/INTERMITTENT, 8 GTT/CC, 77 IN

## (undated) DEVICE — DRAIN, CHANNEL, HUBLESS, ROUND, FULL FLUTE, 19FR

## (undated) DEVICE — OBTURATOR, BLADELESS  8MM

## (undated) DEVICE — CUTTER, PROXIMATE LINEAR RELOAD, 75MM, BLUE

## (undated) DEVICE — DRESSING, ADHESIVE, ISLAND, TELFA, 2 X 3.75 IN, LF

## (undated) DEVICE — CLIP, HEM-O-LOCK, XLARGE GOLD, 6/PACK

## (undated) DEVICE — ADHESIVE, SKIN, DERMABOND ADVANCED, 15CM, PEN-STYLE

## (undated) DEVICE — TROCAR, KII OPTICAL BLADELESS 5MM Z THREAD 100MM LNGTH

## (undated) DEVICE — COVER, TABLE, 44 X 75 IN, DISPOSABLE, LF, STERILE

## (undated) DEVICE — GOWN, SURGICAL, SIRUS, NON REINFORCED, LARGE

## (undated) DEVICE — SEALER, VESSEL, EXTENDED

## (undated) DEVICE — CLOSURE SYSTEM, FASCIAL, EFX ENDO

## (undated) DEVICE — TUBING, SUCTION, CONNECTING, STERILE 0.25 X 120 IN., LF

## (undated) DEVICE — TOWEL, SURGICAL, NEURO, O/R, 16 X 26, BLUE, STERILE

## (undated) DEVICE — CLIPPER, SURGICAL BLADE ASSEMBLY, GENERAL PURPOSE, SINGLE USE

## (undated) DEVICE — STAPLER, ECHELON 3000, 60MM STD

## (undated) DEVICE — SUTURE, PDS II, 1, 36 IN, CT-1, VIOLET

## (undated) DEVICE — DRAPE, COLUMN, DAVINCI XI

## (undated) DEVICE — COUNTER, NEEDLE, FOAM BLOCK, POP-N-COUNT, W/BLADEGUARD, W/ADHESIVE 40 COUNT, RED

## (undated) DEVICE — STRIP, SKIN CLOSURE, STERI STRIP, REINFORCED, 0.5 X 4 IN

## (undated) DEVICE — SYRINGE, 20 CC, LUER LOCK

## (undated) DEVICE — MANIFOLD, 4 PORT NEPTUNE STANDARD

## (undated) DEVICE — TIP, SUCTION, YANKAUER, BULB, ADULT

## (undated) DEVICE — ACCESS PORT, 12MM, 100MM LENGTH, LOW PROFILE W/BLADELESS OPTICAL TIP

## (undated) DEVICE — SUTURE, V-LOC, 2-0, 6IN, GS-21, GREEN

## (undated) DEVICE — STAPLER,  LINEAR RELOAD, 60MM, WHITE, DISP

## (undated) DEVICE — RETRIEVAL SYSTEM, MONARCH, 10MM DISP ENDOSCOPIC

## (undated) DEVICE — SUTURE, VICRYL, 3-0, 27 IN, SH

## (undated) DEVICE — EVACUATOR, WOUND, SUCTION, CLOSED, JACKSON-PRATT, 100 CC, SILICONE

## (undated) DEVICE — STAPLER, LINEAR, 3.5 60MM, RELOADABLE, BLUE

## (undated) DEVICE — CATHETER, URETHRAL, FOLEY, 2 WAY, BARDEX IC, 16 FR, 5 CC, SILICONE

## (undated) DEVICE — SUTURE, SILK, 2-0, FSL, BR, 30 IN, BLACK

## (undated) DEVICE — SCISSORS, MONOPOLAR, CURVED, 8MM

## (undated) DEVICE — COVER, TIP HOT SHEARS ENDOWRIST

## (undated) DEVICE — SEAL, UNIVERSAL, 5-12MM

## (undated) DEVICE — TIP, SUCTION, YANKAUER, FLEXIBLE

## (undated) DEVICE — SUTURE, VICRYL, 2-0, 27 IN, SH, UNDYED

## (undated) DEVICE — GUIDEWIRE, SOLO FLEX, HYDRO, 0.038 X 150CM, ANGLED

## (undated) DEVICE — STAPLER, PROXI, 3.5 60MM, RELOAD, LEG TX LINEAR

## (undated) DEVICE — PREP TRAY, SKIN

## (undated) DEVICE — OSTOMY KIT, POSTOPERATIVE, UROSTOMY, GENTLE TOUCH, 1.75 IN FLANGE, TRANSPARENT, STERILE

## (undated) DEVICE — DRESSING, ISLAND, ADHESIVE, TELFA, 4 X 8 IN

## (undated) DEVICE — SUTURE, VICRYL, 0, SH 27 TAPER NEEDLE, UNDYED, BRAIDED

## (undated) DEVICE — SUTURE, VICRYL, 2-0, 27 IN, BR/SH 27, VIOLET

## (undated) DEVICE — TUBING SET, TRI-LUMEN, FILTERED, F/AIRSEAL

## (undated) DEVICE — FORCEPS, BIPOLAR FENESTRATED XI

## (undated) DEVICE — TROCAR, OPTICAL, BLADELESS, 12MM, THREADED, 100MM LENGTH

## (undated) DEVICE — SUTURE,  V-LOC,  0 GS-21 6IN

## (undated) DEVICE — DRAPE, ARM XI

## (undated) DEVICE — SOLUTION, IRRIGATION, X RX SODIUM CHL 0.9%, 1000ML BTL

## (undated) DEVICE — BAG, DRAINAGE, URINARY, URINE METER, INFECTION CONTROL, LF, 350ML

## (undated) DEVICE — SUTURE, PDS II, 1, 27 IN, CT-1, VIOLET

## (undated) DEVICE — SUTURE, POLYSORB 0 TIE 30 X 6 VIOLET"

## (undated) DEVICE — DRIVER, NEEDLE LARGE, DAVINCI XI

## (undated) DEVICE — POSITIONING, THE PINK PAD, PIGAZZI SYSTEM

## (undated) DEVICE — FORCEPS, PROGRASP, DAVINCI XI

## (undated) DEVICE — SUTURE, POLYSORB, 4-0, 18 IN, P12, UNDYED

## (undated) DEVICE — SUTURE, V-LOC 3-0 V-20 6 GR 180 ABS"

## (undated) DEVICE — KIT, ROBOTIC, CUSTOM UHC

## (undated) DEVICE — RETRIEVAL SYSTEM, MONARCH INZII, 12MM

## (undated) DEVICE — GLOVE, PROTEXIS PI CLASSIC, SZ-7.5, PF, LF

## (undated) DEVICE — OBTURATOR, BLADELESS , SU

## (undated) DEVICE — DRAPE, SHEET, ENDOSCOPY, GENERAL, FENESTRATED, ARMBOARD COVER, 98 X 123.5 IN, DISPOSABLE, LF, STERILE

## (undated) DEVICE — PROTECTOR, NERVE, ULNAR, PINK

## (undated) DEVICE — DRESSING, NON-ADHERENT, TELFA, OUCHLESS, 3 X 8 IN, STERILE